# Patient Record
Sex: FEMALE | Race: WHITE | NOT HISPANIC OR LATINO | Employment: OTHER | URBAN - METROPOLITAN AREA
[De-identification: names, ages, dates, MRNs, and addresses within clinical notes are randomized per-mention and may not be internally consistent; named-entity substitution may affect disease eponyms.]

---

## 2017-02-01 ENCOUNTER — HOSPITAL ENCOUNTER (OUTPATIENT)
Dept: MAMMOGRAPHY | Facility: HOSPITAL | Age: 57
Discharge: HOME | End: 2017-02-01
Attending: INTERNAL MEDICINE | Admitting: INTERNAL MEDICINE

## 2017-02-01 DIAGNOSIS — Z12.31 VISIT FOR SCREENING MAMMOGRAM: ICD-10-CM

## 2017-02-01 PROCEDURE — G0202 SCR MAMMO BI INCL CAD: HCPCS

## 2017-02-01 PROCEDURE — 77067 SCR MAMMO BI INCL CAD: CPT | Performed by: RADIOLOGY

## 2017-02-01 PROCEDURE — 77063 BREAST TOMOSYNTHESIS BI: CPT

## 2017-02-01 PROCEDURE — 77063 BREAST TOMOSYNTHESIS BI: CPT | Performed by: RADIOLOGY

## 2017-02-17 ENCOUNTER — HOSPITAL ENCOUNTER (INPATIENT)
Facility: HOSPITAL | Age: 57
LOS: 3 days | Discharge: HOME | End: 2017-02-20
Attending: EMERGENCY MEDICINE | Admitting: FAMILY MEDICINE

## 2017-02-17 ENCOUNTER — APPOINTMENT (OUTPATIENT)
Dept: CT IMAGING | Facility: HOSPITAL | Age: 57
End: 2017-02-17

## 2017-02-17 DIAGNOSIS — Z86.79 HISTORY OF HYPERTENSION: ICD-10-CM

## 2017-02-17 DIAGNOSIS — N17.9 ACUTE RENAL FAILURE, UNSPECIFIED ACUTE RENAL FAILURE TYPE (HCC): Primary | ICD-10-CM

## 2017-02-17 DIAGNOSIS — R18.8 OTHER ASCITES: ICD-10-CM

## 2017-02-17 DIAGNOSIS — R11.2 NON-INTRACTABLE VOMITING WITH NAUSEA, UNSPECIFIED VOMITING TYPE: ICD-10-CM

## 2017-02-17 DIAGNOSIS — E87.20 LACTIC ACIDOSIS: ICD-10-CM

## 2017-02-17 DIAGNOSIS — I95.9 TRANSIENT HYPOTENSION: ICD-10-CM

## 2017-02-17 DIAGNOSIS — N83.8 OVARIAN MASS, LEFT: ICD-10-CM

## 2017-02-17 PROBLEM — K52.9 GASTROENTERITIS, ACUTE: Status: ACTIVE | Noted: 2017-02-17

## 2017-02-17 PROBLEM — E87.6 HYPOKALEMIA: Status: ACTIVE | Noted: 2017-02-17

## 2017-02-17 PROBLEM — D72.829 LEUKOCYTOSIS: Status: ACTIVE | Noted: 2017-02-17

## 2017-02-17 PROBLEM — A41.9 SEPSIS (HCC): Status: ACTIVE | Noted: 2017-02-17

## 2017-02-17 LAB
ALBUMIN SERPL-MCNC: 5 G/DL (ref 3.2–4.8)
ALBUMIN/GLOB SERPL: 1.4 G/DL (ref 1.5–2.5)
ALP SERPL-CCNC: 54 U/L (ref 25–100)
ALT SERPL W P-5'-P-CCNC: 27 U/L (ref 7–40)
ANION GAP SERPL CALCULATED.3IONS-SCNC: 25 MMOL/L (ref 3–11)
AST SERPL-CCNC: 31 U/L (ref 0–33)
B-OH-BUTYR SERPL-SCNC: 0.3 MMOL/L
BASOPHILS # BLD AUTO: 0.02 10*3/MM3 (ref 0–0.2)
BASOPHILS NFR BLD AUTO: 0.1 % (ref 0–1)
BILIRUB SERPL-MCNC: 0.4 MG/DL (ref 0.3–1.2)
BUN BLD-MCNC: 17 MG/DL (ref 9–23)
BUN/CREAT SERPL: 5.5 (ref 7–25)
CALCIUM SPEC-SCNC: 9.2 MG/DL (ref 8.7–10.4)
CHLORIDE SERPL-SCNC: 96 MMOL/L (ref 99–109)
CO2 SERPL-SCNC: 18 MMOL/L (ref 20–31)
CREAT BLD-MCNC: 3.1 MG/DL (ref 0.6–1.3)
D-LACTATE SERPL-SCNC: 5.2 MMOL/L (ref 0.5–2)
DEPRECATED RDW RBC AUTO: 46.7 FL (ref 37–54)
EOSINOPHIL # BLD AUTO: 0 10*3/MM3 (ref 0.1–0.3)
EOSINOPHIL NFR BLD AUTO: 0 % (ref 0–3)
ERYTHROCYTE [DISTWIDTH] IN BLOOD BY AUTOMATED COUNT: 15.7 % (ref 11.3–14.5)
GFR SERPL CREATININE-BSD FRML MDRD: 15 ML/MIN/1.73
GLOBULIN UR ELPH-MCNC: 3.6 GM/DL
GLUCOSE BLD-MCNC: 249 MG/DL (ref 70–100)
HCT VFR BLD AUTO: 46.5 % (ref 34.5–44)
HGB BLD-MCNC: 15.8 G/DL (ref 11.5–15.5)
IMM GRANULOCYTES # BLD: 0.18 10*3/MM3 (ref 0–0.03)
IMM GRANULOCYTES NFR BLD: 1.1 % (ref 0–0.6)
LIPASE SERPL-CCNC: 35 U/L (ref 6–51)
LYMPHOCYTES # BLD AUTO: 1.67 10*3/MM3 (ref 0.6–4.8)
LYMPHOCYTES NFR BLD AUTO: 10.3 % (ref 24–44)
MCH RBC QN AUTO: 27.4 PG (ref 27–31)
MCHC RBC AUTO-ENTMCNC: 34 G/DL (ref 32–36)
MCV RBC AUTO: 80.7 FL (ref 80–99)
MONOCYTES # BLD AUTO: 0.76 10*3/MM3 (ref 0–1)
MONOCYTES NFR BLD AUTO: 4.7 % (ref 0–12)
NEUTROPHILS # BLD AUTO: 13.52 10*3/MM3 (ref 1.5–8.3)
NEUTROPHILS NFR BLD AUTO: 83.8 % (ref 41–71)
PLATELET # BLD AUTO: 613 10*3/MM3 (ref 150–450)
PMV BLD AUTO: 9.3 FL (ref 6–12)
POTASSIUM BLD-SCNC: 2.8 MMOL/L (ref 3.5–5.5)
PROT SERPL-MCNC: 8.6 G/DL (ref 5.7–8.2)
RBC # BLD AUTO: 5.76 10*6/MM3 (ref 3.89–5.14)
SODIUM BLD-SCNC: 139 MMOL/L (ref 132–146)
WBC NRBC COR # BLD: 16.15 10*3/MM3 (ref 3.5–10.8)

## 2017-02-17 PROCEDURE — 74176 CT ABD & PELVIS W/O CONTRAST: CPT

## 2017-02-17 PROCEDURE — 83690 ASSAY OF LIPASE: CPT | Performed by: EMERGENCY MEDICINE

## 2017-02-17 PROCEDURE — 85025 COMPLETE CBC W/AUTO DIFF WBC: CPT | Performed by: EMERGENCY MEDICINE

## 2017-02-17 PROCEDURE — 25010000002 PIPERACILLIN-TAZOBACTAM: Performed by: EMERGENCY MEDICINE

## 2017-02-17 PROCEDURE — 82010 KETONE BODYS QUAN: CPT | Performed by: EMERGENCY MEDICINE

## 2017-02-17 PROCEDURE — 87040 BLOOD CULTURE FOR BACTERIA: CPT | Performed by: NURSE PRACTITIONER

## 2017-02-17 PROCEDURE — 83605 ASSAY OF LACTIC ACID: CPT | Performed by: EMERGENCY MEDICINE

## 2017-02-17 PROCEDURE — 25010000002 METOCLOPRAMIDE PER 10 MG: Performed by: EMERGENCY MEDICINE

## 2017-02-17 PROCEDURE — 99284 EMERGENCY DEPT VISIT MOD MDM: CPT

## 2017-02-17 PROCEDURE — 80053 COMPREHEN METABOLIC PANEL: CPT | Performed by: EMERGENCY MEDICINE

## 2017-02-17 PROCEDURE — 25010000002 PROMETHAZINE PER 50 MG: Performed by: EMERGENCY MEDICINE

## 2017-02-17 RX ORDER — AMLODIPINE BESYLATE 2.5 MG/1
5 TABLET ORAL DAILY
Status: ON HOLD | COMMUNITY
End: 2017-02-18

## 2017-02-17 RX ORDER — CETIRIZINE HYDROCHLORIDE 10 MG/1
10 TABLET ORAL DAILY
COMMUNITY
End: 2019-08-30 | Stop reason: SDUPTHER

## 2017-02-17 RX ORDER — FAMOTIDINE 10 MG/ML
20 INJECTION, SOLUTION INTRAVENOUS ONCE
Status: COMPLETED | OUTPATIENT
Start: 2017-02-17 | End: 2017-02-17

## 2017-02-17 RX ORDER — SODIUM CHLORIDE 9 MG/ML
125 INJECTION, SOLUTION INTRAVENOUS CONTINUOUS
Status: DISCONTINUED | OUTPATIENT
Start: 2017-02-17 | End: 2017-02-18 | Stop reason: SDUPTHER

## 2017-02-17 RX ORDER — POTASSIUM CHLORIDE 750 MG/1
10 TABLET, FILM COATED, EXTENDED RELEASE ORAL DAILY
Status: ON HOLD | COMMUNITY
End: 2017-02-20

## 2017-02-17 RX ORDER — METOCLOPRAMIDE HYDROCHLORIDE 5 MG/ML
10 INJECTION INTRAMUSCULAR; INTRAVENOUS ONCE
Status: COMPLETED | OUTPATIENT
Start: 2017-02-17 | End: 2017-02-17

## 2017-02-17 RX ORDER — SODIUM CHLORIDE 0.9 % (FLUSH) 0.9 %
10 SYRINGE (ML) INJECTION AS NEEDED
Status: DISCONTINUED | OUTPATIENT
Start: 2017-02-17 | End: 2017-02-20 | Stop reason: HOSPADM

## 2017-02-17 RX ORDER — PROMETHAZINE HYDROCHLORIDE 25 MG/ML
6.25 INJECTION, SOLUTION INTRAMUSCULAR; INTRAVENOUS ONCE
Status: COMPLETED | OUTPATIENT
Start: 2017-02-17 | End: 2017-02-17

## 2017-02-17 RX ADMIN — SODIUM CHLORIDE 125 ML/HR: 9 INJECTION, SOLUTION INTRAVENOUS at 20:59

## 2017-02-17 RX ADMIN — FAMOTIDINE 20 MG: 10 INJECTION, SOLUTION INTRAVENOUS at 18:12

## 2017-02-17 RX ADMIN — SODIUM CHLORIDE 1000 ML: 9 INJECTION, SOLUTION INTRAVENOUS at 18:09

## 2017-02-17 RX ADMIN — METOCLOPRAMIDE 10 MG: 5 INJECTION, SOLUTION INTRAMUSCULAR; INTRAVENOUS at 18:12

## 2017-02-17 RX ADMIN — SODIUM CHLORIDE 1000 ML: 9 INJECTION, SOLUTION INTRAVENOUS at 20:55

## 2017-02-17 RX ADMIN — PROMETHAZINE HYDROCHLORIDE 6.25 MG: 25 INJECTION, SOLUTION INTRAMUSCULAR; INTRAVENOUS at 18:10

## 2017-02-17 RX ADMIN — TAZOBACTAM SODIUM AND PIPERACILLIN SODIUM 4.5 G: .5; 4 INJECTION, POWDER, LYOPHILIZED, FOR SOLUTION INTRAVENOUS at 21:51

## 2017-02-17 NOTE — ED PROVIDER NOTES
Subjective   HPI Comments: Jocelyne Lindquist is a 56 y.o.female who presents to the ED with c/o vomiting onset 0300. EMS report the patient has been experiencing continued episodes of vomiting since 0300 this morning. She took Zofran at 1200 with minimal relief. The patient continued to vomit throughout the day, prompting her to call EMS. EMS treated her with Zofran and fluids. In the ED the patient is continuing to vomit. She reports her last BM was two days ago, however she has passed gas. She denies any other complaints at this time.     Patient is a 56 y.o. female presenting with vomiting.   History provided by:  Patient and EMS personnel  Vomiting   The primary symptoms include vomiting. Primary symptoms do not include fever, abdominal pain or diarrhea. Episode onset: 0300. The onset was sudden. The problem has not changed since onset.  The illness does not include chills.       Review of Systems   Constitutional: Negative.  Negative for chills and fever.   HENT: Negative for congestion and rhinorrhea.    Respiratory: Negative.  Negative for shortness of breath.    Cardiovascular: Negative.    Gastrointestinal: Positive for vomiting. Negative for abdominal pain and diarrhea.   Allergic/Immunologic: Negative for immunocompromised state.   Hematological: Does not bruise/bleed easily.   All other systems reviewed and are negative.      Past Medical History   Diagnosis Date   • Hypertension        No Known Allergies    Past Surgical History   Procedure Laterality Date   • Breast biopsy Left        Family History   Problem Relation Age of Onset   • Breast cancer Neg Hx    • Ovarian cancer Neg Hx        Social History     Social History   • Marital status:      Spouse name: N/A   • Number of children: N/A   • Years of education: N/A     Social History Main Topics   • Smoking status: Former Smoker   • Smokeless tobacco: None   • Alcohol use Yes      Comment: pt states sometimes she drinnks daily but hasnt had a  drink in weeks. pt drinks beer when she does drink   • Drug use: No   • Sexual activity: Defer     Other Topics Concern   • None     Social History Narrative   • None         Objective   Physical Exam   Constitutional: She is oriented to person, place, and time. She appears well-developed and well-nourished. No distress.   Patient is currently not in any pain, but appears uncomfortable.   HENT:   Head: Normocephalic and atraumatic.   Eyes: Conjunctivae are normal. No scleral icterus.   Neck: Normal range of motion. Neck supple.   Cardiovascular: Regular rhythm, normal heart sounds and intact distal pulses.    Tachycardic rate and rhythm.   Pulmonary/Chest: Effort normal and breath sounds normal. No respiratory distress.   Abdominal: Soft. Bowel sounds are normal. There is tenderness (Mild diffuse tenderness but no surgical signs no rigidity.).   Musculoskeletal: Normal range of motion. She exhibits no edema.   Neurological: She is alert and oriented to person, place, and time.   Skin: Skin is warm and dry.   Psychiatric: She has a normal mood and affect. Her behavior is normal. Judgment and thought content normal.   Nursing note and vitals reviewed.      Critical Care  Performed by: JUNG HOFFMANN  Authorized by: JUNG HOFFMANN   Total critical care time: 60 minutes  Critical care time was exclusive of separately billable procedures and treating other patients and teaching time.  Critical care was necessary to treat or prevent imminent or life-threatening deterioration of the following conditions: renal failure and metabolic crisis.  Critical care was time spent personally by me on the following activities: discussions with consultants, evaluation of patient's response to treatment, obtaining history from patient or surrogate, ordering and review of laboratory studies, review of old charts, development of treatment plan with patient or surrogate, examination of patient, ordering and performing treatments  and interventions, ordering and review of radiographic studies and re-evaluation of patient's condition.               ED Course  ED Course   Value Comment By Time   Creatinine: (!) 3.10 (Reviewed) Alan Fregoso MD 02/17 1845   WBC: (!) 16.15 (Reviewed) Alan Fregoso MD 02/17 1845   Hemoglobin: (!) 15.8 (Reviewed) Alan Fregoso MD 02/17 1845   Potassium: (!) 2.8 (Reviewed) Alan Fregoso MD 02/17 1845   Chloride: (!) 96 (Reviewed) Alan Fregoso MD 02/17 1845   CO2: (!) 18.0 (Reviewed) Alan Fregoso MD 02/17 1845   Glucose: (!) 249 (Reviewed) Alan Fregoso MD 02/17 1845    The patient has the findings on the CT scan with the acute renal failure and leukocytosis.  The patient is still unable to make any urine.  Thus we will give the patient another 1000 cc bolus.  This point the patient received well over the 30 cc/kg septic bolus in combination with the 1 L provided by EMS and then 2 L plus basal rate here in the emergency department. Alan Fregoso MD 02/17 2039   Lactate, Venous: (!!) 5.2 (Reviewed) Alan Fregoso MD 02/17 2045    Case discussed with Dr. Bobby who agrees with treatment plan for admission. Alan Fregoso MD 02/17 2101    Patient advised on the pelvic lesion as well as our plan for admission.  The patient understands and agrees with the plan. Alan Fregoso MD 02/17 2111     Recent Results (from the past 24 hour(s))   Comprehensive Metabolic Panel    Collection Time: 02/17/17  6:10 PM   Result Value Ref Range    Glucose 249 (H) 70 - 100 mg/dL    BUN 17 9 - 23 mg/dL    Creatinine 3.10 (H) 0.60 - 1.30 mg/dL    Sodium 139 132 - 146 mmol/L    Potassium 2.8 (L) 3.5 - 5.5 mmol/L    Chloride 96 (L) 99 - 109 mmol/L    CO2 18.0 (L) 20.0 - 31.0 mmol/L    Calcium 9.2 8.7 - 10.4 mg/dL    Total Protein 8.6 (H) 5.7 - 8.2 g/dL    Albumin 5.00 (H) 3.20 - 4.80 g/dL    ALT (SGPT) 27 7 - 40 U/L    AST (SGOT) 31 0 - 33 U/L    Alkaline Phosphatase  54 25 - 100 U/L    Total Bilirubin 0.4 0.3 - 1.2 mg/dL    eGFR Non African Amer 15 (L) >60 mL/min/1.73    Globulin 3.6 gm/dL    A/G Ratio 1.4 (L) 1.5 - 2.5 g/dL    BUN/Creatinine Ratio 5.5 (L) 7.0 - 25.0    Anion Gap 25.0 (H) 3.0 - 11.0 mmol/L   Lipase    Collection Time: 02/17/17  6:10 PM   Result Value Ref Range    Lipase 35 6 - 51 U/L   CBC Auto Differential    Collection Time: 02/17/17  6:10 PM   Result Value Ref Range    WBC 16.15 (H) 3.50 - 10.80 10*3/mm3    RBC 5.76 (H) 3.89 - 5.14 10*6/mm3    Hemoglobin 15.8 (H) 11.5 - 15.5 g/dL    Hematocrit 46.5 (H) 34.5 - 44.0 %    MCV 80.7 80.0 - 99.0 fL    MCH 27.4 27.0 - 31.0 pg    MCHC 34.0 32.0 - 36.0 g/dL    RDW 15.7 (H) 11.3 - 14.5 %    RDW-SD 46.7 37.0 - 54.0 fl    MPV 9.3 6.0 - 12.0 fL    Platelets 613 (H) 150 - 450 10*3/mm3    Neutrophil % 83.8 (H) 41.0 - 71.0 %    Lymphocyte % 10.3 (L) 24.0 - 44.0 %    Monocyte % 4.7 0.0 - 12.0 %    Eosinophil % 0.0 0.0 - 3.0 %    Basophil % 0.1 0.0 - 1.0 %    Immature Grans % 1.1 (H) 0.0 - 0.6 %    Neutrophils, Absolute 13.52 (H) 1.50 - 8.30 10*3/mm3    Lymphocytes, Absolute 1.67 0.60 - 4.80 10*3/mm3    Monocytes, Absolute 0.76 0.00 - 1.00 10*3/mm3    Eosinophils, Absolute 0.00 (L) 0.10 - 0.30 10*3/mm3    Basophils, Absolute 0.02 0.00 - 0.20 10*3/mm3    Immature Grans, Absolute 0.18 (H) 0.00 - 0.03 10*3/mm3   Lactic Acid, Plasma    Collection Time: 02/17/17  7:36 PM   Result Value Ref Range    Lactate 5.2 (C) 0.5 - 2.0 mmol/L   Beta Hydroxybutyrate Quantitative    Collection Time: 02/17/17  7:36 PM   Result Value Ref Range    Beta-Hydroxybutyrate Quant 0.300 <=0.500 mmol/L     Note: In addition to lab results from this visit, the labs listed above may include labs taken at another facility or during a different encounter within the last 24 hours. Please correlate lab times with ED admission and discharge times for further clarification of the services performed during this visit.    CT Abdomen Pelvis Without Contrast   Final  "Result   Abnormal      The overall findings are nonspecific but concerning for a moderate to severe    acute gastroenteritis.  Evolving small bowel ischemia cannot be excluded.      4.7 cm cystic structure in the left ovary. A followup ultrasound is recommended    for further evaluation.      Peripheral vascular disease.      Small to moderate amount of slightly complex free fluid of uncertain etiology    but may be related to a gastroenteritis. Subacute hemorrhage or an underlying    neoplastic process cannot be excluded.      Other findings as discussed above.        At 7:20 PM EST on 2/17/2017 the findings were discussed with Phoenix Vogel R.N.,    who reported she would promptly relay the information to Dr. Fregoso.         THIS DOCUMENT HAS BEEN ELECTRONICALLY SIGNED BY SHERRY CONDE MD        Vitals:    02/17/17 1754 02/17/17 1758 02/17/17 2000   BP:  101/64 113/72   Pulse:  (!) 131    Resp:  20    Temp:  97.7 °F (36.5 °C)    SpO2:  96% 92%   Weight: 136 lb (61.7 kg)     Height: 63\" (160 cm)       Medications   sodium chloride 0.9 % flush 10 mL (not administered)   sodium chloride 0.9 % infusion (125 mL/hr Intravenous New Bag 2/17/17 2059)   piperacillin-tazobactam (ZOSYN) 4.5 g/100 mL 0.9% NS IVPB (mbp) (not administered)   sodium chloride 0.9 % bolus 1,000 mL (0 mL Intravenous Stopped 2/17/17 2025)   famotidine (PEPCID) injection 20 mg (20 mg Intravenous Given 2/17/17 1812)   metoclopramide (REGLAN) injection 10 mg (10 mg Intravenous Given 2/17/17 1812)   promethazine (PHENERGAN) injection 6.25 mg (6.25 mg Intravenous Given 2/17/17 1810)   sodium chloride 0.9 % bolus 1,000 mL (1,000 mL Intravenous New Bag 2/17/17 2055)     ECG/EMG Results (last 24 hours)     ** No results found for the last 24 hours. **                        MDM  Number of Diagnoses or Management Options  Acute renal failure, unspecified acute renal failure type:   History of hypertension:   Lactic acidosis:   Non-intractable vomiting with " nausea, unspecified vomiting type:   Other ascites:   Ovarian mass, left:   Transient hypotension:   Diagnosis management comments: ECG/EMG Results (last 24 hours)     ** No results found for the last 24 hours. **             Amount and/or Complexity of Data Reviewed  Clinical lab tests: reviewed  Tests in the radiology section of CPT®: reviewed  Decide to obtain previous medical records or to obtain history from someone other than the patient: yes  Obtain history from someone other than the patient: yes  Discuss the patient with other providers: yes  Independent visualization of images, tracings, or specimens: yes    Critical Care  Total time providing critical care: 30-74 minutes      Final diagnoses:   Acute renal failure, unspecified acute renal failure type   Non-intractable vomiting with nausea, unspecified vomiting type   History of hypertension   Transient hypotension   Lactic acidosis   Ovarian mass, left   Other ascites       Documentation assistance provided by valentin Singh.  Information recorded by the scribe was done at my direction and has been verified and validated by me.     Casandra Singh  02/17/17 1831       Casandra Singh  02/17/17 2006       Casandra Singh  02/17/17 2026       Casandra Singh  02/17/17 2105       Alan Fregoso MD  02/17/17 2120

## 2017-02-18 PROBLEM — E86.9 VOLUME DEPLETION: Status: ACTIVE | Noted: 2017-02-18

## 2017-02-18 PROBLEM — N17.9 ACUTE RENAL FAILURE: Status: ACTIVE | Noted: 2017-02-18

## 2017-02-18 PROBLEM — Z72.0 TOBACCO ABUSE: Status: ACTIVE | Noted: 2017-02-18

## 2017-02-18 PROBLEM — N39.0 UTI (URINARY TRACT INFECTION): Status: ACTIVE | Noted: 2017-02-18

## 2017-02-18 PROBLEM — I95.9 HYPOTENSION: Status: ACTIVE | Noted: 2017-02-18

## 2017-02-18 PROBLEM — R73.9 HYPERGLYCEMIA: Status: ACTIVE | Noted: 2017-02-18

## 2017-02-18 PROBLEM — E83.42 HYPOMAGNESEMIA: Status: ACTIVE | Noted: 2017-02-18

## 2017-02-18 LAB
ALBUMIN SERPL-MCNC: 3.8 G/DL (ref 3.2–4.8)
ALBUMIN/GLOB SERPL: 1.4 G/DL (ref 1.5–2.5)
ALP SERPL-CCNC: 33 U/L (ref 25–100)
ALT SERPL W P-5'-P-CCNC: 20 U/L (ref 7–40)
ANION GAP SERPL CALCULATED.3IONS-SCNC: 15 MMOL/L (ref 3–11)
APTT PPP: 25.9 SECONDS (ref 24–31)
AST SERPL-CCNC: 37 U/L (ref 0–33)
BACTERIA UR QL AUTO: ABNORMAL /HPF
BASOPHILS # BLD AUTO: 0.01 10*3/MM3 (ref 0–0.2)
BASOPHILS NFR BLD AUTO: 0.1 % (ref 0–1)
BILIRUB SERPL-MCNC: 0.2 MG/DL (ref 0.3–1.2)
BILIRUB UR QL STRIP: ABNORMAL
BUN BLD-MCNC: 25 MG/DL (ref 9–23)
BUN/CREAT SERPL: 6.9 (ref 7–25)
CALCIUM SPEC-SCNC: 7.3 MG/DL (ref 8.7–10.4)
CHLORIDE SERPL-SCNC: 104 MMOL/L (ref 99–109)
CLARITY UR: ABNORMAL
CO2 SERPL-SCNC: 20 MMOL/L (ref 20–31)
COLOR UR: ABNORMAL
CREAT BLD-MCNC: 3.6 MG/DL (ref 0.6–1.3)
D-LACTATE SERPL-SCNC: 0.8 MMOL/L (ref 0.5–2)
D-LACTATE SERPL-SCNC: 2.3 MMOL/L (ref 0.5–2)
DEPRECATED RDW RBC AUTO: 45.9 FL (ref 37–54)
EOSINOPHIL # BLD AUTO: 0 10*3/MM3 (ref 0.1–0.3)
EOSINOPHIL NFR BLD AUTO: 0 % (ref 0–3)
ERYTHROCYTE [DISTWIDTH] IN BLOOD BY AUTOMATED COUNT: 15.6 % (ref 11.3–14.5)
GFR SERPL CREATININE-BSD FRML MDRD: 13 ML/MIN/1.73
GLOBULIN UR ELPH-MCNC: 2.7 GM/DL
GLUCOSE BLD-MCNC: 121 MG/DL (ref 70–100)
GLUCOSE BLDC GLUCOMTR-MCNC: 113 MG/DL (ref 70–130)
GLUCOSE BLDC GLUCOMTR-MCNC: 92 MG/DL (ref 70–130)
GLUCOSE UR STRIP-MCNC: ABNORMAL MG/DL
HBA1C MFR BLD: 5.6 % (ref 4.8–5.6)
HCT VFR BLD AUTO: 33.4 % (ref 34.5–44)
HGB BLD-MCNC: 11.4 G/DL (ref 11.5–15.5)
HGB UR QL STRIP.AUTO: ABNORMAL
HYALINE CASTS UR QL AUTO: ABNORMAL /LPF
IMM GRANULOCYTES # BLD: 0.07 10*3/MM3 (ref 0–0.03)
IMM GRANULOCYTES NFR BLD: 0.5 % (ref 0–0.6)
INR PPP: 1.1
KETONES UR QL STRIP: ABNORMAL
LEUKOCYTE ESTERASE UR QL STRIP.AUTO: ABNORMAL
LYMPHOCYTES # BLD AUTO: 1.58 10*3/MM3 (ref 0.6–4.8)
LYMPHOCYTES NFR BLD AUTO: 12.2 % (ref 24–44)
MAGNESIUM SERPL-MCNC: <0.7 MG/DL (ref 1.3–2.7)
MCH RBC QN AUTO: 27.1 PG (ref 27–31)
MCHC RBC AUTO-ENTMCNC: 34.1 G/DL (ref 32–36)
MCV RBC AUTO: 79.5 FL (ref 80–99)
MONOCYTES # BLD AUTO: 1.28 10*3/MM3 (ref 0–1)
MONOCYTES NFR BLD AUTO: 9.9 % (ref 0–12)
NEUTROPHILS # BLD AUTO: 10 10*3/MM3 (ref 1.5–8.3)
NEUTROPHILS NFR BLD AUTO: 77.3 % (ref 41–71)
NITRITE UR QL STRIP: NEGATIVE
PH UR STRIP.AUTO: <=5 [PH] (ref 5–8)
PLATELET # BLD AUTO: 482 10*3/MM3 (ref 150–450)
PMV BLD AUTO: 9.1 FL (ref 6–12)
POTASSIUM BLD-SCNC: 2.9 MMOL/L (ref 3.5–5.5)
PROT SERPL-MCNC: 6.5 G/DL (ref 5.7–8.2)
PROT UR QL STRIP: ABNORMAL
PROTHROMBIN TIME: 12 SECONDS (ref 9.6–11.5)
RBC # BLD AUTO: 4.2 10*6/MM3 (ref 3.89–5.14)
RBC # UR: ABNORMAL /HPF
REF LAB TEST METHOD: ABNORMAL
SODIUM BLD-SCNC: 139 MMOL/L (ref 132–146)
SP GR UR STRIP: 1.03 (ref 1–1.03)
SQUAMOUS #/AREA URNS HPF: ABNORMAL /HPF
UROBILINOGEN UR QL STRIP: ABNORMAL
WBC NRBC COR # BLD: 12.94 10*3/MM3 (ref 3.5–10.8)
WBC UR QL AUTO: ABNORMAL /HPF

## 2017-02-18 PROCEDURE — 25010000002 PIPERACILLIN SOD-TAZOBACTAM PER 1 G

## 2017-02-18 PROCEDURE — 83735 ASSAY OF MAGNESIUM: CPT | Performed by: NURSE PRACTITIONER

## 2017-02-18 PROCEDURE — 99222 1ST HOSP IP/OBS MODERATE 55: CPT | Performed by: FAMILY MEDICINE

## 2017-02-18 PROCEDURE — 83605 ASSAY OF LACTIC ACID: CPT | Performed by: EMERGENCY MEDICINE

## 2017-02-18 PROCEDURE — 85730 THROMBOPLASTIN TIME PARTIAL: CPT | Performed by: NURSE PRACTITIONER

## 2017-02-18 PROCEDURE — 85610 PROTHROMBIN TIME: CPT | Performed by: NURSE PRACTITIONER

## 2017-02-18 PROCEDURE — 82962 GLUCOSE BLOOD TEST: CPT

## 2017-02-18 PROCEDURE — 25010000002 HEPARIN (PORCINE) PER 1000 UNITS: Performed by: FAMILY MEDICINE

## 2017-02-18 PROCEDURE — 83605 ASSAY OF LACTIC ACID: CPT | Performed by: NURSE PRACTITIONER

## 2017-02-18 PROCEDURE — 87086 URINE CULTURE/COLONY COUNT: CPT | Performed by: EMERGENCY MEDICINE

## 2017-02-18 PROCEDURE — 94799 UNLISTED PULMONARY SVC/PX: CPT

## 2017-02-18 PROCEDURE — 81001 URINALYSIS AUTO W/SCOPE: CPT | Performed by: EMERGENCY MEDICINE

## 2017-02-18 PROCEDURE — 99233 SBSQ HOSP IP/OBS HIGH 50: CPT | Performed by: FAMILY MEDICINE

## 2017-02-18 PROCEDURE — 85025 COMPLETE CBC W/AUTO DIFF WBC: CPT | Performed by: NURSE PRACTITIONER

## 2017-02-18 PROCEDURE — 80053 COMPREHEN METABOLIC PANEL: CPT | Performed by: NURSE PRACTITIONER

## 2017-02-18 PROCEDURE — 25010000002 PIPERACILLIN SOD-TAZOBACTAM PER 1 G: Performed by: FAMILY MEDICINE

## 2017-02-18 PROCEDURE — 25010000003 POTASSIUM CHLORIDE 10 MEQ/100ML SOLUTION: Performed by: FAMILY MEDICINE

## 2017-02-18 PROCEDURE — 99252 IP/OBS CONSLTJ NEW/EST SF 35: CPT | Performed by: NURSE PRACTITIONER

## 2017-02-18 PROCEDURE — 83036 HEMOGLOBIN GLYCOSYLATED A1C: CPT | Performed by: NURSE PRACTITIONER

## 2017-02-18 PROCEDURE — 25010000002 MAGNESIUM SULFATE IN D5W 1G/100ML (PREMIX) 10-5 MG/ML-% SOLUTION: Performed by: FAMILY MEDICINE

## 2017-02-18 RX ORDER — SODIUM CHLORIDE 9 MG/ML
100 INJECTION, SOLUTION INTRAVENOUS CONTINUOUS
Status: DISCONTINUED | OUTPATIENT
Start: 2017-02-18 | End: 2017-02-20 | Stop reason: HOSPADM

## 2017-02-18 RX ORDER — POTASSIUM CHLORIDE 1.5 G/1.77G
20 POWDER, FOR SOLUTION ORAL ONCE
Status: COMPLETED | OUTPATIENT
Start: 2017-02-18 | End: 2017-02-18

## 2017-02-18 RX ORDER — ONDANSETRON 4 MG/1
4 TABLET, FILM COATED ORAL EVERY 6 HOURS PRN
Status: DISCONTINUED | OUTPATIENT
Start: 2017-02-18 | End: 2017-02-20 | Stop reason: HOSPADM

## 2017-02-18 RX ORDER — SODIUM CHLORIDE 450 MG/100ML
150 INJECTION, SOLUTION INTRAVENOUS CONTINUOUS
Status: DISCONTINUED | OUTPATIENT
Start: 2017-02-18 | End: 2017-02-18

## 2017-02-18 RX ORDER — AMLODIPINE BESYLATE 5 MG/1
5 TABLET ORAL DAILY
COMMUNITY
End: 2018-06-14 | Stop reason: SDUPTHER

## 2017-02-18 RX ORDER — OMEPRAZOLE 20 MG/1
20 CAPSULE, DELAYED RELEASE ORAL DAILY
COMMUNITY
End: 2018-06-14 | Stop reason: SDUPTHER

## 2017-02-18 RX ORDER — SODIUM CHLORIDE 0.9 % (FLUSH) 0.9 %
1-10 SYRINGE (ML) INJECTION AS NEEDED
Status: DISCONTINUED | OUTPATIENT
Start: 2017-02-18 | End: 2017-02-20 | Stop reason: HOSPADM

## 2017-02-18 RX ORDER — ASPIRIN 81 MG/1
81 TABLET, CHEWABLE ORAL DAILY
COMMUNITY
End: 2019-08-27

## 2017-02-18 RX ORDER — BENAZEPRIL HYDROCHLORIDE 20 MG/1
20 TABLET ORAL DAILY
COMMUNITY
End: 2018-07-25 | Stop reason: SDUPTHER

## 2017-02-18 RX ORDER — SODIUM CHLORIDE 9 MG/ML
125 INJECTION, SOLUTION INTRAVENOUS CONTINUOUS
Status: DISCONTINUED | OUTPATIENT
Start: 2017-02-18 | End: 2017-02-18

## 2017-02-18 RX ORDER — POTASSIUM CHLORIDE 7.45 MG/ML
10 INJECTION INTRAVENOUS
Status: DISCONTINUED | OUTPATIENT
Start: 2017-02-18 | End: 2017-02-20 | Stop reason: HOSPADM

## 2017-02-18 RX ORDER — HEPARIN SODIUM 5000 [USP'U]/ML
5000 INJECTION, SOLUTION INTRAVENOUS; SUBCUTANEOUS EVERY 12 HOURS SCHEDULED
Status: DISCONTINUED | OUTPATIENT
Start: 2017-02-18 | End: 2017-02-20 | Stop reason: HOSPADM

## 2017-02-18 RX ORDER — ONDANSETRON 2 MG/ML
4 INJECTION INTRAMUSCULAR; INTRAVENOUS EVERY 6 HOURS PRN
Status: DISCONTINUED | OUTPATIENT
Start: 2017-02-18 | End: 2017-02-20 | Stop reason: HOSPADM

## 2017-02-18 RX ORDER — TRIAMTERENE AND HYDROCHLOROTHIAZIDE 37.5; 25 MG/1; MG/1
1 CAPSULE ORAL EVERY MORNING
COMMUNITY
End: 2017-02-20 | Stop reason: HOSPADM

## 2017-02-18 RX ORDER — POTASSIUM CHLORIDE 750 MG/1
40 CAPSULE, EXTENDED RELEASE ORAL AS NEEDED
Status: DISCONTINUED | OUTPATIENT
Start: 2017-02-18 | End: 2017-02-20 | Stop reason: HOSPADM

## 2017-02-18 RX ORDER — ALBUTEROL SULFATE 90 UG/1
2 AEROSOL, METERED RESPIRATORY (INHALATION) EVERY 4 HOURS PRN
COMMUNITY
End: 2018-09-05 | Stop reason: SDUPTHER

## 2017-02-18 RX ORDER — POTASSIUM CHLORIDE 1.5 G/1.77G
40 POWDER, FOR SOLUTION ORAL AS NEEDED
Status: DISCONTINUED | OUTPATIENT
Start: 2017-02-18 | End: 2017-02-20 | Stop reason: HOSPADM

## 2017-02-18 RX ADMIN — POTASSIUM CHLORIDE 10 MEQ: 7.46 INJECTION, SOLUTION INTRAVENOUS at 11:54

## 2017-02-18 RX ADMIN — HEPARIN SODIUM 5000 UNITS: 5000 INJECTION, SOLUTION INTRAVENOUS; SUBCUTANEOUS at 12:03

## 2017-02-18 RX ADMIN — TAZOBACTAM SODIUM AND PIPERACILLIN SODIUM 2.25 G: 250; 2 INJECTION, SOLUTION INTRAVENOUS at 18:33

## 2017-02-18 RX ADMIN — POTASSIUM CHLORIDE 10 MEQ: 7.46 INJECTION, SOLUTION INTRAVENOUS at 17:07

## 2017-02-18 RX ADMIN — POTASSIUM CHLORIDE 10 MEQ: 7.46 INJECTION, SOLUTION INTRAVENOUS at 15:59

## 2017-02-18 RX ADMIN — SODIUM CHLORIDE 125 ML/HR: 9 INJECTION, SOLUTION INTRAVENOUS at 09:46

## 2017-02-18 RX ADMIN — SODIUM CHLORIDE 125 ML/HR: 9 INJECTION, SOLUTION INTRAVENOUS at 01:00

## 2017-02-18 RX ADMIN — MAGNESIUM SULFATE HEPTAHYDRATE 1 G: 1 INJECTION, SOLUTION INTRAVENOUS at 21:42

## 2017-02-18 RX ADMIN — POTASSIUM CHLORIDE 10 MEQ: 7.46 INJECTION, SOLUTION INTRAVENOUS at 13:18

## 2017-02-18 RX ADMIN — MAGNESIUM SULFATE HEPTAHYDRATE 1 G: 1 INJECTION, SOLUTION INTRAVENOUS at 23:13

## 2017-02-18 RX ADMIN — POTASSIUM CHLORIDE 20 MEQ: 1.5 POWDER, FOR SOLUTION ORAL at 06:40

## 2017-02-18 RX ADMIN — POTASSIUM CHLORIDE 10 MEQ: 7.46 INJECTION, SOLUTION INTRAVENOUS at 10:32

## 2017-02-18 RX ADMIN — TAZOBACTAM SODIUM AND PIPERACILLIN SODIUM 3.38 G: 375; 3 INJECTION, SOLUTION INTRAVENOUS at 08:49

## 2017-02-18 RX ADMIN — MAGNESIUM SULFATE HEPTAHYDRATE 1 G: 1 INJECTION, SOLUTION INTRAVENOUS at 18:33

## 2017-02-18 RX ADMIN — POTASSIUM CHLORIDE 10 MEQ: 7.46 INJECTION, SOLUTION INTRAVENOUS at 14:38

## 2017-02-18 RX ADMIN — HEPARIN SODIUM 5000 UNITS: 5000 INJECTION, SOLUTION INTRAVENOUS; SUBCUTANEOUS at 20:31

## 2017-02-18 RX ADMIN — MAGNESIUM SULFATE HEPTAHYDRATE 1 G: 1 INJECTION, SOLUTION INTRAVENOUS at 20:32

## 2017-02-18 NOTE — PROGRESS NOTES
"    [facility] Medicine Services  INPATIENT PROGRESS NOTE    Date of Admission: 2/17/2017  Length of Stay: 1  Primary Care Physician: Steve Trujillo MD    Subjective     Chief Complaint:   N/V, dehyration    HPI:  Pt feels markedly better today, still with \"waves\" of nausea but no emesis with sips of water and ice chips this am.  Starting to make urine, has had UOP x3 this am.  Denies abd pain surprisingly.     Review Of Systems:   Review of Systems   Constitutional: Positive for chills and fatigue. Negative for fever.   Cardiovascular: Negative for chest pain, palpitations and leg swelling.   Gastrointestinal: Positive for diarrhea and nausea. Negative for abdominal pain, constipation and vomiting.   Genitourinary: Positive for decreased urine volume. Negative for pelvic pain.   All other systems reviewed and are negative.        Objective      Temp:  [97.6 °F (36.4 °C)-98.4 °F (36.9 °C)] 98 °F (36.7 °C)  Heart Rate:  [] 92  Resp:  [14-20] 16  BP: ()/(64-78) 140/68  Physical Exam  Temp:  [97.6 °F (36.4 °C)-98.4 °F (36.9 °C)] 98 °F (36.7 °C)  Heart Rate:  [] 92  Resp:  [14-20] 16  BP: ()/(64-78) 140/68  Constitutional: no acute distress, awake, alert, MM still dry  Respiratory: Clear to auscultation bilaterally, nonlabored respirations   Cardiovascular: RRR, no murmurs  Gastrointestinal: Positive bowel sounds, soft, nontender, nondistended  Musculoskeletal: No bilateral ankle edema  Psychiatric: oriented x 3, appropriate affect, cooperative  Neurologic: Strength symmetric in all extremities       Results Review:    I have reviewed the labs, radiology results and diagnostic studies.      Results from last 7 days  Lab Units 02/18/17  0722   WBC 10*3/mm3 12.94*   HEMOGLOBIN g/dL 11.4*   PLATELETS 10*3/mm3 482*       Results from last 7 days  Lab Units 02/18/17  0722   SODIUM mmol/L 139   POTASSIUM mmol/L 2.9*   TOTAL CO2 mmol/L 20.0   CREATININE mg/dL 3.60*   GLUCOSE " mg/dL 121*       Culture Data:    Radiology Data:     I have reviewed the medications.    Assessment/Plan     Assessment/Problem List  Principal Problem:    Volume depletion  Active Problems:    MAMADOU (acute kidney injury)    Nausea & vomiting    Gastroenteritis, acute    Ovarian mass    Lactic acidosis    Hypokalemia    Leukocytosis    Sepsis    Hypotension    Tobacco abuse    Hyperglycemia    Hypomagnesemia    UTI (urinary tract infection)           Plan    - symptomatically feels much better today, starting to make UOP  - keep NPO except sips/cgips today and will consider clears tomorrow  - replace lytes aggressively, continue IVF's  - trend creatinine now that starting to make UOP and trending toward rehydration  - follow urine Cx, on Zosyn for now as coverage for gastroenteritis and UTI  - Gyn/Onc to see for ovarian mass although suspect may be benign must at least have it reviewed given presentation with pelvic free fluid (although my suspicion is this was related to her sever gastroenteritis)    DVT prophylaxis:  SC hep  Discharge Planning: I expect patient to be discharged home likely ~Mon/Tues if rehydrated and MAMADOU reliably resolving etc.     Laura Chery MD   02/18/17   11:01 AM    Please note that portions of this note may have been completed with a voice recognition program. Efforts were made to edit the dictations, but occasionally words are mistranscribed.

## 2017-02-18 NOTE — CONSULTS
GYNECOLOGIC ONCOLOGY INPATIENT CONSULTATION    Jocelyne Lindquist  [id number]  1960    Date of Admission: 2/17/2017  Date of Consultation: 02/18/17    Consulting Physician: Dr. Phipps  Patient Care Team:  Steve Trujillo MD as PCP - General  Steve Trujillo MD as PCP - Family Medicine    Admission Diagnoses:  Principal Problem:    Volume depletion  Active Problems:    MAMADOU (acute kidney injury)    Nausea & vomiting    Gastroenteritis, acute    Ovarian mass    Lactic acidosis    Hypokalemia    Leukocytosis    Sepsis    Hypotension    Tobacco abuse    Hyperglycemia    Hypomagnesemia    UTI (urinary tract infection)      History of Present Illness: Patient is a 56 y.o. female admitted to the hospital for acute renal failure, nausea and vomiting, and gastroenteritis. Patient states that she started having abdominal pain about 24 hours ago. States that around 0300 on 2/17/17 she started having episodes of vomiting and states that since that point time her abdominal pain has pretty much resolved, however she is continuously had intractable vomiting. Ultimately the vomiting prompted the patient to come to the hospital. Upon evaluation in the ER she was found to have creatinine of 3.10, potassium of 2.8, and lactic acid of 5.2. CAT scan abdomen and pelvic without contrast was ordered that revealed moderate to severe acute gastroenteritis with concern for small bowel ischemia, with incidental finding of 4.7 cm cystic structure to the left ovary and small amount of free fluid. We were consulted at the request of Dr. Laura Chery with hospitalist service due to findings of ovarian cyst.      The patient is a G0 who went through menopause around the age of 48. She has no history of ovarian cyst, endometriosis, or other GYN complaints. She had an annual exam done with dr. Trujillo in February including pap smear, that per her report was normal. She denies pelvic cramping or tenderness, and denies vaginal bleeding or  spotting.     Cancer History:   No history exists.       Allergies:  Allergies   Allergen Reactions   • Eggs Or Egg-Derived Products      Causes cramps   • Fish-Derived Products      Causes severe stomach cramps       Past Medical History:  Past Medical History   Diagnosis Date   • Hypertension    • PVD (peripheral vascular disease)      OB History   No data available          Past Surgical History:  Past Surgical History   Procedure Laterality Date   • Breast biopsy Left    • Cyst removal       right wrist        Social History:  reports that she has been smoking.  She has a 20.00 pack-year smoking history. She does not have any smokeless tobacco history on file. She reports that she drinks alcohol. She reports that she does not use illicit drugs.    Family History:   Family History   Problem Relation Age of Onset   • Breast cancer Neg Hx    • Ovarian cancer Neg Hx        Medications: Reviewed per the EMR.    heparin (porcine) 5,000 Units Subcutaneous Q12H   piperacillin-tazobactam 3.375 g Intravenous Q12H     magnesium sulfate in D5W 1g/100mL (PREMIX) **OR** magnesium sulfate in D5W 1g/100mL (PREMIX) **OR** magnesium sulfate in D5W 1g/100mL (PREMIX)  •  ondansetron **OR** ondansetron  •  potassium chloride **OR** potassium chloride **OR** potassium chloride  •  sodium chloride  •  Insert peripheral IV **AND** sodium chloride    Review of Systems:  CONSTITUTIONAL:  Weight has been stable, no excessive fatigue.  No fever, chills, night sweats.  PSYCHIATRIC: No history of anxiety, depression, bipolar disorder, or insomnia  EYES: Vision is unchanged. No photophobia.  EARS, NOSE, MOUTH, AND THROAT: Hearing normal. No swallowing difficulties.  No sore throat.  ENDOCRINE: No history of diabetes or thyroid disease.  LYMPHATIC: No enlarged lymph nodes.  RESPIRATORY: No shortness of breath, cough, asthma or wheezing.  No hemoptysis.  CARDIOVASCULAR: No angina, orthopnea, or edema.  No HTN or  hyperlipidemia.  GASTROINTESTINAL: No constipation, diarrhea, or melena.  No reflux.  No nausea or vomiting.  GENITOURINARY: No dysuria, hematuria, urgency, or frequency.  NEUROLOGIC: No numbness, weakness, motor disturbance, syncope, seizure, or headaches.  MUSCULOSKELETAL: No joint pain.  No muscle weakness.  INTEGUMENTARY: No new skin lesions.  GYNECOLOGIC: Per history of present illness.  HEMATOLOGIC: No bleeding problems.  Denies easy bruising.      Vital Signs  Temp:  [97.6 °F (36.4 °C)-98.4 °F (36.9 °C)] 98 °F (36.7 °C)  Heart Rate:  [] 92  Resp:  [14-20] 16  BP: ()/(64-78) 140/68    Physical Exam:  General Appearance:  alert, cooperative, no apparent distress and appears stated age   Neurologic/Psychiatric: A&O x 3, gait steady, appropriate affect   HEENT:  Normocephalic, without obvious abnormality, mucous membranes moist   Neck: Supple, symmetrical, trachea midline, no adenopathy;  No thyromegaly, masses, or tenderness   Back:   Symmetric, no curvature, ROM normal, no CVA tenderness   Lungs:   Clear to auscultation bilaterally; respirations regular, even, and unlabored bilaterally   Heart:  Regular rate and rhythm, no murmurs appreciated   Breasts:  deferred   Abdomen:   Soft, non-tender, non-distended and no organomegaly   Lymph nodes: No cervical, supraclavicular, inguinal or axillary adenopathy noted   Extremities: Normal, atraumatic; no clubbing, cyanosis, or edema    Skin: No rashes, ulcers, or suspicious lesions noted   Pelvic: External Genitalia  without lesions or skin changes  Vagina  is pink, moist, without lesions.   Cervix  no CMT  Uterus  normal size, midposition and nontender  Ovaries  without palpable masses or fullness  Parametria  smooth     ECOG Performance Status: 1 - Symptomatic but completely ambulatory       Results Review:    I reviewed the patient's new clinical results.  I reviewed the patient's new imaging results and agree with the interpretation.  I reviewed the  patient's other test results and agree with the interpretation  Discussed with Dr. Phipps and Dr. Chery    Assessment and Plan: This is a 56 y.o. female with acute gastroenteritis, intractable nausea and vomiting, acute renal failure, and 4.7 cm left ovarian cyst.    1. Ovarian cyst- this appears to be a simple cyst on CAT scan. Will need further evaluation with TVUS, but can be deferred until patient's symptoms/overall condition improve. May order as inpatient in the next few days. Discussed with the patient that this is not likely to be the cause of her previous abdominal discomfort, nausea and vomiting. We discussed the benign nature of this cyst on imaging, with free fluid likely related to acute inflammation of the GI tract from enteritis. Pelvic exam benign.  2. Gastroenteritis- continue supportive care with IV fluids and antiemetics per hospitalist service.  3. ARF/Lactic acidosis- IVF with ongoing evaluation of labs  4. Hypokalemia- repletion per protocol  5. UTI- on Zosyn, final culture pending  6. Tobacco abuse- encouraged cessation  7. PPX- continue TEDs/SCDs/Ambulation as ordered.     I discussed the patients findings and my recommendations with patient and consulting provider.     Thank you for involving us in the care of this patient. Please do not hesitate to contact our on call physician with any questions.     Accompanied Ms Jodi at visit , agree with assessment as documented.  We will look in again tomorrow.     SUE Keita    Note: Speech recognition transcription software was used to dictate portions of this document.  An attempt at proofreading has been made though minor errors in transcription may still be present.  Please do not hesitate to call our office with any questions.

## 2017-02-18 NOTE — H&P
Patient Care Team:  Steve Trujillo MD as PCP - General  Steve Trujillo MD as PCP - Family Medicine    Chief complaint Nausea    Subjective     Patient is a 56 y.o. female presents with nausea.  Patient states that she started having abdominal pain about 24 hours ago.  States that around 0300 on 2/17/17 she started having episodes of vomiting and states that since that point time her abdominal pain has pretty much resolved, however she is continuously had intractable vomiting.  Ultimately the vomiting prompted the patient to come to the hospital.  Patient states that right now her vomiting and nausea does seem to have improved since she came up from the ER.  States that the last time she had episode of vomiting was while she was in the ER.  Patient does state that she did have a bowel movement today and that her bowel movements have been fairly regular.    Review of Systems   All others reviewed and found negative except mentioned in history of present illness    History  Past Medical History   Diagnosis Date   • Hypertension    • PVD (peripheral vascular disease)      Past Surgical History   Procedure Laterality Date   • Breast biopsy Left      Current Facility-Administered Medications   Medication Dose Route Frequency Provider Last Rate Last Dose   • ondansetron (ZOFRAN) tablet 4 mg  4 mg Oral Q6H PRN SUE Boss        Or   • ondansetron (ZOFRAN) injection 4 mg  4 mg Intravenous Q6H PRN SUE Boss       • piperacillin-tazobactam (ZOSYN) 3.375 g in dextrose 50 mL IVPB (premix)  3.375 g Intravenous Q6H SUE Boss       • sodium chloride 0.9 % flush 1-10 mL  1-10 mL Intravenous PRN SUE Boss       • sodium chloride 0.9 % flush 10 mL  10 mL Intravenous PRN Alan Fregoso MD       • sodium chloride 0.9 % infusion  125 mL/hr Intravenous Continuous Alan Fregoso  mL/hr at 02/17/17 2059 125 mL/hr at 02/17/17 2059   • sodium chloride 0.9 % infusion  125  mL/hr Intravenous Continuous Mag SUE Beltran           sodium chloride 125 mL/hr Last Rate: 125 mL/hr (02/17/17 2059)   sodium chloride 125 mL/hr      •  ondansetron **OR** ondansetron  •  sodium chloride  •  Insert peripheral IV **AND** sodium chloride  Allergies   Allergen Reactions   • Eggs Or Egg-Derived Products      Causes cramps   • Fish-Derived Products      Causes severe stomach cramps     Family History   Problem Relation Age of Onset   • Breast cancer Neg Hx    • Ovarian cancer Neg Hx      Social History     Social History   • Marital status:      Spouse name: N/A   • Number of children: N/A   • Years of education: N/A     Occupational History   • Not on file.     Social History Main Topics   • Smoking status: Former Smoker   • Smokeless tobacco: Not on file   • Alcohol use Yes      Comment: pt states sometimes she drinnks daily but hasnt had a drink in weeks. pt drinks beer when she does drink   • Drug use: No   • Sexual activity: Defer     Other Topics Concern   • Not on file     Social History Narrative   • No narrative on file       Objective     Vital Signs  Temp:  [97.6 °F (36.4 °C)-98.4 °F (36.9 °C)] 98.4 °F (36.9 °C)  Heart Rate:  [] 88  Resp:  [16-20] 16  BP: ()/(64-78) 90/67    Physical Exam:      General Appearance:    Alert, cooperative, in no acute distress   Head:    Normocephalic, without obvious abnormality, atraumatic   Eyes:            Lids and lashes normal, conjunctivae and sclerae normal, no   icterus, no pallor, corneas clear, PERRLA   Ears:    Ears appear intact with no abnormalities noted   Throat:   No oral lesions, no thrush, oral mucosa moist   Neck:   No adenopathy, supple, trachea midline, no thyromegaly, no     carotid bruit, no JVD   Back:     No kyphosis present, no scoliosis present, no skin lesions,       erythema or scars, no tenderness to percussion or                   palpation,   range of motion normal   Lungs:     Clear to  auscultation,respirations regular, even and                   unlabored    Heart:    Regular rhythm and normal rate, normal S1 and S2, no            murmur, no gallop, no rub, no click   Breast Exam:    Deferred   Abdomen:     Normal bowel sounds, no masses, no organomegaly, soft        non-tender, non-distended, no guarding, no rebound                 tenderness   Genitalia:    Deferred   Extremities:   Moves all extremities well, no edema, no cyanosis, no              redness   Pulses:   Pulses palpable and equal bilaterally   Skin:   No bleeding, bruising or rash   Lymph nodes:   No palpable adenopathy   Neurologic:   Cranial nerves 2 - 12 grossly intact, sensation intact, DTR        present and equal bilaterally       Results Review:    I reviewed the patient's new clinical results.    Assessment/Plan     Active Problems:    Acute kidney failure    Nausea & vomiting    Gastroenteritis, acute    Ovarian mass    Lactic acidosis    Hypokalemia    Leukocytosis    Sepsis    Hypotension    Acute renal failure    Tobacco abuse      I I did discuss the CT findings with the on call surgeon.  Due to the fact that the patient's abdominal exam is so benign at this point time and the pain has completely resolved we will currently just monitor the patient and give her IV fluid for hydration.  We will go ahead and see about getting GYN oncology involved as well due to the patient's ovarian mass and see what workup they would recommend.  We will continue the patient on Zosyn for the time being as well as continuing giving the patient IV fluids.  We will monitor the patient's lab work by rechecking it in the morning.     I discussed the patients findings and my recommendations with patient.     Jan Bobby,   02/18/17  12:36 AM

## 2017-02-18 NOTE — H&P
[facility]  HISTORY AND PHYSICAL    Primary Care Physician: Steve Trujillo MD    Subjective     Chief Complaint- nausea and vomiting     History of Present Illness   55 yo female presented to the ed w/ c/o nausea and vomiting. Patient reports that 2 days ago she had abdominal cramping that was followed by nausea and vomiting. Since onset, she has continued to have nausea and vomiting and has been unable to tolerate anything by mouth. Her abdominal cramping/pain subsided and she has had no abdominal discomfort in 2 days. She denies chest pain, dyspnea, dysuria, fever/chills, diarrhea, melena. Her last normal bowel movement was yesterday. Patient found to have an elevated WBC, lactic acidosis, MAMADOU and an abnormal CT abd/pel. Admitted to the hospital medicine service for further evaluation.       Review of Systems   Constitutional: Negative for chills, diaphoresis, fatigue, fever and unexpected weight change.   HENT: Negative for congestion, sinus pressure, sneezing and sore throat.    Eyes: Negative for visual disturbance.   Respiratory: Negative for cough, shortness of breath, wheezing and stridor.    Cardiovascular: Negative for chest pain, palpitations and leg swelling.   Gastrointestinal: Positive for nausea and vomiting. Negative for abdominal distention, abdominal pain, blood in stool, constipation and diarrhea.        Intractable nausea and vomiting x 2 days.    Genitourinary: Negative for decreased urine volume, dysuria, flank pain, frequency, hematuria and urgency.   Musculoskeletal: Negative for back pain.   Skin: Negative for rash and wound.   Neurological: Negative for dizziness, syncope and weakness.   Psychiatric/Behavioral: Negative for confusion.   All other systems reviewed and are negative.     Otherwise complete ROS reviewed and negative except as mentioned in the HPI.      Past Medical History:   Past Medical History   Diagnosis Date   • Hypertension  "   • PVD (peripheral vascular disease)        Past Surgical History:  Past Surgical History   Procedure Laterality Date   • Breast biopsy Left    • Cyst removal       right wrist        Family History:   Family History   Problem Relation Age of Onset   • Breast cancer Neg Hx    • Ovarian cancer Neg Hx      Social History:   Social History     Social History   • Marital status:      Spouse name: N/A   • Number of children: N/A   • Years of education: N/A     Occupational History   • Not on file.     Social History Main Topics   • Smoking status: Current Some Day Smoker     Packs/day: 1.00     Years: 20.00   • Smokeless tobacco: Not on file      Comment: Neetuenltramakrishna trying to quit, 2 ciggs in 2 weeks.   • Alcohol use Yes      Comment: 4-6 beers/night in the past. Only 1 beer in the past 3 weeks.   • Drug use: No   • Sexual activity: Defer     Other Topics Concern   • Not on file     Social History Narrative   • No narrative on file       Medications:  Prescriptions Prior to Admission   Medication Sig Dispense Refill Last Dose   • amLODIPine (NORVASC) 2.5 MG tablet Take 2.5 mg by mouth Daily.      • cetirizine (zyrTEC) 10 MG tablet Take 10 mg by mouth Daily.      • potassium chloride (K-DUR) 10 MEQ CR tablet Take 10 mEq by mouth 2 (Two) Times a Day.        Allergies:  Allergies   Allergen Reactions   • Eggs Or Egg-Derived Products      Causes cramps   • Fish-Derived Products      Causes severe stomach cramps       Objective     Vital Signs:   Visit Vitals   • BP 90/67 (BP Location: Left arm, Patient Position: Lying)   • Pulse 88   • Temp 98.4 °F (36.9 °C) (Axillary)   • Resp 16   • Ht 63\" (160 cm)   • Wt 136 lb (61.7 kg)   • SpO2 96%   • BMI 24.09 kg/m2     Physical Exam   Constitutional: She is oriented to person, place, and time. She appears well-developed and well-nourished. No distress.   HENT:   Head: Normocephalic and atraumatic.   Eyes: EOM are normal. Pupils are equal, round, and reactive to light.   Neck: " Normal range of motion. Neck supple.   Cardiovascular: Normal rate, regular rhythm, normal heart sounds and intact distal pulses.  Exam reveals no gallop and no friction rub.    No murmur heard.  Pulmonary/Chest: Effort normal and breath sounds normal. No respiratory distress. She has no wheezes. She has no rales. She exhibits no tenderness.   Abdominal: Soft. Bowel sounds are normal. She exhibits no distension. There is no tenderness.   Musculoskeletal: Normal range of motion. She exhibits no edema.   Neurological: She is alert and oriented to person, place, and time. No cranial nerve deficit.   Skin: Skin is warm and dry. No rash noted. She is not diaphoretic. No erythema. No pallor.   Psychiatric: She has a normal mood and affect. Her behavior is normal. Judgment and thought content normal.   Vitals reviewed.      Results Reviewed:    Results from last 7 days  Lab Units 02/17/17  1810   WBC 10*3/mm3 16.15*   HEMOGLOBIN g/dL 15.8*   PLATELETS 10*3/mm3 613*       Results from last 7 days  Lab Units 02/17/17  1810   SODIUM mmol/L 139   POTASSIUM mmol/L 2.8*   TOTAL CO2 mmol/L 18.0*   CREATININE mg/dL 3.10*   GLUCOSE mg/dL 249*   CALCIUM mg/dL 9.2     CT ABD/PEL without contrast: The overall findings are nonspecific but concerning for a moderate to severe   acute gastroenteritis. Evolving small bowel ischemia cannot be excluded.  4.7 cm cystic structure in the left ovary. A followup ultrasound is recommended   for further evaluation.  Peripheral vascular disease.  Small to moderate amount of slightly complex free fluid of uncertain etiology   but may be related to a gastroenteritis. Subacute hemorrhage or an underlying   neoplastic process cannot be excluded.      I have personally reviewed and interpreted the radiology studies and ECG obtained at time of admission.     Assessment / Plan      Assessment & Plan  Principal Problem:    Volume depletion  Active Problems:    Acute kidney failure    Nausea & vomiting     Gastroenteritis, acute    Ovarian mass    Lactic acidosis    Hypokalemia    Leukocytosis    Sepsis    Hypotension    Acute renal failure    Tobacco abuse    Hyperglycemia    PLAN:  Sepsis   -secondary to leukocytosis, lactic acidosis, MAMADOU, hypotension  -CT abd/pel reviewed  -blood cultures pending  -started on IV Zosyn in ed; will continue  -IVF  -supportive care    Acute renal failure  -baseline unclear  -UA pending  -likely 2/2 volume depletion  -IVF  -repeat CMP in am   -hold nephrotoxic meds    Lactic acidosis  -IVF  -repeat level pending    Acute gastroenteritis  -per CT abd/pel  -IVF  -supportive care    Nausea/vomiting  -PRN antiemetics  -supportive care  -CT abd/pel reviewed; results above    Hypotension  -IVF  -telemetry monitoring    Hypokalemia  -magnesium w/ am labs  -20meq tonight, repeat in am    Ovarian mass  -per CT abd/pel  -general surgery consulted (ED physician spoke w/ Dr. Almeida)  -GYN/ONC consulted    Hyperglycemia  -no hx of diabetes  -hemoglobin a1c pending  -FSBG q 6 hrs     Tobacco abuse  -pt reports that she is currently trying to quit  -has smoked 2 cigarettes in the last 2 weeks  -smoked 1ppd/20+yrs    DVT prophylaxis: teds/scds only for now  Code Status: Full Code     I discussed the patients findings and my recommendations with: the patient and the primary care team.   Mag Beltran, APRN 02/18/17 12:48 AM      Brief Attending Note   I have seen and examined the patient, performing an independent face-to-face diagnostic evaluation at *** in the ***.    The plan of care reviewed and developed with the advanced practice clinician (APC):   *** .  Brief Summary Statement/HPI:   (insert at least 4 elements of HPI for Level 3 billing)      Attending Physical Exam:  (insert LEXEXAMHP meets criteria for Level 3 exam)      Brief Assessment/Plan :      See above for further detailed assessment and plan developed with APC which I have reviewed and/or edited.    I believe this patient requires  (*** insert LEXOBS or LEXINPT, add any supporting concerns here to justify INPATIENT status, for OBS patient no discussion is required)      SUE Boss 02/18/17 12:48 AM

## 2017-02-18 NOTE — PLAN OF CARE
Problem: Patient Care Overview (Adult)  Goal: Plan of Care Review  Outcome: Ongoing (interventions implemented as appropriate)    02/18/17 0559   Coping/Psychosocial Response Interventions   Plan Of Care Reviewed With patient;spouse   Patient Care Overview   Progress improving   Outcome Evaluation   Outcome Summary/Follow up Plan pt able to tolerate sips & chips. ambulatng in room. no pain, N/V/D noted. continuing electrolyte replacement.

## 2017-02-19 ENCOUNTER — APPOINTMENT (OUTPATIENT)
Dept: ULTRASOUND IMAGING | Facility: HOSPITAL | Age: 57
End: 2017-02-19

## 2017-02-19 LAB
ALBUMIN SERPL-MCNC: 3.6 G/DL (ref 3.2–4.8)
ALBUMIN/GLOB SERPL: 1.4 G/DL (ref 1.5–2.5)
ALP SERPL-CCNC: 31 U/L (ref 25–100)
ALT SERPL W P-5'-P-CCNC: 19 U/L (ref 7–40)
ANION GAP SERPL CALCULATED.3IONS-SCNC: 10 MMOL/L (ref 3–11)
AST SERPL-CCNC: 59 U/L (ref 0–33)
BILIRUB SERPL-MCNC: 0.4 MG/DL (ref 0.3–1.2)
BUN BLD-MCNC: 24 MG/DL (ref 9–23)
BUN/CREAT SERPL: 8.9 (ref 7–25)
CA-I SERPL ISE-MCNC: 0.94 MMOL/L (ref 1.12–1.32)
CALCIUM SPEC-SCNC: 6.8 MG/DL (ref 8.7–10.4)
CHLORIDE SERPL-SCNC: 107 MMOL/L (ref 99–109)
CO2 SERPL-SCNC: 18 MMOL/L (ref 20–31)
CREAT BLD-MCNC: 2.7 MG/DL (ref 0.6–1.3)
DEPRECATED RDW RBC AUTO: 47.9 FL (ref 37–54)
ERYTHROCYTE [DISTWIDTH] IN BLOOD BY AUTOMATED COUNT: 16 % (ref 11.3–14.5)
GFR SERPL CREATININE-BSD FRML MDRD: 18 ML/MIN/1.73
GLOBULIN UR ELPH-MCNC: 2.5 GM/DL
GLUCOSE BLD-MCNC: 107 MG/DL (ref 70–100)
GLUCOSE BLDC GLUCOMTR-MCNC: 105 MG/DL (ref 70–130)
GLUCOSE BLDC GLUCOMTR-MCNC: 82 MG/DL (ref 70–130)
HCT VFR BLD AUTO: 29.7 % (ref 34.5–44)
HGB BLD-MCNC: 10 G/DL (ref 11.5–15.5)
MAGNESIUM SERPL-MCNC: 2 MG/DL (ref 1.3–2.7)
MCH RBC QN AUTO: 27.4 PG (ref 27–31)
MCHC RBC AUTO-ENTMCNC: 33.7 G/DL (ref 32–36)
MCV RBC AUTO: 81.4 FL (ref 80–99)
PLATELET # BLD AUTO: 459 10*3/MM3 (ref 150–450)
PMV BLD AUTO: 8.6 FL (ref 6–12)
POTASSIUM BLD-SCNC: 3.7 MMOL/L (ref 3.5–5.5)
PROT SERPL-MCNC: 6.1 G/DL (ref 5.7–8.2)
RBC # BLD AUTO: 3.65 10*6/MM3 (ref 3.89–5.14)
SODIUM BLD-SCNC: 135 MMOL/L (ref 132–146)
WBC NRBC COR # BLD: 10.16 10*3/MM3 (ref 3.5–10.8)

## 2017-02-19 PROCEDURE — 82330 ASSAY OF CALCIUM: CPT | Performed by: FAMILY MEDICINE

## 2017-02-19 PROCEDURE — 93976 VASCULAR STUDY: CPT

## 2017-02-19 PROCEDURE — 25010000002 PIPERACILLIN SOD-TAZOBACTAM PER 1 G

## 2017-02-19 PROCEDURE — 25010000002 MAGNESIUM SULFATE IN D5W 1G/100ML (PREMIX) 10-5 MG/ML-% SOLUTION: Performed by: FAMILY MEDICINE

## 2017-02-19 PROCEDURE — 25010000002 HEPARIN (PORCINE) PER 1000 UNITS: Performed by: FAMILY MEDICINE

## 2017-02-19 PROCEDURE — 85027 COMPLETE CBC AUTOMATED: CPT | Performed by: FAMILY MEDICINE

## 2017-02-19 PROCEDURE — 83735 ASSAY OF MAGNESIUM: CPT | Performed by: FAMILY MEDICINE

## 2017-02-19 PROCEDURE — 76830 TRANSVAGINAL US NON-OB: CPT

## 2017-02-19 PROCEDURE — 82962 GLUCOSE BLOOD TEST: CPT

## 2017-02-19 PROCEDURE — 99232 SBSQ HOSP IP/OBS MODERATE 35: CPT | Performed by: NURSE PRACTITIONER

## 2017-02-19 PROCEDURE — 99233 SBSQ HOSP IP/OBS HIGH 50: CPT | Performed by: FAMILY MEDICINE

## 2017-02-19 PROCEDURE — 80053 COMPREHEN METABOLIC PANEL: CPT | Performed by: FAMILY MEDICINE

## 2017-02-19 RX ADMIN — HEPARIN SODIUM 5000 UNITS: 5000 INJECTION, SOLUTION INTRAVENOUS; SUBCUTANEOUS at 08:23

## 2017-02-19 RX ADMIN — TAZOBACTAM SODIUM AND PIPERACILLIN SODIUM 2.25 G: 250; 2 INJECTION, SOLUTION INTRAVENOUS at 09:30

## 2017-02-19 RX ADMIN — MAGNESIUM SULFATE HEPTAHYDRATE 1 G: 1 INJECTION, SOLUTION INTRAVENOUS at 00:44

## 2017-02-19 RX ADMIN — HEPARIN SODIUM 5000 UNITS: 5000 INJECTION, SOLUTION INTRAVENOUS; SUBCUTANEOUS at 21:13

## 2017-02-19 RX ADMIN — TAZOBACTAM SODIUM AND PIPERACILLIN SODIUM 2.25 G: 250; 2 INJECTION, SOLUTION INTRAVENOUS at 17:22

## 2017-02-19 RX ADMIN — TAZOBACTAM SODIUM AND PIPERACILLIN SODIUM 2.25 G: 250; 2 INJECTION, SOLUTION INTRAVENOUS at 03:13

## 2017-02-19 RX ADMIN — SODIUM CHLORIDE 100 ML/HR: 9 INJECTION, SOLUTION INTRAVENOUS at 21:13

## 2017-02-19 RX ADMIN — SODIUM CHLORIDE 150 ML/HR: 9 INJECTION, SOLUTION INTRAVENOUS at 08:23

## 2017-02-19 RX ADMIN — SODIUM CHLORIDE 150 ML/HR: 9 INJECTION, SOLUTION INTRAVENOUS at 00:43

## 2017-02-19 NOTE — PLAN OF CARE
Problem: Renal Failure/Kidney Injury, Acute (Adult)  Goal: Signs and Symptoms of Listed Potential Problems Will be Absent or Manageable (Renal Failure/Kidney Injury, Acute)  Outcome: Ongoing (interventions implemented as appropriate)    02/19/17 1135   Renal Failure/Kidney Injury, Acute   Problems Assessed (Acute Renal Failure/Kidney Injury) all   Problems Present (Acute Renal Failure/Kidney Injury) electrolyte imbalance

## 2017-02-19 NOTE — PLAN OF CARE
Problem: Patient Care Overview (Adult)  Goal: Plan of Care Review  Outcome: Ongoing (interventions implemented as appropriate)    02/19/17 1137   Coping/Psychosocial Response Interventions   Plan Of Care Reviewed With patient;spouse   Patient Care Overview   Progress improving

## 2017-02-19 NOTE — PROGRESS NOTES
"    [facility]  INPATIENT PROGRESS NOTE    Date of Admission: 2/17/2017  Length of Stay: 2  Primary Care Physician: Steve Trujillo MD    Subjective     Chief Complaint:   N/V, dehyration    HPI:  Patient states that she feels \"great\", she has showered, and ambulated in the halls.  She is tolerating clear liquids.  Denies abdominal pain, nausea or vomiting.  Urine output has picked up greatly and she has urinated several times large voids through the day.    Review Of Systems:   Review of Systems   Constitutional: Positive for chills and fatigue. Negative for fever.   Cardiovascular: Negative for chest pain, palpitations and leg swelling.   Gastrointestinal: Positive for diarrhea and nausea. Negative for abdominal pain, constipation and vomiting.   Genitourinary: Positive for decreased urine volume. Negative for pelvic pain.   All other systems reviewed and are negative.        Objective      Temp:  [98.3 °F (36.8 °C)-98.4 °F (36.9 °C)] 98.3 °F (36.8 °C)  Heart Rate:  [70-87] 77  Resp:  [18] 18  BP: (151)/(81) 151/81  Physical Exam  Temp:  [98.3 °F (36.8 °C)-98.4 °F (36.9 °C)] 98.3 °F (36.8 °C)  Heart Rate:  [70-87] 77  Resp:  [18] 18  BP: (151)/(81) 151/81  Constitutional: no acute distress, awake, alert, MMM today  Respiratory: Clear to auscultation bilaterally, nonlabored respirations   Cardiovascular: RRR, no murmurs  Gastrointestinal: Positive bowel sounds, soft, nontender, nondistended  Musculoskeletal: No bilateral ankle edema  Psychiatric: oriented x 3, appropriate affect, cooperative  Neurologic: Strength symmetric in all extremities       Results Review:    I have reviewed the labs, radiology results and diagnostic studies.      Results from last 7 days  Lab Units 02/19/17  0626   WBC 10*3/mm3 10.16   HEMOGLOBIN g/dL 10.0*   PLATELETS 10*3/mm3 459*       Results from last 7 days  Lab Units 02/19/17  0626   SODIUM mmol/L 135   POTASSIUM mmol/L 3.7   TOTAL CO2 " mmol/L 18.0*   CREATININE mg/dL 2.70*   GLUCOSE mg/dL 107*       Culture Data:  Urine culture no growth day 1  Radiology Data:  Reviewed at time of admission    I have reviewed the medications.    Assessment/Plan     Assessment/Problem List  Principal Problem:    Volume depletion  Active Problems:    MAMADOU (acute kidney injury)    Nausea & vomiting    Gastroenteritis, acute    Ovarian mass    Lactic acidosis    Hypokalemia    Leukocytosis    Sepsis    Hypotension    Tobacco abuse    Hyperglycemia    Hypomagnesemia    UTI (urinary tract infection)           Plan    - symptomatically feels much better,  Making good UOP  - Tolerating clears, will advance to soft bland foods  - replace lytes aggressively, continue IVF's  - trend creatinine, improved today!  As long as trend continues to improve (hopefully to  <2.0) likely can discharge home in the morning with plans for outpatient follow-up in 3-5 days with PCP and creatinine recheck.  - follow urine Cx, on Zosyn for now as coverage for gastroenteritis and UTI but UCx no growth so far and likely will not need antibiotics upon discharge  - Gyn/Onc evaluated ovarian mass and agrees is likely benign cystic structure      DVT prophylaxis:  SC hep  Discharge Planning: I expect patient to be discharged home likely tomorrow if creatinine continues to improve      Laura Chery MD   02/19/17   1:17 PM    Please note that portions of this note may have been completed with a voice recognition program. Efforts were made to edit the dictations, but occasionally words are mistranscribed.

## 2017-02-19 NOTE — PROGRESS NOTES
GYN ONCOLOGY APRN PROGRESS NOTE    Jocelyne Lindquist  [id number]  1960    Subjective   Patient did well overnight. nausea and vomiting has improved with IV antiemetics and IVF. No abdominal pain. No pelvic cramping or vaginal bleeding.        Objective     Vitals:    02/19/17 0825   BP:    Pulse: 75   Resp:    Temp:    SpO2: 98%       Intake/Output last 24 hours:    Intake/Output Summary (Last 24 hours) at 02/19/17 1026  Last data filed at 02/19/17 0930   Gross per 24 hour   Intake   2230 ml   Output    400 ml   Net   1830 ml       Labs/Studies reviewed:  Yes   Lab Results   Component Value Date    WBC 10.16 02/19/2017    HGB 10.0 (L) 02/19/2017    HCT 29.7 (L) 02/19/2017    MCV 81.4 02/19/2017     (H) 02/19/2017     Lab Results   Component Value Date    GLUCOSE 107 (H) 02/19/2017    BUN 24 (H) 02/19/2017    CREATININE 2.70 (H) 02/19/2017    EGFRIFNONA 18 (L) 02/19/2017    BCR 8.9 02/19/2017    CO2 18.0 (L) 02/19/2017    CALCIUM 6.8 (L) 02/19/2017    ALBUMIN 3.60 02/19/2017    LABIL2 1.4 (L) 02/19/2017    AST 59 (H) 02/19/2017    ALT 19 02/19/2017     Radiology studies reviewed:   Yes   Medications reviewed:   Yes     GENERAL: A&O x 3, appropriate affect. alert, cooperative, no apparent distress and appears stated age   CARDIOVASCULAR: Normal rate, regular rhythm.  RESPIRATORY: Clear to auscultation bilaterally, normal respiratory effort  GASTROINTESTINAL:  Soft, non-tender, non-distended, no rebound or guarding.  Positive bowel sounds.   GENITOURINARY: Voiding on her own  EXREMITIES: Symmetric. No edema noted    Assessment/Plan   This is a 56 y.o. female with acute gastroenteritis, intractable nausea and vomiting, acute renal failure, and 4.7 cm left ovarian cyst.    Problems Addressed this Visit        Other    Lactic acidosis      Other Visit Diagnoses     Acute renal failure, unspecified acute renal failure type    -  Primary    Relevant Medications    triamterene-hydrochlorothiazide (DYAZIDE) 37.5-25  MG per capsule    Non-intractable vomiting with nausea, unspecified vomiting type        History of hypertension        Transient hypotension        Ovarian mass, left        Other ascites            1. Ovarian cyst- Will order TVUS with color flow to evaluate further the incidentally found 4.7 cm left ovarian cyst. Follow up on results as outpatient either with Dr. Trujillo or with GYN. No presumed need for surgical intervention at this time as the patient is not having abdominal or pelvic pain, GYN exam is benign, and there are no concerning features thus far on imaging to warrant operative intervention.   2. Gastroenteritis- continue supportive care with IV fluids and antiemetics per hospitalist service.  3. ARF/Lactic acidosis- IVF, improvement in lactic acid. Creatinine improved, but still elevated. Continue to monitor labs.   4. Hypokalemia- repletion per protocol  5. UTI- on Zosyn, culture negative for growth.   6. Tobacco abuse- encouraged cessation  7. PPX- continue TEDs/SCDs/Ambulation as ordered.     We will continue to follow up along during the patient's hospital care. Prognosis and planned discharge will be based on resolution of primary diagnosis (gastroenteritis/ARF), but we are available if needed.     Mima Zapata, APRN  02/19/17  10:26 AM      Note: Speech recognition transcription software was used to dictate portions of this document.  An attempt at proofreading has been made though minor errors in transcription may still be present.  Please do not hesitate to call our office with any questions.

## 2017-02-20 VITALS
WEIGHT: 136 LBS | TEMPERATURE: 98.8 F | HEIGHT: 63 IN | SYSTOLIC BLOOD PRESSURE: 143 MMHG | HEART RATE: 68 BPM | DIASTOLIC BLOOD PRESSURE: 73 MMHG | BODY MASS INDEX: 24.1 KG/M2 | RESPIRATION RATE: 18 BRPM | OXYGEN SATURATION: 100 %

## 2017-02-20 LAB
ANION GAP SERPL CALCULATED.3IONS-SCNC: 7 MMOL/L (ref 3–11)
BACTERIA SPEC AEROBE CULT: NORMAL
BUN BLD-MCNC: 10 MG/DL (ref 9–23)
BUN/CREAT SERPL: 8.3 (ref 7–25)
CALCIUM SPEC-SCNC: 7.3 MG/DL (ref 8.7–10.4)
CHLORIDE SERPL-SCNC: 110 MMOL/L (ref 99–109)
CO2 SERPL-SCNC: 18 MMOL/L (ref 20–31)
CREAT BLD-MCNC: 1.2 MG/DL (ref 0.6–1.3)
GFR SERPL CREATININE-BSD FRML MDRD: 46 ML/MIN/1.73
GLUCOSE BLD-MCNC: 92 MG/DL (ref 70–100)
MAGNESIUM SERPL-MCNC: 1.4 MG/DL (ref 1.3–2.7)
POTASSIUM BLD-SCNC: 2.9 MMOL/L (ref 3.5–5.5)
POTASSIUM BLD-SCNC: 3.7 MMOL/L (ref 3.5–5.5)
SODIUM BLD-SCNC: 135 MMOL/L (ref 132–146)

## 2017-02-20 PROCEDURE — 99239 HOSP IP/OBS DSCHRG MGMT >30: CPT | Performed by: PHYSICIAN ASSISTANT

## 2017-02-20 PROCEDURE — 84132 ASSAY OF SERUM POTASSIUM: CPT | Performed by: PHYSICIAN ASSISTANT

## 2017-02-20 PROCEDURE — 80048 BASIC METABOLIC PNL TOTAL CA: CPT | Performed by: FAMILY MEDICINE

## 2017-02-20 PROCEDURE — 25010000002 HEPARIN (PORCINE) PER 1000 UNITS: Performed by: FAMILY MEDICINE

## 2017-02-20 PROCEDURE — 25010000002 PIPERACILLIN SOD-TAZOBACTAM PER 1 G

## 2017-02-20 PROCEDURE — 25010000002 MAGNESIUM SULFATE PER 500 MG OF MAGNESIUM

## 2017-02-20 PROCEDURE — 83735 ASSAY OF MAGNESIUM: CPT | Performed by: FAMILY MEDICINE

## 2017-02-20 RX ORDER — MAGNESIUM SULFATE HEPTAHYDRATE 40 MG/ML
4 INJECTION, SOLUTION INTRAVENOUS AS NEEDED
Status: DISCONTINUED | OUTPATIENT
Start: 2017-02-21 | End: 2017-02-20

## 2017-02-20 RX ORDER — POTASSIUM CHLORIDE 750 MG/1
20 TABLET, FILM COATED, EXTENDED RELEASE ORAL DAILY
Qty: 30 TABLET | Refills: 0 | Status: ON HOLD | OUTPATIENT
Start: 2017-02-20 | End: 2018-01-08

## 2017-02-20 RX ORDER — ONDANSETRON 4 MG/1
4 TABLET, FILM COATED ORAL EVERY 6 HOURS PRN
Qty: 12 TABLET | Refills: 0 | Status: ON HOLD | OUTPATIENT
Start: 2017-02-20 | End: 2018-01-08

## 2017-02-20 RX ADMIN — Medication 400 MG: at 14:00

## 2017-02-20 RX ADMIN — TAZOBACTAM SODIUM AND PIPERACILLIN SODIUM 2.25 G: 250; 2 INJECTION, SOLUTION INTRAVENOUS at 10:05

## 2017-02-20 RX ADMIN — MAGNESIUM SULFATE HEPTAHYDRATE 6 G: 500 INJECTION, SOLUTION INTRAMUSCULAR; INTRAVENOUS at 11:13

## 2017-02-20 RX ADMIN — TAZOBACTAM SODIUM AND PIPERACILLIN SODIUM 2.25 G: 250; 2 INJECTION, SOLUTION INTRAVENOUS at 02:21

## 2017-02-20 RX ADMIN — POTASSIUM CHLORIDE 40 MEQ: 750 CAPSULE, EXTENDED RELEASE ORAL at 12:41

## 2017-02-20 RX ADMIN — SODIUM CHLORIDE 100 ML/HR: 9 INJECTION, SOLUTION INTRAVENOUS at 07:32

## 2017-02-20 RX ADMIN — POTASSIUM CHLORIDE 40 MEQ: 750 CAPSULE, EXTENDED RELEASE ORAL at 09:11

## 2017-02-20 RX ADMIN — HEPARIN SODIUM 5000 UNITS: 5000 INJECTION, SOLUTION INTRAVENOUS; SUBCUTANEOUS at 09:10

## 2017-02-20 NOTE — DISCHARGE SUMMARY
[facility] Medicine Services  DISCHARGE SUMMARY       Date of Admission: 2/17/2017  Date of Discharge:  2/20/2017  Primary Care Physician: Steve Trujillo MD    Discharge Diagnoses:  Active Hospital Problems (** Indicates Principal Problem)    Diagnosis Date Noted   • **Volume depletion [E86.9] 02/18/2017   • Hypotension [I95.9] 02/18/2017   • Tobacco abuse [Z72.0] 02/18/2017   • Hyperglycemia [R73.9] 02/18/2017   • Hypomagnesemia [E83.42] 02/18/2017   • UTI (urinary tract infection) [N39.0] 02/18/2017   • MAMADOU (acute kidney injury) [N17.9] 02/17/2017   • Nausea & vomiting [R11.2] 02/17/2017   • Gastroenteritis, acute [K52.9] 02/17/2017   • Ovarian mass [N83.9] 02/17/2017   • Lactic acidosis [E87.2] 02/17/2017   • Hypokalemia [E87.6] 02/17/2017   • Leukocytosis [D72.829] 02/17/2017   • Sepsis [A41.9] 02/17/2017      Resolved Hospital Problems    Diagnosis Date Noted Date Resolved   No resolved problems to display.       Presenting Problem/History of Present Illness  Acute renal failure, unspecified acute renal failure type [N17.9]  Acute renal failure, unspecified acute renal failure type [N17.9]     Chief Complaint on Day of Discharge:   Hand discomfort    History of Present Illness on Day of Discharge:   Patient sitting At time of visit, spouse at bedside.  Patient complains of hand discomfort at site of recent IV, became uncomfortable with K+ infusion, has since been removed.  Patient is quite adamant she is going home today despite electrolyte abnormalities.  We'll replace the rest of magnesium and potassium orally, recheck potassium prior to discharge.  Have discussed with patient importance of normalizing electrolytes given risk of low potassium/mag.  She understands and states she is still going home.  Check potassium to make sure has improved following her placement and patient is to follow-up with primary care early this week.  She denies fever, chills, nausea, shortness of  breath, pleuritic pain, chest pain, palpitations, nausea, vomiting, diarrhea.     Hospital Course  Patient is a 56 y.o. female Past medical history hypertension, PVD presented to the ED 2/17/2017 with nausea, intractable vomiting, abdominal pain for 24 hours prior to presentation.  Patient was found to have MAMADOU with PUD and 17, creatinine 3.1, potassium 3.8, she was noted to have lactic acidosis with lactate 5.2, treated for acute gastroenteritis and admitted to the hospitalist service for further evaluation and treatment.    Upon admission the CT of abdomen performed revealing distended loops of small and large concerning for moderate to severe acute gastroenteritis.  Also noting of 4.7 cm cystic structure on the left adnexal region and moderate to large amount of slightly complex fluid in the cul-de-sac and adnexal regions.  Gynecology services were consulted for evaluation.  Transvaginal and abdominal ultrasounds were performed per  Gyn/Onc, Dr. Phipps, with plan to follow up on results as outpatient with Dr. Trujillo or patient's gynecologist.  It was felt that the cyst was a simple cyst, and fluid was likely related to acute inflammation related to gastroenteritis.  Pelvic exam was benign.  Patient has requested: Dr. Trujillo vs Gyn,.    Patient received IV fluids and supportive care lactic acidosis resolved.  She received 2 days of Zosyn to cover for gastroenteritis and UTI, but urine culture has remained negative will not continue antibiotics upon discharge..      Acute kidney injury much improved with creatinine now down to 1.2, requiring potassium replacement for hypokalemia, accompanied by hypo-magnesium  requiring replacement.  Will increase home KCl dose to 20 mEq until follows up with PCP.  The upon discharge patient will need a follow-up BMP in 3-5 days as well as mag level, results to PCP to monitor renal function as well as electrolytes.  Resume antihypertensives including ACE inhibitor, we will  hold Dyazide until follows up with primary care.    Patient is currently afebrile, satting 100% on room air, tolerating po, good fluid intake, continue to encourage by mouth.  She is clinically improved and medically appropriate for discharge home.  Will need to follow-up with primary care within 3-5 days for posthospitalization evaluation and monitor electrolytes and renal function as well as by mouth tolerance. Pt chose to f/u with PCP instead of gyn for results of transvaginal ultrasound regarding left adnexal cyst.  Procedures Performed     none    Consults:   Consults     Date and Time Order Name Status Description    2/18/2017 0049 Inpatient Consult to General Surgery      2/18/2017 0049 Inpatient Consult to Gynecologic Oncology      2/18/2017 0029 Inpatient Consult to General Surgery      2/18/2017 0029 Inpatient Consult to Gynecologic Oncology      2/17/2017 2051 IP Consult to General Surgery          Component      Latest Ref Rng 2/17/2017 2/17/2017 2/17/2017           6:10 PM  7:36 PM 11:54 PM   RBC      3.89 - 5.14 10*6/mm3 5.76 (H)     Hemoglobin      11.5 - 15.5 g/dL 15.8 (H)     Hematocrit      34.5 - 44.0 % 46.5 (H)     MCV      80.0 - 99.0 fL 80.7     MCH      27.0 - 31.0 pg 27.4     MCHC      32.0 - 36.0 g/dL 34.0     RDW      11.3 - 14.5 % 15.7 (H)     RDW-SD      37.0 - 54.0 fl 46.7     MPV      6.0 - 12.0 fL 9.3     Platelets      150 - 450 10*3/mm3 613 (H)     Neutrophil %      41.0 - 71.0 % 83.8 (H)     Lymphocyte %      24.0 - 44.0 % 10.3 (L)     Monocyte %      0.0 - 12.0 % 4.7     Eosinophil %      0.0 - 3.0 % 0.0     Basophil %      0.0 - 1.0 % 0.1     Immature Grans %      0.0 - 0.6 % 1.1 (H)     Neutrophils, Absolute      1.50 - 8.30 10*3/mm3 13.52 (H)     Lymphocytes, Absolute      0.60 - 4.80 10*3/mm3 1.67     Monocytes, Absolute      0.00 - 1.00 10*3/mm3 0.76     Eosinophils, Absolute      0.10 - 0.30 10*3/mm3 0.00 (L)     Basophils, Absolute      0.00 - 0.20 10*3/mm3 0.02     Immature  Grans, Absolute      0.00 - 0.03 10*3/mm3 0.18 (H)     Glucose      70 - 100 mg/dL 249 (H)     BUN      9 - 23 mg/dL 17     Creatinine      0.60 - 1.30 mg/dL 3.10 (H)     Sodium      132 - 146 mmol/L 139     Potassium      3.5 - 5.5 mmol/L 2.8 (L)     Chloride      99 - 109 mmol/L 96 (L)     CO2      20.0 - 31.0 mmol/L 18.0 (L)     Calcium      8.7 - 10.4 mg/dL 9.2     Total Protein      5.7 - 8.2 g/dL 8.6 (H)     Albumin      3.20 - 4.80 g/dL 5.00 (H)     ALT (SGPT)      7 - 40 U/L 27     AST (SGOT)      0 - 33 U/L 31     Alkaline Phosphatase      25 - 100 U/L 54     Total Bilirubin      0.3 - 1.2 mg/dL 0.4     eGFR Non African Amer      >60 mL/min/1.73 15 (L)     Globulin      gm/dL 3.6     A/G Ratio      1.5 - 2.5 g/dL 1.4 (L)     BUN/Creatinine Ratio      7.0 - 25.0 5.5 (L)     Anion Gap      3.0 - 11.0 mmol/L 25.0 (H)     Lipase      6 - 51 U/L 35     Lactate, Venous      0.5 - 2.0 mmol/L  5.2 (HH)    Blood Culture         No growth at 2 days   Hemoglobin A1C      4.80 - 5.60 %      Magnesium      1.3 - 2.7 mg/dL      Ionized Calcium      1.12 - 1.32 mmol/L        Component      Latest Ref Rng 2/17/2017 2/18/2017 2/18/2017 2/19/2017          11:55 PM  2:14 AM  7:22 AM    RBC      3.89 - 5.14 10*6/mm3    3.65 (L)   Hemoglobin      11.5 - 15.5 g/dL    10.0 (L)   Hematocrit      34.5 - 44.0 %    29.7 (L)   MCV      80.0 - 99.0 fL    81.4   MCH      27.0 - 31.0 pg    27.4   MCHC      32.0 - 36.0 g/dL    33.7   RDW      11.3 - 14.5 %    16.0 (H)   RDW-SD      37.0 - 54.0 fl    47.9   MPV      6.0 - 12.0 fL    8.6   Platelets      150 - 450 10*3/mm3    459 (H)   Neutrophil %      41.0 - 71.0 %       Lymphocyte %      24.0 - 44.0 %       Monocyte %      0.0 - 12.0 %       Eosinophil %      0.0 - 3.0 %       Basophil %      0.0 - 1.0 %       Immature Grans %      0.0 - 0.6 %       Neutrophils, Absolute      1.50 - 8.30 10*3/mm3       Lymphocytes, Absolute      0.60 - 4.80 10*3/mm3       Monocytes, Absolute      0.00 -  1.00 10*3/mm3       Eosinophils, Absolute      0.10 - 0.30 10*3/mm3       Basophils, Absolute      0.00 - 0.20 10*3/mm3       Immature Grans, Absolute      0.00 - 0.03 10*3/mm3       Glucose      70 - 100 mg/dL   121 (H) 107 (H)   BUN      9 - 23 mg/dL   25 (H) 24 (H)   Creatinine      0.60 - 1.30 mg/dL   3.60 (H) 2.70 (H)   Sodium      132 - 146 mmol/L   139 135   Potassium      3.5 - 5.5 mmol/L   2.9 (L) 3.7   Chloride      99 - 109 mmol/L   104 107   CO2      20.0 - 31.0 mmol/L   20.0 18.0 (L)   Calcium      8.7 - 10.4 mg/dL   7.3 (L) 6.8 (L)   Total Protein      5.7 - 8.2 g/dL   6.5 6.1   Albumin      3.20 - 4.80 g/dL   3.80 3.60   ALT (SGPT)      7 - 40 U/L   20 19   AST (SGOT)      0 - 33 U/L   37 (H) 59 (H)   Alkaline Phosphatase      25 - 100 U/L   33 31   Total Bilirubin      0.3 - 1.2 mg/dL   0.2 (L) 0.4   eGFR Non African Amer      >60 mL/min/1.73   13 (L) 18 (L)   Globulin      gm/dL   2.7 2.5   A/G Ratio      1.5 - 2.5 g/dL   1.4 (L) 1.4 (L)   BUN/Creatinine Ratio      7.0 - 25.0   6.9 (L) 8.9   Anion Gap      3.0 - 11.0 mmol/L   15.0 (H) 10.0   Lipase      6 - 51 U/L       Lactate, Venous      0.5 - 2.0 mmol/L  2.3 (HH) 0.8    Blood Culture       No growth at 2 days      Hemoglobin A1C      4.80 - 5.60 %   5.60    Magnesium      1.3 - 2.7 mg/dL   <0.7 (LL) 2.0   Ionized Calcium      1.12 - 1.32 mmol/L    0.94 (L)     Component      Latest Ref Rng 2/20/2017             RBC      3.89 - 5.14 10*6/mm3    Hemoglobin      11.5 - 15.5 g/dL    Hematocrit      34.5 - 44.0 %    MCV      80.0 - 99.0 fL    MCH      27.0 - 31.0 pg    MCHC      32.0 - 36.0 g/dL    RDW      11.3 - 14.5 %    RDW-SD      37.0 - 54.0 fl    MPV      6.0 - 12.0 fL    Platelets      150 - 450 10*3/mm3    Neutrophil %      41.0 - 71.0 %    Lymphocyte %      24.0 - 44.0 %    Monocyte %      0.0 - 12.0 %    Eosinophil %      0.0 - 3.0 %    Basophil %      0.0 - 1.0 %    Immature Grans %      0.0 - 0.6 %    Neutrophils, Absolute      1.50 -  8.30 10*3/mm3    Lymphocytes, Absolute      0.60 - 4.80 10*3/mm3    Monocytes, Absolute      0.00 - 1.00 10*3/mm3    Eosinophils, Absolute      0.10 - 0.30 10*3/mm3    Basophils, Absolute      0.00 - 0.20 10*3/mm3    Immature Grans, Absolute      0.00 - 0.03 10*3/mm3    Glucose      70 - 100 mg/dL 92   BUN      9 - 23 mg/dL 10   Creatinine      0.60 - 1.30 mg/dL 1.20   Sodium      132 - 146 mmol/L 135   Potassium      3.5 - 5.5 mmol/L 2.9 (L)   Chloride      99 - 109 mmol/L 110 (H)   CO2      20.0 - 31.0 mmol/L 18.0 (L)   Calcium      8.7 - 10.4 mg/dL 7.3 (L)   Total Protein      5.7 - 8.2 g/dL    Albumin      3.20 - 4.80 g/dL    ALT (SGPT)      7 - 40 U/L    AST (SGOT)      0 - 33 U/L    Alkaline Phosphatase      25 - 100 U/L    Total Bilirubin      0.3 - 1.2 mg/dL    eGFR Non African Amer      >60 mL/min/1.73 46 (L)   Globulin      gm/dL    A/G Ratio      1.5 - 2.5 g/dL    BUN/Creatinine Ratio      7.0 - 25.0 8.3   Anion Gap      3.0 - 11.0 mmol/L 7.0   Lipase      6 - 51 U/L    Lactate, Venous      0.5 - 2.0 mmol/L    Blood Culture          Hemoglobin A1C      4.80 - 5.60 %    Magnesium      1.3 - 2.7 mg/dL 1.4   Ionized Calcium      1.12 - 1.32 mmol/L      Pertinent Test Results:  Imaging Results (most recent)     Procedure Component Value Units Date/Time    CT Abdomen Pelvis Without Contrast [[id number]]  (Abnormal) Collected:  02/17/17 1758     Updated:  02/17/17 1922    Narrative:       EXAM:  CT Abdomen and Pelvis Without Intravenous Contrast.    CLINICAL HISTORY:  56 years old, female; Signs and symptoms; Nausea and vomiting; Additional info:   N/v intractable    TECHNIQUE:  Axial computed tomography images of the abdomen and pelvis without intravenous   contrast.  This CT exam was performed using one or more of the following dose   reduction techniques:  automated exposure control, adjustment of the mA and/or   kV according to patient size, and/or use of iterative reconstruction technique.  Coronal  reformatted images were created and reviewed.    EXAM DATE/TIME:  2/17/2017 5:58 PM    COMPARISON:  No relevant prior studies available.    FINDINGS:    Lower thorax:  Mild dependent atelectasis. Focus of anterior pericardial fluid   measuring up to 12 mm in depth.    ABDOMEN:    Liver:  Slightly complex perihepatic fluid. No definite hepatic mass.    Gallbladder and bile ducts:  No radiodense gallstones or biliary ductal   dilatation.    Pancreas:  Grossly normal.  No ductal dilation.    Spleen:  Calcified granulomas in the spleen. Small amount of slightly complex   perisplenic fluid.    Adrenals:  Mild diffuse enlargement of both adrenal glands. No mass.    Kidneys and ureters:  No definite renal mass, stone, or hydronephrosis.        PELVIS:  Appendix:  Normal caliber of the appendix. It appendicitis cannot be excluded   due to the free fluid.    Bladder:  Nondistended urinary bladder not well characterized.    Reproductive:  The uterus and right ovary are not well characterized but appear   grossly normal in size.  4.7 cm cystic structure in the left adnexal region,   likely ovarian. Moderate to large amount of slightly complex fluid in the   cul-de-sac and adnexal regions.    ABDOMEN and PELVIS:    Stomach and bowel:  Pronounced wall thickening of the upper to mid stomach   which may at least in part reflect lack of distention. No significant   surrounding inflammation to confirm an acute gastritis. Distended loops of   small bowel, distal bowel wall thickening, mesenteric congestion, and free   fluid in. No significant small or large bowel dilatation to confirm acute   obstruction. Scattered colonic diverticula. Early diverticulitis cannot be   excluded due to the free fluid.     Fluid/air: Small to moderate amount of slightly complex appearing free fluid.   No definite free air.    Bones/joints:  Dextrocurvature the spine, mild bony demineralization, and   spinal degenerative changes.No evidence of an acute  "fracture or dislocation.      Subcutaneous tissues:  Grossly intact.    Vasculature:  Nonaneurysmal abdominal aorta. Scattered calcified aortic and   aortic branch plaque.    Lymph nodes:  No significant lymph node enlargement.      Impression:         The overall findings are nonspecific but concerning for a moderate to severe   acute gastroenteritis.  Evolving small bowel ischemia cannot be excluded.    4.7 cm cystic structure in the left ovary. A followup ultrasound is recommended   for further evaluation.    Peripheral vascular disease.    Small to moderate amount of slightly complex free fluid of uncertain etiology   but may be related to a gastroenteritis. Subacute hemorrhage or an underlying   neoplastic process cannot be excluded.    Other findings as discussed above.      At 7:20 PM EST on 2/17/2017 the findings were discussed with Phoenix Vogel R.N.,   who reported she would promptly relay the information to Dr. Fregoso.      THIS DOCUMENT HAS BEEN ELECTRONICALLY SIGNED BY SHERRY CONDE MD     Non-ob Transvaginal [[id number]]      Updated:  02/19/17 1510    US Testicular or Ovarian Vascular Limited [[id number]] Resulted:  02/19/17 1510     Updated:  02/19/17 1510        Condition on Discharge:  stable    Physical Exam on Discharge:  Visit Vitals   • /73 (BP Location: Left arm, Patient Position: Sitting)   • Pulse 69   • Temp 98.1 °F (36.7 °C) (Oral)   • Resp 20   • Ht 63\" (160 cm)   • Wt 136 lb (61.7 kg)   • SpO2 100%   • BMI 24.09 kg/m2     Physical Exam  Temp:  [97.9 °F (36.6 °C)-98.1 °F (36.7 °C)] 98.1 °F (36.7 °C)  Heart Rate:  [68-83] 69  Resp:  [16-20] 20  BP: (123-143)/(71-73) 143/73  Constitutional: no acute distress, awake, alert  Eyes: PERRLA, sclerae anicteric, no conjunctival injection  Neck: supple, trachea midline  Respiratory: Clear to auscultation bilaterally, nonlabored respirations   Cardiovascular: RRR, no murmurs, rubs, or gallops, palpable pedal pulses " bilaterally  Gastrointestinal: Positive bowel sounds, soft, nontender, nondistended  Musculoskeletal: No bilateral ankle edema, no clubbing or cyanosis to bilateral lower extremities  Psychiatric: oriented x 3, anxious and frustrated but cooperative  Neurologic:follows commands, speech is clear  Discharge Disposition  Home or Self Care    Discharge Medications   Jocelyne Lindquist   Home Medication Instructions BRIANNE:[id number]    Printed on:02/20/17 6008   Medication Information                      albuterol (PROVENTIL HFA;VENTOLIN HFA) 108 (90 BASE) MCG/ACT inhaler  Inhale 2 puffs Every 4 (Four) Hours As Needed for wheezing.             amLODIPine (NORVASC) 5 MG tablet  Take 5 mg by mouth Daily.             aspirin 81 MG chewable tablet  Chew 81 mg Daily.             benazepril (LOTENSIN) 20 MG tablet  Take 20 mg by mouth Daily.             cetirizine (zyrTEC) 10 MG tablet  Take 10 mg by mouth Daily.             omeprazole (priLOSEC) 20 MG capsule  Take 20 mg by mouth Daily.             ondansetron (ZOFRAN) 4 MG tablet  Take 1 tablet by mouth Every 6 (Six) Hours As Needed for nausea or vomiting.             potassium chloride (K-DUR) 10 MEQ CR tablet  Take 2 tablets by mouth Daily.                 Discharge Diet:   Diet Instructions     Advance Diet As Tolerated                     Discharge Care Plan / Instructions:    Activity at Discharge:   Activity Instructions     Activity as Tolerated                     Follow-up Appointments  No future appointments.  Referrals and Follow-ups to Schedule     Follow-Up    As directed    Patient needs to have scheduled appointment with PCP to be seen 3-4 days of discharge for recent MAMADOU with significant electrolyte abnormalities.  BMP, magnesium will be checked and results sent to PCP in 3 days.  Check blood pressure, determine if okay to resume Dyazide.  Transvag and abdominal ultrasound of the ovaries were obtained, Dr. Trujillo to follow-up with results.                 Test  Results Pending at Discharge   Order Current Status    Blood Culture Preliminary result    Blood Culture Preliminary result           Casie M Mayne, PA 02/20/17 1:46 PM    Time: Discharge 45 min    Please note that portions of this note may have been completed with a voice recognition program. Efforts were made to edit the dictations, but occasionally words are mistranscribed.

## 2017-02-20 NOTE — PROGRESS NOTES
Discharge Planning Assessment  [facility]     Patient Name: Jocelyne Linqduist  MRN: [id number]  Today's Date: 2/20/2017    Admit Date: 2/17/2017          Discharge Needs Assessment       02/20/17 0926    Living Environment    Lives With spouse    Living Arrangements house   There are 2 stairs to enter the home and an upstairs.     Provides Primary Care For no one    Quality Of Family Relationships supportive;helpful;involved    Able to Return to Prior Living Arrangements yes    Discharge Needs Assessment    Concerns To Be Addressed no discharge needs identified;denies needs/concerns at this time    Readmission Within The Last 30 Days no previous admission in last 30 days    Anticipated Changes Related to Illness none    Equipment Currently Used at Home none    Equipment Needed After Discharge none    Transportation Available car;family or friend will provide    Discharge Disposition home or self-care    Discharge Contact Information if Applicable Spouse- Thierno Lindquist- [phone]            Discharge Plan       02/20/17 0927    Case Management/Social Work Plan    Plan Home    Patient/Family In Agreement With Plan yes    Additional Comments Spoke with patient at bedside.  Pt resides with spouse in [facility] [place].  Pt is independent with ADL's and uses no HH agency or DME currently.  No discharge needs identified at this time.  CM will continue to follow.         Discharge Placement     No information found        Expected Discharge Date and Time     Expected Discharge Date Expected Discharge Time    Feb 20, 2017               Demographic Summary       02/20/17 0924    Referral Information    Admission Type inpatient    Arrived From admitted as an inpatient    Referral Source admission list    Reason For Consult discharge planning    Contact Information    Permission Granted to Share Information With family/designee    Primary Care Physician Information    Name Steve Trujillo            Functional Status       02/20/17 0912     Functional Status Current    Current Functional Level Comment See Nursing Assessment    Change in Functional Status Since Onset of Current Illness/Injury no    Functional Status Prior    Ambulation 0-->independent    Transferring 0-->independent    Toileting 0-->independent    Bathing 0-->independent    Dressing 0-->independent    Eating 0-->independent    Communication 0-->understands/communicates without difficulty    Swallowing 0-->swallows foods/liquids without difficulty    IADL    Medications independent    Meal Preparation independent    Housekeeping independent    Laundry independent    Shopping independent    Oral Care independent    Activity Tolerance    Usual Activity Tolerance good    Current Activity Tolerance good    Employment/Financial    Employment/Finance Comments Pt confirms she has [place] [employer] with prescription coverage.             Psychosocial     None            Abuse/Neglect     None            Legal     None            Substance Abuse     None            Patient Forms     None          Shahida Navarrete RN

## 2017-02-20 NOTE — PLAN OF CARE
Problem: Patient Care Overview (Adult)  Goal: Plan of Care Review  Outcome: Ongoing (interventions implemented as appropriate)    02/20/17 0202   Coping/Psychosocial Response Interventions   Plan Of Care Reviewed With patient   Patient Care Overview   Progress improving   Outcome Evaluation   Outcome Summary/Follow up Plan doing great and had some pudding for snack and is ambulating on her own       Goal: Adult Individualization and Mutuality  Outcome: Ongoing (interventions implemented as appropriate)  Goal: Discharge Needs Assessment  Outcome: Ongoing (interventions implemented as appropriate)    Problem: Renal Failure/Kidney Injury, Acute (Adult)  Goal: Signs and Symptoms of Listed Potential Problems Will be Absent or Manageable (Renal Failure/Kidney Injury, Acute)  Outcome: Ongoing (interventions implemented as appropriate)

## 2017-02-23 LAB
BACTERIA SPEC AEROBE CULT: NORMAL
BACTERIA SPEC AEROBE CULT: NORMAL

## 2017-12-13 ENCOUNTER — LAB REQUISITION (OUTPATIENT)
Dept: LAB | Facility: HOSPITAL | Age: 57
End: 2017-12-13

## 2017-12-13 DIAGNOSIS — D23.10 OTHER BENIGN NEOPLASM OF SKIN OF RIGHT EYELID, INCLUDING CANTHUS: ICD-10-CM

## 2017-12-13 LAB — POTASSIUM BLDA-SCNC: 3.69 MMOL/L (ref 3.5–5.3)

## 2017-12-13 PROCEDURE — 88305 TISSUE EXAM BY PATHOLOGIST: CPT | Performed by: OPHTHALMOLOGY

## 2017-12-13 PROCEDURE — 84132 ASSAY OF SERUM POTASSIUM: CPT | Performed by: OPHTHALMOLOGY

## 2017-12-15 LAB
CYTO UR: NORMAL
LAB AP CASE REPORT: NORMAL
LAB AP CLINICAL INFORMATION: NORMAL
Lab: NORMAL
PATH REPORT.FINAL DX SPEC: NORMAL
PATH REPORT.GROSS SPEC: NORMAL

## 2017-12-28 ENCOUNTER — LAB REQUISITION (OUTPATIENT)
Dept: LAB | Facility: HOSPITAL | Age: 57
End: 2017-12-28

## 2017-12-28 DIAGNOSIS — Z00.00 ROUTINE GENERAL MEDICAL EXAMINATION AT A HEALTH CARE FACILITY: ICD-10-CM

## 2017-12-28 LAB
FLUAV SUBTYP SPEC NAA+PROBE: NOT DETECTED
FLUBV RNA ISLT QL NAA+PROBE: DETECTED

## 2017-12-28 PROCEDURE — 87502 INFLUENZA DNA AMP PROBE: CPT | Performed by: NURSE PRACTITIONER

## 2018-01-07 ENCOUNTER — APPOINTMENT (OUTPATIENT)
Dept: GENERAL RADIOLOGY | Facility: HOSPITAL | Age: 58
End: 2018-01-07

## 2018-01-07 ENCOUNTER — HOSPITAL ENCOUNTER (INPATIENT)
Facility: HOSPITAL | Age: 58
LOS: 3 days | Discharge: HOME | End: 2018-01-10
Attending: EMERGENCY MEDICINE | Admitting: INTERNAL MEDICINE

## 2018-01-07 DIAGNOSIS — I95.9 HYPOTENSION, UNSPECIFIED HYPOTENSION TYPE: Primary | ICD-10-CM

## 2018-01-07 DIAGNOSIS — N17.9 ACUTE RENAL FAILURE, UNSPECIFIED ACUTE RENAL FAILURE TYPE (HCC): ICD-10-CM

## 2018-01-07 PROBLEM — R19.7 DIARRHEA: Status: ACTIVE | Noted: 2018-01-07

## 2018-01-07 PROBLEM — N83.202 CYST OF LEFT OVARY: Status: ACTIVE | Noted: 2017-02-17

## 2018-01-07 PROBLEM — N83.201 CYST OF RIGHT OVARY: Status: ACTIVE | Noted: 2017-02-17

## 2018-01-07 LAB
ALBUMIN SERPL-MCNC: 3.6 G/DL (ref 3.2–4.8)
ALBUMIN/GLOB SERPL: 1.2 G/DL (ref 1.5–2.5)
ALP SERPL-CCNC: 54 U/L (ref 25–100)
ALT SERPL W P-5'-P-CCNC: 25 U/L (ref 7–40)
ANION GAP SERPL CALCULATED.3IONS-SCNC: 14 MMOL/L (ref 3–11)
APTT PPP: 28.5 SECONDS (ref 24–31)
AST SERPL-CCNC: 26 U/L (ref 0–33)
BASOPHILS # BLD AUTO: 0.06 10*3/MM3 (ref 0–0.2)
BASOPHILS NFR BLD AUTO: 0.5 % (ref 0–1)
BILIRUB SERPL-MCNC: 0.2 MG/DL (ref 0.3–1.2)
BUN BLD-MCNC: 27 MG/DL (ref 9–23)
BUN/CREAT SERPL: 5 (ref 7–25)
C DIFF TOX GENS STL QL NAA+PROBE: NOT DETECTED
CA-I SERPL ISE-MCNC: 1 MMOL/L (ref 1.12–1.32)
CALCIUM SPEC-SCNC: 7.1 MG/DL (ref 8.7–10.4)
CHLORIDE SERPL-SCNC: 100 MMOL/L (ref 99–109)
CO2 SERPL-SCNC: 17 MMOL/L (ref 20–31)
CREAT BLD-MCNC: 5.4 MG/DL (ref 0.6–1.3)
CRP SERPL-MCNC: 1.73 MG/DL (ref 0–1)
D-LACTATE SERPL-SCNC: 1.9 MMOL/L (ref 0.5–2)
DEPRECATED RDW RBC AUTO: 43.3 FL (ref 37–54)
EOSINOPHIL # BLD AUTO: 0.03 10*3/MM3 (ref 0–0.3)
EOSINOPHIL NFR BLD AUTO: 0.2 % (ref 0–3)
ERYTHROCYTE [DISTWIDTH] IN BLOOD BY AUTOMATED COUNT: 15.3 % (ref 11.3–14.5)
GFR SERPL CREATININE-BSD FRML MDRD: 8 ML/MIN/1.73
GLOBULIN UR ELPH-MCNC: 3 GM/DL
GLUCOSE BLD-MCNC: 106 MG/DL (ref 70–100)
HCT VFR BLD AUTO: 33.3 % (ref 34.5–44)
HGB BLD-MCNC: 11 G/DL (ref 11.5–15.5)
HOLD SPECIMEN: NORMAL
HOLD SPECIMEN: NORMAL
IMM GRANULOCYTES # BLD: 0.68 10*3/MM3 (ref 0–0.03)
IMM GRANULOCYTES NFR BLD: 5.1 % (ref 0–0.6)
INR PPP: 1.18
LYMPHOCYTES # BLD AUTO: 2.32 10*3/MM3 (ref 0.6–4.8)
LYMPHOCYTES NFR BLD AUTO: 17.5 % (ref 24–44)
MAGNESIUM SERPL-MCNC: <0.7 MG/DL (ref 1.3–2.7)
MCH RBC QN AUTO: 25.3 PG (ref 27–31)
MCHC RBC AUTO-ENTMCNC: 33 G/DL (ref 32–36)
MCV RBC AUTO: 76.6 FL (ref 80–99)
MONOCYTES # BLD AUTO: 0.99 10*3/MM3 (ref 0–1)
MONOCYTES NFR BLD AUTO: 7.4 % (ref 0–12)
NEUTROPHILS # BLD AUTO: 9.21 10*3/MM3 (ref 1.5–8.3)
NEUTROPHILS NFR BLD AUTO: 69.3 % (ref 41–71)
PHOSPHATE SERPL-MCNC: 5.1 MG/DL (ref 2.4–5.1)
PLAT MORPH BLD: NORMAL
PLATELET # BLD AUTO: 669 10*3/MM3 (ref 150–450)
PMV BLD AUTO: 8.6 FL (ref 6–12)
POTASSIUM BLD-SCNC: 3.2 MMOL/L (ref 3.5–5.5)
PROT SERPL-MCNC: 6.6 G/DL (ref 5.7–8.2)
PROTHROMBIN TIME: 12.9 SECONDS (ref 9.6–11.5)
RBC # BLD AUTO: 4.35 10*6/MM3 (ref 3.89–5.14)
RBC MORPH BLD: NORMAL
SODIUM BLD-SCNC: 131 MMOL/L (ref 132–146)
TROPONIN I SERPL-MCNC: 0 NG/ML (ref 0–0.07)
WBC MORPH BLD: NORMAL
WBC NRBC COR # BLD: 13.29 10*3/MM3 (ref 3.5–10.8)
WHOLE BLOOD HOLD SPECIMEN: NORMAL
WHOLE BLOOD HOLD SPECIMEN: NORMAL

## 2018-01-07 PROCEDURE — 87040 BLOOD CULTURE FOR BACTERIA: CPT | Performed by: EMERGENCY MEDICINE

## 2018-01-07 PROCEDURE — 25010000002 PIPERACILLIN-TAZOBACTAM: Performed by: EMERGENCY MEDICINE

## 2018-01-07 PROCEDURE — 86140 C-REACTIVE PROTEIN: CPT | Performed by: EMERGENCY MEDICINE

## 2018-01-07 PROCEDURE — 99223 1ST HOSP IP/OBS HIGH 75: CPT | Performed by: INTERNAL MEDICINE

## 2018-01-07 PROCEDURE — 87493 C DIFF AMPLIFIED PROBE: CPT | Performed by: INTERNAL MEDICINE

## 2018-01-07 PROCEDURE — 93005 ELECTROCARDIOGRAM TRACING: CPT | Performed by: EMERGENCY MEDICINE

## 2018-01-07 PROCEDURE — 71045 X-RAY EXAM CHEST 1 VIEW: CPT

## 2018-01-07 PROCEDURE — 84100 ASSAY OF PHOSPHORUS: CPT | Performed by: EMERGENCY MEDICINE

## 2018-01-07 PROCEDURE — 85610 PROTHROMBIN TIME: CPT | Performed by: EMERGENCY MEDICINE

## 2018-01-07 PROCEDURE — 80053 COMPREHEN METABOLIC PANEL: CPT | Performed by: EMERGENCY MEDICINE

## 2018-01-07 PROCEDURE — 99285 EMERGENCY DEPT VISIT HI MDM: CPT

## 2018-01-07 PROCEDURE — 51702 INSERT TEMP BLADDER CATH: CPT

## 2018-01-07 PROCEDURE — 83605 ASSAY OF LACTIC ACID: CPT | Performed by: EMERGENCY MEDICINE

## 2018-01-07 PROCEDURE — 84484 ASSAY OF TROPONIN QUANT: CPT

## 2018-01-07 PROCEDURE — 85730 THROMBOPLASTIN TIME PARTIAL: CPT | Performed by: EMERGENCY MEDICINE

## 2018-01-07 PROCEDURE — 25010000002 MAGNESIUM SULFATE 2 GM/50ML SOLUTION: Performed by: INTERNAL MEDICINE

## 2018-01-07 PROCEDURE — 83735 ASSAY OF MAGNESIUM: CPT | Performed by: EMERGENCY MEDICINE

## 2018-01-07 PROCEDURE — 82330 ASSAY OF CALCIUM: CPT | Performed by: EMERGENCY MEDICINE

## 2018-01-07 PROCEDURE — 85007 BL SMEAR W/DIFF WBC COUNT: CPT | Performed by: EMERGENCY MEDICINE

## 2018-01-07 PROCEDURE — 25010000002 HEPARIN (PORCINE) PER 1000 UNITS: Performed by: INTERNAL MEDICINE

## 2018-01-07 PROCEDURE — 25010000002 VANCOMYCIN: Performed by: EMERGENCY MEDICINE

## 2018-01-07 PROCEDURE — 85025 COMPLETE CBC W/AUTO DIFF WBC: CPT | Performed by: EMERGENCY MEDICINE

## 2018-01-07 RX ORDER — MAGNESIUM SULFATE HEPTAHYDRATE 40 MG/ML
2 INJECTION, SOLUTION INTRAVENOUS AS NEEDED
Status: DISCONTINUED | OUTPATIENT
Start: 2018-01-07 | End: 2018-01-10 | Stop reason: HOSPADM

## 2018-01-07 RX ORDER — PANTOPRAZOLE SODIUM 40 MG/1
40 TABLET, DELAYED RELEASE ORAL EVERY MORNING
Status: DISCONTINUED | OUTPATIENT
Start: 2018-01-07 | End: 2018-01-10 | Stop reason: HOSPADM

## 2018-01-07 RX ORDER — MAGNESIUM SULFATE HEPTAHYDRATE 40 MG/ML
2 INJECTION, SOLUTION INTRAVENOUS AS NEEDED
Status: DISCONTINUED | OUTPATIENT
Start: 2018-01-07 | End: 2018-01-07

## 2018-01-07 RX ORDER — MAGNESIUM SULFATE HEPTAHYDRATE 40 MG/ML
4 INJECTION, SOLUTION INTRAVENOUS AS NEEDED
Status: DISCONTINUED | OUTPATIENT
Start: 2018-01-07 | End: 2018-01-07

## 2018-01-07 RX ORDER — POTASSIUM CHLORIDE 7.45 MG/ML
10 INJECTION INTRAVENOUS
Status: DISCONTINUED | OUTPATIENT
Start: 2018-01-07 | End: 2018-01-10 | Stop reason: HOSPADM

## 2018-01-07 RX ORDER — SODIUM CHLORIDE 9 MG/ML
150 INJECTION, SOLUTION INTRAVENOUS CONTINUOUS
Status: DISCONTINUED | OUTPATIENT
Start: 2018-01-07 | End: 2018-01-09

## 2018-01-07 RX ORDER — MAGNESIUM SULFATE 1 G/100ML
1 INJECTION INTRAVENOUS AS NEEDED
Status: DISCONTINUED | OUTPATIENT
Start: 2018-01-07 | End: 2018-01-07

## 2018-01-07 RX ORDER — POTASSIUM CHLORIDE 1.5 G/1.77G
40 POWDER, FOR SOLUTION ORAL AS NEEDED
Status: DISCONTINUED | OUTPATIENT
Start: 2018-01-07 | End: 2018-01-07

## 2018-01-07 RX ORDER — SODIUM CHLORIDE 0.9 % (FLUSH) 0.9 %
10 SYRINGE (ML) INJECTION AS NEEDED
Status: DISCONTINUED | OUTPATIENT
Start: 2018-01-07 | End: 2018-01-10 | Stop reason: HOSPADM

## 2018-01-07 RX ORDER — POTASSIUM CHLORIDE 750 MG/1
40 CAPSULE, EXTENDED RELEASE ORAL AS NEEDED
Status: DISCONTINUED | OUTPATIENT
Start: 2018-01-07 | End: 2018-01-10 | Stop reason: HOSPADM

## 2018-01-07 RX ORDER — ALBUTEROL SULFATE 90 UG/1
2 AEROSOL, METERED RESPIRATORY (INHALATION) EVERY 4 HOURS PRN
Status: DISCONTINUED | OUTPATIENT
Start: 2018-01-07 | End: 2018-01-10 | Stop reason: HOSPADM

## 2018-01-07 RX ORDER — MAGNESIUM SULFATE HEPTAHYDRATE 40 MG/ML
4 INJECTION, SOLUTION INTRAVENOUS ONCE
Status: COMPLETED | OUTPATIENT
Start: 2018-01-07 | End: 2018-01-08

## 2018-01-07 RX ORDER — MAGNESIUM SULFATE HEPTAHYDRATE 40 MG/ML
2 INJECTION, SOLUTION INTRAVENOUS ONCE
Status: COMPLETED | OUTPATIENT
Start: 2018-01-07 | End: 2018-01-07

## 2018-01-07 RX ORDER — HEPARIN SODIUM 5000 [USP'U]/ML
5000 INJECTION, SOLUTION INTRAVENOUS; SUBCUTANEOUS EVERY 8 HOURS SCHEDULED
Status: DISCONTINUED | OUTPATIENT
Start: 2018-01-07 | End: 2018-01-10 | Stop reason: HOSPADM

## 2018-01-07 RX ORDER — SODIUM CHLORIDE 0.9 % (FLUSH) 0.9 %
1-10 SYRINGE (ML) INJECTION AS NEEDED
Status: DISCONTINUED | OUTPATIENT
Start: 2018-01-07 | End: 2018-01-10 | Stop reason: HOSPADM

## 2018-01-07 RX ORDER — MAGNESIUM SULFATE 1 G/100ML
3 INJECTION INTRAVENOUS AS NEEDED
Status: DISCONTINUED | OUTPATIENT
Start: 2018-01-07 | End: 2018-01-10 | Stop reason: HOSPADM

## 2018-01-07 RX ADMIN — SODIUM CHLORIDE 1000 ML: 9 INJECTION, SOLUTION INTRAVENOUS at 17:53

## 2018-01-07 RX ADMIN — VANCOMYCIN HYDROCHLORIDE 1250 MG: 10 INJECTION, POWDER, LYOPHILIZED, FOR SOLUTION INTRAVENOUS at 16:24

## 2018-01-07 RX ADMIN — MAGNESIUM SULFATE HEPTAHYDRATE 2 G: 40 INJECTION, SOLUTION INTRAVENOUS at 21:17

## 2018-01-07 RX ADMIN — HEPARIN SODIUM 5000 UNITS: 5000 INJECTION, SOLUTION INTRAVENOUS; SUBCUTANEOUS at 22:37

## 2018-01-07 RX ADMIN — SODIUM CHLORIDE 150 ML/HR: 9 INJECTION, SOLUTION INTRAVENOUS at 18:51

## 2018-01-07 RX ADMIN — PANTOPRAZOLE SODIUM 40 MG: 40 TABLET, DELAYED RELEASE ORAL at 18:50

## 2018-01-07 RX ADMIN — POTASSIUM CHLORIDE 40 MEQ: 750 CAPSULE, EXTENDED RELEASE ORAL at 22:34

## 2018-01-07 RX ADMIN — TAZOBACTAM SODIUM AND PIPERACILLIN SODIUM 4.5 G: .5; 4 INJECTION, POWDER, LYOPHILIZED, FOR SOLUTION INTRAVENOUS at 16:27

## 2018-01-07 RX ADMIN — SODIUM CHLORIDE 1851 ML: 9 INJECTION, SOLUTION INTRAVENOUS at 15:41

## 2018-01-07 RX ADMIN — MAGNESIUM SULFATE IN WATER 4 G: 40 INJECTION, SOLUTION INTRAVENOUS at 22:27

## 2018-01-07 NOTE — ED PROVIDER NOTES
Subjective   HPI Comments: Ms. Jocelyne Lindquist is a 57 year old female who presents to the ED with c/o illness. She states she tested positive for flu b on 12/25/17. She reports typical flu symptoms at that time; fever, nausea, and fatigue. She was prescribed Tamiflu and finished that course and then started on Doxycycline which she states she had an adverse reaction to. She reports her symptoms now are dizzy, lightheaded, diarrhea, cough, weakness, fatigue and decreased appetite. Her last fever was 5 days ago, the highest being 100.1. There are no other known complaints at this time.      Patient is a 57 y.o. female presenting with general illness.   History provided by:  Patient and spouse  Illness   Location:  Generalized  Quality:  Cough, fatigue, dizzy  Severity:  Moderate  Onset quality:  Gradual  Timing:  Constant  Progression:  Unchanged  Chronicity:  New  Context:  Flu B positive 12/25  Relieved by:  Nothing  Worsened by:  Nothing  Associated symptoms: cough, diarrhea, fatigue, fever, nausea and vomiting        Review of Systems   Constitutional: Positive for fatigue and fever.   Respiratory: Positive for cough.    Gastrointestinal: Positive for diarrhea, nausea and vomiting.   All other systems reviewed and are negative.      Past Medical History:   Diagnosis Date   • Hypertension    • PVD (peripheral vascular disease)        Allergies   Allergen Reactions   • Eggs Or Egg-Derived Products      Causes cramps   • Fish-Derived Products      Causes severe stomach cramps       Past Surgical History:   Procedure Laterality Date   • BREAST BIOPSY Left    • CYST REMOVAL      right wrist        Family History   Problem Relation Age of Onset   • Breast cancer Neg Hx    • Ovarian cancer Neg Hx        Social History     Social History   • Marital status:      Spouse name: N/A   • Number of children: N/A   • Years of education: N/A     Social History Main Topics   • Smoking status: Current Some Day Smoker      Packs/day: 1.00     Years: 20.00   • Smokeless tobacco: None      Comment: Sameera trying to quit, 2 ciggs in 2 weeks.   • Alcohol use Yes      Comment: 4-6 beers/night in the past. Only 1 beer in the past 3 weeks.   • Drug use: No   • Sexual activity: Defer     Other Topics Concern   • None     Social History Narrative         Objective   Physical Exam   Constitutional: She is oriented to person, place, and time. She appears well-developed and well-nourished. No distress.   Patient is lethargic. She is slow to answer questions and answers some inaccurately with  correcting her.   HENT:   Head: Normocephalic and atraumatic.   Nose: Nose normal.   Mouth/Throat: Mucous membranes are dry.   Eyes: Conjunctivae are normal. No scleral icterus.   Neck: Normal range of motion.   Cardiovascular: Normal rate, regular rhythm and normal heart sounds.  Exam reveals decreased pulses.    No murmur heard.  Peripheral pulses are weak including radial.   Pulmonary/Chest: Effort normal. No respiratory distress. She has decreased breath sounds in the left upper field, the left middle field and the left lower field.   Mildly diminished breath sounds on the left.   Abdominal: Soft. There is no tenderness.   Musculoskeletal: Normal range of motion. She exhibits no edema.   Neurological: She is alert and oriented to person, place, and time.   Skin: Skin is warm and dry. There is pallor.   Psychiatric: She has a normal mood and affect. Her behavior is normal.   Nursing note and vitals reviewed.      Critical Care  Performed by: DONNA GARAY  Authorized by: DONNA GARAY     Critical care provider statement:     Critical care time (minutes):  30    Critical care time was exclusive of:  Separately billable procedures and treating other patients    Critical care was necessary to treat or prevent imminent or life-threatening deterioration of the following conditions:  Shock and renal failure    Critical care was time spent  personally by me on the following activities:  Development of treatment plan with patient or surrogate, discussions with consultants, examination of patient, evaluation of patient's response to treatment, obtaining history from patient or surrogate, review of old charts, re-evaluation of patient's condition, ordering and review of radiographic studies, ordering and review of laboratory studies, ordering and performing treatments and interventions and pulse oximetry               ED Course  ED Course   Comment By Time   Chest X-ray interpreted by myself and confirmed by radiology negative. Horace Almazan MD 01/07 173   Dr. Olivo, intensivist, pagekelly. Yuan Carrasco 01/07 1747   Last creatinine was 1.2.  Today's creatinine is 5.4. Horace Almazan MD 01/07 1747   Dr. Almazan has discussed the case in detail with Dr. Olivo, intensivist, who will admit the patient. Yuan Carrasco 01/07 1756     Recent Results (from the past 24 hour(s))   Comprehensive Metabolic Panel    Collection Time: 01/07/18  4:54 PM   Result Value Ref Range    Glucose 106 (H) 70 - 100 mg/dL    BUN 27 (H) 9 - 23 mg/dL    Creatinine 5.40 (H) 0.60 - 1.30 mg/dL    Sodium 131 (L) 132 - 146 mmol/L    Potassium 3.2 (L) 3.5 - 5.5 mmol/L    Chloride 100 99 - 109 mmol/L    CO2 17.0 (L) 20.0 - 31.0 mmol/L    Calcium 7.1 (L) 8.7 - 10.4 mg/dL    Total Protein 6.6 5.7 - 8.2 g/dL    Albumin 3.60 3.20 - 4.80 g/dL    ALT (SGPT) 25 7 - 40 U/L    AST (SGOT) 26 0 - 33 U/L    Alkaline Phosphatase 54 25 - 100 U/L    Total Bilirubin 0.2 (L) 0.3 - 1.2 mg/dL    eGFR Non African Amer 8 (L) >60 mL/min/1.73    Globulin 3.0 gm/dL    A/G Ratio 1.2 (L) 1.5 - 2.5 g/dL    BUN/Creatinine Ratio 5.0 (L) 7.0 - 25.0    Anion Gap 14.0 (H) 3.0 - 11.0 mmol/L   CBC Auto Differential    Collection Time: 01/07/18  4:54 PM   Result Value Ref Range    WBC 13.29 (H) 3.50 - 10.80 10*3/mm3    RBC 4.35 3.89 - 5.14 10*6/mm3    Hemoglobin 11.0 (L) 11.5 - 15.5 g/dL    Hematocrit 33.3 (L)  34.5 - 44.0 %    MCV 76.6 (L) 80.0 - 99.0 fL    MCH 25.3 (L) 27.0 - 31.0 pg    MCHC 33.0 32.0 - 36.0 g/dL    RDW 15.3 (H) 11.3 - 14.5 %    RDW-SD 43.3 37.0 - 54.0 fl    MPV 8.6 6.0 - 12.0 fL    Platelets 669 (H) 150 - 450 10*3/mm3    Neutrophil % 69.3 41.0 - 71.0 %    Lymphocyte % 17.5 (L) 24.0 - 44.0 %    Monocyte % 7.4 0.0 - 12.0 %    Eosinophil % 0.2 0.0 - 3.0 %    Basophil % 0.5 0.0 - 1.0 %    Immature Grans % 5.1 (H) 0.0 - 0.6 %    Neutrophils, Absolute 9.21 (H) 1.50 - 8.30 10*3/mm3    Lymphocytes, Absolute 2.32 0.60 - 4.80 10*3/mm3    Monocytes, Absolute 0.99 0.00 - 1.00 10*3/mm3    Eosinophils, Absolute 0.03 0.00 - 0.30 10*3/mm3    Basophils, Absolute 0.06 0.00 - 0.20 10*3/mm3    Immature Grans, Absolute 0.68 (H) 0.00 - 0.03 10*3/mm3   Protime-INR    Collection Time: 01/07/18  4:54 PM   Result Value Ref Range    Protime 12.9 (H) 9.6 - 11.5 Seconds    INR 1.18    aPTT    Collection Time: 01/07/18  4:54 PM   Result Value Ref Range    PTT 28.5 24.0 - 31.0 seconds   Magnesium    Collection Time: 01/07/18  4:54 PM   Result Value Ref Range    Magnesium <0.7 (C) 1.3 - 2.7 mg/dL   Phosphorus    Collection Time: 01/07/18  4:54 PM   Result Value Ref Range    Phosphorus 5.1 2.4 - 5.1 mg/dL   Calcium, Ionized    Collection Time: 01/07/18  4:54 PM   Result Value Ref Range    Ionized Calcium 1.00 (L) 1.12 - 1.32 mmol/L   Lactic Acid, Plasma    Collection Time: 01/07/18  4:54 PM   Result Value Ref Range    Lactate 1.9 0.5 - 2.0 mmol/L   C-reactive Protein    Collection Time: 01/07/18  4:54 PM   Result Value Ref Range    C-Reactive Protein 1.73 (H) 0.00 - 1.00 mg/dL   Scan Slide    Collection Time: 01/07/18  4:54 PM   Result Value Ref Range    RBC Morphology Normal Normal    WBC Morphology Normal Normal    Platelet Morphology Normal Normal   POC Troponin, Rapid    Collection Time: 01/07/18  4:56 PM   Result Value Ref Range    Troponin I 0.00 0.00 - 0.07 ng/mL     Note: In addition to lab results from this visit, the labs  listed above may include labs taken at another facility or during a different encounter within the last 24 hours. Please correlate lab times with ED admission and discharge times for further clarification of the services performed during this visit.    XR Chest 1 View    (Results Pending)     Vitals:    01/07/18 1622 01/07/18 1623 01/07/18 1642 01/07/18 1730   BP: 94/50  101/57 94/52   BP Location:       Patient Position:       Pulse:  94 101 105   Resp:       Temp:       TempSrc:       SpO2:  100% 97% 99%   Weight:       Height:         Medications   sodium chloride 0.9 % flush 10 mL (not administered)   sodium chloride 0.9 % bolus 1,851 mL (0 mL/kg × 61.7 kg Intravenous Stopped 1/7/18 1743)   sodium chloride 0.9 % bolus 1,000 mL (1,000 mL Intravenous New Bag 1/7/18 1753)   piperacillin-tazobactam (ZOSYN) 4.5 g/100 mL 0.9% NS IVPB (mbp) (0 g Intravenous Stopped 1/7/18 1707)   vancomycin 1250 mg/250 mL 0.9% NS IVPB ([facility]) (1,250 mg Intravenous New Bag 1/7/18 1624)     ECG/EMG Results (last 24 hours)     Procedure Component Value Units Date/Time    ECG 12 Lead [[id number]] Collected:  01/07/18 1558     Updated:  01/07/18 1755    Narrative:       Test Reason : Weak/Dizzy/AMS protocol  Blood Pressure : **/** mmHG  Vent. Rate : 102 BPM     Atrial Rate : 102 BPM     P-R Int : 150 ms          QRS Dur : 078 ms      QT Int : 374 ms       P-R-T Axes : 070 069 084 degrees     QTc Int : 487 ms    Sinus tachycardia  Otherwise normal ECG  No previous ECGs available  Confirmed by DONNA GARAY MD ([id number]) on 1/7/2018 5:55:17 PM    Referred By:  TANISHA           Confirmed By:DONNA GARAY MD                        MDM  Number of Diagnoses or Management Options  Acute renal failure, unspecified acute renal failure type:   Hypotension, unspecified hypotension type:      Amount and/or Complexity of Data Reviewed  Clinical lab tests: reviewed  Tests in the radiology section of CPT®: reviewed  Tests in the medicine section of CPT®:  reviewed  Decide to obtain previous medical records or to obtain history from someone other than the patient: yes    Critical Care  Total time providing critical care: 30-74 minutes      Final diagnoses:   Hypotension, unspecified hypotension type   Acute renal failure, unspecified acute renal failure type       Documentation assistance provided by valentin Carrasco.  Information recorded by the scribe was done at my direction and has been verified and validated by me.     Yuan Carrasco  01/07/18 1608       Yuan BURLESON Walker  01/07/18 1702       Yuan Carrasco  01/07/18 1755       Yuan Carrasco  01/07/18 1757       Horace Almazan MD  01/08/18 0059

## 2018-01-07 NOTE — H&P
Chief complaint:  Dizziness and weakness    Subjective     Patient is a 57 y.o. female who presented to the emergency department this afternoon with complaints of dizziness and weakness.      Her current seems to have begun on [place] day.  She did had a significant cough at that time as well as these other, nausea, and fatigue.  She was positive for flu B and was given Tamiflu.  She had persistent symptoms of cough and weakness and was given doxycycline though she thinks this caused her to have diarrhea.  Start taking this 3 days ago but quit since then.  She has had some mild nausea but says she's been keeping liquids down.  She's had no appetite.  Her last fever was 5 days ago.  Highest temperature that she measured was 100.1.    She presented here and was found to be hypotensive as well as having a markedly elevated creatinine consistent with volume depletion.  I was asked to admit her to the ICU because of her persistent hypotension despite volume resuscitation though currently blood pressure is 110/60.     She had an episode of gastroenteritis with volume depletion and acute kidney injury last February.  She was admitted here during that episode.      History  Past Medical History:   Diagnosis Date   • Asthma    • Hypertension    • PVD (peripheral vascular disease)      Past Surgical History:   Procedure Laterality Date   • BREAST BIOPSY Left    • CYST REMOVAL      right wrist      Family History   Problem Relation Age of Onset   • Cancer Mother    • Heart failure Father    • Breast cancer Neg Hx    • Ovarian cancer Neg Hx      Social History   Substance Use Topics   • Smoking status: Former Smoker     Packs/day: 1.00     Years: 20.00   • Smokeless tobacco: None      Comment: Quit [place] Day   • Alcohol use Yes      Comment: 4-6 beers/night in the past.        (Not in a hospital admission)  Allergies:  Eggs or egg-derived products and Fish-derived products    Review of Systems   Pertinent items are  "noted in HPI, all other systems reviewed and negative      Objective     Vital Signs  Temp:  [97.6 °F (36.4 °C)] 97.6 °F (36.4 °C)  Heart Rate:  [] 96  Resp:  [18] 18  BP: ()/(27-57) 95/54    Physical Exam:    Objective:  General Appearance:  In no acute distress.    Vital signs: (most recent): Blood pressure 95/54, pulse 96, temperature 97.6 °F (36.4 °C), temperature source Oral, resp. rate 18, height 160 cm (63\"), weight 61.7 kg (136 lb), SpO2 98 %.    HEENT: Normal HEENT exam.    Lungs:  Normal respiratory rate and normal effort.  She is not in respiratory distress.  Breath sounds clear to auscultation.  No wheezes, rales or rhonchi.    Heart: Normal rate.  Regular rhythm.  S1 normal and S2 normal.  No murmur, gallop or friction rub.   Chest: Symmetric chest wall expansion.   Abdomen: Abdomen is soft and non-distended.  Bowel sounds are normal.   There is no abdominal tenderness.   There is no mass. There is no splenomegaly. There is no hepatomegaly.   Extremities: There is no deformity or dependent edema.    Neurological: Patient is alert and oriented to person, place and time.    Pupils:  Pupils are equal, round, and reactive to light.    Skin:  Warm and dry.              Results Review:    I reviewed the patient's new clinical results.  I reviewed the patient's new imaging results and agree with the interpretation.  I reviewed the patient's other test results and agree with the interpretation    Assessment/Plan     Assessment:    Hospital Problem List     * (Principal)Sepsis    MAMADOU (acute kidney injury)    Hypotension    Volume depletion    Nausea & vomiting    Gastroenteritis, acute    Hypokalemia    Hypomagnesemia    Diarrhea          57-year-old female with volume depletion and hypotension.  She does not appear to be toxic or clinically septic and has no localizing symptoms.  She has had some mild nausea and some diarrhea, though she does not describe it as severe.  Impressively, her creatinine " is greater than 5.  She seemed to be responding to fluid resuscitation.  She had a recent episode of influenza B and received Tamiflu but the majority of those symptoms seemed to have resolved.      Plan:    1. Complete IV fluid resuscitation   2.  empiric antibiotics with vancomycin and Zosyn  3. Pan culture  4. ICU admission for close observation at least overnight  5. Follow-up renal labs  6. Replace electrolytes    I discussed the patients findings and my recommendations with patient and nursing staff.     Art Chester MD  Pulmonary and Critical Care Medicine  01/07/18  6:45 PM

## 2018-01-07 NOTE — ED NOTES
Assisted patient to bedside toilet. Patient unable to provide urine specimen at this time.     Aj Mendieta  01/07/18 0819

## 2018-01-08 LAB
ALBUMIN SERPL-MCNC: 3.6 G/DL (ref 3.2–4.8)
ALBUMIN/GLOB SERPL: 1.5 G/DL (ref 1.5–2.5)
ALP SERPL-CCNC: 44 U/L (ref 25–100)
ALT SERPL W P-5'-P-CCNC: 21 U/L (ref 7–40)
ANION GAP SERPL CALCULATED.3IONS-SCNC: 13 MMOL/L (ref 3–11)
AST SERPL-CCNC: 24 U/L (ref 0–33)
BACTERIA UR QL AUTO: ABNORMAL /HPF
BASOPHILS # BLD MANUAL: 0 10*3/MM3 (ref 0–0.2)
BASOPHILS NFR BLD AUTO: 0 % (ref 0–1)
BILIRUB SERPL-MCNC: 0.3 MG/DL (ref 0.3–1.2)
BILIRUB UR QL STRIP: NEGATIVE
BUN BLD-MCNC: 25 MG/DL (ref 9–23)
BUN/CREAT SERPL: 6.4 (ref 7–25)
CALCIUM SPEC-SCNC: 6.6 MG/DL (ref 8.7–10.4)
CHLORIDE SERPL-SCNC: 107 MMOL/L (ref 99–109)
CLARITY UR: ABNORMAL
CO2 SERPL-SCNC: 14 MMOL/L (ref 20–31)
COD CRY URNS QL: ABNORMAL /HPF
COLOR UR: YELLOW
CREAT BLD-MCNC: 3.9 MG/DL (ref 0.6–1.3)
DEPRECATED RDW RBC AUTO: 44.3 FL (ref 37–54)
EOSINOPHIL # BLD MANUAL: 0 10*3/MM3 (ref 0.1–0.3)
EOSINOPHIL NFR BLD MANUAL: 0 % (ref 0–3)
ERYTHROCYTE [DISTWIDTH] IN BLOOD BY AUTOMATED COUNT: 15.5 % (ref 11.3–14.5)
GFR SERPL CREATININE-BSD FRML MDRD: 12 ML/MIN/1.73
GLOBULIN UR ELPH-MCNC: 2.4 GM/DL
GLUCOSE BLD-MCNC: 90 MG/DL (ref 70–100)
GLUCOSE UR STRIP-MCNC: NEGATIVE MG/DL
HCT VFR BLD AUTO: 26.6 % (ref 34.5–44)
HGB BLD-MCNC: 8.6 G/DL (ref 11.5–15.5)
HGB UR QL STRIP.AUTO: ABNORMAL
HYALINE CASTS UR QL AUTO: ABNORMAL /LPF
KETONES UR QL STRIP: NEGATIVE
LEUKOCYTE ESTERASE UR QL STRIP.AUTO: NEGATIVE
LYMPHOCYTES # BLD MANUAL: 2.15 10*3/MM3 (ref 0.6–4.8)
LYMPHOCYTES NFR BLD MANUAL: 21 % (ref 24–44)
LYMPHOCYTES NFR BLD MANUAL: 7 % (ref 0–12)
MAGNESIUM SERPL-MCNC: 2.6 MG/DL (ref 1.3–2.7)
MCH RBC QN AUTO: 25 PG (ref 27–31)
MCHC RBC AUTO-ENTMCNC: 32.3 G/DL (ref 32–36)
MCV RBC AUTO: 77.3 FL (ref 80–99)
MONOCYTES # BLD AUTO: 0.72 10*3/MM3 (ref 0–1)
NEUTROPHILS # BLD AUTO: 7.37 10*3/MM3 (ref 1.5–8.3)
NEUTROPHILS NFR BLD MANUAL: 70 % (ref 41–71)
NEUTS BAND NFR BLD MANUAL: 2 % (ref 0–5)
NITRITE UR QL STRIP: NEGATIVE
PH UR STRIP.AUTO: <=5 [PH] (ref 5–8)
PHOSPHATE SERPL-MCNC: 3.4 MG/DL (ref 2.4–5.1)
PLAT MORPH BLD: NORMAL
PLATELET # BLD AUTO: 673 10*3/MM3 (ref 150–450)
PMV BLD AUTO: 9.1 FL (ref 6–12)
POTASSIUM BLD-SCNC: 3.2 MMOL/L (ref 3.5–5.5)
POTASSIUM BLD-SCNC: 3.9 MMOL/L (ref 3.5–5.5)
PROCALCITONIN SERPL-MCNC: 0.07 NG/ML
PROT SERPL-MCNC: 6 G/DL (ref 5.7–8.2)
PROT UR QL STRIP: ABNORMAL
RBC # BLD AUTO: 3.44 10*6/MM3 (ref 3.89–5.14)
RBC # UR: ABNORMAL /HPF
RBC MORPH BLD: NORMAL
REF LAB TEST METHOD: ABNORMAL
SODIUM BLD-SCNC: 134 MMOL/L (ref 132–146)
SP GR UR STRIP: 1.02 (ref 1–1.03)
SQUAMOUS #/AREA URNS HPF: ABNORMAL /HPF
TSH SERPL DL<=0.05 MIU/L-ACNC: 0.73 MIU/ML (ref 0.35–5.35)
UROBILINOGEN UR QL STRIP: ABNORMAL
VANCOMYCIN SERPL-MCNC: 18.4 MCG/ML (ref 5–40)
WBC MORPH BLD: NORMAL
WBC NRBC COR # BLD: 10.23 10*3/MM3 (ref 3.5–10.8)
WBC UR QL AUTO: ABNORMAL /HPF

## 2018-01-08 PROCEDURE — 87045 FECES CULTURE AEROBIC BACT: CPT | Performed by: NURSE PRACTITIONER

## 2018-01-08 PROCEDURE — 25010000002 HEPARIN (PORCINE) PER 1000 UNITS: Performed by: INTERNAL MEDICINE

## 2018-01-08 PROCEDURE — 25010000002 PIPERACILLIN-TAZOBACTAM: Performed by: INTERNAL MEDICINE

## 2018-01-08 PROCEDURE — 94640 AIRWAY INHALATION TREATMENT: CPT

## 2018-01-08 PROCEDURE — 87086 URINE CULTURE/COLONY COUNT: CPT | Performed by: EMERGENCY MEDICINE

## 2018-01-08 PROCEDURE — 94799 UNLISTED PULMONARY SVC/PX: CPT

## 2018-01-08 PROCEDURE — 87046 STOOL CULTR AEROBIC BACT EA: CPT | Performed by: NURSE PRACTITIONER

## 2018-01-08 PROCEDURE — 94760 N-INVAS EAR/PLS OXIMETRY 1: CPT

## 2018-01-08 PROCEDURE — 80202 ASSAY OF VANCOMYCIN: CPT

## 2018-01-08 PROCEDURE — 25010000002 ALBUMIN HUMAN 5% PER 50 ML: Performed by: NURSE PRACTITIONER

## 2018-01-08 PROCEDURE — 85027 COMPLETE CBC AUTOMATED: CPT | Performed by: INTERNAL MEDICINE

## 2018-01-08 PROCEDURE — 84145 PROCALCITONIN (PCT): CPT | Performed by: INTERNAL MEDICINE

## 2018-01-08 PROCEDURE — 99233 SBSQ HOSP IP/OBS HIGH 50: CPT | Performed by: INTERNAL MEDICINE

## 2018-01-08 PROCEDURE — 80053 COMPREHEN METABOLIC PANEL: CPT | Performed by: INTERNAL MEDICINE

## 2018-01-08 PROCEDURE — P9041 ALBUMIN (HUMAN),5%, 50ML: HCPCS | Performed by: NURSE PRACTITIONER

## 2018-01-08 PROCEDURE — 84443 ASSAY THYROID STIM HORMONE: CPT | Performed by: INTERNAL MEDICINE

## 2018-01-08 PROCEDURE — 83735 ASSAY OF MAGNESIUM: CPT | Performed by: INTERNAL MEDICINE

## 2018-01-08 PROCEDURE — 84100 ASSAY OF PHOSPHORUS: CPT | Performed by: INTERNAL MEDICINE

## 2018-01-08 PROCEDURE — 84132 ASSAY OF SERUM POTASSIUM: CPT | Performed by: INTERNAL MEDICINE

## 2018-01-08 PROCEDURE — 85007 BL SMEAR W/DIFF WBC COUNT: CPT | Performed by: INTERNAL MEDICINE

## 2018-01-08 PROCEDURE — 81001 URINALYSIS AUTO W/SCOPE: CPT | Performed by: EMERGENCY MEDICINE

## 2018-01-08 RX ORDER — POTASSIUM CHLORIDE 750 MG/1
10 TABLET, EXTENDED RELEASE ORAL 2 TIMES DAILY
COMMUNITY
End: 2018-06-14 | Stop reason: SDUPTHER

## 2018-01-08 RX ORDER — IPRATROPIUM BROMIDE AND ALBUTEROL SULFATE 2.5; .5 MG/3ML; MG/3ML
3 SOLUTION RESPIRATORY (INHALATION) EVERY 6 HOURS PRN
Status: DISCONTINUED | OUTPATIENT
Start: 2018-01-08 | End: 2018-01-10 | Stop reason: HOSPADM

## 2018-01-08 RX ORDER — ALBUMIN, HUMAN INJ 5% 5 %
500 SOLUTION INTRAVENOUS ONCE
Status: COMPLETED | OUTPATIENT
Start: 2018-01-08 | End: 2018-01-08

## 2018-01-08 RX ORDER — TRIAMTERENE AND HYDROCHLOROTHIAZIDE 37.5; 25 MG/1; MG/1
1 CAPSULE ORAL DAILY
COMMUNITY
End: 2018-06-12 | Stop reason: SDUPTHER

## 2018-01-08 RX ADMIN — POTASSIUM CHLORIDE 40 MEQ: 750 CAPSULE, EXTENDED RELEASE ORAL at 06:35

## 2018-01-08 RX ADMIN — SODIUM CHLORIDE 150 ML/HR: 9 INJECTION, SOLUTION INTRAVENOUS at 15:34

## 2018-01-08 RX ADMIN — SODIUM CHLORIDE 150 ML/HR: 9 INJECTION, SOLUTION INTRAVENOUS at 22:24

## 2018-01-08 RX ADMIN — PANTOPRAZOLE SODIUM 40 MG: 40 TABLET, DELAYED RELEASE ORAL at 06:08

## 2018-01-08 RX ADMIN — SODIUM CHLORIDE 150 ML/HR: 9 INJECTION, SOLUTION INTRAVENOUS at 01:34

## 2018-01-08 RX ADMIN — HEPARIN SODIUM 5000 UNITS: 5000 INJECTION, SOLUTION INTRAVENOUS; SUBCUTANEOUS at 22:24

## 2018-01-08 RX ADMIN — HEPARIN SODIUM 5000 UNITS: 5000 INJECTION, SOLUTION INTRAVENOUS; SUBCUTANEOUS at 06:08

## 2018-01-08 RX ADMIN — ALBUMIN HUMAN 500 ML: 0.05 INJECTION, SOLUTION INTRAVENOUS at 01:15

## 2018-01-08 RX ADMIN — IPRATROPIUM BROMIDE AND ALBUTEROL SULFATE 3 ML: .5; 3 SOLUTION RESPIRATORY (INHALATION) at 13:49

## 2018-01-08 RX ADMIN — TAZOBACTAM SODIUM AND PIPERACILLIN SODIUM 4.5 G: .5; 4 INJECTION, POWDER, LYOPHILIZED, FOR SOLUTION INTRAVENOUS at 06:07

## 2018-01-08 RX ADMIN — SODIUM CHLORIDE 150 ML/HR: 9 INJECTION, SOLUTION INTRAVENOUS at 08:28

## 2018-01-08 RX ADMIN — TAZOBACTAM SODIUM AND PIPERACILLIN SODIUM 4.5 G: .5; 4 INJECTION, POWDER, LYOPHILIZED, FOR SOLUTION INTRAVENOUS at 17:42

## 2018-01-08 RX ADMIN — HEPARIN SODIUM 5000 UNITS: 5000 INJECTION, SOLUTION INTRAVENOUS; SUBCUTANEOUS at 14:09

## 2018-01-08 NOTE — PLAN OF CARE
Problem: Patient Care Overview (Adult)  Goal: Plan of Care Review  Outcome: Ongoing (interventions implemented as appropriate)   01/07/18 2230 01/08/18 0551   Patient Care Overview   Progress --  improving   Outcome Evaluation   Outcome Summary/Follow up Plan --  Pt. admitted from ED at 2214 with sepsis and MAMADOU. HR, RR stable; afebrile. Intermittent O2 desaturations into the 80's when sleeping; 2L/NC applied. BP 80's-100's/40's-50's with MAP 55-70's; IVF continues at 150 mL/hr and 500 mL of 5% Albumin given x1 with improvement of BP afterward. Magnesium and potassium replaced. Congested cough with wheezing/tightness noted in all lung lobes. Incentive spirometer initiated. Nuñez catheter present on admission from ED; urine cloudy initially, but has cleared. UOP marginal, but has improved throughout the night. BM x2; stool culture sent to lab due to stool being liquid and lime green in color. Awaiting morning lab results.   Coping/Psychosocial Response Interventions   Plan Of Care Reviewed With patient --      Goal: Adult Individualization and Mutuality  Outcome: Ongoing (interventions implemented as appropriate)    Goal: Discharge Needs Assessment  Outcome: Ongoing (interventions implemented as appropriate)      Problem: Sepsis (Adult)  Goal: Signs and Symptoms of Listed Potential Problems Will be Absent or Manageable (Sepsis)  Outcome: Ongoing (interventions implemented as appropriate)      Problem: Renal Failure/Kidney Injury, Acute (Adult)  Goal: Signs and Symptoms of Listed Potential Problems Will be Absent or Manageable (Renal Failure/Kidney Injury, Acute)  Outcome: Ongoing (interventions implemented as appropriate)      Problem: Pressure Ulcer Risk (Ángel Scale) (Adult,Obstetrics,Pediatric)  Goal: Identify Related Risk Factors and Signs and Symptoms  Outcome: Outcome(s) achieved Date Met: 01/08/18    Goal: Skin Integrity  Outcome: Ongoing (interventions implemented as appropriate)

## 2018-01-08 NOTE — PROGRESS NOTES
"Adult Nutrition  Assessment/PES    Patient Name:  Jocelyne Lindquist  YOB: 1960  MRN: [id number]  Admit Date:  1/7/2018    Assessment Date:  1/8/2018    Comments:  RD completed nutrition assessment r/t N/V/D PTA noted during admission assessment to ICU. RD to continue to follow.             Reason for Assessment       01/08/18 1005    Reason for Assessment    Reason For Assessment/Visit admission assessment;other (comment)   N/V/D PTA noted    Time Spent (min) 30    Diagnosis Diagnosis    Cardiac Hypotension    Fluid Status Volume depletion    Gastrointestinal Other (comment)   N/V/gastroenteritis-PTA    Infectious Disease Other (comment);Sepsis   Flu B 12/25-tamiflu given    Renal MAMADOU                Nutrition/Diet History       01/08/18 1015    Nutrition/Diet History    Reported/Observed By RN;MD    Other Verbal order via Intensivist to advance diet. Pt states she has had poor PO intake since getting the Flu on 12/25. She states ABX given for her recent illness gave her diarrhea and also affected sense of smell and appetite. Pt states appetite is much better and PO intake has increased.             Anthropometrics       01/08/18 1015    Anthropometrics (Special Considerations)    Height Used for Calculations 1.6 m (5' 3\")    Weight Used for Calculations 61.2 kg (135 lb)   bedscale 1/7 RD Calculated BMI (kg/m2) 23.98    Usual Body Weight (UBW)    Usual Body Weight 108 kg (239 lb)    Weight Loss 1.814 kg (4 lb)   ?fluid status r/t vlume depletion    % Weight Loss  3 %    Weight Loss Time Frame 2 weeks    Body Mass Index (BMI)    BMI Grade 19.1 - 24.9 - normal            Labs/Tests/Procedures/Meds       01/08/18 1016    Labs/Tests/Procedures/Meds    Labs/Tests Review Reviewed;BUN;Creat    Medication Review Reviewed, pertinent   GTT:NaCl@150mL/hr Other meds: ABX     Results from last 7 days  Lab Units 01/08/18  0409   SODIUM mmol/L 134   POTASSIUM mmol/L 3.2*   CHLORIDE mmol/L 107   CO2 mmol/L 14.0* "   BUN mg/dL 25*   CREATININE mg/dL 3.90*   CALCIUM mg/dL 6.6*   BILIRUBIN mg/dL 0.3   ALK PHOS U/L 44   ALT (SGPT) U/L 21   AST (SGOT) U/L 24   GLUCOSE mg/dL 90       Intake & Output (last day)       01/07 0701 - 01/08 0700 01/08 0701 - 01/09 0700    I.V. (mL/kg) 2802 (45.6) 516.6 (8.4)    IV Piggyback 833     Total Intake(mL/kg) 3635 (59.2) 516.6 (8.4)    Urine (mL/kg/hr) 400 925 (2.7)    Stool 150 0 (0)    Total Output 550 925    Net +3085 -408.4          Unmeasured Stool Occurrence 1 x 1 x                   Nutrition Prescription Ordered       01/08/18 1016    Nutrition Prescription PO    Current PO Diet Clear Liquid            Evaluation of Received Nutrient/Fluid Intake       01/08/18 1016    PO Evaluation    Number of Days PO Intake Evaluated Insufficient Data            Problem/Interventions:        Problem 1       01/08/18 1238    Nutrition Diagnoses Problem 1    Problem 1 Unintended Weight Loss    Etiology (related to) --   fluid status, GI status PTA-symptoms improved    Signs/Symptoms (evidenced by) Unintended Weight Change    Unintended Weight Change Loss    Number of Pounds Lost 4lb 3%    Weight loss time period 2 weeks                    Intervention Goal       01/08/18 1239    Intervention Goal    General Nutrition support treatment    PO Establish PO    Weight No significant weight loss            Nutrition Intervention       01/08/18 1240    Nutrition Intervention    RD/Tech Action Care plan reviewd;Follow Tx progress;Menu provided;Encourage intake            Nutrition Prescription       01/08/18 1017    Nutrition Prescription PO    PO Prescription Begin/change diet    Begin/Change Diet to Regular    New PO Prescription Ordered? Yes            Education/Evaluation       01/08/18 1240    Monitor/Evaluation    Monitor Per protocol;I&O;PO intake;Weight        Electronically signed by:  Moriah Conrad, ALINE, LD  01/08/18 12:40 PM

## 2018-01-08 NOTE — PROGRESS NOTES
"Intensive Care Follow-up     Hospital:  LOS: 1 day   Ms. Jocelyne Lindquist, 57 y.o. female is followed for:   Sepsis        Subjective   Interval History:  The chart is been reviewed. The patient has remained hemodynamically stable overnight. She continues to receive IV fluids. She states that she is tolerating her clear liquids well and is requesting an advance of her diet. I will culture data has been reviewed.    The patient's relevant past medical, surgical and social history were reviewed and updated in Epic as appropriate.        Objective     Infusions:    sodium chloride 150 mL/hr Last Rate: 150 mL/hr (01/08/18 0828)     Medications:    heparin (porcine) 5,000 Units Subcutaneous Q8H   pantoprazole 40 mg Oral QAM   piperacillin-tazobactam 4.5 g Intravenous Q12H       Vital Sign Min/Max for last 24 hours  Temp  Min: 97.3 °F (36.3 °C)  Max: 98 °F (36.7 °C)   BP  Min: 53/27  Max: 117/90   Pulse  Min: 85  Max: 121   Resp  Min: 18  Max: 20   SpO2  Min: 86 %  Max: 100 %   Flow (L/min)  Min: 2  Max: 2       Input/Output for last 24 hour shift  01/07 0701 - 01/08 0700  In: 3635 [I.V.:2802]  Out: 550 [Urine:400]      Objective:  General Appearance:  Comfortable, well-appearing and in no acute distress.    Vital signs: (most recent): Blood pressure 101/54, pulse 93, temperature 97.7 °F (36.5 °C), temperature source Axillary, resp. rate 20, height 160 cm (63\"), weight 61.4 kg (135 lb 5.8 oz), SpO2 99 %.  No fever.    Output: Producing urine.    HEENT: Normal HEENT exam.    Lungs:  Normal respiratory rate and normal effort.  She is not in respiratory distress.  No stridor.  There are wheezes.  No rales, rhonchi or decreased breath sounds.    Heart: Normal rate.  Regular rhythm.  S1 normal and S2 normal.  No murmur or friction rub.   Abdomen: Abdomen is soft and non-distended.  Bowel sounds are normal.   There is no abdominal tenderness.   There is no mass.   Extremities: Normal range of motion.  There is no deformity or " dependent edema.    Neurological: Patient is alert and oriented to person, place and time.  Normal strength.    Pupils:  Pupils are equal, round, and reactive to light.  Pupils are equal.   Skin:  Warm.  No rash or cyanosis.               Results from last 7 days  Lab Units 01/08/18  0409 01/07/18  1654   WBC 10*3/mm3 10.23 13.29*   HEMOGLOBIN g/dL 8.6* 11.0*   PLATELETS 10*3/mm3 673* 669*       Results from last 7 days  Lab Units 01/08/18  0409 01/07/18  1654   SODIUM mmol/L 134 131*   POTASSIUM mmol/L 3.2* 3.2*   CO2 mmol/L 14.0* 17.0*   BUN mg/dL 25* 27*   CREATININE mg/dL 3.90* 5.40*   MAGNESIUM mg/dL 2.6 <0.7*   PHOSPHORUS mg/dL 3.4 5.1   GLUCOSE mg/dL 90 106*     Estimated Creatinine Clearance: 15.1 mL/min (by C-G formula based on Cr of 3.9).          I reviewed the patient's results and images.     Assessment/Plan   Impression      Principal Problem:    Sepsis  Active Problems:    MAMADOU (acute kidney injury)    Nausea & vomiting    Gastroenteritis, acute    Hypokalemia    Hypotension    Volume depletion    Hypomagnesemia    Diarrhea       Plan        Stop vancomycin. We will continue Zosyn.  Continue normal saline at 150 mL per hour.  Discontinue Nuñez catheter.  Replace all electrolytes.  Wean oxygen as tolerated.  Follow-up labs and orders have been placed.  She will remain in the intensive care unit today.     Plan of care and goals reviewed with mulitdisciplinary team at daily rounds.   I discussed the patient's findings and my recommendations with patient and nursing staff         Chencho Humphrey MD, [facility]  Pulmonary and Critical Care Medicine  01/08/18 1:43 PM

## 2018-01-08 NOTE — PROGRESS NOTES
Discharge Planning Assessment  [facility]     Patient Name: Jocelyne Lindquist  MRN: [id number]  Today's Date: 1/8/2018    Admit Date: 1/7/2018          Discharge Needs Assessment       01/08/18 1245    Living Environment    Lives With spouse    Living Arrangements house    Home Accessibility no concerns    Stair Railings at Home none    Type of Financial/Environmental Concern none    Transportation Available family or friend will provide    Living Environment    Provides Primary Care For no one    Quality Of Family Relationships supportive    Able to Return to Prior Living Arrangements yes    Discharge Needs Assessment    Concerns To Be Addressed no discharge needs identified    Readmission Within The Last 30 Days no previous admission in last 30 days    Anticipated Changes Related to Illness none    Equipment Currently Used at Home none    Equipment Needed After Discharge none            Discharge Plan       01/08/18 1246    Case Management/Social Work Plan    Plan Home    Patient/Family In Agreement With Plan yes    Additional Comments Spoke with patient and  at bedside.  Patient resides in [facility] and lives with her .  Prior to admission patient was independent with ADL's and mobility.  Patient does not have any DME and has never used home health.  No needs identified at this time.  Patient plans to go home upon discharge.  CM will continue to follow.        Discharge Placement     No information found        Expected Discharge Date and Time     Expected Discharge Date Expected Discharge Time    Jan 12, 2018               Demographic Summary       01/08/18 1245    Referral Information    Admission Type inpatient    Arrived From home or self-care    Referral Source admission list    Reason For Consult discharge planning    Contact Information    Permission Granted to Share Information With ;facility ;family/designee;power of  for finances    Primary Care  Physician Information    Name [facility]            Functional Status     None            Psychosocial     None            Abuse/Neglect     None            Legal     None            Substance Abuse     None            Patient Forms     None          Muna Sheldon RN

## 2018-01-08 NOTE — PLAN OF CARE
Problem: Patient Care Overview (Adult)  Goal: Plan of Care Review  Outcome: Ongoing (interventions implemented as appropriate)   01/08/18 1528   Patient Care Overview   Progress improving   Outcome Evaluation   Outcome Summary/Follow up Plan Pt's BP has remained stable, MAP >65, UOP increasing, Mag and K replacments showed improvment. F/C discontinued. Diet changed to Regular. Pt states she feels much better. PRN nebs ordered for SOA/wheezing are helping pt. Continues to have a few liquid bright green stools, no solid stools.    Coping/Psychosocial Response Interventions   Plan Of Care Reviewed With patient     Goal: Adult Individualization and Mutuality  Outcome: Ongoing (interventions implemented as appropriate)    Goal: Discharge Needs Assessment  Outcome: Ongoing (interventions implemented as appropriate)      Problem: Sepsis (Adult)  Goal: Signs and Symptoms of Listed Potential Problems Will be Absent or Manageable (Sepsis)  Outcome: Ongoing (interventions implemented as appropriate)      Problem: Renal Failure/Kidney Injury, Acute (Adult)  Goal: Signs and Symptoms of Listed Potential Problems Will be Absent or Manageable (Renal Failure/Kidney Injury, Acute)  Outcome: Ongoing (interventions implemented as appropriate)      Problem: Pressure Ulcer Risk (Ángel Scale) (Adult,Obstetrics,Pediatric)  Goal: Skin Integrity  Outcome: Ongoing (interventions implemented as appropriate)

## 2018-01-08 NOTE — PROGRESS NOTES
"Pharmacy Consult-Vancomycin Dosing  Jocelyne Lindquist is a  57 y.o. female receiving vancomycin therapy.     Indication: Sepsis  Consulting Provider: Intensivist  ID Consult: No    Goal Trough: 15-20 mcg/mL    Current Antimicrobial Therapy  Anti-Infectives       Ordered     Dose/Rate Route Frequency Start Stop      01/07/18 1839  piperacillin-tazobactam (ZOSYN) 4.5 g/100 mL 0.9% NS IVPB (mbp)     Comments:  CrCl = 11. Changed from 4.5 gm q8h due to renal function   Ordering Provider:  Art Chester MD    4.5 g  over 4 Hours Intravenous Every 12 Hours 01/08/18 0600 01/15/18 0559    01/07/18 1902  vancomycin (dosing per levels)     Ordering Provider:  Heather Johns RP     Does not apply Daily 01/07/18 1904 01/14/18 0859    01/07/18 1839  Pharmacy to dose vancomycin     Ordering Provider:  Art Chester MD     Does not apply Continuous PRN 01/07/18 1839 01/14/18 1838    01/07/18 1538  piperacillin-tazobactam (ZOSYN) 4.5 g/100 mL 0.9% NS IVPB (mbp)     Ordering Provider:  Horace Almazan MD    4.5 g  over 0.5 Hours Intravenous Once 01/07/18 1540 01/07/18 1707    01/07/18 1538  vancomycin 1250 mg/250 mL 0.9% NS IVPB ([facility])     Ordering Provider:  Horace Almazan MD    20 mg/kg × 61.7 kg  over 90 Minutes Intravenous Once 01/07/18 1540 01/07/18 1754          Allergies  Allergies as of 01/07/2018 - Horace as Reviewed 01/07/2018   Allergen Reaction Noted   • Eggs or egg-derived products  02/17/2017   • Fish-derived products  02/17/2017     Labs    Results from last 7 days   Lab Units 01/07/18  1654   BUN mg/dL 27*   CREATININE mg/dL 5.40*     Results from last 7 days   Lab Units 01/07/18  1654   WBC 10*3/mm3 13.29*     Evaluation of Dosing     Ht - 160 cm (63\")  Wt - 61.7 kg (136 lb)    Estimated Creatinine Clearance: 10.9 mL/min (by C-G formula based on Cr of 5.4).    Intake & Output (last 3 days)         01/05 0701 - 01/06 0700 01/06 0701 - 01/07 0700 01/07 0701 - 01/08 0700      IV Piggyback   250    " Total Intake(mL/kg)   250 (4.1)    Net     +250                 Microbiology and Radiology  Microbiology Results (last 10 days)       ** No results found for the last 240 hours. **          Evaluation of Level    Vanc AM random 1/8.    Assessment/Plan:    Patient received a loading dose of vancomycin 1250 mg IV x 1 (20 mg/kg).   Due to patient's renal function (SCr = 5.4, CrCl = 10.9 ml/min), will order a vancomycin AM random 1/8 to assess need for further dosing.   Monitor renal function, cultures and sensitivities, and clinical status, and adjust regimen as necessary.  Pharmacy will continue to follow.    Heather Johns, PharmD  1/7/2018  7:13 PM

## 2018-01-09 LAB
ANION GAP SERPL CALCULATED.3IONS-SCNC: 8 MMOL/L (ref 3–11)
BASOPHILS # BLD AUTO: 0.03 10*3/MM3 (ref 0–0.2)
BASOPHILS NFR BLD AUTO: 0.4 % (ref 0–1)
BUN BLD-MCNC: 13 MG/DL (ref 9–23)
BUN/CREAT SERPL: 11.8 (ref 7–25)
CALCIUM SPEC-SCNC: 6.6 MG/DL (ref 8.7–10.4)
CHLORIDE SERPL-SCNC: 114 MMOL/L (ref 99–109)
CO2 SERPL-SCNC: 15 MMOL/L (ref 20–31)
CREAT BLD-MCNC: 1.1 MG/DL (ref 0.6–1.3)
DEPRECATED RDW RBC AUTO: 44.4 FL (ref 37–54)
EOSINOPHIL # BLD AUTO: 0.08 10*3/MM3 (ref 0–0.3)
EOSINOPHIL NFR BLD AUTO: 1 % (ref 0–3)
ERYTHROCYTE [DISTWIDTH] IN BLOOD BY AUTOMATED COUNT: 15.7 % (ref 11.3–14.5)
GFR SERPL CREATININE-BSD FRML MDRD: 51 ML/MIN/1.73
GLUCOSE BLD-MCNC: 95 MG/DL (ref 70–100)
HCT VFR BLD AUTO: 25.8 % (ref 34.5–44)
HGB BLD-MCNC: 8.3 G/DL (ref 11.5–15.5)
IMM GRANULOCYTES # BLD: 0.09 10*3/MM3 (ref 0–0.03)
IMM GRANULOCYTES NFR BLD: 1.2 % (ref 0–0.6)
LYMPHOCYTES # BLD AUTO: 1.91 10*3/MM3 (ref 0.6–4.8)
LYMPHOCYTES NFR BLD AUTO: 25 % (ref 24–44)
MAGNESIUM SERPL-MCNC: 1 MG/DL (ref 1.3–2.7)
MCH RBC QN AUTO: 24.8 PG (ref 27–31)
MCHC RBC AUTO-ENTMCNC: 32.2 G/DL (ref 32–36)
MCV RBC AUTO: 77 FL (ref 80–99)
MONOCYTES # BLD AUTO: 0.51 10*3/MM3 (ref 0–1)
MONOCYTES NFR BLD AUTO: 6.7 % (ref 0–12)
NEUTROPHILS # BLD AUTO: 5.01 10*3/MM3 (ref 1.5–8.3)
NEUTROPHILS NFR BLD AUTO: 65.7 % (ref 41–71)
PLATELET # BLD AUTO: 634 10*3/MM3 (ref 150–450)
PMV BLD AUTO: 8.9 FL (ref 6–12)
POTASSIUM BLD-SCNC: 3.1 MMOL/L (ref 3.5–5.5)
POTASSIUM BLD-SCNC: 3.9 MMOL/L (ref 3.5–5.5)
RBC # BLD AUTO: 3.35 10*6/MM3 (ref 3.89–5.14)
SODIUM BLD-SCNC: 137 MMOL/L (ref 132–146)
WBC NRBC COR # BLD: 7.63 10*3/MM3 (ref 3.5–10.8)

## 2018-01-09 PROCEDURE — 99233 SBSQ HOSP IP/OBS HIGH 50: CPT | Performed by: INTERNAL MEDICINE

## 2018-01-09 PROCEDURE — 83735 ASSAY OF MAGNESIUM: CPT | Performed by: INTERNAL MEDICINE

## 2018-01-09 PROCEDURE — 25010000002 PIPERACILLIN-TAZOBACTAM: Performed by: INTERNAL MEDICINE

## 2018-01-09 PROCEDURE — 84132 ASSAY OF SERUM POTASSIUM: CPT | Performed by: INTERNAL MEDICINE

## 2018-01-09 PROCEDURE — 25010000002 HEPARIN (PORCINE) PER 1000 UNITS: Performed by: INTERNAL MEDICINE

## 2018-01-09 PROCEDURE — 25010000002 MAGNESIUM SULFATE 2 GM/50ML SOLUTION: Performed by: INTERNAL MEDICINE

## 2018-01-09 PROCEDURE — 85025 COMPLETE CBC W/AUTO DIFF WBC: CPT | Performed by: INTERNAL MEDICINE

## 2018-01-09 PROCEDURE — 80048 BASIC METABOLIC PNL TOTAL CA: CPT | Performed by: INTERNAL MEDICINE

## 2018-01-09 RX ORDER — ASPIRIN 81 MG/1
81 TABLET, CHEWABLE ORAL DAILY
Status: DISCONTINUED | OUTPATIENT
Start: 2018-01-09 | End: 2018-01-10 | Stop reason: HOSPADM

## 2018-01-09 RX ADMIN — MAGNESIUM SULFATE HEPTAHYDRATE 2 G: 40 INJECTION, SOLUTION INTRAVENOUS at 10:35

## 2018-01-09 RX ADMIN — POTASSIUM CHLORIDE 40 MEQ: 750 CAPSULE, EXTENDED RELEASE ORAL at 12:18

## 2018-01-09 RX ADMIN — HEPARIN SODIUM 5000 UNITS: 5000 INJECTION, SOLUTION INTRAVENOUS; SUBCUTANEOUS at 22:10

## 2018-01-09 RX ADMIN — ASPIRIN 81 MG 81 MG: 81 TABLET ORAL at 10:33

## 2018-01-09 RX ADMIN — MAGNESIUM SULFATE HEPTAHYDRATE 2 G: 40 INJECTION, SOLUTION INTRAVENOUS at 07:27

## 2018-01-09 RX ADMIN — TAZOBACTAM SODIUM AND PIPERACILLIN SODIUM 4.5 G: .5; 4 INJECTION, POWDER, LYOPHILIZED, FOR SOLUTION INTRAVENOUS at 06:00

## 2018-01-09 RX ADMIN — HEPARIN SODIUM 5000 UNITS: 5000 INJECTION, SOLUTION INTRAVENOUS; SUBCUTANEOUS at 06:00

## 2018-01-09 RX ADMIN — SODIUM CHLORIDE 150 ML/HR: 9 INJECTION, SOLUTION INTRAVENOUS at 05:13

## 2018-01-09 RX ADMIN — PANTOPRAZOLE SODIUM 40 MG: 40 TABLET, DELAYED RELEASE ORAL at 06:00

## 2018-01-09 RX ADMIN — POTASSIUM CHLORIDE 40 MEQ: 750 CAPSULE, EXTENDED RELEASE ORAL at 07:27

## 2018-01-09 RX ADMIN — HEPARIN SODIUM 5000 UNITS: 5000 INJECTION, SOLUTION INTRAVENOUS; SUBCUTANEOUS at 15:23

## 2018-01-09 NOTE — PLAN OF CARE
Problem: Patient Care Overview (Adult)  Goal: Plan of Care Review  Outcome: Ongoing (interventions implemented as appropriate)   01/08/18 2000 01/09/18 0616   Patient Care Overview   Progress --  improving   Outcome Evaluation   Outcome Summary/Follow up Plan --  Vital signs stable; afebrile. UOP adequate. No c/o pain. Left DP/PT pulses weak and only dopplerable; both feet feel warm, but are blotchy and discolored when patient is in the chair. Awaiting morning lab results. Potential transfer to telemetry or D/C home today.   Coping/Psychosocial Response Interventions   Plan Of Care Reviewed With patient;spouse --      Goal: Adult Individualization and Mutuality  Outcome: Ongoing (interventions implemented as appropriate)    Goal: Discharge Needs Assessment  Outcome: Ongoing (interventions implemented as appropriate)      Problem: Sepsis (Adult)  Goal: Signs and Symptoms of Listed Potential Problems Will be Absent or Manageable (Sepsis)  Outcome: Ongoing (interventions implemented as appropriate)      Problem: Renal Failure/Kidney Injury, Acute (Adult)  Goal: Signs and Symptoms of Listed Potential Problems Will be Absent or Manageable (Renal Failure/Kidney Injury, Acute)  Outcome: Ongoing (interventions implemented as appropriate)

## 2018-01-09 NOTE — PROGRESS NOTES
"Intensive Care Follow-up     Hospital:  LOS: 2 days   Ms. Jocelyne Lindquist, 57 y.o. female is followed for:   Sepsis        Subjective   Interval History:  The chart is been reviewed. The patient has done well overnight. Her blood pressure has been stable and she states her breathing is much better.    The patient's relevant past medical, surgical and social history were reviewed and updated in Epic as appropriate.        Objective     Infusions:     Medications:    aspirin 81 mg Oral Daily   heparin (porcine) 5,000 Units Subcutaneous Q8H   pantoprazole 40 mg Oral QAM       Vital Sign Min/Max for last 24 hours  Temp  Min: 97.9 °F (36.6 °C)  Max: 98.3 °F (36.8 °C)   BP  Min: 90/50  Max: 124/63   Pulse  Min: 80  Max: 98   Resp  Min: 18  Max: 22   SpO2  Min: 95 %  Max: 100 %   No Data Recorded       Input/Output for last 24 hour shift  01/08 0701 - 01/09 0700  In: 5267.5 [P.O.:1310; I.V.:3757.5]  Out: 2950 [Urine:2950]      Objective:  General Appearance:  Comfortable, well-appearing, in no acute distress and not in pain.    Vital signs: (most recent): Blood pressure 123/61, pulse 94, temperature 98.2 °F (36.8 °C), temperature source Oral, resp. rate 18, height 160 cm (63\"), weight 64.1 kg (141 lb 5 oz), SpO2 100 %.  No fever.    Output: Producing urine.    HEENT: Normal HEENT exam.    Lungs:  Normal respiratory rate and normal effort.  She is not in respiratory distress.  No stridor.  No wheezes, rales, rhonchi or decreased breath sounds.    Heart: Normal rate.  Regular rhythm.  S1 normal and S2 normal.  No murmur or friction rub.   Abdomen: Abdomen is soft and non-distended.  Bowel sounds are normal.   There is no abdominal tenderness.   There is no mass.   Extremities: Normal range of motion.  There is no deformity or dependent edema.    Neurological: Patient is alert and oriented to person, place and time.  Normal strength.    Pupils:  Pupils are equal, round, and reactive to light.  Pupils are equal.   Skin:  Warm.  " No rash or cyanosis.               Results from last 7 days  Lab Units 01/09/18  0421 01/08/18  0409 01/07/18  1654   WBC 10*3/mm3 7.63 10.23 13.29*   HEMOGLOBIN g/dL 8.3* 8.6* 11.0*   PLATELETS 10*3/mm3 634* 673* 669*       Results from last 7 days  Lab Units 01/09/18  0421 01/08/18  1338 01/08/18  0409 01/07/18  1654   SODIUM mmol/L 137  --  134 131*   POTASSIUM mmol/L 3.1* 3.9 3.2* 3.2*   CO2 mmol/L 15.0*  --  14.0* 17.0*   BUN mg/dL 13  --  25* 27*   CREATININE mg/dL 1.10  --  3.90* 5.40*   MAGNESIUM mg/dL 1.0*  --  2.6 <0.7*   PHOSPHORUS mg/dL  --   --  3.4 5.1   GLUCOSE mg/dL 95  --  90 106*     Estimated Creatinine Clearance: 49.6 mL/min (by C-G formula based on Cr of 1.1).            I reviewed the patient's results and images.     Assessment/Plan   Impression      Principal Problem:    Sepsis  Active Problems:    MAMADOU (acute kidney injury)    Nausea & vomiting    Gastroenteritis, acute    Hypokalemia    Hypotension    Volume depletion    Hypomagnesemia    Diarrhea       Plan        The patient's infectious workup has been negative thus far. She has tolerated withdrawal of antibiotics.  Her renal function has improved.  I do note that she is anemic with microcytic characteristics. I will check an iron panel tomorrow to facilitate workup of this.  I would like to monitor her overnight with repeat labs tomorrow morning and if she continues to improve we will plan to discharge her at that time.  Replace all electrolytes.  Follow-up labs and orders are placed.    Plan of care and goals reviewed with mulitdisciplinary team at daily rounds.   I discussed the patient's findings and my recommendations with patient, family and nursing staff         Chencho Humphrey MD, [facility]  Pulmonary and Critical Care Medicine  01/09/18 2:10 PM

## 2018-01-09 NOTE — PLAN OF CARE
Problem: Patient Care Overview (Adult)  Goal: Plan of Care Review  Outcome: Ongoing (interventions implemented as appropriate)   01/09/18 1723   Patient Care Overview   Progress improving   Outcome Evaluation   Outcome Summary/Follow up Plan BUN/Cr 13/1.10, IVF's discontinued. K and Mag replaced. IV Zosyn d/c'd. Pt given shower privileges. MD notifed about weak DP/PT and home dose Aspirin 81 restarted. However was able to palpate pulse more frequently today than need for doppler.    Coping/Psychosocial Response Interventions   Plan Of Care Reviewed With patient       Problem: Sepsis (Adult)  Goal: Signs and Symptoms of Listed Potential Problems Will be Absent or Manageable (Sepsis)  Outcome: Ongoing (interventions implemented as appropriate)      Problem: Renal Failure/Kidney Injury, Acute (Adult)  Goal: Signs and Symptoms of Listed Potential Problems Will be Absent or Manageable (Renal Failure/Kidney Injury, Acute)  Outcome: Ongoing (interventions implemented as appropriate)

## 2018-01-09 NOTE — PROGRESS NOTES
Adult Nutrition  Assessment/PES    Patient Name:  Jocelyne Lindquist  YOB: 1960  MRN: [id number]  Admit Date:  1/7/2018    Assessment Date:  1/9/2018            Reason for Assessment       01/09/18 0920    Reason for Assessment    Reason For Assessment/Visit follow up protocol;multidisciplinary rounds    Time Spent (min) 20    Diagnosis --   per notes this adm   Principal Problem:    Sepsis  Active Problems:    MAMADOU (acute kidney injury)    Nausea & vomiting    Gastroenteritis, acute    Hypokalemia    Hypotension    Volume depletion    Hypomagnesemia    Diarrhea               Anthropometrics       01/09/18 0922    Anthropometrics (Special Considerations)    Weight Used for Calculations 64 kg (141 lb)   per bedscale on (1/9)            Labs/Tests/Procedures/Meds       01/09/18 0921    Labs/Tests/Procedures/Meds    Labs/Tests Review Reviewed;K+;Mg++   replacing    Medication Review Reviewed, pertinent   plan to d/c IV fluids     Results from last 7 days  Lab Units 01/09/18  0421  01/08/18  0409   SODIUM mmol/L 137  --  134   POTASSIUM mmol/L 3.1*  < > 3.2*   CHLORIDE mmol/L 114*  --  107   CO2 mmol/L 15.0*  --  14.0*   BUN mg/dL 13  --  25*   CREATININE mg/dL 1.10  --  3.90*   CALCIUM mg/dL 6.6*  --  6.6*   BILIRUBIN mg/dL  --   --  0.3   ALK PHOS U/L  --   --  44   ALT (SGPT) U/L  --   --  21   AST (SGOT) U/L  --   --  24   GLUCOSE mg/dL 95  --  90   < > = values in this interval not displayed.             Nutrition Prescription Ordered       01/09/18 0921    Nutrition Prescription PO    Current PO Diet Regular            Evaluation of Received Nutrient/Fluid Intake       01/09/18 0921    PO Evaluation    Number of Meals 3    % PO Intake 67            Problem/Interventions:        Problem 1       01/09/18 0921    Nutrition Diagnoses Problem 1    Problem 1 Unintended Weight Loss    Etiology (related to) --   fluid status, GI status PTA-symptoms improved    Signs/Symptoms (evidenced by) Unintended Weight  Change    Unintended Weight Change Loss    Number of Pounds Lost 4lb 3%    Weight loss time period 2 weeks                    Intervention Goal       01/09/18 0921    Intervention Goal    General Nutrition support treatment    PO Maintain intake    Weight No significant weight loss            Nutrition Intervention       01/09/18 0922    Nutrition Intervention    RD/Tech Action Care plan reviewd;Follow Tx progress              Education/Evaluation       01/09/18 0922    Monitor/Evaluation    Monitor Per protocol;PO intake;Weight;Symptoms;Pertinent labs        Electronically signed by:  India Rodriguez MS RD/JULIET [facility]  01/09/18 9:23 AM

## 2018-01-10 VITALS
TEMPERATURE: 97.8 F | BODY MASS INDEX: 25.04 KG/M2 | HEIGHT: 63 IN | RESPIRATION RATE: 18 BRPM | WEIGHT: 141.31 LBS | HEART RATE: 92 BPM | OXYGEN SATURATION: 100 % | DIASTOLIC BLOOD PRESSURE: 64 MMHG | SYSTOLIC BLOOD PRESSURE: 130 MMHG

## 2018-01-10 LAB
ANION GAP SERPL CALCULATED.3IONS-SCNC: 10 MMOL/L (ref 3–11)
BACTERIA SPEC AEROBE CULT: ABNORMAL
BACTERIA SPEC AEROBE CULT: NORMAL
BASOPHILS # BLD AUTO: 0.03 10*3/MM3 (ref 0–0.2)
BASOPHILS NFR BLD AUTO: 0.4 % (ref 0–1)
BUN BLD-MCNC: 6 MG/DL (ref 9–23)
BUN/CREAT SERPL: 10 (ref 7–25)
CALCIUM SPEC-SCNC: 7 MG/DL (ref 8.7–10.4)
CHLORIDE SERPL-SCNC: 109 MMOL/L (ref 99–109)
CO2 SERPL-SCNC: 18 MMOL/L (ref 20–31)
CREAT BLD-MCNC: 0.6 MG/DL (ref 0.6–1.3)
DEPRECATED RDW RBC AUTO: 45.4 FL (ref 37–54)
EOSINOPHIL # BLD AUTO: 0 10*3/MM3 (ref 0–0.3)
EOSINOPHIL NFR BLD AUTO: 0 % (ref 0–3)
ERYTHROCYTE [DISTWIDTH] IN BLOOD BY AUTOMATED COUNT: 16.1 % (ref 11.3–14.5)
GFR SERPL CREATININE-BSD FRML MDRD: 102 ML/MIN/1.73
GLUCOSE BLD-MCNC: 92 MG/DL (ref 70–100)
HCT VFR BLD AUTO: 27.8 % (ref 34.5–44)
HGB BLD-MCNC: 8.9 G/DL (ref 11.5–15.5)
IMM GRANULOCYTES # BLD: 0.05 10*3/MM3 (ref 0–0.03)
IMM GRANULOCYTES NFR BLD: 0.6 % (ref 0–0.6)
IRON 24H UR-MRATE: 44 MCG/DL (ref 50–175)
IRON SATN MFR SERPL: 15 % (ref 15–50)
LYMPHOCYTES # BLD AUTO: 2.18 10*3/MM3 (ref 0.6–4.8)
LYMPHOCYTES NFR BLD AUTO: 28 % (ref 24–44)
MAGNESIUM SERPL-MCNC: 1.2 MG/DL (ref 1.3–2.7)
MCH RBC QN AUTO: 24.9 PG (ref 27–31)
MCHC RBC AUTO-ENTMCNC: 32 G/DL (ref 32–36)
MCV RBC AUTO: 77.7 FL (ref 80–99)
MONOCYTES # BLD AUTO: 0.72 10*3/MM3 (ref 0–1)
MONOCYTES NFR BLD AUTO: 9.2 % (ref 0–12)
NEUTROPHILS # BLD AUTO: 4.81 10*3/MM3 (ref 1.5–8.3)
NEUTROPHILS NFR BLD AUTO: 61.8 % (ref 41–71)
PLATELET # BLD AUTO: 691 10*3/MM3 (ref 150–450)
PMV BLD AUTO: 8.8 FL (ref 6–12)
POTASSIUM BLD-SCNC: 4.4 MMOL/L (ref 3.5–5.5)
RBC # BLD AUTO: 3.58 10*6/MM3 (ref 3.89–5.14)
SODIUM BLD-SCNC: 137 MMOL/L (ref 132–146)
TIBC SERPL-MCNC: 284 MCG/DL (ref 250–450)
WBC NRBC COR # BLD: 7.79 10*3/MM3 (ref 3.5–10.8)

## 2018-01-10 PROCEDURE — 83550 IRON BINDING TEST: CPT | Performed by: INTERNAL MEDICINE

## 2018-01-10 PROCEDURE — 85025 COMPLETE CBC W/AUTO DIFF WBC: CPT | Performed by: INTERNAL MEDICINE

## 2018-01-10 PROCEDURE — 83735 ASSAY OF MAGNESIUM: CPT | Performed by: INTERNAL MEDICINE

## 2018-01-10 PROCEDURE — 25010000002 HEPARIN (PORCINE) PER 1000 UNITS: Performed by: INTERNAL MEDICINE

## 2018-01-10 PROCEDURE — 99239 HOSP IP/OBS DSCHRG MGMT >30: CPT | Performed by: INTERNAL MEDICINE

## 2018-01-10 PROCEDURE — 80048 BASIC METABOLIC PNL TOTAL CA: CPT | Performed by: INTERNAL MEDICINE

## 2018-01-10 PROCEDURE — 83540 ASSAY OF IRON: CPT | Performed by: INTERNAL MEDICINE

## 2018-01-10 RX ADMIN — ASPIRIN 81 MG 81 MG: 81 TABLET ORAL at 08:31

## 2018-01-10 RX ADMIN — HEPARIN SODIUM 5000 UNITS: 5000 INJECTION, SOLUTION INTRAVENOUS; SUBCUTANEOUS at 06:04

## 2018-01-10 RX ADMIN — PANTOPRAZOLE SODIUM 40 MG: 40 TABLET, DELAYED RELEASE ORAL at 06:04

## 2018-01-10 NOTE — PROGRESS NOTES
Multidisciplinary Rounds    Time: 20min  Patient Name: Jocelyne Lindquist  Date of Encounter: 01/10/18 9:54 AM  MRN: [id number]  Admission date: 1/7/2018      Reason for visit: MDR. RD to continue to follow per protocol.     Additional information obtained during MDR: Plans to d/c home today.     Current diet: Diet Regular      Intervention:  Follow treatment plan  Care plan reviewed    Follow up:   Per protocol      Moriah Conrad RDN, JULIET  9:54 AM

## 2018-01-10 NOTE — PAYOR COMM NOTE
"Shahida Navarrete RN Utilization Review [phone]  Fax# [phone]  Ref# [id number]    Jocelyne Lindquist (57 y.o. Female)     Date of Birth Social Security Number Address Home Phone MRN    1960  [id number]   [facility] [address] [phone] [id number]    Zoroastrian Marital Status          None        Admission Date Admission Type Admitting Provider Attending Provider Department, Room/Bed    1/7/18 Emergency Art Chester MD Tzouanakis, Alexander E, MD [facility] 2A ICU, N208/1    Discharge Date Discharge Disposition Discharge Destination                      Attending Provider: Art Chester MD     Allergies:  Eggs Or Egg-derived Products, Fish-derived Products    Isolation:  None   Infection:  None   Code Status:  FULL    Ht:  160 cm (63\")   Wt:  64.1 kg (141 lb 5 oz)    Admission Cmt:  None   Principal Problem:  Sepsis [A41.9]                 Active Insurance as of 1/7/2018     Primary Coverage     Payor Plan Insurance Group Employer/Plan Group    ANTHEM BLUE CROSS ANTHEM BLUE CROSS BLUE [facility] PPO [id number]     Payor Plan Address Payor Plan Phone Number Effective From Effective To    [phone] 2/1/2017     [place] [address]       Subscriber Name Subscriber Birth Date Member ID       CHOCO LINDQUIST 6/6/1962 [id number]                 Emergency Contacts      (Rel.) Home Phone Work Phone Mobile Phone    Thierno Lindquist (Spouse) [phone] -- [phone]            Vital Signs (last 24 hours)       01/09 0700  -  01/10 0659 01/10 0700  -  01/10 0915   Most Recent    Temp (°F) 97.3 -  98.2       97.9 (36.6)    Heart Rate 68 -  98      80     80    Resp 16 -  20      18     18    BP 95/59 -  140/80      136/67     136/67    SpO2 (%) 95 -  100      96     96          Hospital Medications (active)       Dose Frequency Start End    albuterol (PROVENTIL HFA;VENTOLIN HFA) inhaler 2 puff 2 puff Every 4 Hours PRN 1/7/2018     Sig - Route: " "Inhale 2 puffs Every 4 (Four) Hours As Needed for Wheezing. - Inhalation    aspirin chewable tablet 81 mg 81 mg Daily 1/9/2018     Sig - Route: Chew 1 tablet Daily. - Oral    heparin (porcine) 5000 UNIT/ML injection 5,000 Units 5,000 Units Every 8 Hours Scheduled 1/7/2018     Sig - Route: Inject 1 mL under the skin Every 8 (Eight) Hours. - Subcutaneous    ipratropium-albuterol (DUO-NEB) nebulizer solution 3 mL 3 mL Every 6 Hours PRN 1/8/2018     Sig - Route: Take 3 mL by nebulization Every 6 (Six) Hours As Needed for Shortness of Air. - Nebulization    magnesium sulfate 2g/50 mL (PREMIX) infusion 2 g As Needed 1/7/2018 1/21/2018    Sig - Route: Infuse 50 mL into a venous catheter As Needed (For Magnesium 1.6-1.9 mg/dL). - Intravenous    magnesium sulfate in D5W 1g/100mL (PREMIX) 3 g As Needed 1/7/2018 1/21/2018    Sig - Route: Infuse 300 mL into a venous catheter As Needed (Magnesium 1.1-1.5). - Intravenous    pantoprazole (PROTONIX) EC tablet 40 mg 40 mg Every Morning 1/7/2018     Sig - Route: Take 1 tablet by mouth Every Morning. - Oral    potassium chloride (MICRO-K) CR capsule 40 mEq 40 mEq As Needed 1/7/2018     Sig - Route: Take 4 capsules by mouth As Needed (potassium replacement.  see admin instructions). - Oral    Linked Group 1:  \"Or\" Linked Group Details        potassium chloride 10 mEq in 100 mL IVPB 10 mEq Every 1 Hour PRN 1/7/2018     Sig - Route: Infuse 100 mL into a venous catheter Every 1 (One) Hour As Needed (potassium protocol PERIPHERAL - see admin instructions). - Intravenous    Linked Group 1:  \"Or\" Linked Group Details        sodium chloride 0.9 % flush 1-10 mL 1-10 mL As Needed 1/7/2018     Sig - Route: Infuse 1-10 mL into a venous catheter As Needed for Line Care. - Intravenous    sodium chloride 0.9 % flush 10 mL 10 mL As Needed 1/7/2018     Sig - Route: Infuse 10 mL into a venous catheter As Needed for Line Care. - Intravenous    Cosign for Ordering: Accepted by Horace Almazan MD on " 1/9/2018  1:50 AM    piperacillin-tazobactam (ZOSYN) 4.5 g/100 mL 0.9% NS IVPB (mbp) (Discontinued) 4.5 g Every 12 Hours 1/8/2018 1/9/2018    Sig - Route: Infuse 100 mL into a venous catheter Every 12 (Twelve) Hours. - Intravenous    Notes to Pharmacy: CrCl = 11. Changed from 4.5 gm q8h due to renal function    sodium chloride 0.9 % infusion (Discontinued) 150 mL/hr Continuous 1/7/2018 1/9/2018    Sig - Route: Infuse 150 mL/hr into a venous catheter Continuous. - Intravenous          Operative/Procedure Notes (all)     No notes of this type exist for this encounter.        Chencho Humphrey MD Physician Signed Pulmonology Progress Notes Date of Service: 1/8/2018  1:43 PM      Expand All Collapse All    []Hide copied text  []Hover for attribution information  Intensive Care Follow-up      Hospital:  LOS: 1 day   Ms. Jocelyne Lindquist, 57 y.o. female is followed for:   Sepsis         Subjective      Subjective   Interval History:  The chart is been reviewed. The patient has remained hemodynamically stable overnight. She continues to receive IV fluids. She states that she is tolerating her clear liquids well and is requesting an advance of her diet. I will culture data has been reviewed.     The patient's relevant past medical, surgical and social history were reviewed and updated in Epic as appropriate.         Objective      Objective      Infusions:     sodium chloride 150 mL/hr Last Rate: 150 mL/hr (01/08/18 0828)      Medications:     heparin (porcine) 5,000 Units Subcutaneous Q8H   pantoprazole 40 mg Oral QAM   piperacillin-tazobactam 4.5 g Intravenous Q12H         Vital Sign Min/Max for last 24 hours  Temp  Min: 97.3 °F (36.3 °C)  Max: 98 °F (36.7 °C)   BP  Min: 53/27  Max: 117/90   Pulse  Min: 85  Max: 121   Resp  Min: 18  Max: 20   SpO2  Min: 86 %  Max: 100 %   Flow (L/min)  Min: 2  Max: 2        Input/Output for last 24 hour shift  01/07 0701 - 01/08 0700  In: 3635 [I.V.:2802]  Out: 550 [Urine:400]  "  Objective:  General Appearance:  Comfortable, well-appearing and in no acute distress.    Vital signs: (most recent): Blood pressure 101/54, pulse 93, temperature 97.7 °F (36.5 °C), temperature source Axillary, resp. rate 20, height 160 cm (63\"), weight 61.4 kg (135 lb 5.8 oz), SpO2 99 %.  No fever.    Output: Producing urine.    HEENT: Normal HEENT exam.    Lungs:  Normal respiratory rate and normal effort.  She is not in respiratory distress.  No stridor.  There are wheezes.  No rales, rhonchi or decreased breath sounds.    Heart: Normal rate.  Regular rhythm.  S1 normal and S2 normal.  No murmur or friction rub.   Abdomen: Abdomen is soft and non-distended.  Bowel sounds are normal.   There is no abdominal tenderness.   There is no mass.   Extremities: Normal range of motion.  There is no deformity or dependent edema.    Neurological: Patient is alert and oriented to person, place and time.  Normal strength.    Pupils:  Pupils are equal, round, and reactive to light.  Pupils are equal.   Skin:  Warm.  No rash or cyanosis.                     Results from last 7 days  Lab Units 01/08/18  0409 01/07/18  1654   WBC 10*3/mm3 10.23 13.29*   HEMOGLOBIN g/dL 8.6* 11.0*   PLATELETS 10*3/mm3 673* 669*         Results from last 7 days  Lab Units 01/08/18  0409 01/07/18  1654   SODIUM mmol/L 134 131*   POTASSIUM mmol/L 3.2* 3.2*   CO2 mmol/L 14.0* 17.0*   BUN mg/dL 25* 27*   CREATININE mg/dL 3.90* 5.40*   MAGNESIUM mg/dL 2.6 <0.7*   PHOSPHORUS mg/dL 3.4 5.1   GLUCOSE mg/dL 90 106*      Estimated Creatinine Clearance: 15.1 mL/min (by C-G formula based on Cr of 3.9).            I reviewed the patient's results and images.         Assessment/Plan      Impression       Principal Problem:    Sepsis  Active Problems:    MAMADOU (acute kidney injury)    Nausea & vomiting    Gastroenteritis, acute    Hypokalemia    Hypotension    Volume depletion    Hypomagnesemia    Diarrhea         Plan         Stop vancomycin. We will continue " "Zosyn.  Continue normal saline at 150 mL per hour.  Discontinue Nuñez catheter.  Replace all electrolytes.  Wean oxygen as tolerated.  Follow-up labs and orders have been placed.  She will remain in the intensive care unit today.      Plan of care and goals reviewed with mulitdisciplinary team at daily rounds.   I discussed the patient's findings and my recommendations with patient and nursing staff            Chencho Humphrey MD, [facility]  Pulmonary and Critical Care Medicine  01/08/18 1:43 PM                 Physician Progress Notes (last 24 hours) (Notes from 1/9/2018  9:16 AM through 1/10/2018  9:16 AM)      Chencho Humphrey MD at 1/9/2018  2:10 PM  Version 1 of 1         Intensive Care Follow-up     Hospital:  LOS: 2 days   Ms. Jocelyne Lindquist, 57 y.o. female is followed for:   Sepsis     Subjective  Subjective   Interval History:  The chart is been reviewed. The patient has done well overnight. Her blood pressure has been stable and she states her breathing is much better.    The patient's relevant past medical, surgical and social history were reviewed and updated in Epic as appropriate.     Objective  Objective     Infusions:     Medications:    aspirin 81 mg Oral Daily   heparin (porcine) 5,000 Units Subcutaneous Q8H   pantoprazole 40 mg Oral QAM       Vital Sign Min/Max for last 24 hours  Temp  Min: 97.9 °F (36.6 °C)  Max: 98.3 °F (36.8 °C)   BP  Min: 90/50  Max: 124/63   Pulse  Min: 80  Max: 98   Resp  Min: 18  Max: 22   SpO2  Min: 95 %  Max: 100 %   No Data Recorded       Input/Output for last 24 hour shift  01/08 0701 - 01/09 0700  In: 5267.5 [P.O.:1310; I.V.:3757.5]  Out: 2950 [Urine:2950]      Objective:  General Appearance:  Comfortable, well-appearing, in no acute distress and not in pain.    Vital signs: (most recent): Blood pressure 123/61, pulse 94, temperature 98.2 °F (36.8 °C), temperature source Oral, resp. rate 18, height 160 cm (63\"), weight 64.1 kg (141 lb 5 oz), SpO2 100 %.  No fever.  "   Output: Producing urine.    HEENT: Normal HEENT exam.    Lungs:  Normal respiratory rate and normal effort.  She is not in respiratory distress.  No stridor.  No wheezes, rales, rhonchi or decreased breath sounds.    Heart: Normal rate.  Regular rhythm.  S1 normal and S2 normal.  No murmur or friction rub.   Abdomen: Abdomen is soft and non-distended.  Bowel sounds are normal.   There is no abdominal tenderness.   There is no mass.   Extremities: Normal range of motion.  There is no deformity or dependent edema.    Neurological: Patient is alert and oriented to person, place and time.  Normal strength.    Pupils:  Pupils are equal, round, and reactive to light.  Pupils are equal.   Skin:  Warm.  No rash or cyanosis.               Results from last 7 days  Lab Units 01/09/18  0421 01/08/18  0409 01/07/18  1654   WBC 10*3/mm3 7.63 10.23 13.29*   HEMOGLOBIN g/dL 8.3* 8.6* 11.0*   PLATELETS 10*3/mm3 634* 673* 669*       Results from last 7 days  Lab Units 01/09/18  0421 01/08/18  1338 01/08/18  0409 01/07/18  1654   SODIUM mmol/L 137  --  134 131*   POTASSIUM mmol/L 3.1* 3.9 3.2* 3.2*   CO2 mmol/L 15.0*  --  14.0* 17.0*   BUN mg/dL 13  --  25* 27*   CREATININE mg/dL 1.10  --  3.90* 5.40*   MAGNESIUM mg/dL 1.0*  --  2.6 <0.7*   PHOSPHORUS mg/dL  --   --  3.4 5.1   GLUCOSE mg/dL 95  --  90 106*     Estimated Creatinine Clearance: 49.6 mL/min (by C-G formula based on Cr of 1.1).            I reviewed the patient's results and images.     Assessment/Plan   Impression      Principal Problem:    Sepsis  Active Problems:    MAMADOU (acute kidney injury)    Nausea & vomiting    Gastroenteritis, acute    Hypokalemia    Hypotension    Volume depletion    Hypomagnesemia    Diarrhea       Plan        The patient's infectious workup has been negative thus far. She has tolerated withdrawal of antibiotics.  Her renal function has improved.  I do note that she is anemic with microcytic characteristics. I will check an iron panel tomorrow  to facilitate workup of this.  I would like to monitor her overnight with repeat labs tomorrow morning and if she continues to improve we will plan to discharge her at that time.  Replace all electrolytes.  Follow-up labs and orders are placed.    Plan of care and goals reviewed with mulitdisciplinary team at daily rounds.   I discussed the patient's findings and my recommendations with patient, family and nursing staff         Chencho Humphrey MD, [facility]  Pulmonary and Critical Care Medicine  01/09/18 2:10 PM        Electronically signed by Chencho Humphrey MD at 1/9/2018  2:14 PM

## 2018-01-10 NOTE — PROGRESS NOTES
Continued Stay Note  [facility]     Patient Name: Jocelyne Lindquist  MRN: [id number]  Today's Date: 1/10/2018    Admit Date: 1/7/2018          Discharge Plan       01/10/18 1046    Case Management/Social Work Plan    Plan Home    Additional Comments Patient discharged home.  No needs identified.              Discharge Codes     None        Expected Discharge Date and Time     Expected Discharge Date Expected Discharge Time    Henry 10, 2018             Muna Sheldon RN

## 2018-01-10 NOTE — DISCHARGE SUMMARY
Date of Admission: 1/7/2018  Date of Discharge:  1/10/2018    Discharge Diagnosis:   Problem List Items Addressed This Visit        Cardiovascular and Mediastinum    Hypotension - Primary; this likely was secondary to dehydration only with no evidence of systemic sepsis.       Other Visit Diagnoses     Acute renal failure, unspecified acute renal failure type        Relevant Medications    triamterene-hydrochlorothiazide (DYAZIDE) 37.5-25 MG per capsule            Hospital Course:  Patient is a 57 y.o. female who presented to the emergency department this afternoon with complaints of dizziness and weakness.       Her current seems to have begun on [place] day.  She did had a significant cough at that time as well as these other, nausea, and fatigue.  She was positive for flu B and was given Tamiflu.  She had persistent symptoms of cough and weakness and was given doxycycline though she thinks this caused her to have diarrhea.  Start taking this 3 days ago but quit since then.  She has had some mild nausea but says she's been keeping liquids down.  She's had no appetite.  Her last fever was 5 days ago.  Highest temperature that she measured was 100.1.     She presented here and was found to be hypotensive as well as having a markedly elevated creatinine consistent with volume depletion.  I was asked to admit her to the ICU because of her persistent hypotension despite volume resuscitation though currently blood pressure is 110/60.      She had an episode of gastroenteritis with volume depletion and acute kidney injury last February.  She was admitted here during that episode.    The patient received volume resuscitation during her stay in the intensive care unit and her renal function improved remarkably down to normal on the day of discharge. She was able tolerate by mouth intake without difficulty. She initially did receive broad antibiotics including Zosyn and vancomycin though these were stopped after  approximately 24 hours. All cultures were reviewed and are negative. It was noted that the patient had a low hemoglobin which apparently is new for her. I have advised her to follow-up with her primary care physician about this within the next week or so.    The day of discharge, it was determined that she had maximized the benefit of her inpatient hospitalization. The plan will be to discharge her to home with follow-up with her primary care.        Pertinent Test Results:     Results from last 7 days  Lab Units 01/10/18  0410   WBC 10*3/mm3 7.79   HEMOGLOBIN g/dL 8.9*   HEMATOCRIT % 27.8*   PLATELETS 10*3/mm3 691*       Results from last 7 days  Lab Units 01/10/18  0410 01/09/18  1603 01/09/18  0421  01/08/18  0409 01/07/18  1654   SODIUM mmol/L 137  --  137  --  134 131*   POTASSIUM mmol/L 4.4 3.9 3.1*  < > 3.2* 3.2*   CHLORIDE mmol/L 109  --  114*  --  107 100   CO2 mmol/L 18.0*  --  15.0*  --  14.0* 17.0*   BUN mg/dL 6*  --  13  --  25* 27*   CREATININE mg/dL 0.60  --  1.10  --  3.90* 5.40*   GLUCOSE mg/dL 92  --  95  --  90 106*   CALCIUM mg/dL 7.0*  --  6.6*  --  6.6* 7.1*   PHOSPHORUS mg/dL  --   --   --   --  3.4 5.1   < > = values in this interval not displayed.        CULTURES were reviewed and are negative.      Condition on Discharge:  Good    Vital Signs  Temp:  [97.3 °F (36.3 °C)-98 °F (36.7 °C)] 97.8 °F (36.6 °C)  Heart Rate:  [68-97] 92  Resp:  [16-20] 18  BP: (109-140)/(61-83) 130/64    Discharge Disposition:  Home or Self Care    Discharge Medications:   Jocelyne Lindquist   Home Medication Instructions BRIANNE:[id number]    Printed on:01/10/18 1202   Medication Information                      albuterol (PROVENTIL HFA;VENTOLIN HFA) 108 (90 BASE) MCG/ACT inhaler  Inhale 2 puffs Every 4 (Four) Hours As Needed for wheezing.             amLODIPine (NORVASC) 5 MG tablet  Take 5 mg by mouth Daily.             aspirin 81 MG chewable tablet  Chew 81 mg Daily.             benazepril (LOTENSIN) 20 MG  tablet  Take 20 mg by mouth Daily.             cetirizine (zyrTEC) 10 MG tablet  Take 10 mg by mouth Daily.             omeprazole (priLOSEC) 20 MG capsule  Take 20 mg by mouth Daily.             potassium chloride (K-DUR,KLOR-CON) 10 MEQ CR tablet  Take 10 mEq by mouth 2 (Two) Times a Day.             triamterene-hydrochlorothiazide (DYAZIDE) 37.5-25 MG per capsule  Take 1 capsule by mouth Daily.                 Discharge Diet:   Diet Instructions     Resume as tolerated.  We recommend a heart healthy diet low in saturated fats and salt.                Activity at Discharge:   Activity Instructions     As tolerated.               Follow-up Appointments   Mrs. Lindquist has been advised to obtain a follow-up appointment with her primary care physician by early next week for generalized checkup and recheck of her lab work including a CBC to follow her anemia as well as a basic metabolic panel.     Chencho Humphrey MD  01/10/18  12:02 PM    Cc: Steve Trujillo MD    Time: Discharge 34 min

## 2018-01-10 NOTE — PLAN OF CARE
Problem: Patient Care Overview (Adult)  Goal: Plan of Care Review  Outcome: Ongoing (interventions implemented as appropriate)   01/09/18 2000 01/10/18 0454   Patient Care Overview   Progress --  improving   Outcome Evaluation   Outcome Summary/Follow up Plan --  Vital sigsn stable; afebrile. Lung sounds are clear and undiminished; pt. on room air. IVFs D/C'd yesterday and pt. consuming adequate PO intake. Able to palpate left pedal pulses throughout the night. Awaiting morning lab results for potential discharge home today.   Coping/Psychosocial Response Interventions   Plan Of Care Reviewed With patient;spouse --      Goal: Adult Individualization and Mutuality  Outcome: Ongoing (interventions implemented as appropriate)

## 2018-01-12 LAB
BACTERIA SPEC AEROBE CULT: NORMAL
BACTERIA SPEC AEROBE CULT: NORMAL

## 2018-01-24 ENCOUNTER — TRANSCRIBE ORDERS (OUTPATIENT)
Dept: ADMINISTRATIVE | Facility: HOSPITAL | Age: 58
End: 2018-01-24

## 2018-01-24 DIAGNOSIS — Z12.31 VISIT FOR SCREENING MAMMOGRAM: Primary | ICD-10-CM

## 2018-02-14 ENCOUNTER — HOSPITAL ENCOUNTER (OUTPATIENT)
Dept: MAMMOGRAPHY | Facility: HOSPITAL | Age: 58
Discharge: HOME | End: 2018-02-14
Attending: INTERNAL MEDICINE | Admitting: INTERNAL MEDICINE

## 2018-02-14 DIAGNOSIS — Z12.31 VISIT FOR SCREENING MAMMOGRAM: ICD-10-CM

## 2018-02-14 PROCEDURE — 77067 SCR MAMMO BI INCL CAD: CPT

## 2018-02-14 PROCEDURE — 77063 BREAST TOMOSYNTHESIS BI: CPT | Performed by: RADIOLOGY

## 2018-02-14 PROCEDURE — 77067 SCR MAMMO BI INCL CAD: CPT | Performed by: RADIOLOGY

## 2018-02-14 PROCEDURE — 77063 BREAST TOMOSYNTHESIS BI: CPT

## 2018-03-02 ENCOUNTER — LAB REQUISITION (OUTPATIENT)
Dept: LAB | Facility: HOSPITAL | Age: 58
End: 2018-03-02

## 2018-03-02 DIAGNOSIS — C69.01: ICD-10-CM

## 2018-03-02 LAB — POTASSIUM BLDA-SCNC: 5.24 MMOL/L (ref 3.5–5.3)

## 2018-03-02 PROCEDURE — 84132 ASSAY OF SERUM POTASSIUM: CPT | Performed by: OPHTHALMOLOGY

## 2018-03-20 LAB
LAB AP CASE REPORT: NORMAL
Lab: NORMAL
PATH REPORT.FINAL DX SPEC: NORMAL

## 2018-03-21 ENCOUNTER — INFUSION (OUTPATIENT)
Dept: ONCOLOGY | Facility: HOSPITAL | Age: 58
End: 2018-03-21

## 2018-03-21 VITALS
TEMPERATURE: 97.8 F | HEART RATE: 84 BPM | RESPIRATION RATE: 18 BRPM | WEIGHT: 148 LBS | DIASTOLIC BLOOD PRESSURE: 60 MMHG | HEIGHT: 63 IN | BODY MASS INDEX: 26.22 KG/M2 | SYSTOLIC BLOOD PRESSURE: 123 MMHG

## 2018-03-21 DIAGNOSIS — D50.9 IRON DEFICIENCY ANEMIA, UNSPECIFIED IRON DEFICIENCY ANEMIA TYPE: Primary | ICD-10-CM

## 2018-03-21 PROCEDURE — 25010000002 FERUMOXYTOL 510 MG/17ML SOLUTION 510 MG VIAL: Performed by: INTERNAL MEDICINE

## 2018-03-21 PROCEDURE — 96374 THER/PROPH/DIAG INJ IV PUSH: CPT

## 2018-03-21 RX ADMIN — FERUMOXYTOL 510 MG: 510 INJECTION INTRAVENOUS at 13:42

## 2018-03-28 ENCOUNTER — INFUSION (OUTPATIENT)
Dept: ONCOLOGY | Facility: HOSPITAL | Age: 58
End: 2018-03-28

## 2018-03-28 VITALS
TEMPERATURE: 98.4 F | WEIGHT: 146 LBS | HEART RATE: 88 BPM | RESPIRATION RATE: 16 BRPM | BODY MASS INDEX: 25.87 KG/M2 | HEIGHT: 63 IN | DIASTOLIC BLOOD PRESSURE: 56 MMHG | SYSTOLIC BLOOD PRESSURE: 133 MMHG

## 2018-03-28 DIAGNOSIS — D50.9 IRON DEFICIENCY ANEMIA, UNSPECIFIED IRON DEFICIENCY ANEMIA TYPE: Primary | ICD-10-CM

## 2018-03-28 PROCEDURE — 96374 THER/PROPH/DIAG INJ IV PUSH: CPT

## 2018-03-28 PROCEDURE — 25010000002 FERUMOXYTOL 510 MG/17ML SOLUTION 510 MG VIAL: Performed by: INTERNAL MEDICINE

## 2018-03-28 RX ADMIN — FERUMOXYTOL 510 MG: 510 INJECTION INTRAVENOUS at 13:46

## 2018-04-25 ENCOUNTER — LAB REQUISITION (OUTPATIENT)
Dept: LAB | Facility: HOSPITAL | Age: 58
End: 2018-04-25

## 2018-04-25 DIAGNOSIS — C44.121 SQUAMOUS CELL CARCINOMA OF SKIN OF RIGHT EYELID, INCLUDING CANTHUS: ICD-10-CM

## 2018-04-25 PROCEDURE — 88305 TISSUE EXAM BY PATHOLOGIST: CPT | Performed by: OPHTHALMOLOGY

## 2018-04-25 PROCEDURE — 88331 PATH CONSLTJ SURG 1 BLK 1SPC: CPT | Performed by: OPHTHALMOLOGY

## 2018-04-26 LAB
CYTO UR: NORMAL
LAB AP CASE REPORT: NORMAL
LAB AP CLINICAL INFORMATION: NORMAL
LAB AP DIAGNOSIS COMMENT: NORMAL
Lab: NORMAL
Lab: NORMAL
PATH REPORT.FINAL DX SPEC: NORMAL
PATH REPORT.GROSS SPEC: NORMAL

## 2018-06-04 ENCOUNTER — OFFICE VISIT (OUTPATIENT)
Dept: FAMILY MEDICINE CLINIC | Facility: CLINIC | Age: 58
End: 2018-06-04

## 2018-06-04 VITALS
SYSTOLIC BLOOD PRESSURE: 134 MMHG | OXYGEN SATURATION: 99 % | BODY MASS INDEX: 25.52 KG/M2 | HEIGHT: 63 IN | HEART RATE: 68 BPM | DIASTOLIC BLOOD PRESSURE: 84 MMHG | WEIGHT: 144 LBS

## 2018-06-04 DIAGNOSIS — R21 RASH AND NONSPECIFIC SKIN ERUPTION: Primary | ICD-10-CM

## 2018-06-04 PROCEDURE — 99213 OFFICE O/P EST LOW 20 MIN: CPT | Performed by: PHYSICIAN ASSISTANT

## 2018-06-04 RX ORDER — PREDNISONE 20 MG/1
TABLET ORAL
Qty: 23 TABLET | Refills: 0 | Status: SHIPPED | OUTPATIENT
Start: 2018-06-04 | End: 2019-05-23

## 2018-06-04 RX ORDER — CEPHALEXIN 500 MG/1
500 CAPSULE ORAL 3 TIMES DAILY
Qty: 30 CAPSULE | Refills: 0 | Status: SHIPPED | OUTPATIENT
Start: 2018-06-04 | End: 2018-06-14

## 2018-06-04 NOTE — PROGRESS NOTES
Chief Complaint   Patient presents with   • Rash     right leg, ongoing since last tuesday, itching        Patient is a pleasant 57-year-old white female who presents today for new onset right lower extremity rash.  Patient reports that she noticed the rash 6 days ago.  She denies any pain associated with.  The rash is pruritic which she notices mostly at night.  She denies any recent bug bites, change in medications or skin products.  She has not been using anything topically to alleviate the symptoms.  She is taking Zyrtec and/or Benadryl daily.      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     Social History Main Topics   • Smoking status: Former Smoker     Packs/day: 1.00     Years: 20.00   • Smokeless tobacco: Never Used      Comment: 12/25/17   • Alcohol use No      Comment: not since 12/25/17   • Drug use: No   • Sexual activity: Defer     Other Topics Concern   • Not on file     Social History Narrative   • No narrative on file       Allergies   Allergen Reactions   • Eggs Or Egg-Derived Products      Causes cramps   • Fish-Derived Products      Causes severe stomach cramps       ROS    Review of Systems   Constitutional: Negative for chills and fever.   Eyes: Positive  for redness and visual disturbance.   Cardiovascular: Positive for leg swelling.   Skin: Positive for rash and skin lesions. Negative for bruise.        Pruritic rash on right lower extremity with small wound.         Vitals:    06/04/18 1314   BP: 134/84   Pulse: 68   SpO2: 99%         Current Outpatient Prescriptions:   •  albuterol (PROVENTIL HFA;VENTOLIN HFA) 108 (90 BASE) MCG/ACT inhaler, Inhale 2 puffs Every 4 (Four) Hours As Needed for wheezing., Disp: , Rfl:   •  amLODIPine (NORVASC) 5 MG tablet, Take 5 mg by mouth Daily., Disp: , Rfl:   •  aspirin 81 MG chewable tablet, Chew 81 mg Daily., Disp: , Rfl:   •  benazepril (LOTENSIN) 20 MG tablet, Take 20 mg by mouth Daily., Disp: , Rfl:   •  cetirizine (zyrTEC) 10 MG tablet, Take 10 mg by mouth Daily., Disp: , Rfl:   •  omeprazole (priLOSEC) 20 MG capsule, Take 20 mg by mouth Daily., Disp: , Rfl:   •  potassium chloride (K-DUR,KLOR-CON) 10 MEQ CR tablet, Take 10 mEq by mouth 2 (Two) Times a Day., Disp: , Rfl:   •  triamterene-hydrochlorothiazide (DYAZIDE) 37.5-25 MG per capsule, Take 1 capsule by mouth Daily., Disp: , Rfl:   •  cephalexin (KEFLEX) 500 MG capsule, Take 1 capsule by mouth 3 (Three) Times a Day for 10 days., Disp: 30 capsule, Rfl: 0  •  HYDROcodone-acetaminophen (NORCO) 5-325 MG per tablet, Take 1 tablet by mouth Every 4 (Four) Hours As Needed for pain, Disp: 10 tablet, Rfl: 0  •  ondansetron ODT (ZOFRAN-ODT) 8 MG disintegrating tablet, Take 1 tablet by mouth Every 8 (Eight) Hours As Needed for nausea, Disp: 10 tablet, Rfl: 0  •  predniSONE (DELTASONE) 20 MG tablet, Take 3 tabs (60mg) x3 days, 2 tabs (40mg) x4 days, 1 tab (20mg) x4 days, 0.5 tab (10mg) x4 days, Disp: 23 tablet, Rfl: 0  •  triamcinolone (KENALOG) 0.1 % ointment, Apply  topically 3 (Three) Times a Day., Disp: 60 g, Rfl: 1    PE    Physical Exam   Constitutional: She is oriented to person, place, and time. She appears well-developed and well-nourished.   HENT:   Head: Normocephalic.    Eyes: Conjunctivae are normal.   Neck: Normal range of motion.   Cardiovascular: Normal rate, regular rhythm and normal heart sounds.  Exam reveals no gallop and no friction rub.    No murmur heard.  Pulmonary/Chest: Effort normal and breath sounds normal. No respiratory distress. She has no wheezes. She has no rales.   Musculoskeletal: Normal range of motion. She exhibits no edema or tenderness.   Neurological: She is alert and oriented to person, place, and time.   Skin: Skin is dry. Rash noted. There is erythema.   RLE has 3 erythematous macular patches of dry skin on shin.  No vesicles or papules.  Skin is dry and warm.      Small I cm laceration on lower aspect of right shin without any purulent drainage or signs of infection.    Severe spider veins in bilateral lower extremities around feet, ankles and shins.   Psychiatric: She has a normal mood and affect. Judgment normal.   Vitals reviewed.       A/P    Jocelyne was seen today for rash.    Diagnoses and all orders for this visit:    Rash and nonspecific skin eruption  -erythematous macular patched rash on shin of RLE x6 days  -will treat for possible infection given heat, degree of erythema  -most likely due to allergic dermatitis, will give topical cream and prednisone  -call if symptoms worsen or don't improve  -     predniSONE (DELTASONE) 20 MG tablet; Take 3 tabs (60mg) x3 days, 2 tabs (40mg) x4 days, 1 tab (20mg) x4 days, 0.5 tab (10mg) x4 days  -     triamcinolone (KENALOG) 0.1 % ointment; Apply  topically 3 (Three) Times a Day.  -     cephalexin (KEFLEX) 500 MG capsule; Take 1 capsule by mouth 3 (Three) Times a Day for 10 days.         Plan of care reviewed with patient at the conclusion of today's visit. Education was provided regarding diagnosis, management and any prescribed or recommended OTC medications.  Patient verbalizes understanding of and agreement with management plan.    Return in about 4 weeks (around 7/2/2018) for Annual physical.      Uzma Duffy PA-C

## 2018-06-12 ENCOUNTER — TELEPHONE (OUTPATIENT)
Dept: FAMILY MEDICINE CLINIC | Facility: CLINIC | Age: 58
End: 2018-06-12

## 2018-06-12 RX ORDER — TRIAMTERENE AND HYDROCHLOROTHIAZIDE 37.5; 25 MG/1; MG/1
1 CAPSULE ORAL DAILY
Qty: 90 CAPSULE | Refills: 1 | Status: SHIPPED | OUTPATIENT
Start: 2018-06-12 | End: 2018-11-16 | Stop reason: SDUPTHER

## 2018-06-12 NOTE — TELEPHONE ENCOUNTER
PT CALLING BECAUSE SHE IS OUT OF MED. PT NEEDING TRIAMTERENE-HYDROCHLOROTHIAZIDE 37.5-25 MG PER CAP. 1 CAP PO QD. PT USES [facility] ON [address].

## 2018-06-13 ENCOUNTER — LAB REQUISITION (OUTPATIENT)
Dept: LAB | Facility: HOSPITAL | Age: 58
End: 2018-06-13

## 2018-06-13 DIAGNOSIS — H02.831 DERMATOCHALASIS OF RIGHT UPPER EYELID: ICD-10-CM

## 2018-06-13 LAB — POTASSIUM BLDA-SCNC: 3.65 MMOL/L (ref 3.5–5.3)

## 2018-06-13 PROCEDURE — 84132 ASSAY OF SERUM POTASSIUM: CPT | Performed by: OPHTHALMOLOGY

## 2018-06-14 ENCOUNTER — TELEPHONE (OUTPATIENT)
Dept: FAMILY MEDICINE CLINIC | Facility: CLINIC | Age: 58
End: 2018-06-14

## 2018-06-14 RX ORDER — OMEPRAZOLE 20 MG/1
20 CAPSULE, DELAYED RELEASE ORAL DAILY
Qty: 90 CAPSULE | Refills: 3 | Status: SHIPPED | OUTPATIENT
Start: 2018-06-14 | End: 2019-07-04 | Stop reason: SDUPTHER

## 2018-06-14 RX ORDER — POTASSIUM CHLORIDE 750 MG/1
10 TABLET, EXTENDED RELEASE ORAL DAILY
Qty: 90 TABLET | Refills: 1 | Status: SHIPPED | OUTPATIENT
Start: 2018-06-14 | End: 2018-12-17 | Stop reason: SDUPTHER

## 2018-06-14 RX ORDER — AMLODIPINE BESYLATE 5 MG/1
5 TABLET ORAL DAILY
Qty: 90 TABLET | Refills: 1 | Status: SHIPPED | OUTPATIENT
Start: 2018-06-14 | End: 2018-12-17 | Stop reason: SDUPTHER

## 2018-06-14 NOTE — TELEPHONE ENCOUNTER
PT NEEDS KLOR-CON 10 MEQ 1PO BID, AMLODIPINE 5MG 1PO QD AND  OMEPRAZOLE 20MG 1PO QD 30 DAY SUPPLY AT [employer] [facility]

## 2018-07-25 ENCOUNTER — TELEPHONE (OUTPATIENT)
Dept: FAMILY MEDICINE CLINIC | Facility: CLINIC | Age: 58
End: 2018-07-25

## 2018-07-25 RX ORDER — BENAZEPRIL HYDROCHLORIDE 20 MG/1
20 TABLET ORAL DAILY
Qty: 90 TABLET | Refills: 1 | Status: SHIPPED | OUTPATIENT
Start: 2018-07-25 | End: 2018-12-17 | Stop reason: SDUPTHER

## 2018-07-25 NOTE — TELEPHONE ENCOUNTER
PER PT WOULD LIKE A REFILL OF:    BENAZEPRIL 20 MG 1 A DAY 30 DAY SUPPLY    [facility]

## 2018-09-05 RX ORDER — ALBUTEROL SULFATE 90 UG/1
2 AEROSOL, METERED RESPIRATORY (INHALATION) EVERY 4 HOURS PRN
Qty: 8 G | Refills: 2 | Status: SHIPPED | OUTPATIENT
Start: 2018-09-05 | End: 2018-12-19 | Stop reason: SDUPTHER

## 2018-11-01 ENCOUNTER — TELEPHONE (OUTPATIENT)
Dept: FAMILY MEDICINE CLINIC | Facility: CLINIC | Age: 58
End: 2018-11-01

## 2018-11-01 ENCOUNTER — OFFICE VISIT (OUTPATIENT)
Dept: FAMILY MEDICINE CLINIC | Facility: CLINIC | Age: 58
End: 2018-11-01

## 2018-11-01 VITALS
WEIGHT: 146.8 LBS | HEIGHT: 63 IN | SYSTOLIC BLOOD PRESSURE: 124 MMHG | BODY MASS INDEX: 26.01 KG/M2 | OXYGEN SATURATION: 96 % | HEART RATE: 97 BPM | DIASTOLIC BLOOD PRESSURE: 70 MMHG

## 2018-11-01 DIAGNOSIS — Z23 NEED FOR IMMUNIZATION AGAINST INFLUENZA: ICD-10-CM

## 2018-11-01 DIAGNOSIS — J20.9 ACUTE BRONCHITIS, UNSPECIFIED ORGANISM: Primary | ICD-10-CM

## 2018-11-01 DIAGNOSIS — B00.9 HERPES: ICD-10-CM

## 2018-11-01 DIAGNOSIS — B00.9 HERPES: Primary | ICD-10-CM

## 2018-11-01 DIAGNOSIS — J30.2 SEASONAL ALLERGIES: ICD-10-CM

## 2018-11-01 PROCEDURE — 99214 OFFICE O/P EST MOD 30 MIN: CPT | Performed by: PHYSICIAN ASSISTANT

## 2018-11-01 PROCEDURE — 90471 IMMUNIZATION ADMIN: CPT | Performed by: PHYSICIAN ASSISTANT

## 2018-11-01 PROCEDURE — 90686 IIV4 VACC NO PRSV 0.5 ML IM: CPT | Performed by: PHYSICIAN ASSISTANT

## 2018-11-01 RX ORDER — METHYLPREDNISOLONE 4 MG/1
TABLET ORAL
Qty: 1 EACH | Refills: 0 | Status: SHIPPED | OUTPATIENT
Start: 2018-11-01 | End: 2019-05-23

## 2018-11-01 RX ORDER — ACYCLOVIR 400 MG/1
400 TABLET ORAL 3 TIMES DAILY
Qty: 30 TABLET | Refills: 1 | Status: SHIPPED | OUTPATIENT
Start: 2018-11-01 | End: 2018-11-01

## 2018-11-01 RX ORDER — ACYCLOVIR 400 MG/1
400 TABLET ORAL 3 TIMES DAILY
Qty: 30 TABLET | Refills: 1 | Status: SHIPPED | OUTPATIENT
Start: 2018-11-01 | End: 2020-03-12

## 2018-11-01 RX ORDER — AMOXICILLIN 875 MG/1
875 TABLET, COATED ORAL EVERY 12 HOURS SCHEDULED
Qty: 20 TABLET | Refills: 0 | Status: SHIPPED | OUTPATIENT
Start: 2018-11-01 | End: 2018-11-11

## 2018-11-01 NOTE — PROGRESS NOTES
Chief Complaint   Patient presents with   • Cough     Cough, congestion. Cough is productive. Symptoms have been going on for a couple of days.       Patient is a pleasant 57 y/o white female who presents with new onset productive cough, congestion, sneezing and sinus pressure that started a few days ago and is not improving.  Patient reports that she usually gets sick around this time of year with similar symptoms, has had to go to ER when she didn't treat it early enough.  History of smoking, quit last year.  Has asthma, but denies COPD diagnosis.  Denies fever, chills, shortness of breath, ear or throat pain today.  Didn't tolerate doxycycline previously, no antibiotic allergies.          Jocelyne Lindquist is a 58 y.o. female who presents for Cough (Cough, congestion. Cough is productive. 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     Social History Main Topics   • Smoking status: Former Smoker     Packs/day: 1.00     Years: 20.00   • Smokeless tobacco: Never Used      Comment: 12/25/17   • Alcohol use No      Comment: not since 12/25/17   • Drug use: No   • Sexual activity: Defer     Other Topics Concern   •  "Not on file     Social History Narrative   • No narrative on file       Allergies   Allergen Reactions   • Doxycycline Diarrhea   • Eggs Or Egg-Derived Products      Causes cramps   • Fish-Derived Products      Causes severe stomach cramps       ROS    Review of Systems   Constitutional: Positive for fatigue. Negative for chills and fever.   HENT: Positive for congestion, rhinorrhea and sinus pressure. Negative for ear pain and sore throat.    Respiratory: Positive for cough. Negative for shortness of breath and wheezing.        Vitals:    11/01/18 1334   BP: 124/70   BP Location: Right arm   Patient Position: Sitting   Cuff Size: Adult   Pulse: 97   SpO2: 96%   Weight: 66.6 kg (146 lb 12.8 oz)   Height: 160 cm (63\")         Current Outpatient Prescriptions:   •  albuterol (PROVENTIL HFA;VENTOLIN HFA) 108 (90 Base) MCG/ACT inhaler, Inhale 2 puffs Every 4 (Four) Hours As Needed for Wheezing., Disp: 8 g, Rfl: 2  •  amLODIPine (NORVASC) 5 MG tablet, Take 1 tablet by mouth Daily., Disp: 90 tablet, Rfl: 1  •  aspirin 81 MG chewable tablet, Chew 81 mg Daily., Disp: , Rfl:   •  benazepril (LOTENSIN) 20 MG tablet, Take 1 tablet by mouth Daily., Disp: 90 tablet, Rfl: 1  •  cetirizine (zyrTEC) 10 MG tablet, Take 10 mg by mouth Daily., Disp: , Rfl:   •  HYDROcodone-acetaminophen (NORCO) 5-325 MG per tablet, Take 1 tablet by mouth Every 4 (Four) Hours As Needed for pain., Disp: 10 tablet, Rfl: 0  •  omeprazole (priLOSEC) 20 MG capsule, Take 1 capsule by mouth Daily., Disp: 90 capsule, Rfl: 3  •  ondansetron ODT (ZOFRAN-ODT) 8 MG disintegrating tablet, Take 1 tablet by mouth Every 8 (Eight) Hours As Needed for nausea, Disp: 10 tablet, Rfl: 0  •  potassium chloride (K-DUR,KLOR-CON) 10 MEQ CR tablet, Take 1 tablet by mouth Daily., Disp: 90 tablet, Rfl: 1  •  predniSONE (DELTASONE) 20 MG tablet, Take 3 tabs (60mg) x3 days, 2 tabs (40mg) x4 days, 1 tab (20mg) x4 days, 0.5 tab (10mg) x4 days, Disp: 23 tablet, Rfl: 0  •  triamcinolone " (KENALOG) 0.1 % ointment, Apply  topically 3 (Three) Times a Day., Disp: 60 g, Rfl: 1  •  triamterene-hydrochlorothiazide (DYAZIDE) 37.5-25 MG per capsule, Take 1 capsule by mouth Daily., Disp: 90 capsule, Rfl: 1  •  acyclovir (ZOVIRAX) 400 MG tablet, Take 1 tablet by mouth 3 (Three) Times a Day. Take no more than 5 doses a day., Disp: 30 tablet, Rfl: 1  •  amoxicillin (AMOXIL) 875 MG tablet, Take 1 tablet by mouth Every 12 (Twelve) Hours for 10 days., Disp: 20 tablet, Rfl: 0  •  MethylPREDNISolone (MEDROL) 4 MG tablet, Take as directed on package instructions. Dispense: 21 Count dose ashu with 0 refills., Disp: 1 each, Rfl: 0    PE    Physical Exam   Constitutional: She is oriented to person, place, and time. Vital signs are normal. She appears well-developed and well-nourished. No distress.   HENT:   Head: Normocephalic and atraumatic.   Right Ear: Hearing, tympanic membrane, external ear and ear canal normal.   Left Ear: Hearing, tympanic membrane, external ear and ear canal normal.   Nose: Mucosal edema and rhinorrhea present. Right sinus exhibits maxillary sinus tenderness. Right sinus exhibits no frontal sinus tenderness. Left sinus exhibits maxillary sinus tenderness. Left sinus exhibits no frontal sinus tenderness.   Mouth/Throat: Uvula is midline and mucous membranes are normal. Posterior oropharyngeal erythema present.   Eyes: Conjunctivae are normal.   Neck: Normal range of motion.   Cardiovascular: Normal rate, regular rhythm and normal heart sounds.  Exam reveals no gallop and no friction rub.    No murmur heard.  Pulmonary/Chest: Effort normal and breath sounds normal. No respiratory distress. She has no decreased breath sounds. She has no rales.   Dry cough   Lymphadenopathy:     She has no cervical adenopathy.   Neurological: She is alert and oriented to person, place, and time.   Skin: She is not diaphoretic.   Psychiatric: She has a normal mood and affect. Judgment normal.   Vitals reviewed.        A/P    Jocelyne was seen today for cough.    Diagnoses and all orders for this visit:    Acute bronchitis, unspecified organism  -reports productive cough worse at night and in morning for the last few days that is worsening  -reports yearly bronchitis around this time of year, similar symptoms each year  -given upcoming weekend, will prescribe amoxicillin and medrol dosepak  -call if symptoms do not improve, would need chest x-ray at that time  -most likely related to post-nasal drip   -     MethylPREDNISolone (MEDROL) 4 MG tablet; Take as directed on package instructions. Dispense: 21 Count dose ashu with 0 refills.  -     amoxicillin (AMOXIL) 875 MG tablet; Take 1 tablet by mouth Every 12 (Twelve) Hours for 10 days.    Seasonal allergies  -recommend continuing claritin daily, adding nasal saline lavage    Herpes  -reports episodic oral herpes outbreak on lips  -will give acyclovir to keep onhand  -     acyclovir (ZOVIRAX) 400 MG tablet; Take 1 tablet by mouth 3 (Three) Times a Day. Take no more than 5 doses a day.     Need for immunization against influenza  -     Fluarix/Fluzone/Afluria/FluLaval ([id number])    Plan of care reviewed with patient at the conclusion of today's visit. Education was provided regarding diagnosis, management and any prescribed or recommended OTC medications.  Patient verbalizes understanding of and agreement with management plan.    Return in about 4 weeks (around 11/29/2018) for Annual physical.     Uzma Duffy PA-C

## 2018-11-01 NOTE — PATIENT INSTRUCTIONS
"· Attain adequate rest and increase clear fluid intake.   · Use warm salt water gargles, lozenges, honey as a natural antitussive, and/or Delsym syrup as needed for cough.   · Use Mucinex 600 mg twice daily and nasal saline irrigation with \"ocean\" or \"simply saline\" brand sterile nasal spray twice daily.   · Use warm compresses over sinuses for comfort.   · Alternate use of Tylenol 500 mg and Ibuprofen 400 mg every 4 hours as needed for headache, body aches, and/or fever reduction  · Use Loratadine (Claritin), Cetrizine (Zyrtec), or Fexofenadine (Allegra) once daily over the counter, Flonase 1 spray each nostril daily, and auto-inflate ears using modified valsalva maneuver approximately 20 times daily for ear congestion.   · Recommend probiotic, plain yogurt or kefir while taking an antibiotic to help mitigate GI symptoms.    Call or Return to office if symptoms worsen or persist.     "

## 2018-11-16 ENCOUNTER — TELEPHONE (OUTPATIENT)
Dept: FAMILY MEDICINE CLINIC | Facility: CLINIC | Age: 58
End: 2018-11-16

## 2018-11-16 DIAGNOSIS — E87.1 HYPONATREMIA: Primary | ICD-10-CM

## 2018-11-16 RX ORDER — TRIAMTERENE AND HYDROCHLOROTHIAZIDE 37.5; 25 MG/1; MG/1
CAPSULE ORAL
Qty: 90 CAPSULE | Refills: 1 | Status: SHIPPED | OUTPATIENT
Start: 2018-11-16 | End: 2019-05-09 | Stop reason: SDUPTHER

## 2018-11-16 NOTE — TELEPHONE ENCOUNTER
PHARM NEEDS TO KNOW IF THE PATIENT HAD A RECENT BLOOD DRAW TO MAKE SURE HER POTASIUM LEVEL IS OK. THE MED THAT WAS JUST CALLED IN INTERACTS WITH ANOTHER MED SHE IS TAKING.

## 2018-11-16 NOTE — TELEPHONE ENCOUNTER
Patient needs to return for blood work to check potassium and kidney function before we can refill her medication.  Does she have enough to get through the weekend?  Could call in a week supply if necessary.  Also, have we received patient's paperwork - when was her last annual physical?

## 2018-11-16 NOTE — TELEPHONE ENCOUNTER
Refill request was for Triamterene-HCTZ 37.5-25 mg tablets.      Last potassium level was 3.65 on 6/13/18    Please advise.

## 2018-11-28 NOTE — TELEPHONE ENCOUNTER
Called pt, informed pt of the message below. Stated that she would come in and have blood drawn, requesting that we add an iron level to her blood work as well. Told her that Uzma would not be in the office until tomorrow. Stated that was fine to just give her a call back tomorrow and let her know when that order is placed.

## 2018-11-29 DIAGNOSIS — N17.9 AKI (ACUTE KIDNEY INJURY) (HCC): Primary | ICD-10-CM

## 2018-11-29 NOTE — TELEPHONE ENCOUNTER
Both iron and BMP added.  Please have patient come in for these.  Will resubmit BMP to ensure it is completed.

## 2018-11-30 ENCOUNTER — TELEPHONE (OUTPATIENT)
Dept: FAMILY MEDICINE CLINIC | Facility: CLINIC | Age: 58
End: 2018-11-30

## 2018-12-01 DIAGNOSIS — J20.9 ACUTE BRONCHITIS, UNSPECIFIED ORGANISM: ICD-10-CM

## 2018-12-03 RX ORDER — AMOXICILLIN 875 MG/1
TABLET, COATED ORAL
Qty: 20 TABLET | Refills: 0 | OUTPATIENT
Start: 2018-12-03

## 2018-12-06 ENCOUNTER — LAB (OUTPATIENT)
Dept: LAB | Facility: HOSPITAL | Age: 58
End: 2018-12-06

## 2018-12-06 DIAGNOSIS — N17.9 AKI (ACUTE KIDNEY INJURY) (HCC): ICD-10-CM

## 2018-12-06 DIAGNOSIS — E87.1 HYPONATREMIA: ICD-10-CM

## 2018-12-06 LAB
ANION GAP SERPL CALCULATED.3IONS-SCNC: 8 MMOL/L (ref 3–11)
BUN BLD-MCNC: 7 MG/DL (ref 9–23)
BUN/CREAT SERPL: 10.8 (ref 7–25)
CALCIUM SPEC-SCNC: 9.7 MG/DL (ref 8.7–10.4)
CHLORIDE SERPL-SCNC: 105 MMOL/L (ref 99–109)
CO2 SERPL-SCNC: 29 MMOL/L (ref 20–31)
CREAT BLD-MCNC: 0.65 MG/DL (ref 0.6–1.3)
GFR SERPL CREATININE-BSD FRML MDRD: 93 ML/MIN/1.73
GLUCOSE BLD-MCNC: 96 MG/DL (ref 70–100)
IRON 24H UR-MRATE: 98 MCG/DL (ref 50–175)
POTASSIUM BLD-SCNC: 4 MMOL/L (ref 3.5–5.5)
SODIUM BLD-SCNC: 142 MMOL/L (ref 132–146)

## 2018-12-06 PROCEDURE — 80048 BASIC METABOLIC PNL TOTAL CA: CPT | Performed by: PHYSICIAN ASSISTANT

## 2018-12-06 PROCEDURE — 83540 ASSAY OF IRON: CPT | Performed by: PHYSICIAN ASSISTANT

## 2018-12-06 PROCEDURE — 36415 COLL VENOUS BLD VENIPUNCTURE: CPT

## 2018-12-06 NOTE — TELEPHONE ENCOUNTER
PER PT CALLED, STATED SHE HAD HER LAB WORK DONE AND WAS WONDERING WHEN THE AMOXICILLIN WOULD BE CALLED IN AND IS REQUESTING 1 EVERY 4 HOURS. PLEASE CALL PT BACK TO ADVISE.

## 2018-12-07 NOTE — TELEPHONE ENCOUNTER
Spoke with pt and let her know per Uzma that she will need to be seen in order to receive any antibiotics. Patient stated that she is highly displeased because that is not what she was previously told. Explained to her that she was last seen on 11/01 for that Rx and that she will need to be evaluated again. Patient was not happy but understanding was verbalized. Told her to call back if she changed her mind and wanted to schedule an appt.

## 2018-12-17 RX ORDER — POTASSIUM CHLORIDE 750 MG/1
TABLET, EXTENDED RELEASE ORAL
Qty: 90 TABLET | Refills: 1 | Status: SHIPPED | OUTPATIENT
Start: 2018-12-17 | End: 2019-06-07 | Stop reason: SDUPTHER

## 2018-12-17 RX ORDER — BENAZEPRIL HYDROCHLORIDE 20 MG/1
20 TABLET ORAL DAILY
Qty: 90 TABLET | Refills: 1 | Status: SHIPPED | OUTPATIENT
Start: 2018-12-17 | End: 2019-07-04 | Stop reason: SDUPTHER

## 2018-12-17 RX ORDER — AMLODIPINE BESYLATE 5 MG/1
5 TABLET ORAL DAILY
Qty: 90 TABLET | Refills: 1 | Status: SHIPPED | OUTPATIENT
Start: 2018-12-17 | End: 2020-01-02

## 2018-12-19 ENCOUNTER — TELEPHONE (OUTPATIENT)
Dept: FAMILY MEDICINE CLINIC | Facility: CLINIC | Age: 58
End: 2018-12-19

## 2019-01-09 ENCOUNTER — TRANSCRIBE ORDERS (OUTPATIENT)
Dept: ADMINISTRATIVE | Facility: HOSPITAL | Age: 59
End: 2019-01-09

## 2019-01-09 DIAGNOSIS — Z12.31 VISIT FOR SCREENING MAMMOGRAM: Primary | ICD-10-CM

## 2019-02-22 ENCOUNTER — HOSPITAL ENCOUNTER (OUTPATIENT)
Dept: MAMMOGRAPHY | Facility: HOSPITAL | Age: 59
Discharge: HOME | End: 2019-02-22
Admitting: PHYSICIAN ASSISTANT

## 2019-02-22 DIAGNOSIS — Z12.31 VISIT FOR SCREENING MAMMOGRAM: ICD-10-CM

## 2019-02-22 PROCEDURE — 77067 SCR MAMMO BI INCL CAD: CPT

## 2019-02-22 PROCEDURE — 77067 SCR MAMMO BI INCL CAD: CPT | Performed by: RADIOLOGY

## 2019-02-22 PROCEDURE — 77063 BREAST TOMOSYNTHESIS BI: CPT

## 2019-02-22 PROCEDURE — 77063 BREAST TOMOSYNTHESIS BI: CPT | Performed by: RADIOLOGY

## 2019-03-08 ENCOUNTER — TELEPHONE (OUTPATIENT)
Dept: FAMILY MEDICINE CLINIC | Facility: CLINIC | Age: 59
End: 2019-03-08

## 2019-03-08 DIAGNOSIS — M54.2 NECK PAIN: Primary | ICD-10-CM

## 2019-03-08 RX ORDER — CYCLOBENZAPRINE HCL 5 MG
5 TABLET ORAL 3 TIMES DAILY PRN
Qty: 30 TABLET | Refills: 0 | Status: SHIPPED | OUTPATIENT
Start: 2019-03-08 | End: 2020-03-12

## 2019-03-08 NOTE — TELEPHONE ENCOUNTER
PT CALLED AGAIN. CRICK IN HER NECK IS REALLY BAD.    WONDERING IF A MUSCLE RELAXER CAN BE CALLED IN FOR HER.

## 2019-03-08 NOTE — TELEPHONE ENCOUNTER
Called pt and gave message below. Pt verbalized understanding and had no further questions or concerns at this time.

## 2019-03-08 NOTE — TELEPHONE ENCOUNTER
Sent in flexeril muscle relaxant to [facility].  This medication can be sedating - please warn patient about this.  Recommend light stretching of neck, heating pad, ibuprofen or tylenol and rest.  Needs appointment next week if symptoms do not improve.  Let her know we are open tomorrow as well and I am working.

## 2019-03-16 ENCOUNTER — TELEPHONE (OUTPATIENT)
Dept: FAMILY MEDICINE CLINIC | Facility: CLINIC | Age: 59
End: 2019-03-16

## 2019-04-25 ENCOUNTER — TELEPHONE (OUTPATIENT)
Dept: FAMILY MEDICINE CLINIC | Facility: CLINIC | Age: 59
End: 2019-04-25

## 2019-04-25 NOTE — TELEPHONE ENCOUNTER
Can you confirm that we have received her and her husbands medical records?  sees David. Please call at [phone]

## 2019-05-09 RX ORDER — TRIAMTERENE AND HYDROCHLOROTHIAZIDE 37.5; 25 MG/1; MG/1
CAPSULE ORAL
Qty: 90 CAPSULE | Refills: 0 | Status: SHIPPED | OUTPATIENT
Start: 2019-05-09 | End: 2019-08-07 | Stop reason: SDUPTHER

## 2019-05-23 ENCOUNTER — OFFICE VISIT (OUTPATIENT)
Dept: FAMILY MEDICINE CLINIC | Facility: CLINIC | Age: 59
End: 2019-05-23

## 2019-05-23 VITALS
HEIGHT: 63 IN | WEIGHT: 150.2 LBS | SYSTOLIC BLOOD PRESSURE: 136 MMHG | OXYGEN SATURATION: 98 % | HEART RATE: 77 BPM | DIASTOLIC BLOOD PRESSURE: 74 MMHG | BODY MASS INDEX: 26.61 KG/M2

## 2019-05-23 DIAGNOSIS — E87.6 HYPOKALEMIA: ICD-10-CM

## 2019-05-23 DIAGNOSIS — I10 ESSENTIAL HYPERTENSION: ICD-10-CM

## 2019-05-23 DIAGNOSIS — D50.9 IRON DEFICIENCY ANEMIA, UNSPECIFIED IRON DEFICIENCY ANEMIA TYPE: ICD-10-CM

## 2019-05-23 DIAGNOSIS — S69.92XA INJURY OF LEFT WRIST, INITIAL ENCOUNTER: Primary | ICD-10-CM

## 2019-05-23 DIAGNOSIS — Z12.83 SKIN CANCER SCREENING: ICD-10-CM

## 2019-05-23 DIAGNOSIS — B07.8 OTHER VIRAL WARTS: ICD-10-CM

## 2019-05-23 DIAGNOSIS — Z13.220 LIPID SCREENING: ICD-10-CM

## 2019-05-23 PROBLEM — Z72.0 TOBACCO ABUSE: Status: RESOLVED | Noted: 2017-02-18 | Resolved: 2019-05-23

## 2019-05-23 PROBLEM — I95.9 HYPOTENSION: Status: RESOLVED | Noted: 2017-02-18 | Resolved: 2019-05-23

## 2019-05-23 PROCEDURE — 99214 OFFICE O/P EST MOD 30 MIN: CPT | Performed by: PHYSICIAN ASSISTANT

## 2019-05-23 PROCEDURE — 17110 DESTRUCTION B9 LES UP TO 14: CPT | Performed by: PHYSICIAN ASSISTANT

## 2019-05-23 NOTE — PROGRESS NOTES
Chief Complaint   Patient presents with   • Fall     recent fall, has bump on left arm.   • Anemia     low iron, needing blood work done       HPI      Jocelyne Lindquist is a 58 y.o. female who presents for Fall (recent fall, has bump on left arm.) and Anemia (low iron, needing blood work done)    Patient is a pleasant 57 y/o white female who presents to the office with several concerns.  She bumped her left arm and has swelling at the wrist.  Denies any pain or loss of motion.  Injury occurred last week and swelling has improved some.  She has a wart on her left hand that has worsened and she would like this frozen/removed.  She is not established with dermatology.  She has history of significant sun exposure in youth and has freckles/nevi throughout.  She has a history of iron deficiency and would like her labs evaluated.  She denies worsening shortness of breath or fatigue.  She is not currently taking any iron tablets.  Last iron value was normal, 5 months ago.    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, right     outer corner of right eye   • Tobacco abuse 2/18/2017    Quit 12/25/17       Past Surgical History:   Procedure Laterality Date   • BREAST BIOPSY Left 06/18/2010    stereo bx   • CYST REMOVAL Right     wrist   • SQUAMOUS CELL CARCINOMA EXCISION Right 12/13/2017    outer corner of right eye       Family History   Problem Relation Age of Onset   • Cancer Mother         esophageal and liver   • Heart failure Father    • Breast cancer Neg Hx    • Ovarian cancer Neg Hx        Social History     Socioeconomic History   • Marital status:      Spouse name: Not on file   • Number of children: Not on file   • Years of education: Not on file   • Highest education level: Not on file   Tobacco Use   • Smoking status:  "Former Smoker     Packs/day: 1.00     Years: 20.00     Pack years: 20.00   • Smokeless tobacco: Never Used   • Tobacco comment: 12/25/17   Substance and Sexual Activity   • Alcohol use: No     Comment: not since 12/25/17   • Drug use: No   • Sexual activity: Defer       Allergies   Allergen Reactions   • Doxycycline Diarrhea   • Eggs Or Egg-Derived Products      Causes cramps   • Fish-Derived Products      Causes severe stomach cramps       ROS    Review of Systems   Constitutional: Negative for chills, diaphoresis, fatigue and fever.   HENT: Positive for postnasal drip and rhinorrhea. Negative for congestion.    Respiratory: Negative for cough, shortness of breath and wheezing.    Cardiovascular: Negative for chest pain, palpitations and leg swelling.   Musculoskeletal: Positive for joint swelling. Negative for arthralgias, gait problem and myalgias.   Skin: Positive for skin lesions. Negative for rash.   Neurological: Negative for dizziness and headache.       Vitals:    05/23/19 1304   BP: 136/74   BP Location: Right arm   Patient Position: Sitting   Cuff Size: Adult   Pulse: 77   SpO2: 98%   Weight: 68.1 kg (150 lb 3.2 oz)   Height: 160 cm (63\")       Current Outpatient Medications on File Prior to Visit   Medication Sig Dispense Refill   • acyclovir (ZOVIRAX) 400 MG tablet Take 1 tablet by mouth 3 (Three) Times a Day. Do not take for more than 5 days. 30 tablet 1   • amLODIPine (NORVASC) 5 MG tablet Take 1 tablet by mouth Daily. 90 tablet 1   • aspirin 81 MG chewable tablet Chew 81 mg Daily.     • benazepril (LOTENSIN) 20 MG tablet Take 1 tablet by mouth Daily. 90 tablet 1   • cetirizine (zyrTEC) 10 MG tablet Take 10 mg by mouth Daily.     • cyclobenzaprine (FLEXERIL) 5 MG tablet Take 1 tablet by mouth 3 (Three) Times a Day As Needed for Muscle Spasms. 30 tablet 0   • KLOR-CON 10 MEQ CR tablet TAKE 1 TABLET BY MOUTH ONCE DAILY 90 tablet 1   • omeprazole (priLOSEC) 20 MG capsule Take 1 capsule by mouth Daily. 90 " capsule 3   • ondansetron ODT (ZOFRAN-ODT) 8 MG disintegrating tablet Take 1 tablet by mouth Every 8 (Eight) Hours As Needed for nausea 10 tablet 0   • PROAIR  (90 Base) MCG/ACT inhaler Inhale 2 puffs Every 4 (Four) Hours As Needed for Wheezing. 1 inhaler 2   • triamcinolone (KENALOG) 0.1 % ointment Apply  topically 3 (Three) Times a Day. 60 g 1   • triamterene-hydrochlorothiazide (DYAZIDE) 37.5-25 MG per capsule TAKE 1 CAPSULE BY MOUTH ONCE DAILY 90 capsule 0   • [DISCONTINUED] HYDROcodone-acetaminophen (NORCO) 5-325 MG per tablet Take 1 tablet by mouth Every 4 (Four) Hours As Needed for pain. 10 tablet 0   • [DISCONTINUED] MethylPREDNISolone (MEDROL) 4 MG tablet Take as directed on package instructions. Dispense: 21 Count dose ashu with 0 refills. 1 each 0   • [DISCONTINUED] predniSONE (DELTASONE) 20 MG tablet Take 3 tabs (60mg) x3 days, 2 tabs (40mg) x4 days, 1 tab (20mg) x4 days, 0.5 tab (10mg) x4 days 23 tablet 0     No current facility-administered medications on file prior to visit.        Results for orders placed or performed in visit on 12/06/18   Iron   Result Value Ref Range    Iron 98 50 - 175 mcg/dL   Basic metabolic panel   Result Value Ref Range    Glucose 96 70 - 100 mg/dL    BUN 7 (L) 9 - 23 mg/dL    Creatinine 0.65 0.60 - 1.30 mg/dL    Sodium 142 132 - 146 mmol/L    Potassium 4.0 3.5 - 5.5 mmol/L    Chloride 105 99 - 109 mmol/L    CO2 29.0 20.0 - 31.0 mmol/L    Calcium 9.7 8.7 - 10.4 mg/dL    eGFR Non African Amer 93 >60 mL/min/1.73    BUN/Creatinine Ratio 10.8 7.0 - 25.0    Anion Gap 8.0 3.0 - 11.0 mmol/L       PE    Physical Exam   Constitutional: She appears well-developed and well-nourished. She is active and cooperative. No distress.   HENT:   Head: Normocephalic and atraumatic.   Eyes: EOM are normal.   Neck: Normal range of motion.   Cardiovascular: Normal rate, regular rhythm and normal heart sounds.   Pulmonary/Chest: Effort normal and breath sounds normal.   Musculoskeletal: Normal  range of motion. She exhibits no edema.   Neurological: She is alert.   Skin: Skin is warm. She is not diaphoretic. No erythema.        Psychiatric: She has a normal mood and affect. Her speech is normal and behavior is normal. Judgment and thought content normal. She is not actively hallucinating. She is attentive.      Cryotherapy, Skin Lesion  Date/Time: 5/23/2019 2:26 PM  Performed by: Uzma Duffy PA-C  Authorized by: Uzma Duffy PA-C   Local anesthesia used: no    Anesthesia:  Local anesthesia used: no    Sedation:  Patient sedated: no    Patient tolerance: Patient tolerated the procedure well with no immediate complications  Comments: Left dorsal aspect of hand near digit #4          A/P    Jocelyne was seen today for fall and anemia.    Diagnoses and all orders for this visit:    Injury of left wrist, initial encounter  -bumped her left wrist a week ago and has swelling  -denies pain or diminished range of motion, swelling has improved slightly since onset  -recommend giving it more time to see if swelling will resolve since it is asymptomatic  -if swelling persists or she has a change in symptoms, would want a left wrist x-ray    Other viral warts  -wart on her left hand on dorsal aspect near digit #4  -     Cryotherapy, Skin Lesion    Skin cancer screening  -     Ambulatory Referral to Dermatology    Iron deficiency anemia, unspecified iron deficiency anemia type  -history of iron deficiency, recent iron 5 months ago was normal  -not taking any iron supplements  -denies worsening shortness of breath or fatigue  -will repeat iron panel  -     Iron and TIBC    Hypokalemia  -     Comprehensive Metabolic Panel    Essential hypertension  -blood pressure is stable and well-controlled  -     Comprehensive Metabolic Panel         Plan of care reviewed with patient at the conclusion of today's visit. Education was provided regarding diagnosis, management and any prescribed or recommended OTC  medications.  Patient verbalizes understanding of and agreement with management plan.    Return in about 3 months (around 8/23/2019) for Annual physical.     Uzma Duffy PA-C

## 2019-05-23 NOTE — PATIENT INSTRUCTIONS
-recommend shingrix and hepatitis A    [facility] Ovarian Cancer Screening Program    Eligibility:    All women over the age of 50 (including those who have no symptoms and no personal history of ovarian cancer) are eligible for a free ovarian cancer screening.   Women over the age of 25 who have a family history of ovarian cancer are also eligible for a free screening.    Any woman in one of these two groups should contact us at [phone] to schedule an appointment.  Free service available to women.    About Ovarian Cancer (from [facility] website):    The chances of having breast cancer are about one in nine over a woman’s lifetime. The chances of having ovarian cancer are about seven to eight times lower than that. But despite the lower risk, ovarian cancer kills more women than all other gynecologic malignancies combined.     Typically ovarian cancer is not accompanied by symptoms until the disease is advanced. Early-stage ovarian cancer is often curable, while advanced-stage ovarian cancer carries a poor prognosis for survival.     Most ovarian cancers occur in women who are over the age of 50 and do not have any symptoms or risk factors.    Our research protocol experience involving more than 47,000 women who have received over 310,000 free ultrasound screens indicates that transvaginal ultrasound is the most effective way to detect early stage ovarian cancers and save lives. This research protocol remains open to [place] women to continue establishing the soundness of this approach.

## 2019-05-24 ENCOUNTER — LAB (OUTPATIENT)
Dept: LAB | Facility: HOSPITAL | Age: 59
End: 2019-05-24

## 2019-05-24 DIAGNOSIS — Z13.220 LIPID SCREENING: ICD-10-CM

## 2019-05-24 DIAGNOSIS — D50.9 IRON DEFICIENCY ANEMIA, UNSPECIFIED IRON DEFICIENCY ANEMIA TYPE: ICD-10-CM

## 2019-05-24 DIAGNOSIS — E87.6 HYPOKALEMIA: ICD-10-CM

## 2019-05-24 LAB
ALBUMIN SERPL-MCNC: 4.6 G/DL (ref 3.5–5.2)
ALBUMIN/GLOB SERPL: 1.4 G/DL
ALP SERPL-CCNC: 63 U/L (ref 39–117)
ALT SERPL W P-5'-P-CCNC: 18 U/L (ref 1–33)
ANION GAP SERPL CALCULATED.3IONS-SCNC: 16.7 MMOL/L
AST SERPL-CCNC: 27 U/L (ref 1–32)
BILIRUB SERPL-MCNC: 0.6 MG/DL (ref 0.2–1.2)
BUN BLD-MCNC: 7 MG/DL (ref 8–23)
BUN/CREAT SERPL: 10.6 (ref 7–25)
CALCIUM SPEC-SCNC: 10.4 MG/DL (ref 8.6–10.5)
CHLORIDE SERPL-SCNC: 94 MMOL/L (ref 98–107)
CHOLEST SERPL-MCNC: 229 MG/DL (ref 0–200)
CO2 SERPL-SCNC: 25.3 MMOL/L (ref 22–29)
CREAT BLD-MCNC: 0.66 MG/DL (ref 0.57–1)
GFR SERPL CREATININE-BSD FRML MDRD: 91 ML/MIN/1.73
GLOBULIN UR ELPH-MCNC: 3.4 GM/DL
GLUCOSE BLD-MCNC: 107 MG/DL (ref 65–99)
HDLC SERPL-MCNC: 106 MG/DL (ref 40–60)
IRON 24H UR-MRATE: 144 MCG/DL (ref 37–145)
IRON SATN MFR SERPL: 33 % (ref 20–50)
LDLC SERPL CALC-MCNC: 84 MG/DL (ref 0–100)
LDLC/HDLC SERPL: 0.79 {RATIO}
POTASSIUM BLD-SCNC: 4.1 MMOL/L (ref 3.5–5.2)
PROT SERPL-MCNC: 8 G/DL (ref 6–8.5)
SODIUM BLD-SCNC: 136 MMOL/L (ref 136–145)
TIBC SERPL-MCNC: 431 MCG/DL (ref 298–536)
TRANSFERRIN SERPL-MCNC: 289 MG/DL (ref 200–360)
TRIGL SERPL-MCNC: 194 MG/DL (ref 0–150)
VLDLC SERPL-MCNC: 38.8 MG/DL (ref 5–40)

## 2019-05-24 PROCEDURE — 80053 COMPREHEN METABOLIC PANEL: CPT | Performed by: PHYSICIAN ASSISTANT

## 2019-05-24 PROCEDURE — 80061 LIPID PANEL: CPT | Performed by: PHYSICIAN ASSISTANT

## 2019-05-24 PROCEDURE — 83540 ASSAY OF IRON: CPT | Performed by: PHYSICIAN ASSISTANT

## 2019-05-24 PROCEDURE — 84466 ASSAY OF TRANSFERRIN: CPT | Performed by: PHYSICIAN ASSISTANT

## 2019-06-07 RX ORDER — POTASSIUM CHLORIDE 750 MG/1
TABLET, EXTENDED RELEASE ORAL
Qty: 90 TABLET | Refills: 1 | Status: SHIPPED | OUTPATIENT
Start: 2019-06-07 | End: 2019-12-23

## 2019-07-05 RX ORDER — AMLODIPINE BESYLATE 5 MG/1
TABLET ORAL
Qty: 90 TABLET | Refills: 1 | Status: SHIPPED | OUTPATIENT
Start: 2019-07-05 | End: 2019-08-27 | Stop reason: SDUPTHER

## 2019-07-05 RX ORDER — OMEPRAZOLE 20 MG/1
CAPSULE, DELAYED RELEASE ORAL
Qty: 90 CAPSULE | Refills: 1 | Status: SHIPPED | OUTPATIENT
Start: 2019-07-05 | End: 2020-01-20

## 2019-07-05 RX ORDER — BENAZEPRIL HYDROCHLORIDE 20 MG/1
TABLET ORAL
Qty: 90 TABLET | Refills: 1 | Status: SHIPPED | OUTPATIENT
Start: 2019-07-05 | End: 2020-01-02

## 2019-07-08 ENCOUNTER — TELEPHONE (OUTPATIENT)
Dept: FAMILY MEDICINE CLINIC | Facility: CLINIC | Age: 59
End: 2019-07-08

## 2019-07-08 NOTE — TELEPHONE ENCOUNTER
Med Refill:    PROAIR  (90 Base) MCG/ACT inhaler    [address] - [phone] [facility] [phone]   [phone]

## 2019-08-07 RX ORDER — TRIAMTERENE AND HYDROCHLOROTHIAZIDE 37.5; 25 MG/1; MG/1
CAPSULE ORAL
Qty: 90 CAPSULE | Refills: 1 | Status: SHIPPED | OUTPATIENT
Start: 2019-08-07 | End: 2020-01-02

## 2019-08-19 ENCOUNTER — TELEPHONE (OUTPATIENT)
Dept: FAMILY MEDICINE CLINIC | Facility: CLINIC | Age: 59
End: 2019-08-19

## 2019-08-19 NOTE — TELEPHONE ENCOUNTER
Called and spoke with patient, she did not know the date she was originally called for these records and did not know who she spoke with. I am currently unable to find these records

## 2019-08-27 ENCOUNTER — OFFICE VISIT (OUTPATIENT)
Dept: FAMILY MEDICINE CLINIC | Facility: CLINIC | Age: 59
End: 2019-08-27

## 2019-08-27 ENCOUNTER — APPOINTMENT (OUTPATIENT)
Dept: LAB | Facility: HOSPITAL | Age: 59
End: 2019-08-27

## 2019-08-27 VITALS
HEIGHT: 63 IN | OXYGEN SATURATION: 98 % | RESPIRATION RATE: 16 BRPM | DIASTOLIC BLOOD PRESSURE: 82 MMHG | HEART RATE: 100 BPM | WEIGHT: 145 LBS | SYSTOLIC BLOOD PRESSURE: 150 MMHG | BODY MASS INDEX: 25.69 KG/M2

## 2019-08-27 DIAGNOSIS — R73.9 HYPERGLYCEMIA: ICD-10-CM

## 2019-08-27 DIAGNOSIS — E55.9 VITAMIN D DEFICIENCY: ICD-10-CM

## 2019-08-27 DIAGNOSIS — I10 ESSENTIAL HYPERTENSION: ICD-10-CM

## 2019-08-27 DIAGNOSIS — E78.2 MIXED HYPERLIPIDEMIA: ICD-10-CM

## 2019-08-27 DIAGNOSIS — F51.8 ABNORMAL DREAMS: ICD-10-CM

## 2019-08-27 DIAGNOSIS — D50.9 IRON DEFICIENCY ANEMIA, UNSPECIFIED IRON DEFICIENCY ANEMIA TYPE: ICD-10-CM

## 2019-08-27 DIAGNOSIS — Z00.00 PHYSICAL EXAM, ANNUAL: Primary | ICD-10-CM

## 2019-08-27 DIAGNOSIS — N17.9 AKI (ACUTE KIDNEY INJURY) (HCC): ICD-10-CM

## 2019-08-27 DIAGNOSIS — Z13.29 SCREENING FOR THYROID DISORDER: ICD-10-CM

## 2019-08-27 LAB
ALBUMIN SERPL-MCNC: 5 G/DL (ref 3.5–5.2)
ALBUMIN/GLOB SERPL: 1.7 G/DL
ALP SERPL-CCNC: 56 U/L (ref 39–117)
ALT SERPL W P-5'-P-CCNC: 18 U/L (ref 1–33)
ANION GAP SERPL CALCULATED.3IONS-SCNC: 15.9 MMOL/L (ref 5–15)
AST SERPL-CCNC: 27 U/L (ref 1–32)
BASOPHILS # BLD AUTO: 0.07 10*3/MM3 (ref 0–0.2)
BASOPHILS NFR BLD AUTO: 0.9 % (ref 0–1.5)
BILIRUB SERPL-MCNC: 0.4 MG/DL (ref 0.2–1.2)
BUN BLD-MCNC: 8 MG/DL (ref 8–23)
BUN/CREAT SERPL: 14 (ref 7–25)
CALCIUM SPEC-SCNC: 10 MG/DL (ref 8.6–10.5)
CHLORIDE SERPL-SCNC: 97 MMOL/L (ref 98–107)
CHOLEST SERPL-MCNC: 239 MG/DL (ref 0–200)
CO2 SERPL-SCNC: 25.1 MMOL/L (ref 22–29)
CREAT BLD-MCNC: 0.57 MG/DL (ref 0.57–1)
DEPRECATED RDW RBC AUTO: 46.6 FL (ref 37–54)
EOSINOPHIL # BLD AUTO: 0.41 10*3/MM3 (ref 0–0.4)
EOSINOPHIL NFR BLD AUTO: 5.4 % (ref 0.3–6.2)
ERYTHROCYTE [DISTWIDTH] IN BLOOD BY AUTOMATED COUNT: 13.4 % (ref 12.3–15.4)
GFR SERPL CREATININE-BSD FRML MDRD: 108 ML/MIN/1.73
GLOBULIN UR ELPH-MCNC: 2.9 GM/DL
GLUCOSE BLD-MCNC: 103 MG/DL (ref 65–99)
HBA1C MFR BLD: 5.71 % (ref 4.8–5.6)
HCT VFR BLD AUTO: 44.5 % (ref 34–46.6)
HDLC SERPL-MCNC: 93 MG/DL (ref 40–60)
HGB BLD-MCNC: 14.4 G/DL (ref 12–15.9)
IMM GRANULOCYTES # BLD AUTO: 0.03 10*3/MM3 (ref 0–0.05)
IMM GRANULOCYTES NFR BLD AUTO: 0.4 % (ref 0–0.5)
LDLC SERPL CALC-MCNC: ABNORMAL MG/DL
LDLC/HDLC SERPL: ABNORMAL {RATIO}
LYMPHOCYTES # BLD AUTO: 2.42 10*3/MM3 (ref 0.7–3.1)
LYMPHOCYTES NFR BLD AUTO: 31.7 % (ref 19.6–45.3)
MCH RBC QN AUTO: 30.6 PG (ref 26.6–33)
MCHC RBC AUTO-ENTMCNC: 32.4 G/DL (ref 31.5–35.7)
MCV RBC AUTO: 94.7 FL (ref 79–97)
MONOCYTES # BLD AUTO: 0.66 10*3/MM3 (ref 0.1–0.9)
MONOCYTES NFR BLD AUTO: 8.7 % (ref 5–12)
NEUTROPHILS # BLD AUTO: 4.04 10*3/MM3 (ref 1.7–7)
NEUTROPHILS NFR BLD AUTO: 52.9 % (ref 42.7–76)
NRBC BLD AUTO-RTO: 0.1 /100 WBC (ref 0–0.2)
PLATELET # BLD AUTO: 348 10*3/MM3 (ref 140–450)
PMV BLD AUTO: 9.4 FL (ref 6–12)
POTASSIUM BLD-SCNC: 4.4 MMOL/L (ref 3.5–5.2)
PROT SERPL-MCNC: 7.9 G/DL (ref 6–8.5)
RBC # BLD AUTO: 4.7 10*6/MM3 (ref 3.77–5.28)
SODIUM BLD-SCNC: 138 MMOL/L (ref 136–145)
TRIGL SERPL-MCNC: 459 MG/DL (ref 0–150)
VLDLC SERPL-MCNC: ABNORMAL MG/DL
WBC NRBC COR # BLD: 7.63 10*3/MM3 (ref 3.4–10.8)

## 2019-08-27 PROCEDURE — 85025 COMPLETE CBC W/AUTO DIFF WBC: CPT | Performed by: PHYSICIAN ASSISTANT

## 2019-08-27 PROCEDURE — 80061 LIPID PANEL: CPT | Performed by: PHYSICIAN ASSISTANT

## 2019-08-27 PROCEDURE — 83036 HEMOGLOBIN GLYCOSYLATED A1C: CPT | Performed by: PHYSICIAN ASSISTANT

## 2019-08-27 PROCEDURE — 99396 PREV VISIT EST AGE 40-64: CPT | Performed by: PHYSICIAN ASSISTANT

## 2019-08-27 PROCEDURE — 80053 COMPREHEN METABOLIC PANEL: CPT | Performed by: PHYSICIAN ASSISTANT

## 2019-08-27 PROCEDURE — 83721 ASSAY OF BLOOD LIPOPROTEIN: CPT | Performed by: PHYSICIAN ASSISTANT

## 2019-08-27 PROCEDURE — 82306 VITAMIN D 25 HYDROXY: CPT | Performed by: PHYSICIAN ASSISTANT

## 2019-08-27 PROCEDURE — 84443 ASSAY THYROID STIM HORMONE: CPT | Performed by: PHYSICIAN ASSISTANT

## 2019-08-27 RX ORDER — TRAZODONE HYDROCHLORIDE 50 MG/1
50 TABLET ORAL NIGHTLY
Qty: 30 TABLET | Refills: 1 | Status: SHIPPED | OUTPATIENT
Start: 2019-08-27 | End: 2020-03-12

## 2019-08-27 NOTE — PATIENT INSTRUCTIONS
"-may want to try Xyzal 5 mg daily for allergies (instead of zyrtec)  -okay to stop Aspirin    General Health Maintenance:  -Yearly dermatology exam  -Yearly eye exam if you are diabetic, otherwise every 2 years for patients without diabetes  -Dental exams/cleanings at least twice a year  -Staying current on your required colonoscopy, starts at age 50 unless there are other indications prior  -Regular pap smears (at least every 1-3 years until age 65)  -Yearly pelvic and breast exams  -Yearly mammograms starting at age 40    Vaccines:  -Talk to pharmacist about shingrix shot  -Obtain flu shot when available  -Health department is recommending Hepatitis A vaccine    The largest impact you can have on your own health is getting the proper nutrition, exercise and maintaining an overall healthy body weight.  Proper nutrition involves eating lots of vegetables, fruit, minimal lean meat and avoiding processed foods and limiting refined sugars, carbohydrates and starches (\"the white stuff\").  Drinking at least 8 cups of water daily.  Walking at least 30 minutes a day and if possible, increasing this to a more cardiovascular aerobic exercise.  Maintaining a healthy body weight.      If you smoke or use tobacco products, quitting is extremely important and I am happy to discuss options with you regarding how to do this and what's available to assist you.    If you consume alcohol, limit your alcohol intake to 2 drinks a day for men and 1 drink a day for woman.    It is also important to make sure to keep regular appointments and have all general health maintenance items completed in a timely manner.      Thank you for entrusting me with your care.  It is a pleasure and honor to participate in your health.    "

## 2019-08-27 NOTE — PROGRESS NOTES
Patient Care Team:  Uzma Duffy PA-C as PCP - General (Physician Assistant)     Chief complaint: Patient is in today for a physical     Jocelynejaci Lindquist is a 58 y.o. female who presents for her yearly physical exam.     Patient is a pleasant 58-year-old white female who presents for annual physical exam.  Patient's past medical history is significant for hypertension, hyperlipidemia, hyperglycemia, acute kidney injury, iron deficiency anemia and abnormal dreams.  Overall patient is doing well and has no complaints today.  She is compliant on all of her medications.  She is up-to-date on mammogram and colonoscopy.  She is currently taking aspirin but has no history of stroke or heart disease so she will discontinue.  Her blood pressure is elevated.  She monitors occasionally at home and it is within normal limits.  She denies headaches or dizziness.  She is due for an ophthalmologist appointment.  Goes to dentist regularly.  Recently seen a dermatologist.  Denies any concerns for skin lesions.  Overall patient is doing well.  Her main complaint is abnormal dreams.  She reports that she has no difficulty falling or staying asleep but that while sleeping she has abnormal dreams throughout the night and upon awaking often does not feel well rested.  She has taken temazepam historically and this helped.  She has not tried any other medications and does not currently take any melatonin.         Review of Systems   Constitutional: Negative for chills, diaphoresis, fatigue and fever.   HENT: Negative for congestion, ear pain, hearing loss, postnasal drip, rhinorrhea and sore throat.    Eyes: Negative for blurred vision and pain.   Respiratory: Negative for cough, shortness of breath and wheezing.    Cardiovascular: Negative for chest pain and leg swelling.   Gastrointestinal: Negative for abdominal pain, blood in stool, constipation, diarrhea, nausea, vomiting and indigestion.   Endocrine: Negative for  polyuria.   Genitourinary: Negative for breast lump, dysuria, flank pain, hematuria, vaginal discharge and vaginal pain.   Musculoskeletal: Negative for arthralgias, gait problem and myalgias.   Skin: Negative for rash and skin lesions.   Neurological: Negative for dizziness and headache.   Psychiatric/Behavioral: Positive for sleep disturbance. Negative for self-injury, suicidal ideas, depressed mood and stress. The patient is not nervous/anxious.         History  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, right     outer corner of right eye   • Tobacco abuse 2/18/2017    Quit 12/25/17      Past Surgical History:   Procedure Laterality Date   • BREAST BIOPSY Left 06/18/2010    stereo bx   • CYST REMOVAL Right     wrist   • SQUAMOUS CELL CARCINOMA EXCISION Right 12/13/2017    outer corner of right eye      Allergies   Allergen Reactions   • Doxycycline Diarrhea   • Eggs Or Egg-Derived Products      Causes cramps   • Fish-Derived Products      Causes severe stomach cramps      Family History   Problem Relation Age of Onset   • Cancer Mother         esophageal and liver   • Heart failure Father    • Breast cancer Neg Hx    • Ovarian cancer Neg Hx       Social History     Socioeconomic History   • Marital status:      Spouse name: Not on file   • Number of children: Not on file   • Years of education: Not on file   • Highest education level: Not on file   Tobacco Use   • Smoking status: Former Smoker     Packs/day: 1.00     Years: 20.00     Pack years: 20.00   • Smokeless tobacco: Never Used   • Tobacco comment: 12/25/17   Substance and Sexual Activity   • Alcohol use: No     Comment: not since 12/25/17   • Drug use: No   • Sexual activity: Defer      Current Outpatient Medications on File Prior to Visit   Medication Sig  Dispense Refill   • acyclovir (ZOVIRAX) 400 MG tablet Take 1 tablet by mouth 3 (Three) Times a Day. Do not take for more than 5 days. 30 tablet 1   • amLODIPine (NORVASC) 5 MG tablet Take 1 tablet by mouth Daily. 90 tablet 1   • benazepril (LOTENSIN) 20 MG tablet TAKE 1 TABLET BY MOUTH ONCE DAILY 90 tablet 1   • cetirizine (zyrTEC) 10 MG tablet Take 10 mg by mouth Daily.     • cyclobenzaprine (FLEXERIL) 5 MG tablet Take 1 tablet by mouth 3 (Three) Times a Day As Needed for Muscle Spasms. 30 tablet 0   • omeprazole (priLOSEC) 20 MG capsule TAKE 1 CAPSULE BY MOUTH ONCE DAILY 90 capsule 1   • ondansetron ODT (ZOFRAN-ODT) 8 MG disintegrating tablet Take 1 tablet by mouth Every 8 (Eight) Hours As Needed for nausea 10 tablet 0   • potassium chloride (K-DUR,KLOR-CON) 10 MEQ CR tablet TAKE 1 TABLET BY MOUTH ONCE DAILY 90 tablet 1   • PROAIR  (90 Base) MCG/ACT inhaler Inhale 2 puffs Every 4 (Four) Hours As Needed for Wheezing. 1 inhaler 2   • triamcinolone (KENALOG) 0.1 % ointment Apply  topically 3 (Three) Times a Day. 60 g 1   • triamterene-hydrochlorothiazide (DYAZIDE) 37.5-25 MG per capsule TAKE 1 CAPSULE BY MOUTH ONCE DAILY 90 capsule 1   • [DISCONTINUED] aspirin 81 MG chewable tablet Chew 81 mg Daily.     • [DISCONTINUED] amLODIPine (NORVASC) 5 MG tablet TAKE 1 TABLET BY MOUTH ONCE DAILY 90 tablet 1     No current facility-administered medications on file prior to visit.        Results for orders placed or performed in visit on 05/24/19   Iron and TIBC   Result Value Ref Range    Iron 144 37 - 145 mcg/dL    Iron Saturation 33 20 - 50 %    Transferrin 289 200 - 360 mg/dL    TIBC 431 298 - 536 mcg/dL   Comprehensive metabolic panel   Result Value Ref Range    Glucose 107 (H) 65 - 99 mg/dL    BUN 7 (L) 8 - 23 mg/dL    Creatinine 0.66 0.57 - 1.00 mg/dL    Sodium 136 136 - 145 mmol/L    Potassium 4.1 3.5 - 5.2 mmol/L    Chloride 94 (L) 98 - 107 mmol/L    CO2 25.3 22.0 - 29.0 mmol/L    Calcium 10.4 8.6 - 10.5 mg/dL     Total Protein 8.0 6.0 - 8.5 g/dL    Albumin 4.60 3.50 - 5.20 g/dL    ALT (SGPT) 18 1 - 33 U/L    AST (SGOT) 27 1 - 32 U/L    Alkaline Phosphatase 63 39 - 117 U/L    Total Bilirubin 0.6 0.2 - 1.2 mg/dL    eGFR Non African Amer 91 >60 mL/min/1.73    Globulin 3.4 gm/dL    A/G Ratio 1.4 g/dL    BUN/Creatinine Ratio 10.6 7.0 - 25.0    Anion Gap 16.7 mmol/L   Lipid panel   Result Value Ref Range    Total Cholesterol 229 (H) 0 - 200 mg/dL    Triglycerides 194 (H) 0 - 150 mg/dL    HDL Cholesterol 106 (H) 40 - 60 mg/dL    LDL Cholesterol  84 0 - 100 mg/dL    VLDL Cholesterol 38.8 5 - 40 mg/dL    LDL/HDL Ratio 0.79        Health Maintenance   Topic Date Due   • ZOSTER VACCINE (1 of 2) 09/15/2008   • PAP SMEAR  2018   • LIPID PANEL  2020   • ANNUAL PHYSICAL  2020   • MAMMOGRAM  2021   • DXA SCAN  2022   • TDAP/TD VACCINES (2 - Td) 2023   • COLONOSCOPY  2024   • HEPATITIS C SCREENING  Completed       Immunizations  Td/Tdap(Booster Q 10 yrs):  Completed  Flu (Yearly):  N/A  Pneumovax (1 yr after Prevnar):  Completed  [id number] (1 yr after Pneumo):  N/A  Hep B:  N/A  Shringrix:  Discussed Today}   Immunization History   Administered Date(s) Administered   • FLUARIX/FLUZONE/AFLURIA/FLULAVAL QUAD 2018   • Pneumococcal Polysaccharide (PPSV23) 2016   • Tdap 2013         Diabetes:  No   Eye Exam: N/A   Foot Exam:  N/A  Obesity Counseling:  N/A  Hemoglobin A1C   Date Value Ref Range Status   2017 5.60 4.80 - 5.60 % Final       Patient's Body mass index is 25.69 kg/m². BMI is within normal parameters. No follow-up required..      Colorectal Screening:  Complete  Pap:  Complete  Mammogram:  Complete  PSA(Over age 50):  N/A  US Aorta (For male smokers, age 65):  N/A  CT for Smoker (Age 55-75, 30pk yr):  N/A  Bone Density/DEXA:  N/A  Hep C ( 9483-2686):  Complete      Results for orders placed or performed in visit on 19   Iron and TIBC   Result Value Ref Range     "Iron 144 37 - 145 mcg/dL    Iron Saturation 33 20 - 50 %    Transferrin 289 200 - 360 mg/dL    TIBC 431 298 - 536 mcg/dL   Comprehensive metabolic panel   Result Value Ref Range    Glucose 107 (H) 65 - 99 mg/dL    BUN 7 (L) 8 - 23 mg/dL    Creatinine 0.66 0.57 - 1.00 mg/dL    Sodium 136 136 - 145 mmol/L    Potassium 4.1 3.5 - 5.2 mmol/L    Chloride 94 (L) 98 - 107 mmol/L    CO2 25.3 22.0 - 29.0 mmol/L    Calcium 10.4 8.6 - 10.5 mg/dL    Total Protein 8.0 6.0 - 8.5 g/dL    Albumin 4.60 3.50 - 5.20 g/dL    ALT (SGPT) 18 1 - 33 U/L    AST (SGOT) 27 1 - 32 U/L    Alkaline Phosphatase 63 39 - 117 U/L    Total Bilirubin 0.6 0.2 - 1.2 mg/dL    eGFR Non African Amer 91 >60 mL/min/1.73    Globulin 3.4 gm/dL    A/G Ratio 1.4 g/dL    BUN/Creatinine Ratio 10.6 7.0 - 25.0    Anion Gap 16.7 mmol/L   Lipid panel   Result Value Ref Range    Total Cholesterol 229 (H) 0 - 200 mg/dL    Triglycerides 194 (H) 0 - 150 mg/dL    HDL Cholesterol 106 (H) 40 - 60 mg/dL    LDL Cholesterol  84 0 - 100 mg/dL    VLDL Cholesterol 38.8 5 - 40 mg/dL    LDL/HDL Ratio 0.79             Vitals:    08/27/19 1308   BP: 150/82   Pulse: 100   Resp: 16   SpO2: 98%   Weight: 65.8 kg (145 lb)   Height: 160 cm (63\")         Physical Exam   Constitutional: She is oriented to person, place, and time. She appears well-developed and well-nourished. She is active and cooperative. She does not appear ill. No distress. She is not overweight.  HENT:   Head: Normocephalic and atraumatic.   Right Ear: Hearing, tympanic membrane, external ear and ear canal normal.   Left Ear: Hearing, tympanic membrane, external ear and ear canal normal.   Nose: Nose normal. Right sinus exhibits no maxillary sinus tenderness and no frontal sinus tenderness. Left sinus exhibits no maxillary sinus tenderness and no frontal sinus tenderness.   Mouth/Throat: Uvula is midline, oropharynx is clear and moist and mucous membranes are normal.   Eyes: Conjunctivae, EOM and lids are normal.   Neck: " Trachea normal, normal range of motion and phonation normal. No thyroid mass and no thyromegaly present.   Cardiovascular: Normal rate, regular rhythm and normal heart sounds.   Pulmonary/Chest: Effort normal and breath sounds normal.   Abdominal: Soft. Normal appearance and bowel sounds are normal. She exhibits no distension. There is no tenderness. There is no rigidity, no guarding and no CVA tenderness.   Musculoskeletal: Normal range of motion. She exhibits no edema.   Lymphadenopathy:     She has no cervical adenopathy.        Right cervical: No superficial cervical adenopathy present.       Left cervical: No superficial cervical adenopathy present.   Neurological: She is alert and oriented to person, place, and time. She has normal strength and normal reflexes. Coordination and gait normal.   CN grossly intact   Skin: Skin is warm and intact. No rash noted. She is not diaphoretic. No cyanosis or erythema. Nails show no clubbing.   Psychiatric: She has a normal mood and affect. Her speech is normal and behavior is normal. Judgment and thought content normal. She is not actively hallucinating. Cognition and memory are normal. She is attentive.   Vitals reviewed.          Counseling provided on diet and nutrition, exercise, supplements, mental health concerns and vaccinations.    Jocelyne was seen today for annual exam.    Diagnoses and all orders for this visit:    Physical exam, annual  -PE is unremarkable  -up-to-date on ophthalmologist, dentist and dermatologist  -recently had mammogram and colonoscopy  -had normal pap smear 1-2 years ago, no symptoms; will repeat pap smear in 2020  -will order labs    Essential hypertension  -mild elevation today  -monitors at home, denies headache/dizziness  -repeat CMP  -compliant on benazepril 20 mg QD, dyazide and amlodipine 5 mg QD  -     Comprehensive Metabolic Panel    MAMADOU (acute kidney injury) (CMS/HCC)  -repeat CMP today    Iron deficiency anemia, unspecified iron  deficiency anemia type  -     CBC Auto Differential    Hyperglycemia  -     Hemoglobin A1c    Mixed hyperlipidemia  -     Comprehensive Metabolic Panel  -     Lipid Panel    Vitamin D deficiency  -     Vitamin D 25 Hydroxy    Screening for thyroid disorder  -     TSH    Abnormal dreams  -trial of trazodone to help patient with this  -previously took temazepam and this helped  -     traZODone (DESYREL) 50 MG tablet; Take 1 tablet by mouth Every Night.       Uzma Duffy PA-C   8/27/2019   2:12 PM

## 2019-08-28 LAB
25(OH)D3 SERPL-MCNC: 33.9 NG/ML (ref 30–100)
ARTICHOKE IGE QN: 112 MG/DL (ref 0–100)
TSH SERPL DL<=0.05 MIU/L-ACNC: 2.17 MIU/ML (ref 0.27–4.2)

## 2019-08-29 ENCOUNTER — TELEPHONE (OUTPATIENT)
Dept: FAMILY MEDICINE CLINIC | Facility: CLINIC | Age: 59
End: 2019-08-29

## 2019-08-30 DIAGNOSIS — E78.2 MIXED HYPERLIPIDEMIA: Primary | ICD-10-CM

## 2019-08-30 RX ORDER — LEVOCETIRIZINE DIHYDROCHLORIDE 5 MG/1
5 TABLET, FILM COATED ORAL EVERY EVENING
Qty: 30 TABLET | Refills: 5 | Status: SHIPPED | OUTPATIENT
Start: 2019-08-30 | End: 2020-03-12

## 2019-08-30 RX ORDER — PRAVASTATIN SODIUM 40 MG
40 TABLET ORAL NIGHTLY
Qty: 30 TABLET | Refills: 5 | Status: SHIPPED | OUTPATIENT
Start: 2019-08-30 | End: 2020-12-31

## 2019-08-30 RX ORDER — CETIRIZINE HYDROCHLORIDE 10 MG/1
10 TABLET ORAL DAILY
Qty: 30 TABLET | Refills: 5 | Status: SHIPPED | OUTPATIENT
Start: 2019-08-30 | End: 2019-08-30

## 2019-08-30 NOTE — TELEPHONE ENCOUNTER
Pt called back. She said that they prescription that is at [facility] is for Zyrtec. She does not need Zyrtec.    She stated that on her 8/27/19 office visit Uzma told her to go to the pharmacy and buy xyzal over the counter. The pharmacist told her that they do not make a generic for xyzal that she would need a prescription.    She is requesting a prescription for  levocetirizine dihydrochloride (XYZAL).  She has not had this prescription before so she does not know how many mg it should be.    Her pharmacy is the [facility].    She can be reached at [phone]

## 2019-09-12 ENCOUNTER — TELEPHONE (OUTPATIENT)
Dept: FAMILY MEDICINE CLINIC | Facility: CLINIC | Age: 59
End: 2019-09-12

## 2019-09-12 DIAGNOSIS — E78.2 MIXED HYPERLIPIDEMIA: Primary | ICD-10-CM

## 2019-09-12 NOTE — TELEPHONE ENCOUNTER
PER PT CALLED, IS REQUESTING THE LABS SHE HAD DONE  ON 8/27 TO BE REORDERED BECAUSE SHE WAS NOT COMPLETLEY FASTING AND PT WOULD LIKE TO RE-DO THEM. PLEASE CALL PT BACK TO [facility].

## 2019-09-13 NOTE — TELEPHONE ENCOUNTER
Patient was notified of the lipid panel being ordered. She was advised to come in at her earliest convenience

## 2019-09-13 NOTE — TELEPHONE ENCOUNTER
Cholesterol panel is the only lab that would be affected by not fasting.  This order has been placed.  Please notify patient.

## 2019-09-20 ENCOUNTER — TELEPHONE (OUTPATIENT)
Dept: FAMILY MEDICINE CLINIC | Facility: CLINIC | Age: 59
End: 2019-09-20

## 2019-09-20 NOTE — TELEPHONE ENCOUNTER
8/27/19 LABS WERE NOT CODED AS PREVENTATIVE SHE WAS IN FOR A PHYSICAL. PLEASE CHANGE SO INS WILL COVER.     PT REQUESTING A CALL BACK WHEN COMPLETED.

## 2019-09-23 NOTE — TELEPHONE ENCOUNTER
I don't know how to recode these without canceling and reordering but patient has already had labs completed.  She did have a physical exam and they can be coded under this.

## 2019-09-24 NOTE — TELEPHONE ENCOUNTER
I have printed these and had CM sign.  I will bring down for you.  Please call the patient to let them know.  thanks

## 2019-09-25 ENCOUNTER — LAB (OUTPATIENT)
Dept: LAB | Facility: HOSPITAL | Age: 59
End: 2019-09-25

## 2019-09-25 DIAGNOSIS — E78.2 MIXED HYPERLIPIDEMIA: ICD-10-CM

## 2019-09-25 LAB
CHOLEST SERPL-MCNC: 218 MG/DL (ref 0–200)
HDLC SERPL-MCNC: 80 MG/DL (ref 40–60)
LDLC SERPL CALC-MCNC: 96 MG/DL (ref 0–100)
LDLC/HDLC SERPL: 1.2 {RATIO}
TRIGL SERPL-MCNC: 210 MG/DL (ref 0–150)
VLDLC SERPL-MCNC: 42 MG/DL (ref 5–40)

## 2019-09-25 PROCEDURE — 80061 LIPID PANEL: CPT

## 2019-10-17 ENCOUNTER — TELEPHONE (OUTPATIENT)
Dept: FAMILY MEDICINE CLINIC | Facility: CLINIC | Age: 59
End: 2019-10-17

## 2019-12-23 RX ORDER — POTASSIUM CHLORIDE 750 MG/1
TABLET, EXTENDED RELEASE ORAL
Qty: 90 TABLET | Refills: 0 | Status: SHIPPED | OUTPATIENT
Start: 2019-12-23 | End: 2020-03-12 | Stop reason: SDUPTHER

## 2019-12-27 ENCOUNTER — TELEPHONE (OUTPATIENT)
Dept: FAMILY MEDICINE CLINIC | Facility: CLINIC | Age: 59
End: 2019-12-27

## 2019-12-27 NOTE — TELEPHONE ENCOUNTER
Spoke with patient.   is doing okay.  He will go to [facility] this weekend if symptoms do not improve or worsen.

## 2019-12-27 NOTE — TELEPHONE ENCOUNTER
Pt called requesting a call back From TRACEE Duffy about a Prescription. While verifying info by protocol Pt suggested that there were too many questions being asked and that all that needed to be known was that she needed a call back tried to verify nature of call but was unsuccessful. Please advise at [facility] at [phone]

## 2019-12-27 NOTE — TELEPHONE ENCOUNTER
Patient stopped by the office. This may be related to her . She presented upstairs while her  was getting labs drawn on the first floor requesting an abx for her . I explained to her that he would need to be seen before abx could be sent in but she stated he was just seen for a physical on Saturday last week. I further informed her that because this was for a physical and the patient was experiencing URI sx that protocol is for him to be seen in the office before abx were given. The patient was very irate and did not agree with this. She had asked if Uzma EASLEY was in and could see her  and proceeded downstairs to schedule an appt if one was available.     Just a little more information. Patient only requested to speak with Uzma.

## 2020-01-02 RX ORDER — AMLODIPINE BESYLATE 5 MG/1
TABLET ORAL
Qty: 90 TABLET | Refills: 0 | Status: SHIPPED | OUTPATIENT
Start: 2020-01-02 | End: 2020-03-12 | Stop reason: SDUPTHER

## 2020-01-02 RX ORDER — TRIAMTERENE AND HYDROCHLOROTHIAZIDE 37.5; 25 MG/1; MG/1
CAPSULE ORAL
Qty: 90 CAPSULE | Refills: 0 | Status: SHIPPED | OUTPATIENT
Start: 2020-01-02 | End: 2020-03-12 | Stop reason: SDUPTHER

## 2020-01-02 RX ORDER — BENAZEPRIL HYDROCHLORIDE 20 MG/1
TABLET ORAL
Qty: 90 TABLET | Refills: 0 | Status: SHIPPED | OUTPATIENT
Start: 2020-01-02 | End: 2020-03-12 | Stop reason: SDUPTHER

## 2020-01-20 ENCOUNTER — TELEPHONE (OUTPATIENT)
Dept: FAMILY MEDICINE CLINIC | Facility: CLINIC | Age: 60
End: 2020-01-20

## 2020-01-20 RX ORDER — OMEPRAZOLE 20 MG/1
20 CAPSULE, DELAYED RELEASE ORAL DAILY
Qty: 90 CAPSULE | Refills: 1 | Status: CANCELLED | OUTPATIENT
Start: 2020-01-20

## 2020-01-20 RX ORDER — OMEPRAZOLE 20 MG/1
CAPSULE, DELAYED RELEASE ORAL
Qty: 90 CAPSULE | Refills: 0 | Status: SHIPPED | OUTPATIENT
Start: 2020-01-20 | End: 2020-03-12 | Stop reason: SDUPTHER

## 2020-01-20 NOTE — TELEPHONE ENCOUNTER
These medications have already been sent in. Patient will have to schedule an appt for future refills.

## 2020-01-20 NOTE — TELEPHONE ENCOUNTER
PT CALLED IN REQUESTING A REFILL ON HER omeprazole (priLOSEC) 20 MG capsule AND PROAIR  (90 Base) MCG/ACT inhaler PT IS ALSO REQUESTING MORE THAN A MONTHLY REFILL    PT CB NUMBER: [phone]  [place]: HETAL RAJAN  FAX# [phone]

## 2020-01-27 ENCOUNTER — TRANSCRIBE ORDERS (OUTPATIENT)
Dept: ADMINISTRATIVE | Facility: HOSPITAL | Age: 60
End: 2020-01-27

## 2020-01-27 DIAGNOSIS — Z12.31 VISIT FOR SCREENING MAMMOGRAM: Primary | ICD-10-CM

## 2020-01-31 ENCOUNTER — TELEPHONE (OUTPATIENT)
Dept: FAMILY MEDICINE CLINIC | Facility: CLINIC | Age: 60
End: 2020-01-31

## 2020-01-31 NOTE — TELEPHONE ENCOUNTER
Pt says she has bump on her forearm and is wanting to know if she needs a referral to se Dr. Angulo.  Pt request call back to discuss further.

## 2020-02-21 ENCOUNTER — TELEPHONE (OUTPATIENT)
Dept: FAMILY MEDICINE CLINIC | Facility: CLINIC | Age: 60
End: 2020-02-21

## 2020-02-21 NOTE — TELEPHONE ENCOUNTER
Patient wanted to know if it was time for her to have labs, and also why he medication is only being called in one month at a time.

## 2020-03-12 ENCOUNTER — OFFICE VISIT (OUTPATIENT)
Dept: FAMILY MEDICINE CLINIC | Facility: CLINIC | Age: 60
End: 2020-03-12

## 2020-03-12 VITALS
OXYGEN SATURATION: 97 % | WEIGHT: 143.6 LBS | SYSTOLIC BLOOD PRESSURE: 124 MMHG | HEART RATE: 88 BPM | DIASTOLIC BLOOD PRESSURE: 80 MMHG | BODY MASS INDEX: 25.45 KG/M2 | HEIGHT: 63 IN

## 2020-03-12 DIAGNOSIS — K21.9 GASTROESOPHAGEAL REFLUX DISEASE WITHOUT ESOPHAGITIS: ICD-10-CM

## 2020-03-12 DIAGNOSIS — M67.432 GANGLION CYST OF WRIST, LEFT: Primary | ICD-10-CM

## 2020-03-12 DIAGNOSIS — I10 ESSENTIAL HYPERTENSION: ICD-10-CM

## 2020-03-12 DIAGNOSIS — E78.2 MIXED HYPERLIPIDEMIA: ICD-10-CM

## 2020-03-12 DIAGNOSIS — J45.20 MILD INTERMITTENT ASTHMA WITHOUT COMPLICATION: ICD-10-CM

## 2020-03-12 PROCEDURE — 99214 OFFICE O/P EST MOD 30 MIN: CPT | Performed by: PHYSICIAN ASSISTANT

## 2020-03-12 RX ORDER — OMEPRAZOLE 20 MG/1
20 CAPSULE, DELAYED RELEASE ORAL DAILY
Qty: 90 CAPSULE | Refills: 1 | Status: SHIPPED | OUTPATIENT
Start: 2020-03-12 | End: 2020-09-21

## 2020-03-12 RX ORDER — AMLODIPINE BESYLATE 5 MG/1
5 TABLET ORAL DAILY
Qty: 90 TABLET | Refills: 1 | Status: SHIPPED | OUTPATIENT
Start: 2020-03-12 | End: 2020-09-21

## 2020-03-12 RX ORDER — TRIAMTERENE AND HYDROCHLOROTHIAZIDE 37.5; 25 MG/1; MG/1
1 CAPSULE ORAL DAILY
Qty: 90 CAPSULE | Refills: 1 | Status: SHIPPED | OUTPATIENT
Start: 2020-03-12 | End: 2020-09-21

## 2020-03-12 RX ORDER — POTASSIUM CHLORIDE 750 MG/1
10 TABLET, EXTENDED RELEASE ORAL DAILY
Qty: 90 TABLET | Refills: 1 | Status: SHIPPED | OUTPATIENT
Start: 2020-03-12 | End: 2020-09-21

## 2020-03-12 RX ORDER — BENAZEPRIL HYDROCHLORIDE 20 MG/1
20 TABLET ORAL DAILY
Qty: 90 TABLET | Refills: 1 | Status: SHIPPED | OUTPATIENT
Start: 2020-03-12 | End: 2020-09-21

## 2020-03-12 NOTE — PROGRESS NOTES
Chief Complaint   Patient presents with   • Hypertension       HPI     Jocelyne Lindquist is a pleasant 59 y.o. female who is here for routine follow-up of hypertension, hyperlipidemia, asthma, GERD and ganglion cyst of left wrist.  Patient's blood pressure is stable and well-controlled.  Needs refills on all medications.  Asthma is well-controlled with minimal episodes of shortness of breath, does need inhaler refill.  She is taking omeprazole 20 mg daily and this is working well for her reflux.  She reports worsening lump on her left wrist.  Previous history of ganglion cyst on wrist that had to be removed surgically.  Not established with orthopedic surgeon, needs refill.  Only mild pain at this time but seems similar to symptoms she had previously with the other ganglion cyst.    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, right     outer corner of right eye   • Tobacco abuse 2/18/2017    Quit 12/25/17       Past Surgical History:   Procedure Laterality Date   • BREAST BIOPSY Left 06/18/2010    stereo bx   • CYST REMOVAL Right     wrist   • SQUAMOUS CELL CARCINOMA EXCISION Right 12/13/2017    outer corner of right eye       Family History   Problem Relation Age of Onset   • Cancer Mother         esophageal and liver   • Heart failure Father    • Breast cancer Neg Hx    • Ovarian cancer Neg Hx        Social History     Socioeconomic History   • Marital status:      Spouse name: Not on file   • Number of children: Not on file   • Years of education: Not on file   • Highest education level: Not on file   Tobacco Use   • Smoking status: Former Smoker     Packs/day: 1.00     Years: 20.00     Pack years: 20.00   • Smokeless tobacco: Never Used   • Tobacco comment: 12/25/17   Substance and Sexual Activity   • Alcohol use: No      "Comment: not since 12/25/17   • Drug use: No   • Sexual activity: Defer       Allergies   Allergen Reactions   • Doxycycline Diarrhea   • Eggs Or Egg-Derived Products      Causes cramps   • Fish-Derived Products      Causes severe stomach cramps       ROS  Review of Systems   Constitutional: Negative for chills, diaphoresis, fatigue and fever.   HENT: Negative for congestion, postnasal drip and rhinorrhea.    Respiratory: Negative for cough, shortness of breath and wheezing.    Cardiovascular: Negative for chest pain and leg swelling.   Musculoskeletal: Positive for joint swelling.   Neurological: Negative for dizziness and headache.   Psychiatric/Behavioral: Negative for self-injury, sleep disturbance, suicidal ideas, depressed mood and stress. The patient is not nervous/anxious.        Vitals:    03/12/20 1432   BP: 124/80   BP Location: Left arm   Patient Position: Sitting   Cuff Size: Adult   Pulse: 88   SpO2: 97%   Weight: 65.1 kg (143 lb 9.6 oz)   Height: 160 cm (63\")     Body mass index is 25.44 kg/m².    Current Outpatient Medications on File Prior to Visit   Medication Sig Dispense Refill   • pravastatin (PRAVACHOL) 40 MG tablet Take 1 tablet by mouth Every Night. 30 tablet 5   • triamcinolone (KENALOG) 0.1 % ointment Apply  topically 3 (Three) Times a Day. 60 g 1     No current facility-administered medications on file prior to visit.        Results for orders placed or performed in visit on 09/25/19   Lipid Panel   Result Value Ref Range    Total Cholesterol 218 (H) 0 - 200 mg/dL    Triglycerides 210 (H) 0 - 150 mg/dL    HDL Cholesterol 80 (H) 40 - 60 mg/dL    LDL Cholesterol  96 0 - 100 mg/dL    VLDL Cholesterol 42 (H) 5 - 40 mg/dL    LDL/HDL Ratio 1.20        PE    Physical Exam   Constitutional: She appears well-developed and well-nourished. She is active and cooperative. No distress.   HENT:   Head: Normocephalic and atraumatic.   Eyes: EOM are normal.   Neck: Normal range of motion.   Cardiovascular: " Normal rate, regular rhythm and normal heart sounds.   Pulmonary/Chest: Effort normal and breath sounds normal.   Musculoskeletal: Normal range of motion. She exhibits no edema.   Neurological: She is alert.   Skin: Skin is warm. She is not diaphoretic. No erythema.   Psychiatric: She has a normal mood and affect. Her speech is normal and behavior is normal. Judgment and thought content normal. She is not actively hallucinating. She is attentive.   Vitals reviewed.      A/P    Jocelyne was seen today for hypertension.    Diagnoses and all orders for this visit:    Ganglion cyst of wrist, left  -     Ambulatory Referral to Orthopedic Surgery  History of ganglion cyst removed before.  New lump with similar symptoms that appeared in a different location.  Mild pain.  Getting worse.  No signs of infection.  Will refer to ortho for evaluation.    Essential hypertension  -     Comprehensive Metabolic Panel; Future  -     amLODIPine (NORVASC) 5 MG tablet; Take 1 tablet by mouth Daily.  -     benazepril (LOTENSIN) 20 MG tablet; Take 1 tablet by mouth Daily.  -     potassium chloride (K-DUR,KLOR-CON) 10 MEQ CR tablet; Take 1 tablet by mouth Daily.  -     triamterene-hydrochlorothiazide (DYAZIDE) 37.5-25 MG per capsule; Take 1 capsule by mouth Daily.  Stable, well-controlled.  Compliant on medications.    Mixed hyperlipidemia  -     Lipid Panel; Future  Compliant on statin.    Mild intermittent asthma without complication  -     PROAIR  (90 Base) MCG/ACT inhaler; Inhale 2 puffs Every 6 (Six) Hours As Needed for Wheezing.  No significant issues, only has to use albuterol very infrequently.    Gastroesophageal reflux disease without esophagitis  -     omeprazole (priLOSEC) 20 MG capsule; Take 1 capsule by mouth Daily.  No issues with heartburn as long as patient takes her PPI.       Plan of care reviewed with patient at the conclusion of today's visit. Education was provided regarding diagnosis, management and any  prescribed or recommended OTC medications.  Patient verbalizes understanding of and agreement with management plan.    Return in about 6 months (around 9/12/2020) for Annual physical.     Uzma Duffy PA-C

## 2020-03-15 DIAGNOSIS — B00.9 HERPES: ICD-10-CM

## 2020-03-16 RX ORDER — ACYCLOVIR 400 MG/1
TABLET ORAL
Qty: 30 TABLET | Refills: 0 | Status: SHIPPED | OUTPATIENT
Start: 2020-03-16 | End: 2020-08-06 | Stop reason: SDUPTHER

## 2020-03-18 ENCOUNTER — LAB (OUTPATIENT)
Dept: LAB | Facility: HOSPITAL | Age: 60
End: 2020-03-18

## 2020-03-18 DIAGNOSIS — I10 ESSENTIAL HYPERTENSION: ICD-10-CM

## 2020-03-18 DIAGNOSIS — E78.2 MIXED HYPERLIPIDEMIA: ICD-10-CM

## 2020-03-18 LAB
ALBUMIN SERPL-MCNC: 4.8 G/DL (ref 3.5–5.2)
ALBUMIN/GLOB SERPL: 1.8 G/DL
ALP SERPL-CCNC: 59 U/L (ref 39–117)
ALT SERPL W P-5'-P-CCNC: 16 U/L (ref 1–33)
ANION GAP SERPL CALCULATED.3IONS-SCNC: 13.3 MMOL/L (ref 5–15)
AST SERPL-CCNC: 23 U/L (ref 1–32)
BILIRUB SERPL-MCNC: 0.4 MG/DL (ref 0.2–1.2)
BUN BLD-MCNC: 7 MG/DL (ref 8–23)
BUN/CREAT SERPL: 9.5 (ref 7–25)
CALCIUM SPEC-SCNC: 9.3 MG/DL (ref 8.6–10.5)
CHLORIDE SERPL-SCNC: 97 MMOL/L (ref 98–107)
CHOLEST SERPL-MCNC: 194 MG/DL (ref 0–200)
CO2 SERPL-SCNC: 24.7 MMOL/L (ref 22–29)
CREAT BLD-MCNC: 0.74 MG/DL (ref 0.57–1)
GFR SERPL CREATININE-BSD FRML MDRD: 80 ML/MIN/1.73
GLOBULIN UR ELPH-MCNC: 2.6 GM/DL
GLUCOSE BLD-MCNC: 114 MG/DL (ref 65–99)
HDLC SERPL-MCNC: 92 MG/DL (ref 40–60)
LDLC SERPL CALC-MCNC: 71 MG/DL (ref 0–100)
LDLC/HDLC SERPL: 0.77 {RATIO}
POTASSIUM BLD-SCNC: 3.9 MMOL/L (ref 3.5–5.2)
PROT SERPL-MCNC: 7.4 G/DL (ref 6–8.5)
SODIUM BLD-SCNC: 135 MMOL/L (ref 136–145)
TRIGL SERPL-MCNC: 155 MG/DL (ref 0–150)
VLDLC SERPL-MCNC: 31 MG/DL (ref 5–40)

## 2020-03-18 PROCEDURE — 80061 LIPID PANEL: CPT

## 2020-03-18 PROCEDURE — 80053 COMPREHEN METABOLIC PANEL: CPT

## 2020-04-08 ENCOUNTER — APPOINTMENT (OUTPATIENT)
Dept: MAMMOGRAPHY | Facility: HOSPITAL | Age: 60
End: 2020-04-08

## 2020-05-15 ENCOUNTER — TELEPHONE (OUTPATIENT)
Dept: FAMILY MEDICINE CLINIC | Facility: CLINIC | Age: 60
End: 2020-05-15

## 2020-05-15 DIAGNOSIS — J45.20 MILD INTERMITTENT ASTHMA WITHOUT COMPLICATION: ICD-10-CM

## 2020-05-15 NOTE — TELEPHONE ENCOUNTER
Pt is requesting a refill   PROAIR  (90 Base) MCG/ACT inhaler  30 day supply  She stated It needs to say inhale 2 puff every 6 hours as needed     Pt call back [phone]    [facility]

## 2020-05-19 ENCOUNTER — TELEPHONE (OUTPATIENT)
Dept: FAMILY MEDICINE CLINIC | Facility: CLINIC | Age: 60
End: 2020-05-19

## 2020-05-19 ENCOUNTER — TELEMEDICINE (OUTPATIENT)
Dept: FAMILY MEDICINE CLINIC | Facility: CLINIC | Age: 60
End: 2020-05-19

## 2020-05-19 DIAGNOSIS — M54.2 NECK PAIN: Primary | ICD-10-CM

## 2020-05-19 PROCEDURE — 99213 OFFICE O/P EST LOW 20 MIN: CPT | Performed by: PHYSICIAN ASSISTANT

## 2020-05-19 RX ORDER — METHYLPREDNISOLONE 4 MG/1
TABLET ORAL
Qty: 1 EACH | Refills: 0 | Status: SHIPPED | OUTPATIENT
Start: 2020-05-19 | End: 2020-12-31

## 2020-05-19 RX ORDER — CYCLOBENZAPRINE HCL 10 MG
10 TABLET ORAL 3 TIMES DAILY PRN
Qty: 30 TABLET | Refills: 0 | Status: SHIPPED | OUTPATIENT
Start: 2020-05-19 | End: 2020-12-28 | Stop reason: SDUPTHER

## 2020-05-19 NOTE — TELEPHONE ENCOUNTER
-Patient called and statd that she picked up a bag of mulch out of a truck and now she cannot move her neck.  Patient stated that this happened 05/17/20 and she slept on a heating pad, taken tylenol but her neck has continued to decline.  Patient is asking for a muscle relaxer    Pharmacy: [facility] [address]  PH: [phone]  FX: [phone]    Pt callback: [phone]    Please advise.

## 2020-05-19 NOTE — TELEPHONE ENCOUNTER
Attempted to contact, no answer LVM that she needs to scheduled an appt.     Hub can relay message

## 2020-05-19 NOTE — PROGRESS NOTES
Chief Complaint   Patient presents with   • Neck Pain       HPI      Jocelyne Lindquist is a 59 y.o. female who presents for Neck Pain.  Patient reports lifting 10 bags of mulch on Sunday and waking up the next day with significant neck pain and stiffness.  Denies any radicular pain into upper extremities, paresthesia or weakness.  Has been taking ibuprofen and tylenol without much relief.  No history of diabetes.  Reports that her blood pressure is currently stable and well-controlled on medications.      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, right     outer corner of right eye   • Tobacco abuse 2/18/2017    Quit 12/25/17       Past Surgical History:   Procedure Laterality Date   • BREAST BIOPSY Left 06/18/2010    stereo bx   • CYST REMOVAL Right     wrist   • SQUAMOUS CELL CARCINOMA EXCISION Right 12/13/2017    outer corner of right eye       Family History   Problem Relation Age of Onset   • Cancer Mother         esophageal and liver   • Heart failure Father    • Breast cancer Neg Hx    • Ovarian cancer Neg Hx        Social History     Socioeconomic History   • Marital status:      Spouse name: Not on file   • Number of children: Not on file   • Years of education: Not on file   • Highest education level: Not on file   Tobacco Use   • Smoking status: Former Smoker     Packs/day: 1.00     Years: 20.00     Pack years: 20.00   • Smokeless tobacco: Never Used   • Tobacco comment: 12/25/17   Substance and Sexual Activity   • Alcohol use: No     Comment: not since 12/25/17   • Drug use: No   • Sexual activity: Defer       Allergies   Allergen Reactions   • Doxycycline Diarrhea   • Eggs Or Egg-Derived Products      Causes cramps   • Fish-Derived Products      Causes severe stomach cramps       ROS    Review of Systems    Constitutional: Negative for chills and fever.   Musculoskeletal: Positive for arthralgias, neck pain and neck stiffness.   Neurological: Negative for weakness and numbness.       There were no vitals filed for this visit.  There is no height or weight on file to calculate BMI.    Current Outpatient Medications on File Prior to Visit   Medication Sig Dispense Refill   • acyclovir (ZOVIRAX) 400 MG tablet TAKE 1 TABLET BY MOUTH THREE TIMES DAILY **DO  NOT  TAKE  FOR  MORE  THAN  5  DAYS** 30 tablet 0   • amLODIPine (NORVASC) 5 MG tablet Take 1 tablet by mouth Daily. 90 tablet 1   • benazepril (LOTENSIN) 20 MG tablet Take 1 tablet by mouth Daily. 90 tablet 1   • omeprazole (priLOSEC) 20 MG capsule Take 1 capsule by mouth Daily. 90 capsule 1   • potassium chloride (K-DUR,KLOR-CON) 10 MEQ CR tablet Take 1 tablet by mouth Daily. 90 tablet 1   • pravastatin (PRAVACHOL) 40 MG tablet Take 1 tablet by mouth Every Night. 30 tablet 5   • PROAIR  (90 Base) MCG/ACT inhaler Inhale 2 puffs Every 6 (Six) Hours As Needed for Wheezing. 18 g 5   • triamcinolone (KENALOG) 0.1 % ointment Apply  topically 3 (Three) Times a Day. 60 g 1   • triamterene-hydrochlorothiazide (DYAZIDE) 37.5-25 MG per capsule Take 1 capsule by mouth Daily. 90 capsule 1     No current facility-administered medications on file prior to visit.        Results for orders placed or performed in visit on 03/18/20   Lipid Panel   Result Value Ref Range    Total Cholesterol 194 0 - 200 mg/dL    Triglycerides 155 (H) 0 - 150 mg/dL    HDL Cholesterol 92 (H) 40 - 60 mg/dL    LDL Cholesterol  71 0 - 100 mg/dL    VLDL Cholesterol 31 5 - 40 mg/dL    LDL/HDL Ratio 0.77    Comprehensive Metabolic Panel   Result Value Ref Range    Glucose 114 (H) 65 - 99 mg/dL    BUN 7 (L) 8 - 23 mg/dL    Creatinine 0.74 0.57 - 1.00 mg/dL    Sodium 135 (L) 136 - 145 mmol/L    Potassium 3.9 3.5 - 5.2 mmol/L    Chloride 97 (L) 98 - 107 mmol/L    CO2 24.7 22.0 - 29.0 mmol/L    Calcium 9.3  8.6 - 10.5 mg/dL    Total Protein 7.4 6.0 - 8.5 g/dL    Albumin 4.80 3.50 - 5.20 g/dL    ALT (SGPT) 16 1 - 33 U/L    AST (SGOT) 23 1 - 32 U/L    Alkaline Phosphatase 59 39 - 117 U/L    Total Bilirubin 0.4 0.2 - 1.2 mg/dL    eGFR Non African Amer 80 >60 mL/min/1.73    Globulin 2.6 gm/dL    A/G Ratio 1.8 g/dL    BUN/Creatinine Ratio 9.5 7.0 - 25.0    Anion Gap 13.3 5.0 - 15.0 mmol/L       PE    Physical Exam   Constitutional: She appears well-developed and well-nourished. She is active and cooperative.  Non-toxic appearance. She does not appear ill. No distress. She is not overweight.  HENT:   Head: Normocephalic and atraumatic.   Eyes: EOM are normal.   Neck:   Minimal range of motion.  No swelling or discoloration or rash appreciated.   Pulmonary/Chest: No respiratory distress.   Musculoskeletal: Normal range of motion.   Neurological: She is alert.   Skin: She is not diaphoretic.   Psychiatric: She has a normal mood and affect. Her speech is normal and behavior is normal. Judgment and thought content normal. She is not actively hallucinating. She is attentive.        A/P    Jocelyne was seen today for neck pain.    Diagnoses and all orders for this visit:    Neck pain  -     methylPREDNISolone (Medrol) 4 MG tablet; Take as directed on package instructions. Dispense: 21 Count dose ashu with 0 refills.  -     cyclobenzaprine (FLEXERIL) 10 MG tablet; Take 1 tablet by mouth 3 (Three) Times a Day As Needed for Muscle Spasms.  Secondary to overuse/lifting 2 days ago.  No radiculopathy/weakness or paresthesia into upper extremities.  Recommend light stretching, heating pad, tylenol prn pain.  Will prescribe steroid pack (advised to watch blood pressure with steroids) and flexeril prn.  If symptoms do not improve or worsen, would recommend PT and xray.     You have chosen to receive care through a telehealth visit.  Completed on [facility].  Do you consent to use a video/audio connection for your medical care today?  Yes      Plan of care reviewed with patient at the conclusion of today's visit. Education was provided regarding diagnosis, management and any prescribed or recommended OTC medications.  Patient verbalizes understanding of and agreement with management plan.    No follow-ups on file.     Uzma Duffy PA-C

## 2020-05-19 NOTE — PATIENT INSTRUCTIONS
Neck Exercises  Ask your health care provider which exercises are safe for you. Do exercises exactly as told by your health care provider and adjust them as directed. It is normal to feel mild stretching, pulling, tightness, or discomfort as you do these exercises. Stop right away if you feel sudden pain or your pain gets worse. Do not begin these exercises until told by your health care provider.  Neck exercises can be important for many reasons. They can improve strength and maintain flexibility in your neck, which will help your upper back and prevent neck pain.  Stretching exercises  Rotation neck stretching    1. Sit in a chair or stand up.  2. Place your feet flat on the floor, shoulder width apart.  3. Slowly turn your head (rotate) to the right until a slight stretch is felt. Turn it all the way to the right so you can look over your right shoulder. Do not tilt or tip your head.  4. Hold this position for 10-30 seconds.  5. Slowly turn your head (rotate) to the left until a slight stretch is felt. Turn it all the way to the left so you can look over your left shoulder. Do not tilt or tip your head.  6. Hold this position for 10-30 seconds.  Repeat __________ times. Complete this exercise __________ times a day.  Neck retraction  1. Sit in a sturdy chair or stand up.  2. Look straight ahead. Do not bend your neck.  3. Use your fingers to push your chin backward (retraction). Do not bend your neck for this movement. Continue to face straight ahead. If you are doing the exercise properly, you will feel a slight sensation in your throat and a stretch at the back of your neck.  4. Hold the stretch for 1-2 seconds.  Repeat __________ times. Complete this exercise __________ times a day.  Strengthening exercises  Neck press  1. Lie on your back on a firm bed or on the floor with a pillow under your head.  2. Use your neck muscles to push your head down on the pillow and straighten your spine.  3. Hold the position  as well as you can. Keep your head facing up (in a neutral position) and your chin tucked.  4. Slowly count to 5 while holding this position.  Repeat __________ times. Complete this exercise __________ times a day.  Isometrics  These are exercises in which you strengthen the muscles in your neck while keeping your neck still (isometrics).  1. Sit in a supportive chair and place your hand on your forehead.  2. Keep your head and face facing straight ahead. Do not flex or extend your neck while doing isometrics.  3. Push forward with your head and neck while pushing back with your hand. Hold for 10 seconds.  4. Do the sequence again, this time putting your hand against the back of your head. Use your head and neck to push backward against the hand pressure.  5. Finally, do the same exercise on either side of your head, pushing sideways against the pressure of your hand.  Repeat __________ times. Complete this exercise __________ times a day.  Prone head lifts  1. Lie face-down (prone position), resting on your elbows so that your chest and upper back are raised.  2. Start with your head facing downward, near your chest. Position your chin either on or near your chest.  3. Slowly lift your head upward. Lift until you are looking straight ahead. Then continue lifting your head as far back as you can comfortably stretch.  4. Hold your head up for 5 seconds. Then slowly lower it to your starting position.  Repeat __________ times. Complete this exercise __________ times a day.  Supine head lifts  1. Lie on your back (supine position), bending your knees to point to the ceiling and keeping your feet flat on the floor.  2. Lift your head slowly off the floor, raising your chin toward your chest.  3. Hold for 5 seconds.  Repeat __________ times. Complete this exercise __________ times a day.  Scapular retraction  1. Stand with your arms at your sides. Look straight ahead.  2. Slowly pull both shoulders (scapulae) backward  and downward (retraction) until you feel a stretch between your shoulder blades in your upper back.  3. Hold for 10-30 seconds.  4. Relax and repeat.  Repeat __________ times. Complete this exercise __________ times a day.  Contact a health care provider if:  · Your neck pain or discomfort gets much worse when you do an exercise.  · Your neck pain or discomfort does not improve within 2 hours after you exercise.  If you have any of these problems, stop exercising right away. Do not do the exercises again unless your health care provider says that you can.  Get help right away if:  · You develop sudden, severe neck pain.  If this happens, stop exercising right away. Do not do the exercises again unless your health care provider says that you can.  This information is not intended to replace advice given to you by your health care provider. Make sure you discuss any questions you have with your health care provider.  Document Released: 11/28/2016 Document Revised: 10/16/2019 Document Reviewed: 10/16/2019  Elsevier Interactive Patient Education © 2020 [employer]

## 2020-06-18 ENCOUNTER — HOSPITAL ENCOUNTER (OUTPATIENT)
Dept: MAMMOGRAPHY | Facility: HOSPITAL | Age: 60
Discharge: HOME | End: 2020-06-18
Admitting: PHYSICIAN ASSISTANT

## 2020-06-18 DIAGNOSIS — Z12.31 VISIT FOR SCREENING MAMMOGRAM: ICD-10-CM

## 2020-06-18 PROCEDURE — 77067 SCR MAMMO BI INCL CAD: CPT | Performed by: RADIOLOGY

## 2020-06-18 PROCEDURE — 77063 BREAST TOMOSYNTHESIS BI: CPT

## 2020-06-18 PROCEDURE — 77067 SCR MAMMO BI INCL CAD: CPT

## 2020-06-18 PROCEDURE — 77063 BREAST TOMOSYNTHESIS BI: CPT | Performed by: RADIOLOGY

## 2020-07-14 ENCOUNTER — TELEPHONE (OUTPATIENT)
Dept: FAMILY MEDICINE CLINIC | Facility: CLINIC | Age: 60
End: 2020-07-14

## 2020-07-14 DIAGNOSIS — J45.20 MILD INTERMITTENT ASTHMA WITHOUT COMPLICATION: ICD-10-CM

## 2020-07-14 NOTE — TELEPHONE ENCOUNTER
PT STATES THAT SHE WANTS GENERIC ALBUTEROL PLEASE RESEND TO PHARMACY     PT LIKES THE GENERIC BETTER    PT STATES TO PLEASE GET IT RIGHT THIS TIME.      PHARMACY CONFIRMED  PLEASE [facility]  [phone]

## 2020-08-06 ENCOUNTER — TELEPHONE (OUTPATIENT)
Dept: FAMILY MEDICINE CLINIC | Facility: CLINIC | Age: 60
End: 2020-08-06

## 2020-08-06 DIAGNOSIS — B00.9 HERPES: ICD-10-CM

## 2020-08-06 RX ORDER — ACYCLOVIR 400 MG/1
400 TABLET ORAL 3 TIMES DAILY
Qty: 30 TABLET | Refills: 0 | Status: SHIPPED | OUTPATIENT
Start: 2020-08-06 | End: 2021-01-04 | Stop reason: HOSPADM

## 2020-08-06 NOTE — TELEPHONE ENCOUNTER
Patient states that she needs Acyclovir called in for a fever blister to [facility], [address].  He can be reached at [phone]

## 2020-09-20 DIAGNOSIS — I10 ESSENTIAL HYPERTENSION: ICD-10-CM

## 2020-09-20 DIAGNOSIS — K21.9 GASTROESOPHAGEAL REFLUX DISEASE WITHOUT ESOPHAGITIS: ICD-10-CM

## 2020-09-21 RX ORDER — BENAZEPRIL HYDROCHLORIDE 20 MG/1
TABLET ORAL
Qty: 90 TABLET | Refills: 0 | Status: SHIPPED | OUTPATIENT
Start: 2020-09-21 | End: 2020-12-28

## 2020-09-21 RX ORDER — POTASSIUM CHLORIDE 750 MG/1
TABLET, EXTENDED RELEASE ORAL
Qty: 90 TABLET | Refills: 0 | Status: SHIPPED | OUTPATIENT
Start: 2020-09-21 | End: 2020-12-28

## 2020-09-21 RX ORDER — TRIAMTERENE AND HYDROCHLOROTHIAZIDE 37.5; 25 MG/1; MG/1
CAPSULE ORAL
Qty: 90 CAPSULE | Refills: 0 | Status: SHIPPED | OUTPATIENT
Start: 2020-09-21 | End: 2020-12-28

## 2020-09-21 RX ORDER — AMLODIPINE BESYLATE 5 MG/1
TABLET ORAL
Qty: 90 TABLET | Refills: 0 | Status: SHIPPED | OUTPATIENT
Start: 2020-09-21 | End: 2020-12-28

## 2020-09-21 RX ORDER — OMEPRAZOLE 20 MG/1
CAPSULE, DELAYED RELEASE ORAL
Qty: 90 CAPSULE | Refills: 0 | Status: SHIPPED | OUTPATIENT
Start: 2020-09-21 | End: 2020-12-28

## 2020-09-29 ENCOUNTER — TELEMEDICINE (OUTPATIENT)
Dept: FAMILY MEDICINE CLINIC | Facility: CLINIC | Age: 60
End: 2020-09-29

## 2020-09-29 ENCOUNTER — DOCUMENTATION (OUTPATIENT)
Dept: FAMILY MEDICINE CLINIC | Facility: CLINIC | Age: 60
End: 2020-09-29

## 2020-09-29 DIAGNOSIS — J06.9 UPPER RESPIRATORY TRACT INFECTION, UNSPECIFIED TYPE: Primary | ICD-10-CM

## 2020-09-29 PROCEDURE — 99213 OFFICE O/P EST LOW 20 MIN: CPT | Performed by: PHYSICIAN ASSISTANT

## 2020-09-29 RX ORDER — AMOXICILLIN 500 MG/1
500 CAPSULE ORAL 3 TIMES DAILY
Qty: 30 CAPSULE | Refills: 0 | Status: SHIPPED | OUTPATIENT
Start: 2020-09-29 | End: 2020-10-09

## 2020-09-29 RX ORDER — AZITHROMYCIN 250 MG/1
TABLET, FILM COATED ORAL
Qty: 6 TABLET | Refills: 0 | Status: SHIPPED | OUTPATIENT
Start: 2020-09-29 | End: 2020-12-31

## 2020-09-29 NOTE — PROGRESS NOTES
Was contacres by patient seen earlier in [facility] for sinusitis. Patient requested antibiotic changed to amoxil. Sent rx to [facility] on [address]

## 2020-09-29 NOTE — PROGRESS NOTES
Chief Complaint   Patient presents with   • Mouth Lesions   • Cough       HPI      Jocelyne Lindquist is a 60 y.o. female who presents for Mouth Lesions and Cough for over a week.  Had acyclovir for mouth sore and completed 5 day course with no relief.  Cough is getting worse with productive sputum.  No facial pain, sore throat, loss of taste/smell, fever/chills or shortness of breath.  Usually does well with azithromycin.      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, right     outer corner of right eye   • Tobacco abuse 2/18/2017    Quit 12/25/17       Past Surgical History:   Procedure Laterality Date   • BREAST BIOPSY Left 06/18/2010    stereo bx   • CYST REMOVAL Right     wrist   • SQUAMOUS CELL CARCINOMA EXCISION Right 12/13/2017    outer corner of right eye       Family History   Problem Relation Age of Onset   • Cancer Mother         esophageal and liver   • Heart failure Father    • Breast cancer Neg Hx    • Ovarian cancer Neg Hx        Social History     Socioeconomic History   • Marital status:      Spouse name: Not on file   • Number of children: Not on file   • Years of education: Not on file   • Highest education level: Not on file   Tobacco Use   • Smoking status: Former Smoker     Packs/day: 1.00     Years: 20.00     Pack years: 20.00   • Smokeless tobacco: Never Used   • Tobacco comment: 12/25/17   Substance and Sexual Activity   • Alcohol use: No     Comment: not since 12/25/17   • Drug use: No   • Sexual activity: Defer       Allergies   Allergen Reactions   • Doxycycline Diarrhea   • Eggs Or Egg-Derived Products      Causes cramps   • Fish-Derived Products      Causes severe stomach cramps       ROS    Review of Systems   Constitutional: Positive for fatigue. Negative for chills and fever.   HENT:  Positive for congestion, mouth sores and rhinorrhea. Negative for ear pain, sinus pressure and sore throat.    Respiratory: Positive for cough. Negative for shortness of breath and wheezing.        There were no vitals filed for this visit.  There is no height or weight on file to calculate BMI.    Current Outpatient Medications on File Prior to Visit   Medication Sig Dispense Refill   • acyclovir (ZOVIRAX) 400 MG tablet Take 1 tablet by mouth 3 (Three) Times a Day. Take no more than 5 doses a day. 30 tablet 0   • albuterol (PROAIR RESPICLICK) 108 (90 Base) MCG/ACT inhaler Inhale 1 puff Every 4 (Four) Hours As Needed for Wheezing. 18 g 5   • amLODIPine (NORVASC) 5 MG tablet Take 1 tablet by mouth once daily 90 tablet 0   • benazepril (LOTENSIN) 20 MG tablet Take 1 tablet by mouth once daily 90 tablet 0   • cyclobenzaprine (FLEXERIL) 10 MG tablet Take 1 tablet by mouth 3 (Three) Times a Day As Needed for Muscle Spasms. 30 tablet 0   • methylPREDNISolone (Medrol) 4 MG tablet Take as directed on package instructions. Dispense: 21 Count dose ashu with 0 refills. 1 each 0   • omeprazole (priLOSEC) 20 MG capsule Take 1 capsule by mouth once daily 90 capsule 0   • potassium chloride (K-DUR,KLOR-CON) 10 MEQ CR tablet Take 1 tablet by mouth once daily 90 tablet 0   • pravastatin (PRAVACHOL) 40 MG tablet Take 1 tablet by mouth Every Night. 30 tablet 5   • PROAIR  (90 Base) MCG/ACT inhaler Inhale 2 puffs Every 6 (Six) Hours As Needed for Wheezing. 18 g 5   • triamcinolone (KENALOG) 0.1 % ointment Apply  topically 3 (Three) Times a Day. 60 g 1   • triamterene-hydrochlorothiazide (DYAZIDE) 37.5-25 MG per capsule Take 1 capsule by mouth once daily 90 capsule 0     No current facility-administered medications on file prior to visit.        Results for orders placed or performed in visit on 03/18/20   Lipid Panel    Specimen: Blood   Result Value Ref Range    Total Cholesterol 194 0 - 200 mg/dL    Triglycerides 155 (H) 0 -  150 mg/dL    HDL Cholesterol 92 (H) 40 - 60 mg/dL    LDL Cholesterol  71 0 - 100 mg/dL    VLDL Cholesterol 31 5 - 40 mg/dL    LDL/HDL Ratio 0.77    Comprehensive Metabolic Panel    Specimen: Blood   Result Value Ref Range    Glucose 114 (H) 65 - 99 mg/dL    BUN 7 (L) 8 - 23 mg/dL    Creatinine 0.74 0.57 - 1.00 mg/dL    Sodium 135 (L) 136 - 145 mmol/L    Potassium 3.9 3.5 - 5.2 mmol/L    Chloride 97 (L) 98 - 107 mmol/L    CO2 24.7 22.0 - 29.0 mmol/L    Calcium 9.3 8.6 - 10.5 mg/dL    Total Protein 7.4 6.0 - 8.5 g/dL    Albumin 4.80 3.50 - 5.20 g/dL    ALT (SGPT) 16 1 - 33 U/L    AST (SGOT) 23 1 - 32 U/L    Alkaline Phosphatase 59 39 - 117 U/L    Total Bilirubin 0.4 0.2 - 1.2 mg/dL    eGFR Non African Amer 80 >60 mL/min/1.73    Globulin 2.6 gm/dL    A/G Ratio 1.8 g/dL    BUN/Creatinine Ratio 9.5 7.0 - 25.0    Anion Gap 13.3 5.0 - 15.0 mmol/L       PE    Physical Exam  Vitals signs reviewed.   Constitutional:       General: She is not in acute distress.     Appearance: Normal appearance. She is well-developed. She is not ill-appearing or diaphoretic.   HENT:      Head: Normocephalic and atraumatic.      Mouth/Throat:     Eyes:      Extraocular Movements: Extraocular movements intact.      Conjunctiva/sclera: Conjunctivae normal.   Neck:      Musculoskeletal: Normal range of motion.   Pulmonary:      Effort: No respiratory distress.   Musculoskeletal: Normal range of motion.   Neurological:      General: No focal deficit present.      Mental Status: She is alert.   Psychiatric:         Attention and Perception: She is attentive.         Mood and Affect: Mood normal.         Speech: Speech normal.         Behavior: Behavior normal. Behavior is cooperative.         Thought Content: Thought content normal.         Judgment: Judgment normal.          A/P    Diagnoses and all orders for this visit:    Upper respiratory tract infection, unspecified type  -     azithromycin (Zithromax Z-Sincere) 250 MG tablet; Take 2 tablets the  first day, then 1 tablet daily for 4 days.  Reports productive cough, nasal drainage and mouth sores that have been ongoing.  She started acyclovir and reports no improvement.  Has done well in the past in resolution of symptoms with azithromycin, will send in.  Call if symptoms do not improve.       You have chosen to receive care through a telehealth visit.  Do you consent to use a video/audio connection for your medical care today? Yes    Plan of care reviewed with patient at the conclusion of today's visit. Education was provided regarding diagnosis, management and any prescribed or recommended OTC medications.  Patient verbalizes understanding of and agreement with management plan.    No follow-ups on file.     Uzma Duffy PA-C

## 2020-10-27 ENCOUNTER — TELEPHONE (OUTPATIENT)
Dept: FAMILY MEDICINE CLINIC | Facility: CLINIC | Age: 60
End: 2020-10-27

## 2020-10-27 DIAGNOSIS — B00.9 HERPES: Primary | ICD-10-CM

## 2020-10-27 RX ORDER — ACYCLOVIR 800 MG/1
TABLET ORAL
Qty: 15 TABLET | Refills: 0 | Status: SHIPPED | OUTPATIENT
Start: 2020-10-27 | End: 2020-12-28 | Stop reason: SDUPTHER

## 2020-10-27 NOTE — TELEPHONE ENCOUNTER
PT CALLED TO REQUEST A HIGH DOSE FOR acyclovir (ZOVIRAX) 400 MG tablet.  PT STATED THAT SHE DOESN'T HAVE A FEVER AND IT IS JUST FOR HER LIPS.    PT CONTACT [phone]     PHARMACY VERIFIED [address] [phone]  - [phone] FX

## 2020-12-26 DIAGNOSIS — K21.9 GASTROESOPHAGEAL REFLUX DISEASE WITHOUT ESOPHAGITIS: ICD-10-CM

## 2020-12-26 DIAGNOSIS — I10 ESSENTIAL HYPERTENSION: ICD-10-CM

## 2020-12-26 DIAGNOSIS — B00.9 HERPES: ICD-10-CM

## 2020-12-28 ENCOUNTER — TELEPHONE (OUTPATIENT)
Dept: FAMILY MEDICINE CLINIC | Facility: CLINIC | Age: 60
End: 2020-12-28

## 2020-12-28 DIAGNOSIS — M54.2 NECK PAIN: ICD-10-CM

## 2020-12-28 RX ORDER — CYCLOBENZAPRINE HCL 10 MG
10 TABLET ORAL 3 TIMES DAILY PRN
Qty: 30 TABLET | Refills: 0 | Status: SHIPPED | OUTPATIENT
Start: 2020-12-28 | End: 2022-04-22

## 2020-12-28 RX ORDER — POTASSIUM CHLORIDE 750 MG/1
TABLET, EXTENDED RELEASE ORAL
Qty: 90 TABLET | Refills: 0 | Status: SHIPPED | OUTPATIENT
Start: 2020-12-28 | End: 2021-01-07

## 2020-12-28 RX ORDER — AMLODIPINE BESYLATE 5 MG/1
TABLET ORAL
Qty: 90 TABLET | Refills: 0 | Status: SHIPPED | OUTPATIENT
Start: 2020-12-28 | End: 2021-01-07

## 2020-12-28 RX ORDER — TRIAMTERENE AND HYDROCHLOROTHIAZIDE 37.5; 25 MG/1; MG/1
CAPSULE ORAL
Qty: 90 CAPSULE | Refills: 0 | Status: SHIPPED | OUTPATIENT
Start: 2020-12-28 | End: 2021-01-07

## 2020-12-28 RX ORDER — BENAZEPRIL HYDROCHLORIDE 20 MG/1
TABLET ORAL
Qty: 90 TABLET | Refills: 0 | Status: SHIPPED | OUTPATIENT
Start: 2020-12-28 | End: 2021-01-07

## 2020-12-28 RX ORDER — ACYCLOVIR 800 MG/1
TABLET ORAL
Qty: 15 TABLET | Refills: 0 | Status: SHIPPED | OUTPATIENT
Start: 2020-12-28 | End: 2021-01-04 | Stop reason: HOSPADM

## 2020-12-28 RX ORDER — OMEPRAZOLE 20 MG/1
CAPSULE, DELAYED RELEASE ORAL
Qty: 90 CAPSULE | Refills: 0 | Status: SHIPPED | OUTPATIENT
Start: 2020-12-28 | End: 2021-03-25 | Stop reason: SDUPTHER

## 2020-12-28 NOTE — TELEPHONE ENCOUNTER
Caller: AUTUMN    Relationship: SELF    Best call back number: [phone]    Medication needed:   Requested Prescriptions     Pending Prescriptions Disp Refills   • potassium chloride (K-DUR,KLOR-CON) 10 MEQ CR tablet [Pharmacy Med Name: Potassium Chloride Mary ER 10 MEQ Oral Tablet Extended Release] 90 tablet 0     Sig: Take 1 tablet by mouth once daily   • amLODIPine (NORVASC) 5 MG tablet [Pharmacy Med Name: amLODIPine Besylate 5 MG Oral Tablet] 90 tablet 0     Sig: Take 1 tablet by mouth once daily   • omeprazole (priLOSEC) 20 MG capsule [Pharmacy Med Name: Omeprazole 20 MG Oral Capsule Delayed Release] 90 capsule 0     Sig: Take 1 capsule by mouth once daily   • triamterene-hydrochlorothiazide (DYAZIDE) 37.5-25 MG per capsule [Pharmacy Med Name: Triamterene-HCTZ 37.5-25 MG Oral Capsule] 90 capsule 0     Sig: Take 1 capsule by mouth once daily   • benazepril (LOTENSIN) 20 MG tablet [Pharmacy Med Name: Benazepril HCl 20 MG Oral Tablet] 90 tablet 0     Sig: Take 1 tablet by mouth once daily   • acyclovir (ZOVIRAX) 800 MG tablet 15 tablet 0     Sig: Take 1 tab 3 times a day for 2-5 days.  Stop high dose when lesions resolve.       When do you need the refill by: 12/28/20    What details did the patient provide when requesting the medication: PATIENT ASKED FOR MORE THAN ONE MONTH OF REFILLS.    Does the patient have less than a 3 day supply:  [x] Yes  [] No    What is the patient's preferred pharmacy: [address] [phone] [facility] [phone]

## 2020-12-28 NOTE — TELEPHONE ENCOUNTER
Please call patient.  Sent in a refill of cyclobenzaprine (flexeril).  If pain persists, she needs appointment to be evaluated to determine next course of action.

## 2020-12-28 NOTE — TELEPHONE ENCOUNTER
Pt called stating she is experiencing neck pain and is requesting something be called in for her    Please advise at: [phone]     [address] [phone] [facility] [phone] FX

## 2020-12-31 ENCOUNTER — OFFICE VISIT (OUTPATIENT)
Dept: FAMILY MEDICINE CLINIC | Facility: CLINIC | Age: 60
End: 2020-12-31

## 2020-12-31 ENCOUNTER — LAB (OUTPATIENT)
Dept: LAB | Facility: HOSPITAL | Age: 60
End: 2020-12-31

## 2020-12-31 ENCOUNTER — TELEPHONE (OUTPATIENT)
Dept: FAMILY MEDICINE CLINIC | Facility: CLINIC | Age: 60
End: 2020-12-31

## 2020-12-31 VITALS
BODY MASS INDEX: 25.34 KG/M2 | HEART RATE: 76 BPM | WEIGHT: 143 LBS | SYSTOLIC BLOOD PRESSURE: 122 MMHG | HEIGHT: 63 IN | DIASTOLIC BLOOD PRESSURE: 84 MMHG | OXYGEN SATURATION: 92 %

## 2020-12-31 DIAGNOSIS — J06.9 UPPER RESPIRATORY TRACT INFECTION, UNSPECIFIED TYPE: Primary | ICD-10-CM

## 2020-12-31 DIAGNOSIS — J06.9 ACUTE RESPIRATORY DISEASE: Primary | ICD-10-CM

## 2020-12-31 LAB — SARS-COV-2 RNA RESP QL NAA+PROBE: NOT DETECTED

## 2020-12-31 PROCEDURE — 99213 OFFICE O/P EST LOW 20 MIN: CPT | Performed by: NURSE PRACTITIONER

## 2020-12-31 PROCEDURE — U0004 COV-19 TEST NON-CDC HGH THRU: HCPCS

## 2020-12-31 RX ORDER — AMOXICILLIN AND CLAVULANATE POTASSIUM 875; 125 MG/1; MG/1
1 TABLET, FILM COATED ORAL 2 TIMES DAILY
Qty: 20 TABLET | Refills: 0 | Status: SHIPPED | OUTPATIENT
Start: 2020-12-31 | End: 2021-01-04 | Stop reason: HOSPADM

## 2020-12-31 NOTE — TELEPHONE ENCOUNTER
PATIENT CALLED TO CHECK AND MAKE SURE THAT OLIVER LLOYD WAS GOING TO SEND HER PRESCRIPTIONS IN WITH MORE REFILLS.    PATIENT'S CONTACT [phone]

## 2020-12-31 NOTE — PROGRESS NOTES
"Lauro Lindquist is a 60 y.o. female. URI (pt states that symptoms mostly started yesterday), Nasal Congestion, and Cough (mild cough)      History of Present Illness   2 days ago she started having symptoms.  Yesterday it worsened.  She is having nasal congestion with yellow/green discharge.  She has a cough that is productive of yellow/green mucus.  She is having some pain over her sinuses.  She feels like she is going to get a h/a, but then it goes away.  She denies ear pain, throat pain, chills, fever, shortness of breath.  She feels like she is starting to have some body aches over her lungs.  She denies sick contacts.  She has taken Zyrtec and Aspirin.  She doesn't really feel like it helped.      She has been using a massager to help get rid of the crick in her neck.  She sent a message to her PCP on 12/28 and received a script for Cyclobenzaprine, which has been somewhat helpful.      The following portions of the patient's history were reviewed and updated as appropriate: allergies, current medications, past medical history and problem list.    Review of Systems   Constitutional: Negative for chills and fever.   HENT: Positive for congestion and rhinorrhea. Negative for ear pain and sore throat.    Respiratory: Positive for cough. Negative for shortness of breath.    Musculoskeletal: Positive for myalgias and neck pain.       Objective   Vitals:    12/31/20 0922   BP: 122/84   BP Location: Left arm   Patient Position: Sitting   Cuff Size: Adult   Pulse: 76   SpO2: 92%   Weight: 64.9 kg (143 lb)   Height: 160 cm (63\")      Physical Exam  Vitals signs reviewed.   Constitutional:       Appearance: Normal appearance.   HENT:      Head: Normocephalic and atraumatic.   Cardiovascular:      Rate and Rhythm: Normal rate and regular rhythm.      Heart sounds: Normal heart sounds.   Pulmonary:      Comments: Pt appears to get SOB when speaking, but is able to complete sentences.  BLL " decreased.  Musculoskeletal:      Comments: Decreased ROM neck d/t pain.  Pain with palpation of left trapezius muscle.   Neurological:      General: No focal deficit present.      Mental Status: She is alert and oriented to person, place, and time.   Psychiatric:         Mood and Affect: Mood normal.         Behavior: Behavior normal.         Thought Content: Thought content normal.         Judgment: Judgment normal.       Assessment/Plan   Diagnoses and all orders for this visit:    1. Upper respiratory tract infection, unspecified type (Primary) -d/t pt's symptoms and decreased breath sounds, will treat with Augmentin 1 tab PO BID x 10 days.  Discussed SE.  Agrees to use.  Encouraged her to try OTC Fluticasone 2 sprays to each nostril daily.  Can use Albuterol inhaler PRN for SOB/cough/wheezing.  Ensure adequate fluid intake.  Perform good hand hygiene.  Will swab for COVID today.  Encouraged pt to isolate for 10 days until symptoms are improved.  Will notify her of COVID results when they are received.  If she develops high fever, confusion, worsening SOB; go to ED for further evaluation.    -     amoxicillin-clavulanate (Augmentin) 875-125 MG per tablet; Take 1 tablet by mouth 2 (Two) Times a Day for 10 days.  Dispense: 20 tablet; Refill: 0  -     Cancel: COVID-19,[facility],NP/OP Swab in Transport Media or ESwab 72 HR TAT - Swab, Oropharynx; Future    2. Neck Pain -appears to be muscular.  Continue Cyclobenzaprine TID PRN.  Apply heat to affected area TID for 20 minutes at a time.  If worsens or does not improve, RTC.         Plan of care discussed with pt and pt is agreeable.    Return if symptoms worsen or fail to improve.    Kristan Winter, APRN

## 2021-01-04 ENCOUNTER — HOSPITAL ENCOUNTER (INPATIENT)
Facility: HOSPITAL | Age: 61
LOS: 1 days | Discharge: HOME | End: 2021-01-04
Attending: EMERGENCY MEDICINE | Admitting: INTERNAL MEDICINE

## 2021-01-04 ENCOUNTER — APPOINTMENT (OUTPATIENT)
Dept: GENERAL RADIOLOGY | Facility: HOSPITAL | Age: 61
End: 2021-01-04

## 2021-01-04 ENCOUNTER — READMISSION MANAGEMENT (OUTPATIENT)
Dept: CALL CENTER | Facility: HOSPITAL | Age: 61
End: 2021-01-04

## 2021-01-04 VITALS
HEIGHT: 62 IN | SYSTOLIC BLOOD PRESSURE: 129 MMHG | RESPIRATION RATE: 16 BRPM | TEMPERATURE: 96.9 F | WEIGHT: 143 LBS | OXYGEN SATURATION: 98 % | HEART RATE: 97 BPM | BODY MASS INDEX: 26.31 KG/M2 | DIASTOLIC BLOOD PRESSURE: 69 MMHG

## 2021-01-04 DIAGNOSIS — T78.3XXA ANGIOEDEMA, INITIAL ENCOUNTER: Primary | ICD-10-CM

## 2021-01-04 PROBLEM — E87.1 HYPONATREMIA: Status: ACTIVE | Noted: 2021-01-04

## 2021-01-04 PROBLEM — K14.8 TONGUE EDEMA: Status: ACTIVE | Noted: 2021-01-04

## 2021-01-04 PROBLEM — J01.10 ACUTE FRONTAL SINUSITIS: Status: ACTIVE | Noted: 2021-01-04

## 2021-01-04 PROBLEM — R11.2 NAUSEA & VOMITING: Status: RESOLVED | Noted: 2017-02-17 | Resolved: 2021-01-04

## 2021-01-04 PROBLEM — R19.7 DIARRHEA: Status: RESOLVED | Noted: 2018-01-07 | Resolved: 2021-01-04

## 2021-01-04 PROBLEM — K52.9 GASTROENTERITIS, ACUTE: Status: RESOLVED | Noted: 2017-02-17 | Resolved: 2021-01-04

## 2021-01-04 PROBLEM — A41.9 SEPSIS (HCC): Status: RESOLVED | Noted: 2017-02-17 | Resolved: 2021-01-04

## 2021-01-04 PROBLEM — D75.839 THROMBOCYTOSIS: Status: ACTIVE | Noted: 2021-01-04

## 2021-01-04 PROBLEM — E83.42 HYPOMAGNESEMIA: Status: RESOLVED | Noted: 2017-02-18 | Resolved: 2021-01-04

## 2021-01-04 PROBLEM — E86.9 VOLUME DEPLETION: Status: RESOLVED | Noted: 2017-02-18 | Resolved: 2021-01-04

## 2021-01-04 PROBLEM — N39.0 UTI (URINARY TRACT INFECTION): Status: RESOLVED | Noted: 2017-02-18 | Resolved: 2021-01-04

## 2021-01-04 PROBLEM — T78.40XA ALLERGIC REACTION CAUSED BY A DRUG: Status: ACTIVE | Noted: 2021-01-04

## 2021-01-04 PROBLEM — N17.9 AKI (ACUTE KIDNEY INJURY): Status: RESOLVED | Noted: 2017-02-17 | Resolved: 2021-01-04

## 2021-01-04 PROBLEM — E87.20 LACTIC ACIDOSIS: Status: RESOLVED | Noted: 2017-02-17 | Resolved: 2021-01-04

## 2021-01-04 PROBLEM — D72.829 LEUKOCYTOSIS: Status: RESOLVED | Noted: 2017-02-17 | Resolved: 2021-01-04

## 2021-01-04 PROBLEM — R73.9 HYPERGLYCEMIA: Status: RESOLVED | Noted: 2017-02-18 | Resolved: 2021-01-04

## 2021-01-04 PROBLEM — E87.6 HYPOKALEMIA: Status: RESOLVED | Noted: 2017-02-17 | Resolved: 2021-01-04

## 2021-01-04 LAB
ALBUMIN SERPL-MCNC: 3.8 G/DL (ref 3.5–5.2)
ALBUMIN/GLOB SERPL: 1.3 G/DL
ALP SERPL-CCNC: 81 U/L (ref 39–117)
ALT SERPL W P-5'-P-CCNC: 24 U/L (ref 1–33)
ANION GAP SERPL CALCULATED.3IONS-SCNC: 17 MMOL/L (ref 5–15)
AST SERPL-CCNC: 31 U/L (ref 1–32)
BASOPHILS # BLD AUTO: 0.07 10*3/MM3 (ref 0–0.2)
BASOPHILS NFR BLD AUTO: 0.6 % (ref 0–1.5)
BILIRUB SERPL-MCNC: 0.4 MG/DL (ref 0–1.2)
BUN SERPL-MCNC: 21 MG/DL (ref 8–23)
BUN/CREAT SERPL: 24.4 (ref 7–25)
CALCIUM SPEC-SCNC: 8.1 MG/DL (ref 8.6–10.5)
CHLORIDE SERPL-SCNC: 86 MMOL/L (ref 98–107)
CO2 SERPL-SCNC: 23 MMOL/L (ref 22–29)
CREAT SERPL-MCNC: 0.86 MG/DL (ref 0.57–1)
DEPRECATED RDW RBC AUTO: 44.3 FL (ref 37–54)
EOSINOPHIL # BLD AUTO: 0.1 10*3/MM3 (ref 0–0.4)
EOSINOPHIL NFR BLD AUTO: 0.9 % (ref 0.3–6.2)
ERYTHROCYTE [DISTWIDTH] IN BLOOD BY AUTOMATED COUNT: 13.2 % (ref 12.3–15.4)
FLUAV RNA RESP QL NAA+PROBE: NOT DETECTED
FLUBV RNA RESP QL NAA+PROBE: NOT DETECTED
GFR SERPL CREATININE-BSD FRML MDRD: 67 ML/MIN/1.73
GLOBULIN UR ELPH-MCNC: 3 GM/DL
GLUCOSE SERPL-MCNC: 136 MG/DL (ref 65–99)
HCT VFR BLD AUTO: 37 % (ref 34–46.6)
HGB BLD-MCNC: 12.9 G/DL (ref 12–15.9)
HOLD SPECIMEN: NORMAL
HOLD SPECIMEN: NORMAL
IMM GRANULOCYTES # BLD AUTO: 0.09 10*3/MM3 (ref 0–0.05)
IMM GRANULOCYTES NFR BLD AUTO: 0.8 % (ref 0–0.5)
LYMPHOCYTES # BLD AUTO: 3.77 10*3/MM3 (ref 0.7–3.1)
LYMPHOCYTES NFR BLD AUTO: 32.5 % (ref 19.6–45.3)
MCH RBC QN AUTO: 31.8 PG (ref 26.6–33)
MCHC RBC AUTO-ENTMCNC: 34.9 G/DL (ref 31.5–35.7)
MCV RBC AUTO: 91.1 FL (ref 79–97)
MONOCYTES # BLD AUTO: 1.3 10*3/MM3 (ref 0.1–0.9)
MONOCYTES NFR BLD AUTO: 11.2 % (ref 5–12)
NEUTROPHILS NFR BLD AUTO: 54 % (ref 42.7–76)
NEUTROPHILS NFR BLD AUTO: 6.28 10*3/MM3 (ref 1.7–7)
NRBC BLD AUTO-RTO: 0 /100 WBC (ref 0–0.2)
NT-PROBNP SERPL-MCNC: 6.9 PG/ML (ref 0–900)
PLATELET # BLD AUTO: 592 10*3/MM3 (ref 140–450)
PMV BLD AUTO: 9.1 FL (ref 6–12)
POTASSIUM SERPL-SCNC: 3.6 MMOL/L (ref 3.5–5.2)
PROT SERPL-MCNC: 6.8 G/DL (ref 6–8.5)
QT INTERVAL: 312 MS
QTC INTERVAL: 437 MS
RBC # BLD AUTO: 4.06 10*6/MM3 (ref 3.77–5.28)
SARS-COV-2 RNA RESP QL NAA+PROBE: NOT DETECTED
SODIUM SERPL-SCNC: 126 MMOL/L (ref 136–145)
TROPONIN T SERPL-MCNC: <0.01 NG/ML (ref 0–0.03)
WBC # BLD AUTO: 11.61 10*3/MM3 (ref 3.4–10.8)
WHOLE BLOOD HOLD SPECIMEN: NORMAL
WHOLE BLOOD HOLD SPECIMEN: NORMAL

## 2021-01-04 PROCEDURE — 99284 EMERGENCY DEPT VISIT MOD MDM: CPT

## 2021-01-04 PROCEDURE — 84484 ASSAY OF TROPONIN QUANT: CPT | Performed by: EMERGENCY MEDICINE

## 2021-01-04 PROCEDURE — 25010000002 METHYLPREDNISOLONE PER 125 MG: Performed by: EMERGENCY MEDICINE

## 2021-01-04 PROCEDURE — 83880 ASSAY OF NATRIURETIC PEPTIDE: CPT | Performed by: EMERGENCY MEDICINE

## 2021-01-04 PROCEDURE — 25010000002 EPINEPHRINE (ANAPHYLAXIS) 1 MG/ML SOLUTION: Performed by: EMERGENCY MEDICINE

## 2021-01-04 PROCEDURE — 80053 COMPREHEN METABOLIC PANEL: CPT | Performed by: EMERGENCY MEDICINE

## 2021-01-04 PROCEDURE — 93005 ELECTROCARDIOGRAM TRACING: CPT | Performed by: EMERGENCY MEDICINE

## 2021-01-04 PROCEDURE — 85025 COMPLETE CBC W/AUTO DIFF WBC: CPT | Performed by: EMERGENCY MEDICINE

## 2021-01-04 PROCEDURE — 99238 HOSP IP/OBS DSCHRG MGMT 30/<: CPT | Performed by: NURSE PRACTITIONER

## 2021-01-04 PROCEDURE — 99223 1ST HOSP IP/OBS HIGH 75: CPT | Performed by: INTERNAL MEDICINE

## 2021-01-04 PROCEDURE — 71045 X-RAY EXAM CHEST 1 VIEW: CPT

## 2021-01-04 PROCEDURE — 25010000002 DIPHENHYDRAMINE PER 50 MG: Performed by: EMERGENCY MEDICINE

## 2021-01-04 PROCEDURE — 87636 SARSCOV2 & INF A&B AMP PRB: CPT | Performed by: EMERGENCY MEDICINE

## 2021-01-04 PROCEDURE — 70360 X-RAY EXAM OF NECK: CPT

## 2021-01-04 RX ORDER — EPINEPHRINE 1 MG/ML
0.3 INJECTION, SOLUTION INTRAMUSCULAR; SUBCUTANEOUS ONCE
Status: COMPLETED | OUTPATIENT
Start: 2021-01-04 | End: 2021-01-04

## 2021-01-04 RX ORDER — SODIUM CHLORIDE, SODIUM LACTATE, POTASSIUM CHLORIDE, CALCIUM CHLORIDE 600; 310; 30; 20 MG/100ML; MG/100ML; MG/100ML; MG/100ML
100 INJECTION, SOLUTION INTRAVENOUS CONTINUOUS
Status: DISCONTINUED | OUTPATIENT
Start: 2021-01-04 | End: 2021-01-04 | Stop reason: HOSPADM

## 2021-01-04 RX ORDER — METHYLPREDNISOLONE SODIUM SUCCINATE 125 MG/2ML
60 INJECTION, POWDER, LYOPHILIZED, FOR SOLUTION INTRAMUSCULAR; INTRAVENOUS EVERY 8 HOURS
Status: DISCONTINUED | OUTPATIENT
Start: 2021-01-04 | End: 2021-01-04 | Stop reason: HOSPADM

## 2021-01-04 RX ORDER — METHYLPREDNISOLONE SODIUM SUCCINATE 125 MG/2ML
125 INJECTION, POWDER, LYOPHILIZED, FOR SOLUTION INTRAMUSCULAR; INTRAVENOUS ONCE
Status: COMPLETED | OUTPATIENT
Start: 2021-01-04 | End: 2021-01-04

## 2021-01-04 RX ORDER — DIPHENHYDRAMINE HYDROCHLORIDE 50 MG/ML
25 INJECTION INTRAMUSCULAR; INTRAVENOUS ONCE
Status: COMPLETED | OUTPATIENT
Start: 2021-01-04 | End: 2021-01-04

## 2021-01-04 RX ORDER — PANTOPRAZOLE SODIUM 40 MG/1
40 TABLET, DELAYED RELEASE ORAL EVERY MORNING
Status: CANCELLED | OUTPATIENT
Start: 2021-01-04

## 2021-01-04 RX ORDER — SODIUM CHLORIDE 0.9 % (FLUSH) 0.9 %
10 SYRINGE (ML) INJECTION AS NEEDED
Status: DISCONTINUED | OUTPATIENT
Start: 2021-01-04 | End: 2021-01-04 | Stop reason: HOSPADM

## 2021-01-04 RX ORDER — LISINOPRIL 10 MG/1
10 TABLET ORAL
Status: CANCELLED | OUTPATIENT
Start: 2021-01-05

## 2021-01-04 RX ORDER — SODIUM CHLORIDE 0.9 % (FLUSH) 0.9 %
10 SYRINGE (ML) INJECTION EVERY 12 HOURS SCHEDULED
Status: DISCONTINUED | OUTPATIENT
Start: 2021-01-04 | End: 2021-01-04 | Stop reason: HOSPADM

## 2021-01-04 RX ORDER — CYCLOBENZAPRINE HCL 10 MG
10 TABLET ORAL 3 TIMES DAILY PRN
Status: CANCELLED | OUTPATIENT
Start: 2021-01-04

## 2021-01-04 RX ORDER — FAMOTIDINE 10 MG/ML
20 INJECTION, SOLUTION INTRAVENOUS ONCE
Status: COMPLETED | OUTPATIENT
Start: 2021-01-04 | End: 2021-01-04

## 2021-01-04 RX ORDER — TRIAMTERENE AND HYDROCHLOROTHIAZIDE 37.5; 25 MG/1; MG/1
1 TABLET ORAL DAILY
Status: CANCELLED | OUTPATIENT
Start: 2021-01-05

## 2021-01-04 RX ORDER — AMLODIPINE BESYLATE 5 MG/1
5 TABLET ORAL DAILY
Status: CANCELLED | OUTPATIENT
Start: 2021-01-05

## 2021-01-04 RX ORDER — PREDNISONE 20 MG/1
40 TABLET ORAL DAILY
Qty: 5 TABLET | Refills: 0 | Status: SHIPPED | OUTPATIENT
Start: 2021-01-04 | End: 2021-01-07

## 2021-01-04 RX ADMIN — EPINEPHRINE 0.3 MG: 1 INJECTION INTRAMUSCULAR; INTRAVENOUS; SUBCUTANEOUS at 05:58

## 2021-01-04 RX ADMIN — METHYLPREDNISOLONE SODIUM SUCCINATE 125 MG: 125 INJECTION, POWDER, FOR SOLUTION INTRAMUSCULAR; INTRAVENOUS at 05:58

## 2021-01-04 RX ADMIN — FAMOTIDINE 20 MG: 10 INJECTION, SOLUTION INTRAVENOUS at 05:58

## 2021-01-04 RX ADMIN — DIPHENHYDRAMINE HYDROCHLORIDE 25 MG: 50 INJECTION INTRAMUSCULAR; INTRAVENOUS at 05:58

## 2021-01-04 NOTE — H&P
Intensive Care Admission Note     Chief Complaint:  Allergic reaction caused by a drug    History of Present Illness     Jocelyne Lindquist is a 60 year old female former smoker with PMH significant for HTN, Asthma, GERD who presented to the ED with 12 hours of swelling of the tongue.  She states that she was started on Augmentin 800 mg 4 days ago.  For a sinusitis at which point time she states that starting around 2 AM she started feeling of tongue swelling she woke up took Benadryl, she woke up again a few hours later and realized that her tongue continue to swell and she could not talk or swallow at that point.  Immediately to the ER.  Upon arrival to the ER she was given steroids, Benadryl, and Pepcid with significant relief.  She denies any other drug changes and has been on her lisinopril for many years at this point.  She states that she had never taken Augmentin before but taken amoxicillin multiple times during her life.  States that the sinusitis appears to be improved at this point.  She denies any chest pain, nausea, vomiting, or abdominal pain at this point.    Problem List, Surgical History, Family, Social History, and ROS     Patient Active Problem List    Diagnosis   • *Allergic reaction caused by a drug [T78.40XA]   • Hyponatremia [E87.1]   • Thrombocytosis (CMS/HCC) [D47.3]   • Swollen tongue [K14.8]   • Acute frontal sinusitis [J01.10]   • Essential hypertension [I10]   • Mixed hyperlipidemia [E78.2]   • Abnormal dreams [F51.8]   • Iron deficiency anemia [D50.9]   • Cyst of left ovary [N83.202]     Past Surgical History:   Procedure Laterality Date   • BREAST BIOPSY Left 06/18/2010    stereo bx   • CYST REMOVAL Right     wrist   • SQUAMOUS CELL CARCINOMA EXCISION Right 12/13/2017    outer corner of right eye       Allergies   Allergen Reactions   • Amoxicillin Swelling   • Doxycycline Diarrhea   • Eggs Or Egg-Derived Products      Causes cramps   • Fish-Derived Products      Causes severe stomach  cramps     No current facility-administered medications on file prior to encounter.      Current Outpatient Medications on File Prior to Encounter   Medication Sig   • acyclovir (ZOVIRAX) 400 MG tablet Take 1 tablet by mouth 3 (Three) Times a Day. Take no more than 5 doses a day.   • acyclovir (ZOVIRAX) 800 MG tablet Take 1 tab 3 times a day for 2-5 days.  Stop high dose when lesions resolve.   • amLODIPine (NORVASC) 5 MG tablet Take 1 tablet by mouth once daily   • amoxicillin-clavulanate (Augmentin) 875-125 MG per tablet Take 1 tablet by mouth 2 (Two) Times a Day for 10 days.   • benazepril (LOTENSIN) 20 MG tablet Take 1 tablet by mouth once daily   • cyclobenzaprine (FLEXERIL) 10 MG tablet Take 1 tablet by mouth 3 (Three) Times a Day As Needed for Muscle Spasms.   • omeprazole (priLOSEC) 20 MG capsule Take 1 capsule by mouth once daily   • potassium chloride (K-DUR,KLOR-CON) 10 MEQ CR tablet Take 1 tablet by mouth once daily   • PROAIR  (90 Base) MCG/ACT inhaler Inhale 2 puffs Every 6 (Six) Hours As Needed for Wheezing.   • triamcinolone (KENALOG) 0.1 % ointment Apply  topically 3 (Three) Times a Day.   • triamterene-hydrochlorothiazide (DYAZIDE) 37.5-25 MG per capsule Take 1 capsule by mouth once daily     MEDICATION LIST AND ALLERGIES REVIEWED.    Family History   Problem Relation Age of Onset   • Cancer Mother         esophageal and liver   • Heart failure Father    • Breast cancer Neg Hx    • Ovarian cancer Neg Hx      Social History     Tobacco Use   • Smoking status: Former Smoker     Packs/day: 1.00     Years: 20.00     Pack years: 20.00   • Smokeless tobacco: Never Used   • Tobacco comment: 12/25/17   Substance Use Topics   • Alcohol use: No     Comment: not since 12/25/17   • Drug use: No     Social History     Social History Narrative   • Not on file     FAMILY AND SOCIAL HISTORY REVIEWED.    Review of Systems   Constitutional: Negative for activity change, appetite change, chills and fever.  "  HENT: Positive for facial swelling and trouble swallowing. Negative for congestion, sore throat and voice change.    Eyes: Negative for photophobia and visual disturbance.   Respiratory: Negative for cough, shortness of breath and wheezing.    Cardiovascular: Negative for chest pain, palpitations and leg swelling.   Gastrointestinal: Negative for abdominal distention and abdominal pain.   Genitourinary: Negative for difficulty urinating and flank pain.   Musculoskeletal: Negative for myalgias and neck stiffness.   Skin: Negative for color change and rash.   Neurological: Negative for dizziness, seizures and headaches.   Hematological: Negative for adenopathy.   Psychiatric/Behavioral: Negative for agitation, hallucinations and sleep disturbance.     ALL OTHER SYSTEMS REVIEWED AND ARE NEGATIVE.    Physical Exam and Clinical Information   /72 (BP Location: Left arm, Patient Position: Sitting)   Pulse 104   Temp 96.9 °F (36.1 °C) (Infrared)   Resp 16   Ht 157.5 cm (62\")   Wt 64.9 kg (143 lb)   SpO2 93%   BMI 26.16 kg/m²   Physical Exam  Vitals signs and nursing note reviewed.   Constitutional:       General: She is not in acute distress.     Appearance: She is well-developed and normal weight. She is not ill-appearing or toxic-appearing.   HENT:      Head: Normocephalic and atraumatic.      Right Ear: External ear normal.      Left Ear: External ear normal.      Nose: Nose normal.      Mouth/Throat:      Mouth: Mucous membranes are moist.      Pharynx: Oropharynx is clear. No oropharyngeal exudate or posterior oropharyngeal erythema.      Comments: Swelling of the tongue  Eyes:      Conjunctiva/sclera: Conjunctivae normal.      Pupils: Pupils are equal, round, and reactive to light.   Neck:      Musculoskeletal: Normal range of motion and neck supple. No neck rigidity.   Cardiovascular:      Rate and Rhythm: Normal rate and regular rhythm.      Pulses: Normal pulses.      Heart sounds: Normal heart " sounds. No murmur. No friction rub. No gallop.    Pulmonary:      Effort: Pulmonary effort is normal. No respiratory distress.      Breath sounds: Normal breath sounds. No wheezing, rhonchi or rales.   Abdominal:      General: Bowel sounds are normal. There is no distension.      Palpations: Abdomen is soft.      Tenderness: There is no abdominal tenderness. There is no rebound.   Musculoskeletal: Normal range of motion.      Right lower leg: No edema.      Left lower leg: No edema.   Skin:     General: Skin is warm and dry.      Capillary Refill: Capillary refill takes less than 2 seconds.   Neurological:      General: No focal deficit present.      Mental Status: She is alert and oriented to person, place, and time.   Psychiatric:         Mood and Affect: Mood normal.         Behavior: Behavior normal.         Thought Content: Thought content normal.         Judgment: Judgment normal.         Results from last 7 days   Lab Units 01/04/21  0612   WBC 10*3/mm3 11.61*   HEMOGLOBIN g/dL 12.9   PLATELETS 10*3/mm3 592*     Results from last 7 days   Lab Units 01/04/21  0612   SODIUM mmol/L 126*   POTASSIUM mmol/L 3.6   CO2 mmol/L 23.0   BUN mg/dL 21   CREATININE mg/dL 0.86   GLUCOSE mg/dL 136*     Estimated Creatinine Clearance: 60 mL/min (by C-G formula based on SCr of 0.86 mg/dL).          Lab Results   Component Value Date    LACTATE 1.9 01/07/2018        Images: Xr Neck Soft Tissue    Result Date: 1/4/2021  The styloid process is somewhat elongated on both sides. There is cervical degenerative disc disease and facet arthropathy as described above. There is calcified plaque at the left carotid bifurcation. Signer Name: Quinten Roman MD  Signed: 1/4/2021 6:45 AM  Workstation Name: [email or web address]  [facility]    Xr Chest 1 View    Result Date: 1/4/2021  No acute cardiopulmonary findings. Signer Name: Quinten Roman MD  Signed: 1/4/2021 6:45 AM  Workstation Name: [email or web address]  [facility]   [place]        I reviewed the patient's results/ images and I agree with the reports.     Impression     Allergic reaction caused by a drug    Essential hypertension    Mixed hyperlipidemia    Hyponatremia    Thrombocytosis (CMS/HCC)    Swollen tongue    Acute frontal sinusitis      Plan/Recommendations     60-year-old female with a past medical history sniffing for hypertension and hyperlipidemia.  Who presented to [facility] ICU on 1/4/2020 secondary to an allergic reaction leading to swelling of the tongue at high risk for airway compromise.  Suspect this is secondary to the amoxicillin as his only new medication that she has had, but I am not sure that this is a penicillin allergy is much as a reaction to possible fixative could have been inside of the medication given the fact that she had taken penicillin on multiple occasions in the past.  She was given Benadryl, epi, steroids, and famotidine in the ER with significant improvement of her swelling.    · Admit the patient to the ICU for close monitoring due to high risk airway.  · Continue Solu-Medrol 60 mg 3 times daily  · Hold Augmentin  ·  cc/h  · Monitor sodium and thrombocytosis likely secondary to allergic reaction, repeat labs in the morning  · Continue antihypertensive medications  · Patient okay for ice chips and swabs for now, if swelling continues to decrease over the course the afternoon than I will start her on a full diet by dinnertime.  · Hold further antibiotics as sinusitis appears to improved at this point.  · SCDs for DVT prophylaxis  · If patient does well this afternoon, likely be discharged in the morning.      High level of risk due to:  illness with threat to life or bodily function.    SUE Sorto, ACNP-BC  Pulmonary & Critical Care Medicine     STALIN Lorenzana DO  Pulmonary and Critical Care Medicine  01/04/21 08:45 EST     CC: Uzma Duffy PA-C

## 2021-01-04 NOTE — PROGRESS NOTES
Discharge Planning Assessment  [facility]     Patient Name: Jocelyne Lindquist  MRN: [id number]  Today's Date: 1/4/2021    Admit Date: 1/4/2021    Discharge Needs Assessment     Row Name 01/04/21 0920       Living Environment    Lives With  spouse    Name(s) of Who Lives With Patient  Thierno    Current Living Arrangements  home/apartment/condo    Potentially Unsafe Housing Conditions  unable to assess    Primary Care Provided by  self    Provides Primary Care For  no one    Family Caregiver if Needed  spouse    Family Caregiver Names  Thierno    Quality of Family Relationships  helpful;involved;supportive    Able to Return to Prior Arrangements  yes       Resource/Environmental Concerns    Resource/Environmental Concerns  none    Transportation Concerns  car, none       Transition Planning    Patient/Family Anticipates Transition to  home with family    Patient/Family Anticipated Services at Transition  none       Discharge Needs Assessment    Readmission Within the Last 30 Days  no previous admission in last 30 days    Equipment Currently Used at Home  none    Concerns to be Addressed  denies needs/concerns at this time    Anticipated Changes Related to Illness  none    Equipment Needed After Discharge  none    Provided Post Acute Provider List?  N/A    Provided Post Acute Provider Quality & Resource List?  N/A        Discharge Plan     Row Name 01/04/21 0922       Plan    Plan  initial    Plan Comments  Pt lives in [facility] with her . She is independent with care. No DME, oxygen, outpt services PCP is TRACEE Meade    Final Discharge Disposition Code  30 - still a patient        Continued Care and Services - Admitted Since 1/4/2021    Coordination has not been started for this encounter.         Demographic Summary    No documentation.       Functional Status     Row Name 01/04/21 0920       Functional Status    Usual Activity Tolerance  good       Functional Status, IADL    Medications   independent    Meal Preparation  independent    Housekeeping  independent    Laundry  independent    Shopping  independent       Mental Status    General Appearance WDL  WDL       Mental Status Summary    Recent Changes in Mental Status/Cognitive Functioning  no changes       Employment/    Employment Status  retired        Psychosocial    No documentation.       Abuse/Neglect    No documentation.       Legal    No documentation.       Substance Abuse    No documentation.       Patient Forms    No documentation.           Yenni De Dios RN

## 2021-01-04 NOTE — PLAN OF CARE
"  Problem: Adult Inpatient Plan of Care  Goal: Plan of Care Review  Outcome: Ongoing, Progressing  Flowsheets (Taken 1/4/2021 1333)  Progress: no change  Plan of Care Reviewed With: patient  Outcome Summary: Patient refusing care, wants to leave AMA upon arrival to unit. Patient stated that she told the ED that she did not want to stay and they stated that, \"they'll decide upstairs if you can leave.\" Patient educated about the risks of going home and APRN notified. Patient does not want to be assessed, but no swelling noted. Pt okay with me checking her oxygen saturations before she leaves, the pt is 98% on RA. Patient now being discharged home on prednisone. Discharge teaching completed.   Goal Outcome Evaluation:  Plan of Care Reviewed With: patient  Progress: no change  Outcome Summary: Patient refusing care, wants to leave AMA upon arrival to unit. Patient stated that she told the ED that she did not want to stay and they stated that, \"they'll decide upstairs if you can leave.\" Patient educated about the risks of going home and APRN notified. Patient does not want to be assess, but no swelling noted. Pt okay with me checking her oxygen saturations before she leaves, the pt is 98% on RA. Patient now being discharged home on prednisone. Discharge teaching completed.  "

## 2021-01-04 NOTE — DISCHARGE SUMMARY
DISCHARGE SUMMARY     Admit date: 1/4/2021  Date of Discharge:  1/4/2021    Discharge Diagnoses  Active Hospital Problems    Diagnosis   • **Allergic reaction caused by a drug   • Hyponatremia   • Thrombocytosis (CMS/HCC)   • Swollen tongue   • Acute frontal sinusitis   • Essential hypertension   • Mixed hyperlipidemia       Past Surgical History:   Procedure Laterality Date   • BREAST BIOPSY Left 06/18/2010    stereo bx   • CYST REMOVAL Right     wrist   • SQUAMOUS CELL CARCINOMA EXCISION Right 12/13/2017    outer corner of right eye       History of Present Illness    Patient is a 60 y.o. female presented with: Allergic reaction caused by a drug    Jocelyne Lindquist is a 60 year old female former smoker with PMH significant for HTN, Asthma, GERD who presented to the ED with 12 hours of swelling of the tongue.  She states that she was started on Augmentin 800 mg 4 days ago.  For a sinusitis at which point time she states that starting around 2 AM she started feeling of tongue swelling she woke up took Benadryl, she woke up again a few hours later and realized that her tongue continue to swell and she could not talk or swallow at that point.  Immediately to the ER.  Upon arrival to the ER she was given steroids, Benadryl, and Pepcid with significant relief.  She denies any other drug changes and has been on her lisinopril for many years at this point.  She states that she had never taken Augmentin before but taken amoxicillin multiple times during her life.  States that the sinusitis appears to be improved at this point.  She denies any chest pain, nausea, vomiting, or abdominal pain at this point.    Hospital Course    She was admitted to ICU and by mid-day felt completely back to baseline and was very insistent that she wanted to go home. She was able to speak normally and eat with no evidence of dysphagia and her tongue is normal. After discussion with Dr. Lorenzana, it was felt that she could safely be discharged  "home. She will be given a 5 day course of prednisone 40 mg daily. She will follow up with her PCP in one wek and has been instructed to return to ED immediately if symptoms return.     /79   Pulse 97   Temp 96.9 °F (36.1 °C) (Infrared)   Resp 16   Ht 157.5 cm (62\")   Wt 64.9 kg (143 lb)   SpO2 93%   BMI 26.16 kg/m²     Physical Exam  Constitutional:       General: She is not in acute distress.     Appearance: Normal appearance. She is not toxic-appearing.   HENT:      Mouth/Throat:      Mouth: Mucous membranes are moist.      Tongue: No lesions.      Pharynx: Oropharynx is clear. No oropharyngeal exudate or posterior oropharyngeal erythema.      Comments: Tongue without edema.   Eyes:      Pupils: Pupils are equal, round, and reactive to light.   Cardiovascular:      Rate and Rhythm: Normal rate and regular rhythm.      Heart sounds: No murmur. No friction rub. No gallop.    Pulmonary:      Effort: Pulmonary effort is normal. No respiratory distress.      Breath sounds: Normal breath sounds. No stridor. No wheezing, rhonchi or rales.   Abdominal:      General: Bowel sounds are normal. There is no distension.      Palpations: Abdomen is soft.      Tenderness: There is no abdominal tenderness.   Skin:     General: Skin is warm and dry.   Neurological:      General: No focal deficit present.      Mental Status: She is alert and oriented to person, place, and time. Mental status is at baseline.   Psychiatric:         Mood and Affect: Mood normal.         Behavior: Behavior normal.         Thought Content: Thought content normal.         Judgment: Judgment normal.           Procedures Performed: None    Consults: None    Pertinent Test Results:   Results from last 7 days   Lab Units 01/04/21  0612   WBC 10*3/mm3 11.61*   HEMOGLOBIN g/dL 12.9   HEMATOCRIT % 37.0   PLATELETS 10*3/mm3 592*     Results from last 7 days   Lab Units 01/04/21  0612   SODIUM mmol/L 126*   POTASSIUM mmol/L 3.6   CHLORIDE mmol/L 86* "   CO2 mmol/L 23.0   BUN mg/dL 21   CREATININE mg/dL 0.86   GLUCOSE mg/dL 136*   CALCIUM mg/dL 8.1*           Condition on Discharge:  Stable    Discharge Disposition: Home or Self Care    Discharge Medications:      Discharge Medications      New Medications      Instructions Start Date   predniSONE 20 MG tablet  Commonly known as: DELTASONE   40 mg, Oral, Daily         Continue These Medications      Instructions Start Date   amLODIPine 5 MG tablet  Commonly known as: NORVASC   Take 1 tablet by mouth once daily      benazepril 20 MG tablet  Commonly known as: LOTENSIN   Take 1 tablet by mouth once daily      cyclobenzaprine 10 MG tablet  Commonly known as: FLEXERIL   10 mg, Oral, 3 Times Daily PRN      omeprazole 20 MG capsule  Commonly known as: priLOSEC   Take 1 capsule by mouth once daily      potassium chloride 10 MEQ CR tablet  Commonly known as: K-DUR,KLOR-CON   Take 1 tablet by mouth once daily      ProAir  (90 Base) MCG/ACT inhaler  Generic drug: albuterol sulfate HFA   2 puffs, Inhalation, Every 6 Hours PRN      triamcinolone 0.1 % ointment  Commonly known as: KENALOG   Topical, 3 Times Daily      triamterene-hydrochlorothiazide 37.5-25 MG per capsule  Commonly known as: DYAZIDE   Take 1 capsule by mouth once daily         Stop These Medications    acyclovir 400 MG tablet  Commonly known as: ZOVIRAX     acyclovir 800 MG tablet  Commonly known as: ZOVIRAX     amoxicillin-clavulanate 875-125 MG per tablet  Commonly known as: Augmentin            Discharge Diet: Regular    Activity at Discharge: As tolerated    Follow-up Appointments  No future appointments.  Additional Instructions for the Follow-ups that You Need to Schedule     Discharge Follow-up with PCP   As directed       Currently Documented PCP:    Uzma Duffy PA-C    PCP Phone Number:    [phone]     Follow Up Details: 1 week               Test Results Pending at Discharge       Code Status and Medical Interventions:   Ordered  at: 01/04/21 0756     Level Of Support Discussed With:    Patient     Code Status:    CPR     Medical Interventions (Level of Support Prior to Arrest):    Full       Camila Guajardo, APRN  01/04/21  13:02 EST    Discharge Time Spent: 10 Minutes

## 2021-01-04 NOTE — OUTREACH NOTE
Prep Survey      Responses   [facility] patient discharged from?  [facility]   Is LACE score < 7 ?  Yes   Emergency Room discharge w/ pulse ox?  No   Eligibility  [facility]   Date of Admission  01/04/21   Date of Discharge  01/04/21   Discharge Disposition  Home or Self Care   Discharge diagnosis  Allergic reaction caused by a drug   Does the patient have one of the following disease processes/diagnoses(primary or secondary)?  Other   Does the patient have Home health ordered?  No   Is there a DME ordered?  No   Prep survey completed?  Yes          Berenice Pulliam RN

## 2021-01-05 ENCOUNTER — TRANSITIONAL CARE MANAGEMENT TELEPHONE ENCOUNTER (OUTPATIENT)
Dept: CALL CENTER | Facility: HOSPITAL | Age: 61
End: 2021-01-05

## 2021-01-05 NOTE — ED PROVIDER NOTES
Subjective   Pt is a 60 year old female who presents with oral swelling and shortness of breath.  Her  is the primary historian as the patient is unable to speak.  He states that the symptoms began at 0300 today and have progressively worsened over the past two hours to the point that she is no longer able to speak.  She denies any similar episodes in the past and has no known family history of angioedema.  She notes that she was started on amoxicillin 4 days ago for a sinus infection.  Denies itching, rash, or other signs of allergic reaction prior to awaking at 0300 with her swollen tongue.  The swelling initially only affected the right side of the tongue, but quickly worsened.  She notes that she is most comfortable sitting up as it is easier for her to control her secretions.  She notes mild SOA also associated with this.  Pt does not believe she is on an ACE-I.      History provided by:  Spouse  Oral Swelling  Location:  Tongue  Severity:  Severe  Onset quality:  Sudden  Duration:  2 hours  Progression:  Worsening  Chronicity:  New  Ineffective treatments:  None  Associated symptoms: shortness of breath    Associated symptoms: no abdominal pain, no chest pain, no cough, no fever, no headaches, no loss of consciousness and no vomiting        Review of Systems   Constitutional: Negative for fever.   Respiratory: Positive for shortness of breath. Negative for cough.    Cardiovascular: Negative for chest pain.   Gastrointestinal: Negative for abdominal pain and vomiting.   Neurological: Negative for loss of consciousness and headaches.   All other systems reviewed and are negative.      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, right     outer corner of right eye   • Tobacco abuse 2/18/2017    Quit 12/25/17        Allergies   Allergen Reactions   • Amoxicillin Swelling   • Doxycycline Diarrhea   • Eggs Or Egg-Derived Products      Causes cramps   • Fish-Derived Products      Causes severe stomach cramps       Past Surgical History:   Procedure Laterality Date   • BREAST BIOPSY Left 06/18/2010    stereo bx   • CYST REMOVAL Right     wrist   • SQUAMOUS CELL CARCINOMA EXCISION Right 12/13/2017    outer corner of right eye       Family History   Problem Relation Age of Onset   • Cancer Mother         esophageal and liver   • Heart failure Father    • Breast cancer Neg Hx    • Ovarian cancer Neg Hx        Social History     Socioeconomic History   • Marital status:      Spouse name: Not on file   • Number of children: Not on file   • Years of education: Not on file   • Highest education level: Not on file   Tobacco Use   • Smoking status: Former Smoker     Packs/day: 1.00     Years: 20.00     Pack years: 20.00   • Smokeless tobacco: Never Used   • Tobacco comment: 12/25/17   Substance and Sexual Activity   • Alcohol use: No     Comment: not since 12/25/17   • Drug use: No   • Sexual activity: Defer           Objective   Physical Exam  Vitals signs and nursing note reviewed.   Constitutional:       General: She is in acute distress.      Appearance: She is ill-appearing.   HENT:      Head: Normocephalic and atraumatic.      Nose: Nose normal.      Mouth/Throat:      Comments: Severe edema of the tongue.  Unable to visualize the posterior pharynx.  Tongue is protruded out of the mouth and pt is unable to retract or close her mouth secondary to this.  Eyes:      Extraocular Movements: Extraocular movements intact.      Pupils: Pupils are equal, round, and reactive to light.   Neck:      Musculoskeletal: Normal range of motion. No neck rigidity or muscular tenderness.      Vascular: No carotid bruit.      Comments: Fullness to the floor of the mouth/submandibular fullness  Cardiovascular:      Rate and Rhythm: Regular  rhythm. Tachycardia present.   Pulmonary:      Effort: Pulmonary effort is normal. No respiratory distress.      Breath sounds: No stridor. No wheezing or rhonchi.      Comments: Despite significant oral swelling, pt has good air movement at this time.  Abdominal:      General: Abdomen is flat. Bowel sounds are normal. There is no distension.      Palpations: Abdomen is soft.      Tenderness: There is no abdominal tenderness.   Musculoskeletal: Normal range of motion.      Right lower leg: No edema.      Left lower leg: No edema.   Lymphadenopathy:      Cervical: No cervical adenopathy.   Skin:     General: Skin is warm and dry.      Capillary Refill: Capillary refill takes less than 2 seconds.      Findings: No erythema or rash.   Neurological:      General: No focal deficit present.      Mental Status: She is alert.   Psychiatric:      Comments: Anxious         Critical Care  Performed by: Manuelito Sharif DO  Authorized by: Manuelito Sharif DO     Critical care provider statement:     Critical care time (minutes):  45    Critical care time was exclusive of:  Separately billable procedures and treating other patients    Critical care was necessary to treat or prevent imminent or life-threatening deterioration of the following conditions:  Respiratory failure    Critical care was time spent personally by me on the following activities:  Development of treatment plan with patient or surrogate, discussions with consultants, evaluation of patient's response to treatment, examination of patient, obtaining history from patient or surrogate, ordering and performing treatments and interventions, ordering and review of laboratory studies, ordering and review of radiographic studies, pulse oximetry and re-evaluation of patient's condition    I assumed direction of critical care for this patient from another provider in my specialty: no                 ED Course      No results found for this or any previous visit (from the past 24  hour(s)).  Note: In addition to lab results from this visit, the labs listed above may include labs taken at another facility or during a different encounter within the last 24 hours. Please correlate lab times with ED admission and discharge times for further clarification of the services performed during this visit.    XR Chest 1 View   Final Result   No acute cardiopulmonary findings.      Signer Name: Quinten Roman MD    Signed: 1/4/2021 6:45 AM    Work[facility]     Radiology [facility]      XR Neck Soft Tissue   Final Result   The styloid process is somewhat elongated on both sides. There is cervical degenerative disc disease and facet arthropathy as described above. There is calcified plaque at the left carotid bifurcation.      Signer Name: Quinten Roman MD    Signed: 1/4/2021 6:45 AM    Workstation Name: [facility]     [facility]        Vitals:    01/04/21 1100 01/04/21 1130 01/04/21 1145 01/04/21 1211   BP: 111/75 113/61 120/79 129/69   BP Location: Left arm Left arm  Right arm   Patient Position: Sitting Sitting  Lying   Pulse: 97 96 97    Resp: 16 16     Temp:       TempSrc:       SpO2: 92% 95% 93% 98%   Weight:       Height:         Medications   diphenhydrAMINE (BENADRYL) injection 25 mg (25 mg Intravenous Given 1/4/21 0558)   methylPREDNISolone sodium succinate (SOLU-Medrol) injection 125 mg (125 mg Intravenous Given 1/4/21 0558)   famotidine (PEPCID) injection 20 mg (20 mg Intravenous Given 1/4/21 0558)   EPINEPHrine (Anaphylaxis) (ADRENALIN) injection 0.3 mg (0.3 mg Intramuscular Given 1/4/21 0558)     ECG/EMG Results (last 24 hours)     ** No results found for the last 24 hours. **        ECG 12 Lead   Final Result   Test Reason : ALLERGIC REACTION   Blood Pressure : **/** mmHG   Vent. Rate : 118 BPM     Atrial Rate : 118 BPM      P-R Int : 132 ms          QRS Dur : 074 ms       QT Int : 312 ms       P-R-T Axes : 062 052 058 degrees      QTc  Int : 437 ms      ** Poor data quality, interpretation may be adversely affected   Sinus tachycardia   Otherwise normal ECG   When compared with ECG of 07-JAN-2018 15:58,   No significant change was found   Confirmed by MD KO CORY ([id number]) on 1/4/2021 9:46:46 PM      Referred By:  EDMD           Confirmed By:CRISTINA KO MD        Pt was given epinephrine, solumedrol, benadryl, and Pepcid immediately upon arrival.  I spoke with anesthesia regarding the potential emergent intubation, preferably nasotracheal via fiberoptic scope.  We decided to give the patient five minutes following treatment with the medications upon arrival, and if pt does not have considerable improvement, I will escort the patient to the OR for fiberoptic intubation and surgery available for emergency surgical airway.    Fortunately pt did show improvement with the initial treatment.  I discussed the case with ICU who will admit for further observation and management.      1/5/21 update:  I called and spoke with the patinet this evening and she states that she if feeling great or no residual swelling.  She does take an ACE-I, which she has taken for the past 20 years and had not issues.  As the presentation was classic for angioedema, in addition to avoiding PCN antibiotics I instructed also to stop taking ACE-I.  She will contact her PCP in the AM to switch this to a different medication.  She understands to have a low threshold to return to the ED if any concerns arise.        MDM    Final diagnoses:   Angioedema, initial encounter            Cristina Ko DO  01/06/21 3216

## 2021-01-05 NOTE — OUTREACH NOTE
Call Center TCM Note      Responses   [facility] [place] patient discharged from?  [place]   Does the patient have one of the following disease processes/diagnoses(primary or secondary)?  Other   TCM attempt successful?  Yes   Call start time  1032   Call end time  1033   Discharge diagnosis  Allergic reaction caused by a drug   Meds reviewed with patient/caregiver?  Yes   Is the patient having any side effects they believe may be caused by any medication additions or changes?  No   Does the patient have all medications ordered at discharge?  Yes   Is the patient taking all medications as directed (includes completed medication regime)?  Yes   Does the patient have a primary care provider?   Yes   Does the patient have an appointment with their PCP within 7 days of discharge?  N/A   Comments regarding PCP  Patient will call to make f/u appts.   Has the patient kept scheduled appointments due by today?  N/A   Psychosocial issues?  No   Did the patient receive a copy of their discharge instructions?  Yes   Nursing interventions  Reviewed instructions with patient   What is the patient's perception of their health status since discharge?  Improving   Is the patient/caregiver able to teach back signs and symptoms related to disease process for when to call PCP?  Yes   Is the patient/caregiver able to teach back signs and symptoms related to disease process for when to call 911?  Yes   Is the patient/caregiver able to teach back the hierarchy of who to call/visit for symptoms/problems? PCP, Specialist, Home health nurse, Urgent Care, ED, 911  Yes   TCM call completed?  Yes   Wrap up additional comments  States she is doing well, no more swelling of her tongue noted, she will call to make her f/u appts.          Dai Navarrete RN    1/5/2021, 10:33 EST

## 2021-01-06 ENCOUNTER — TELEPHONE (OUTPATIENT)
Dept: FAMILY MEDICINE CLINIC | Facility: CLINIC | Age: 61
End: 2021-01-06

## 2021-01-06 NOTE — TELEPHONE ENCOUNTER
She reports that she had an adverse reaction to benazepril which caused her tongue to swell and caused an angioedema. She was advised to switch to a different ACE inhibitor    I advised the patient that she should have kept her appt that was scheduled today so she can follow up and discuss alternatives. She verbalized understanding and scheduled OV 1/7 with Uzma    I have requested [facility] ED records stat.

## 2021-01-06 NOTE — TELEPHONE ENCOUNTER
Patient is requesting a different blood pressure to be sent to [place] [address] [address] [facility] - [phone]  - [phone]   [phone]    Patient had to go to the ER on Monday and the doctor there told her to get off her current medication.    [phone]

## 2021-01-07 ENCOUNTER — OFFICE VISIT (OUTPATIENT)
Dept: FAMILY MEDICINE CLINIC | Facility: CLINIC | Age: 61
End: 2021-01-07

## 2021-01-07 ENCOUNTER — TELEPHONE (OUTPATIENT)
Dept: FAMILY MEDICINE CLINIC | Facility: CLINIC | Age: 61
End: 2021-01-07

## 2021-01-07 VITALS
TEMPERATURE: 98.2 F | HEIGHT: 62 IN | HEART RATE: 97 BPM | OXYGEN SATURATION: 96 % | BODY MASS INDEX: 25.4 KG/M2 | WEIGHT: 138 LBS | DIASTOLIC BLOOD PRESSURE: 80 MMHG | SYSTOLIC BLOOD PRESSURE: 150 MMHG

## 2021-01-07 DIAGNOSIS — T78.40XS ALLERGIC REACTION, SEQUELA: ICD-10-CM

## 2021-01-07 DIAGNOSIS — K14.8 TONGUE EDEMA: ICD-10-CM

## 2021-01-07 DIAGNOSIS — E78.2 MIXED HYPERLIPIDEMIA: ICD-10-CM

## 2021-01-07 DIAGNOSIS — R21 RASH AND NONSPECIFIC SKIN ERUPTION: ICD-10-CM

## 2021-01-07 DIAGNOSIS — Z09 HOSPITAL DISCHARGE FOLLOW-UP: Primary | ICD-10-CM

## 2021-01-07 DIAGNOSIS — I10 ESSENTIAL HYPERTENSION: ICD-10-CM

## 2021-01-07 PROCEDURE — 99214 OFFICE O/P EST MOD 30 MIN: CPT | Performed by: PHYSICIAN ASSISTANT

## 2021-01-07 RX ORDER — METOPROLOL SUCCINATE 25 MG/1
25 TABLET, EXTENDED RELEASE ORAL DAILY
Qty: 30 TABLET | Refills: 5 | Status: SHIPPED | OUTPATIENT
Start: 2021-01-07 | End: 2021-03-09 | Stop reason: SDUPTHER

## 2021-01-07 RX ORDER — PRAVASTATIN SODIUM 40 MG
40 TABLET ORAL NIGHTLY
Qty: 30 TABLET | Refills: 5 | Status: SHIPPED | OUTPATIENT
Start: 2021-01-07 | End: 2021-03-25 | Stop reason: SDUPTHER

## 2021-01-07 NOTE — TELEPHONE ENCOUNTER
THE PATIENT IS REQUESTING A CALL BACK BECAUSE SHE HAS A QUESTION ABOUT A NEW PRESCRIPTION SHE GOT TODAY CALLED PRAVASTATIN. THE PATIENT STATES SHE THOUGHT SHE WAS NOT GOING TO BE PUT ON A STATIN.  PLEASE CALL PATIENT AND ADVISE -[phone]

## 2021-01-07 NOTE — TELEPHONE ENCOUNTER
BRANDY relayed the information and I did as well. The patient wanted a message sent to Uzma.    She was under the impression that it was discussed that she shouldn't start a statin and that she should use something else. She is requesting that Uzma call back to explain this because she is confused and wanted clarification.

## 2021-01-07 NOTE — PATIENT INSTRUCTIONS
"Hypertension, Adult  High blood pressure (hypertension) is when the force of blood pumping through the arteries is too strong. The arteries are the blood vessels that carry blood from the heart throughout the body. Hypertension forces the heart to work harder to pump blood and may cause arteries to become narrow or stiff. Untreated or uncontrolled hypertension can cause a heart attack, heart failure, a stroke, kidney disease, and other problems.  A blood pressure reading consists of a higher number over a lower number. Ideally, your blood pressure should be below 120/80. The first (\"top\") number is called the systolic pressure. It is a measure of the pressure in your arteries as your heart beats. The second (\"bottom\") number is called the diastolic pressure. It is a measure of the pressure in your arteries as the heart relaxes.  What are the causes?  The exact cause of this condition is not known. There are some conditions that result in or are related to high blood pressure.  What increases the risk?  Some risk factors for high blood pressure are under your control. The following factors may make you more likely to develop this condition:  · Smoking.  · Having type 2 diabetes mellitus, high cholesterol, or both.  · Not getting enough exercise or physical activity.  · Being overweight.  · Having too much fat, sugar, calories, or salt (sodium) in your diet.  · Drinking too much alcohol.  Some risk factors for high blood pressure may be difficult or impossible to change. Some of these factors include:  · Having chronic kidney disease.  · Having a family history of high blood pressure.  · Age. Risk increases with age.  · Race. You may be at higher risk if you are .  · Gender. Men are at higher risk than women before age 45. After age 65, women are at higher risk than men.  · Having obstructive sleep apnea.  · Stress.  What are the signs or symptoms?  High blood pressure may not cause symptoms. Very high " blood pressure (hypertensive crisis) may cause:  · Headache.  · Anxiety.  · Shortness of breath.  · Nosebleed.  · Nausea and vomiting.  · Vision changes.  · Severe chest pain.  · Seizures.  How is this diagnosed?  This condition is diagnosed by measuring your blood pressure while you are seated, with your arm resting on a flat surface, your legs uncrossed, and your feet flat on the floor. The cuff of the blood pressure monitor will be placed directly against the skin of your upper arm at the level of your heart. It should be measured at least twice using the same arm. Certain conditions can cause a difference in blood pressure between your right and left arms.  Certain factors can cause blood pressure readings to be lower or higher than normal for a short period of time:  · When your blood pressure is higher when you are in a health care provider's office than when you are at home, this is called white coat hypertension. Most people with this condition do not need medicines.  · When your blood pressure is higher at home than when you are in a health care provider's office, this is called masked hypertension. Most people with this condition may need medicines to control blood pressure.  If you have a high blood pressure reading during one visit or you have normal blood pressure with other risk factors, you may be asked to:  · Return on a different day to have your blood pressure checked again.  · Monitor your blood pressure at home for 1 week or longer.  If you are diagnosed with hypertension, you may have other blood or imaging tests to help your health care provider understand your overall risk for other conditions.  How is this treated?  This condition is treated by making healthy lifestyle changes, such as eating healthy foods, exercising more, and reducing your alcohol intake. Your health care provider may prescribe medicine if lifestyle changes are not enough to get your blood pressure under control, and  if:  · Your systolic blood pressure is above 130.  · Your diastolic blood pressure is above 80.  Your personal target blood pressure may vary depending on your medical conditions, your age, and other factors.  Follow these instructions at home:  Eating and drinking    · Eat a diet that is high in fiber and potassium, and low in sodium, added sugar, and fat. An example eating plan is called the DASH (Dietary Approaches to Stop Hypertension) diet. To eat this way:  ? Eat plenty of fresh fruits and vegetables. Try to fill one half of your plate at each meal with fruits and vegetables.  ? Eat whole grains, such as whole-wheat pasta, brown rice, or whole-grain bread. Fill about one fourth of your plate with whole grains.  ? Eat or drink low-fat dairy products, such as skim milk or low-fat yogurt.  ? Avoid fatty cuts of meat, processed or cured meats, and poultry with skin. Fill about one fourth of your plate with lean proteins, such as fish, chicken without skin, beans, eggs, or tofu.  ? Avoid pre-made and processed foods. These tend to be higher in sodium, added sugar, and fat.  · Reduce your daily sodium intake. Most people with hypertension should eat less than 1,500 mg of sodium a day.  · Do not drink alcohol if:  ? Your health care provider tells you not to drink.  ? You are pregnant, may be pregnant, or are planning to become pregnant.  · If you drink alcohol:  ? Limit how much you use to:  § 0-1 drink a day for women.  § 0-2 drinks a day for men.  ? Be aware of how much alcohol is in your drink. In the [place] one drink equals one 12 oz bottle of beer (355 mL), one 5 oz glass of wine (148 mL), or one 1½ oz glass of hard liquor (44 mL).  Lifestyle    · Work with your health care provider to maintain a healthy body weight or to lose weight. Ask what an ideal weight is for you.  · Get at least 30 minutes of exercise most days of the week. Activities may include walking, swimming, or biking.  · Include exercise to  strengthen your muscles (resistance exercise), such as Pilates or lifting weights, as part of your weekly exercise routine. Try to do these types of exercises for 30 minutes at least 3 days a week.  · Do not use any products that contain nicotine or tobacco, such as cigarettes, e-cigarettes, and chewing tobacco. If you need help quitting, ask your health care provider.  · Monitor your blood pressure at home as told by your health care provider.  · Keep all follow-up visits as told by your health care provider. This is important.  Medicines  · Take over-the-counter and prescription medicines only as told by your health care provider. Follow directions carefully. Blood pressure medicines must be taken as prescribed.  · Do not skip doses of blood pressure medicine. Doing this puts you at risk for problems and can make the medicine less effective.  · Ask your health care provider about side effects or reactions to medicines that you should watch for.  Contact a health care provider if you:  · Think you are having a reaction to a medicine you are taking.  · Have headaches that keep coming back (recurring).  · Feel dizzy.  · Have swelling in your ankles.  · Have trouble with your vision.  Get help right away if you:  · Develop a severe headache or confusion.  · Have unusual weakness or numbness.  · Feel faint.  · Have severe pain in your chest or abdomen.  · Vomit repeatedly.  · Have trouble breathing.  Summary  · Hypertension is when the force of blood pumping through your arteries is too strong. If this condition is not controlled, it may put you at risk for serious complications.  · Your personal target blood pressure may vary depending on your medical conditions, your age, and other factors. For most people, a normal blood pressure is less than 120/80.  · Hypertension is treated with lifestyle changes, medicines, or a combination of both. Lifestyle changes include losing weight, eating a healthy, low-sodium diet,  exercising more, and limiting alcohol.  This information is not intended to replace advice given to you by your health care provider. Make sure you discuss any questions you have with your health care provider.  Document Revised: 08/28/2019 Document Reviewed: 08/28/2019  Elsevier Patient Education © 2020 [employer]

## 2021-01-07 NOTE — TELEPHONE ENCOUNTER
No answer, left message.  Starting a statin was discussed with patient today due to imaging showing that she had plague buildup along her carotid artery bifurcation.  I would want her to start this statin medication.    Okay for HUB to relay

## 2021-01-07 NOTE — PROGRESS NOTES
Chief Complaint   Patient presents with   • Allergic Reaction     Pt states she had an allergic reaction to her benazepril. Pt states she felt weird and her tounge swell and she took a benadryl. Pt states she then went to sleep and woke up with her tounge even more swollen and was afraid she wouldn't be able to breathe so she went to the emergency room.Pt states she has been taken the medication for 20+ years and its never done that before. Pt states she has stopped the medication and would like to dicuss other options with PCP.     • Allergic Reaction     Pt also states she was told to stop flexeril & amplodipine and she hasn't taken those since 1/4/2021        Patient presents today after being seen in the ER on 1/4 for allergic reaction with swollen tongue. Patient woke in the middle the night and her tongue was swollen causing difficulty breathing. She was seen at the ER and with medication her symptoms resolved. The doctor there felt that this was most likely related to her Benzapril prescription. Patient has been on this for 20+ years. Patient was also recently started on Augmentin and this could also be a possible allergen. She has not had issues with penicillins in the past. Patient has not been taking any of her medications since the incident.   She was previously on Benzapril, amlodipine and Dyazide with potassium. She has also avoided taking her Flexeril and omeprazole. She noticed reflux yesterday and will begin her omeprazole again. Her blood pressure on repeat is 150/80 today. She is asymptomatic. She has a cuff at home and will begin monitoring it. She has asthma but it is mild without any exacerbations. Patient has not been on beta-blockers previously. CT neck/soft tissue was completed in the ER showing a stable plaque along the left carotid bifurcation. Patient is not currently on a statin. Overall patient is doing well today.        Jocelyne Lindquist is a 60 y.o. female who presents for Allergic  Reaction (Pt states she had an allergic reaction to her benazepril. Pt states she felt weird and her tounge swell and she took a benadryl. Pt states she then went to sleep and woke up with her tounge even more swollen and was afraid she wouldn't be able to breathe so she went to the emergency room.Pt states she has been taken the medication for 20+ years and its never done that before. Pt states she has stopped the medication and would like to dicuss other options with PCP.  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, right     outer corner of right eye   • Tobacco abuse 2/18/2017    Quit 12/25/17       Past Surgical History:   Procedure Laterality Date   • BREAST BIOPSY Left 06/18/2010    stereo bx   • CYST REMOVAL Right     wrist   • SQUAMOUS CELL CARCINOMA EXCISION Right 12/13/2017    outer corner of right eye       Family History   Problem Relation Age of Onset   • Cancer Mother         esophageal and liver   • Heart failure Father    • Breast cancer Neg Hx    • Ovarian cancer Neg Hx        Social History     Socioeconomic History   • Marital status:      Spouse name: Not on file   • Number of children: Not on file   • Years of education: Not on file   • Highest education level: Not on file   Tobacco Use   • Smoking status: Former Smoker     Packs/day: 1.00     Years: 20.00     Pack years: 20.00   • Smokeless tobacco: Never Used   • Tobacco comment: 12/25/17   Substance and Sexual Activity   • Alcohol use: No     Comment: not since 12/25/17   • Drug use: No   • Sexual activity: Defer       Allergies   Allergen Reactions   • Amoxicillin Swelling   • Benazepril Swelling   • Doxycycline  "Diarrhea   • Eggs Or Egg-Derived Products      Causes cramps   • Fish-Derived Products      Causes severe stomach cramps       ROS    Review of Systems   Constitutional: Negative for chills and fever.   Respiratory: Negative for cough, shortness of breath and wheezing.    Cardiovascular: Negative for chest pain, palpitations and leg swelling.   Neurological: Negative for dizziness, light-headedness and headache.       Vitals:    01/07/21 1249 01/07/21 1326   BP: 125/70 150/80   Pulse: 97    Temp: 98.2 °F (36.8 °C)    SpO2: 96%    Weight: 62.6 kg (138 lb)    Height: 157.5 cm (62\")    PainSc: 0-No pain      Body mass index is 25.24 kg/m².    Current Outpatient Medications on File Prior to Visit   Medication Sig Dispense Refill   • omeprazole (priLOSEC) 20 MG capsule Take 1 capsule by mouth once daily 90 capsule 0   • PROAIR  (90 Base) MCG/ACT inhaler Inhale 2 puffs Every 6 (Six) Hours As Needed for Wheezing. 18 g 5   • [DISCONTINUED] potassium chloride (K-DUR,KLOR-CON) 10 MEQ CR tablet Take 1 tablet by mouth once daily 90 tablet 0   • [DISCONTINUED] predniSONE (DELTASONE) 20 MG tablet Take 2 tablets by mouth Daily for 5 days. 5 tablet 0   • [DISCONTINUED] triamcinolone (KENALOG) 0.1 % ointment Apply  topically 3 (Three) Times a Day. 60 g 1   • [DISCONTINUED] triamterene-hydrochlorothiazide (DYAZIDE) 37.5-25 MG per capsule Take 1 capsule by mouth once daily 90 capsule 0   • cyclobenzaprine (FLEXERIL) 10 MG tablet Take 1 tablet by mouth 3 (Three) Times a Day As Needed for Muscle Spasms. 30 tablet 0   • [DISCONTINUED] amLODIPine (NORVASC) 5 MG tablet Take 1 tablet by mouth once daily 90 tablet 0   • [DISCONTINUED] benazepril (LOTENSIN) 20 MG tablet Take 1 tablet by mouth once daily 90 tablet 0     No current facility-administered medications on file prior to visit.        Results for orders placed or performed during the hospital encounter of 01/04/21   COVID-19 and FLU A/B PCR - Swab, Nasopharynx    Specimen: " Nasopharynx; Swab   Result Value Ref Range    COVID19 Not Detected Not Detected - Ref. Range    Influenza A PCR Not Detected Not Detected    Influenza B PCR Not Detected Not Detected   Comprehensive Metabolic Panel    Specimen: Blood   Result Value Ref Range    Glucose 136 (H) 65 - 99 mg/dL    BUN 21 8 - 23 mg/dL    Creatinine 0.86 0.57 - 1.00 mg/dL    Sodium 126 (L) 136 - 145 mmol/L    Potassium 3.6 3.5 - 5.2 mmol/L    Chloride 86 (L) 98 - 107 mmol/L    CO2 23.0 22.0 - 29.0 mmol/L    Calcium 8.1 (L) 8.6 - 10.5 mg/dL    Total Protein 6.8 6.0 - 8.5 g/dL    Albumin 3.80 3.50 - 5.20 g/dL    ALT (SGPT) 24 1 - 33 U/L    AST (SGOT) 31 1 - 32 U/L    Alkaline Phosphatase 81 39 - 117 U/L    Total Bilirubin 0.4 0.0 - 1.2 mg/dL    eGFR Non African Amer 67 >60 mL/min/1.73    Globulin 3.0 gm/dL    A/G Ratio 1.3 g/dL    BUN/Creatinine Ratio 24.4 7.0 - 25.0    Anion Gap 17.0 (H) 5.0 - 15.0 mmol/L   BNP    Specimen: Blood   Result Value Ref Range    proBNP 6.9 0.0 - 900.0 pg/mL   Troponin    Specimen: Blood   Result Value Ref Range    Troponin T <0.010 0.000 - 0.030 ng/mL   CBC Auto Differential    Specimen: Blood   Result Value Ref Range    WBC 11.61 (H) 3.40 - 10.80 10*3/mm3    RBC 4.06 3.77 - 5.28 10*6/mm3    Hemoglobin 12.9 12.0 - 15.9 g/dL    Hematocrit 37.0 34.0 - 46.6 %    MCV 91.1 79.0 - 97.0 fL    MCH 31.8 26.6 - 33.0 pg    MCHC 34.9 31.5 - 35.7 g/dL    RDW 13.2 12.3 - 15.4 %    RDW-SD 44.3 37.0 - 54.0 fl    MPV 9.1 6.0 - 12.0 fL    Platelets 592 (H) 140 - 450 10*3/mm3    Neutrophil % 54.0 42.7 - 76.0 %    Lymphocyte % 32.5 19.6 - 45.3 %    Monocyte % 11.2 5.0 - 12.0 %    Eosinophil % 0.9 0.3 - 6.2 %    Basophil % 0.6 0.0 - 1.5 %    Immature Grans % 0.8 (H) 0.0 - 0.5 %    Neutrophils, Absolute 6.28 1.70 - 7.00 10*3/mm3    Lymphocytes, Absolute 3.77 (H) 0.70 - 3.10 10*3/mm3    Monocytes, Absolute 1.30 (H) 0.10 - 0.90 10*3/mm3    Eosinophils, Absolute 0.10 0.00 - 0.40 10*3/mm3    Basophils, Absolute 0.07 0.00 - 0.20 10*3/mm3     Immature Grans, Absolute 0.09 (H) 0.00 - 0.05 10*3/mm3    nRBC 0.0 0.0 - 0.2 /100 WBC   ECG 12 Lead   Result Value Ref Range    QT Interval 312 ms    QTC Interval 437 ms   Light Blue Top   Result Value Ref Range    Extra Tube hold for add-on    Green Top (Gel)   Result Value Ref Range    Extra Tube Hold for add-ons.    Lavender Top   Result Value Ref Range    Extra Tube hold for add-on    Gold Top - SST   Result Value Ref Range    Extra Tube Hold for add-ons.        PE    Physical Exam  Vitals signs reviewed.   Constitutional:       General: She is not in acute distress.     Appearance: Normal appearance. She is well-developed and normal weight. She is not ill-appearing or diaphoretic.   HENT:      Head: Normocephalic and atraumatic.   Eyes:      Extraocular Movements: Extraocular movements intact.      Conjunctiva/sclera: Conjunctivae normal.   Neck:      Musculoskeletal: Normal range of motion.   Cardiovascular:      Rate and Rhythm: Normal rate and regular rhythm.      Heart sounds: Normal heart sounds.   Pulmonary:      Effort: Pulmonary effort is normal.      Breath sounds: Normal breath sounds.   Musculoskeletal: Normal range of motion.      Right lower leg: No edema.      Left lower leg: No edema.   Skin:     General: Skin is warm.      Findings: No erythema or rash.   Neurological:      General: No focal deficit present.      Mental Status: She is alert.   Psychiatric:         Attention and Perception: Attention and perception normal. She is attentive.         Mood and Affect: Mood and affect normal.         Speech: Speech normal.         Behavior: Behavior normal. Behavior is cooperative.         Thought Content: Thought content normal.         Cognition and Memory: Cognition and memory normal.         Judgment: Judgment normal.          A/P    Diagnoses and all orders for this visit:    1. Hospital discharge follow-up (Primary)  Seen in ER on 01/04.    2. Allergic reaction, sequela  3. Swollen  tongue  Resolved at ER.  No further issues.  Was on augmentin at the time.  ER physician also felt it may be related to benzapril.  Patient had been on this for 20+ years.  She was also taking amlodipine.  Patient has stopped all home medications since the event.    4. Essential hypertension  -     metoprolol succinate XL (TOPROL-XL) 25 MG 24 hr tablet; Take 1 tablet by mouth Daily.  Dispense: 30 tablet; Refill: 5  Repeat BP today is 150/80.  Stop benzapril and amlodipine.  Prefer to avoid dyazide due to low sodium while hospitalized.  Trial of metoprolol 25 mg QD.  Has asthma, but only mild.  No exacerbations in the past that required hospital visit.  Uses albuterol very infrequently.  Patient will monitor at home and call if elevated.  Follow-up in 1 month.    5. Rash and nonspecific skin eruption  -     triamcinolone (KENALOG) 0.1 % ointment; Apply  topically to the appropriate area as directed 3 (Three) Times a Day.  Dispense: 453.6 g; Refill: 0    6. Mixed hyperlipidemia  -     pravastatin (Pravachol) 40 MG tablet; Take 1 tablet by mouth Every Night.  Dispense: 30 tablet; Refill: 5  History of elevated cholesterol in the past.  Patient is not currently on a statin and never has been.  Recent CT neck/soft tissue showed stable plaque deposit on left bifurcation of carotid artery.  Recommend that we start pravastatin 40 mg daily.       Plan of care reviewed with patient at the conclusion of today's visit. Education was provided regarding diagnosis, management and any prescribed or recommended OTC medications.  Patient verbalizes understanding of and agreement with management plan.    Return in about 4 weeks (around 2/4/2021) for Annual physical.     Uzma Duffy PA-C

## 2021-01-14 DIAGNOSIS — J45.20 MILD INTERMITTENT ASTHMA WITHOUT COMPLICATION: ICD-10-CM

## 2021-01-15 RX ORDER — ALBUTEROL SULFATE 90 UG/1
AEROSOL, METERED RESPIRATORY (INHALATION)
Qty: 18 G | Refills: 5 | Status: SHIPPED | OUTPATIENT
Start: 2021-01-15 | End: 2021-03-25 | Stop reason: SDUPTHER

## 2021-01-16 ENCOUNTER — APPOINTMENT (OUTPATIENT)
Dept: GENERAL RADIOLOGY | Facility: HOSPITAL | Age: 61
End: 2021-01-16

## 2021-01-16 ENCOUNTER — HOSPITAL ENCOUNTER (OUTPATIENT)
Facility: HOSPITAL | Age: 61
Setting detail: OBSERVATION
Discharge: HOME | End: 2021-01-17
Attending: EMERGENCY MEDICINE | Admitting: FAMILY MEDICINE

## 2021-01-16 ENCOUNTER — APPOINTMENT (OUTPATIENT)
Dept: CT IMAGING | Facility: HOSPITAL | Age: 61
End: 2021-01-16

## 2021-01-16 ENCOUNTER — APPOINTMENT (OUTPATIENT)
Dept: NEUROLOGY | Facility: HOSPITAL | Age: 61
End: 2021-01-16

## 2021-01-16 ENCOUNTER — APPOINTMENT (OUTPATIENT)
Dept: MRI IMAGING | Facility: HOSPITAL | Age: 61
End: 2021-01-16

## 2021-01-16 DIAGNOSIS — R56.9 SEIZURE (HCC): Primary | ICD-10-CM

## 2021-01-16 PROBLEM — Z78.9 ALCOHOL USE: Status: ACTIVE | Noted: 2021-01-16

## 2021-01-16 PROBLEM — F10.90 ALCOHOL USE: Status: ACTIVE | Noted: 2021-01-16

## 2021-01-16 LAB
25(OH)D3 SERPL-MCNC: 15.8 NG/ML (ref 30–100)
ALBUMIN SERPL-MCNC: 3.2 G/DL (ref 3.5–5.2)
ALBUMIN/GLOB SERPL: 1.1 G/DL
ALP SERPL-CCNC: 62 U/L (ref 39–117)
ALT SERPL W P-5'-P-CCNC: 12 U/L (ref 1–33)
AMPHET+METHAMPHET UR QL: NEGATIVE
AMPHETAMINES UR QL: NEGATIVE
ANION GAP SERPL CALCULATED.3IONS-SCNC: 13 MMOL/L (ref 5–15)
ANION GAP SERPL CALCULATED.3IONS-SCNC: 15 MMOL/L (ref 5–15)
AST SERPL-CCNC: 18 U/L (ref 1–32)
BACTERIA UR QL AUTO: ABNORMAL /HPF
BARBITURATES UR QL SCN: NEGATIVE
BASOPHILS # BLD AUTO: 0.03 10*3/MM3 (ref 0–0.2)
BASOPHILS # BLD AUTO: 0.05 10*3/MM3 (ref 0–0.2)
BASOPHILS NFR BLD AUTO: 0.4 % (ref 0–1.5)
BASOPHILS NFR BLD AUTO: 0.5 % (ref 0–1.5)
BENZODIAZ UR QL SCN: NEGATIVE
BILIRUB SERPL-MCNC: 0.3 MG/DL (ref 0–1.2)
BILIRUB UR QL STRIP: NEGATIVE
BUN SERPL-MCNC: 5 MG/DL (ref 8–23)
BUN SERPL-MCNC: 5 MG/DL (ref 8–23)
BUN/CREAT SERPL: 6.8 (ref 7–25)
BUN/CREAT SERPL: 8.1 (ref 7–25)
BUPRENORPHINE SERPL-MCNC: NEGATIVE NG/ML
CA-I SERPL ISE-MCNC: 0.93 MMOL/L (ref 1.12–1.32)
CA-I SERPL ISE-MCNC: 1.02 MMOL/L (ref 1.12–1.32)
CALCIUM SPEC-SCNC: 6.5 MG/DL (ref 8.6–10.5)
CALCIUM SPEC-SCNC: 7.2 MG/DL (ref 8.6–10.5)
CANNABINOIDS SERPL QL: POSITIVE
CHLORIDE SERPL-SCNC: 102 MMOL/L (ref 98–107)
CHLORIDE SERPL-SCNC: 103 MMOL/L (ref 98–107)
CLARITY UR: ABNORMAL
CO2 SERPL-SCNC: 23 MMOL/L (ref 22–29)
CO2 SERPL-SCNC: 27 MMOL/L (ref 22–29)
COCAINE UR QL: NEGATIVE
COLOR UR: YELLOW
CREAT SERPL-MCNC: 0.62 MG/DL (ref 0.57–1)
CREAT SERPL-MCNC: 0.74 MG/DL (ref 0.57–1)
DEPRECATED RDW RBC AUTO: 44.5 FL (ref 37–54)
DEPRECATED RDW RBC AUTO: 46.3 FL (ref 37–54)
EOSINOPHIL # BLD AUTO: 0.03 10*3/MM3 (ref 0–0.4)
EOSINOPHIL # BLD AUTO: 0.08 10*3/MM3 (ref 0–0.4)
EOSINOPHIL NFR BLD AUTO: 0.4 % (ref 0.3–6.2)
EOSINOPHIL NFR BLD AUTO: 0.8 % (ref 0.3–6.2)
ERYTHROCYTE [DISTWIDTH] IN BLOOD BY AUTOMATED COUNT: 13.5 % (ref 12.3–15.4)
ERYTHROCYTE [DISTWIDTH] IN BLOOD BY AUTOMATED COUNT: 13.7 % (ref 12.3–15.4)
ETHANOL BLD-MCNC: <10 MG/DL (ref 0–10)
FLUAV RNA RESP QL NAA+PROBE: NOT DETECTED
FLUBV RNA RESP QL NAA+PROBE: NOT DETECTED
GFR SERPL CREATININE-BSD FRML MDRD: 80 ML/MIN/1.73
GFR SERPL CREATININE-BSD FRML MDRD: 98 ML/MIN/1.73
GLOBULIN UR ELPH-MCNC: 2.9 GM/DL
GLUCOSE BLDC GLUCOMTR-MCNC: 133 MG/DL (ref 70–130)
GLUCOSE SERPL-MCNC: 124 MG/DL (ref 65–99)
GLUCOSE SERPL-MCNC: 91 MG/DL (ref 65–99)
GLUCOSE UR STRIP-MCNC: ABNORMAL MG/DL
HCT VFR BLD AUTO: 34.5 % (ref 34–46.6)
HCT VFR BLD AUTO: 35.3 % (ref 34–46.6)
HGB BLD-MCNC: 11.5 G/DL (ref 12–15.9)
HGB BLD-MCNC: 11.7 G/DL (ref 12–15.9)
HGB UR QL STRIP.AUTO: ABNORMAL
HOLD SPECIMEN: NORMAL
HOLD SPECIMEN: NORMAL
IMM GRANULOCYTES # BLD AUTO: 0.05 10*3/MM3 (ref 0–0.05)
IMM GRANULOCYTES # BLD AUTO: 0.07 10*3/MM3 (ref 0–0.05)
IMM GRANULOCYTES NFR BLD AUTO: 0.6 % (ref 0–0.5)
IMM GRANULOCYTES NFR BLD AUTO: 0.7 % (ref 0–0.5)
KETONES UR QL STRIP: ABNORMAL
LEUKOCYTE ESTERASE UR QL STRIP.AUTO: NEGATIVE
LYMPHOCYTES # BLD AUTO: 1.1 10*3/MM3 (ref 0.7–3.1)
LYMPHOCYTES # BLD AUTO: 1.5 10*3/MM3 (ref 0.7–3.1)
LYMPHOCYTES NFR BLD AUTO: 10.5 % (ref 19.6–45.3)
LYMPHOCYTES NFR BLD AUTO: 18 % (ref 19.6–45.3)
MAGNESIUM SERPL-MCNC: 0.4 MG/DL (ref 1.6–2.4)
MCH RBC QN AUTO: 29.8 PG (ref 26.6–33)
MCH RBC QN AUTO: 30.3 PG (ref 26.6–33)
MCHC RBC AUTO-ENTMCNC: 33.1 G/DL (ref 31.5–35.7)
MCHC RBC AUTO-ENTMCNC: 33.3 G/DL (ref 31.5–35.7)
MCV RBC AUTO: 89.8 FL (ref 79–97)
MCV RBC AUTO: 91 FL (ref 79–97)
METHADONE UR QL SCN: NEGATIVE
MONOCYTES # BLD AUTO: 0.47 10*3/MM3 (ref 0.1–0.9)
MONOCYTES # BLD AUTO: 0.54 10*3/MM3 (ref 0.1–0.9)
MONOCYTES NFR BLD AUTO: 5.2 % (ref 5–12)
MONOCYTES NFR BLD AUTO: 5.6 % (ref 5–12)
NEUTROPHILS NFR BLD AUTO: 6.24 10*3/MM3 (ref 1.7–7)
NEUTROPHILS NFR BLD AUTO: 75 % (ref 42.7–76)
NEUTROPHILS NFR BLD AUTO: 8.63 10*3/MM3 (ref 1.7–7)
NEUTROPHILS NFR BLD AUTO: 82.3 % (ref 42.7–76)
NITRITE UR QL STRIP: NEGATIVE
NRBC BLD AUTO-RTO: 0 /100 WBC (ref 0–0.2)
NRBC BLD AUTO-RTO: 0 /100 WBC (ref 0–0.2)
OPIATES UR QL: NEGATIVE
OXYCODONE UR QL SCN: NEGATIVE
PCP UR QL SCN: NEGATIVE
PH UR STRIP.AUTO: 6.5 [PH] (ref 5–8)
PLATELET # BLD AUTO: 438 10*3/MM3 (ref 140–450)
PLATELET # BLD AUTO: 444 10*3/MM3 (ref 140–450)
PMV BLD AUTO: 8.6 FL (ref 6–12)
PMV BLD AUTO: 9 FL (ref 6–12)
POTASSIUM SERPL-SCNC: 2.9 MMOL/L (ref 3.5–5.2)
POTASSIUM SERPL-SCNC: 3.5 MMOL/L (ref 3.5–5.2)
POTASSIUM SERPL-SCNC: 3.7 MMOL/L (ref 3.5–5.2)
PROPOXYPH UR QL: NEGATIVE
PROT SERPL-MCNC: 6.1 G/DL (ref 6–8.5)
PROT UR QL STRIP: ABNORMAL
PTH-INTACT SERPL-MCNC: 42.4 PG/ML (ref 15–65)
RBC # BLD AUTO: 3.79 10*6/MM3 (ref 3.77–5.28)
RBC # BLD AUTO: 3.93 10*6/MM3 (ref 3.77–5.28)
RBC # UR: ABNORMAL /HPF
REF LAB TEST METHOD: ABNORMAL
SARS-COV-2 RNA RESP QL NAA+PROBE: NOT DETECTED
SODIUM SERPL-SCNC: 141 MMOL/L (ref 136–145)
SODIUM SERPL-SCNC: 142 MMOL/L (ref 136–145)
SP GR UR STRIP: 1.02 (ref 1–1.03)
SQUAMOUS #/AREA URNS HPF: ABNORMAL /HPF
TRICYCLICS UR QL SCN: NEGATIVE
UROBILINOGEN UR QL STRIP: ABNORMAL
WBC # BLD AUTO: 10.47 10*3/MM3 (ref 3.4–10.8)
WBC # BLD AUTO: 8.32 10*3/MM3 (ref 3.4–10.8)
WBC UR QL AUTO: ABNORMAL /HPF
WHOLE BLOOD HOLD SPECIMEN: NORMAL
WHOLE BLOOD HOLD SPECIMEN: NORMAL

## 2021-01-16 PROCEDURE — 82306 VITAMIN D 25 HYDROXY: CPT | Performed by: INTERNAL MEDICINE

## 2021-01-16 PROCEDURE — 83735 ASSAY OF MAGNESIUM: CPT | Performed by: INTERNAL MEDICINE

## 2021-01-16 PROCEDURE — 25010000002 CALCIUM GLUCONATE PER 10 ML: Performed by: INTERNAL MEDICINE

## 2021-01-16 PROCEDURE — 71045 X-RAY EXAM CHEST 1 VIEW: CPT

## 2021-01-16 PROCEDURE — 25010000002 LEVETIRACETAM IN NACL 0.82% 500 MG/100ML SOLUTION: Performed by: NURSE PRACTITIONER

## 2021-01-16 PROCEDURE — G0378 HOSPITAL OBSERVATION PER HR: HCPCS

## 2021-01-16 PROCEDURE — 85025 COMPLETE CBC W/AUTO DIFF WBC: CPT | Performed by: EMERGENCY MEDICINE

## 2021-01-16 PROCEDURE — 0 GADOBENATE DIMEGLUMINE 529 MG/ML SOLUTION: Performed by: EMERGENCY MEDICINE

## 2021-01-16 PROCEDURE — 93005 ELECTROCARDIOGRAM TRACING: CPT | Performed by: EMERGENCY MEDICINE

## 2021-01-16 PROCEDURE — 70450 CT HEAD/BRAIN W/O DYE: CPT

## 2021-01-16 PROCEDURE — 95819 EEG AWAKE AND ASLEEP: CPT

## 2021-01-16 PROCEDURE — 80053 COMPREHEN METABOLIC PANEL: CPT | Performed by: EMERGENCY MEDICINE

## 2021-01-16 PROCEDURE — 96374 THER/PROPH/DIAG INJ IV PUSH: CPT

## 2021-01-16 PROCEDURE — 80306 DRUG TEST PRSMV INSTRMNT: CPT | Performed by: EMERGENCY MEDICINE

## 2021-01-16 PROCEDURE — 87636 SARSCOV2 & INF A&B AMP PRB: CPT | Performed by: PHYSICIAN ASSISTANT

## 2021-01-16 PROCEDURE — 85025 COMPLETE CBC W/AUTO DIFF WBC: CPT | Performed by: NURSE PRACTITIONER

## 2021-01-16 PROCEDURE — 82962 GLUCOSE BLOOD TEST: CPT

## 2021-01-16 PROCEDURE — 99223 1ST HOSP IP/OBS HIGH 75: CPT | Performed by: INTERNAL MEDICINE

## 2021-01-16 PROCEDURE — 99205 OFFICE O/P NEW HI 60 MIN: CPT | Performed by: PSYCHIATRY & NEUROLOGY

## 2021-01-16 PROCEDURE — 81001 URINALYSIS AUTO W/SCOPE: CPT | Performed by: EMERGENCY MEDICINE

## 2021-01-16 PROCEDURE — 82077 ASSAY SPEC XCP UR&BREATH IA: CPT | Performed by: EMERGENCY MEDICINE

## 2021-01-16 PROCEDURE — 80048 BASIC METABOLIC PNL TOTAL CA: CPT | Performed by: NURSE PRACTITIONER

## 2021-01-16 PROCEDURE — 83970 ASSAY OF PARATHORMONE: CPT | Performed by: INTERNAL MEDICINE

## 2021-01-16 PROCEDURE — 94640 AIRWAY INHALATION TREATMENT: CPT

## 2021-01-16 PROCEDURE — 25010000002 MAGNESIUM SULFATE 2 GM/50ML SOLUTION: Performed by: FAMILY MEDICINE

## 2021-01-16 PROCEDURE — 82330 ASSAY OF CALCIUM: CPT | Performed by: INTERNAL MEDICINE

## 2021-01-16 PROCEDURE — 87086 URINE CULTURE/COLONY COUNT: CPT | Performed by: EMERGENCY MEDICINE

## 2021-01-16 PROCEDURE — A9577 INJ MULTIHANCE: HCPCS | Performed by: EMERGENCY MEDICINE

## 2021-01-16 PROCEDURE — 84132 ASSAY OF SERUM POTASSIUM: CPT | Performed by: FAMILY MEDICINE

## 2021-01-16 PROCEDURE — 70553 MRI BRAIN STEM W/O & W/DYE: CPT

## 2021-01-16 PROCEDURE — 82330 ASSAY OF CALCIUM: CPT | Performed by: FAMILY MEDICINE

## 2021-01-16 PROCEDURE — 99285 EMERGENCY DEPT VISIT HI MDM: CPT

## 2021-01-16 PROCEDURE — 25010000003 LEVETIRACETAM IN NACL 0.75% 1000 MG/100ML SOLUTION: Performed by: EMERGENCY MEDICINE

## 2021-01-16 PROCEDURE — 82652 VIT D 1 25-DIHYDROXY: CPT | Performed by: INTERNAL MEDICINE

## 2021-01-16 RX ORDER — POTASSIUM CHLORIDE 1.5 G/1.77G
40 POWDER, FOR SOLUTION ORAL AS NEEDED
Status: DISCONTINUED | OUTPATIENT
Start: 2021-01-16 | End: 2021-01-17 | Stop reason: HOSPADM

## 2021-01-16 RX ORDER — MAGNESIUM SULFATE HEPTAHYDRATE 40 MG/ML
2 INJECTION, SOLUTION INTRAVENOUS AS NEEDED
Status: DISCONTINUED | OUTPATIENT
Start: 2021-01-16 | End: 2021-01-17 | Stop reason: HOSPADM

## 2021-01-16 RX ORDER — MAGNESIUM SULFATE HEPTAHYDRATE 40 MG/ML
4 INJECTION, SOLUTION INTRAVENOUS AS NEEDED
Status: DISCONTINUED | OUTPATIENT
Start: 2021-01-16 | End: 2021-01-17 | Stop reason: HOSPADM

## 2021-01-16 RX ORDER — POTASSIUM CHLORIDE 750 MG/1
40 CAPSULE, EXTENDED RELEASE ORAL AS NEEDED
Status: DISCONTINUED | OUTPATIENT
Start: 2021-01-16 | End: 2021-01-17 | Stop reason: HOSPADM

## 2021-01-16 RX ORDER — ACETAMINOPHEN 160 MG/5ML
650 SOLUTION ORAL EVERY 4 HOURS PRN
Status: DISCONTINUED | OUTPATIENT
Start: 2021-01-16 | End: 2021-01-17 | Stop reason: HOSPADM

## 2021-01-16 RX ORDER — PRAVASTATIN SODIUM 40 MG
40 TABLET ORAL NIGHTLY
Status: DISCONTINUED | OUTPATIENT
Start: 2021-01-16 | End: 2021-01-17 | Stop reason: HOSPADM

## 2021-01-16 RX ORDER — METOPROLOL SUCCINATE 25 MG/1
25 TABLET, EXTENDED RELEASE ORAL DAILY
Status: DISCONTINUED | OUTPATIENT
Start: 2021-01-16 | End: 2021-01-17 | Stop reason: HOSPADM

## 2021-01-16 RX ORDER — POTASSIUM CHLORIDE 7.45 MG/ML
10 INJECTION INTRAVENOUS
Status: DISCONTINUED | OUTPATIENT
Start: 2021-01-16 | End: 2021-01-17 | Stop reason: HOSPADM

## 2021-01-16 RX ORDER — LORAZEPAM 2 MG/ML
1 INJECTION INTRAMUSCULAR
Status: DISCONTINUED | OUTPATIENT
Start: 2021-01-16 | End: 2021-01-17 | Stop reason: HOSPADM

## 2021-01-16 RX ORDER — ACETAMINOPHEN 650 MG/1
650 SUPPOSITORY RECTAL EVERY 4 HOURS PRN
Status: DISCONTINUED | OUTPATIENT
Start: 2021-01-16 | End: 2021-01-17 | Stop reason: HOSPADM

## 2021-01-16 RX ORDER — LORAZEPAM 2 MG/ML
2 INJECTION INTRAMUSCULAR
Status: DISCONTINUED | OUTPATIENT
Start: 2021-01-16 | End: 2021-01-17 | Stop reason: HOSPADM

## 2021-01-16 RX ORDER — LEVETIRACETAM 5 MG/ML
500 INJECTION INTRAVASCULAR EVERY 12 HOURS SCHEDULED
Status: DISCONTINUED | OUTPATIENT
Start: 2021-01-16 | End: 2021-01-17 | Stop reason: HOSPADM

## 2021-01-16 RX ORDER — SODIUM CHLORIDE 0.9 % (FLUSH) 0.9 %
10 SYRINGE (ML) INJECTION AS NEEDED
Status: DISCONTINUED | OUTPATIENT
Start: 2021-01-16 | End: 2021-01-17 | Stop reason: HOSPADM

## 2021-01-16 RX ORDER — LEVETIRACETAM 10 MG/ML
1000 INJECTION INTRAVASCULAR ONCE
Status: COMPLETED | OUTPATIENT
Start: 2021-01-16 | End: 2021-01-16

## 2021-01-16 RX ORDER — SODIUM CHLORIDE 9 MG/ML
50 INJECTION, SOLUTION INTRAVENOUS CONTINUOUS
Status: ACTIVE | OUTPATIENT
Start: 2021-01-16 | End: 2021-01-16

## 2021-01-16 RX ORDER — ACETAMINOPHEN 325 MG/1
650 TABLET ORAL EVERY 4 HOURS PRN
Status: DISCONTINUED | OUTPATIENT
Start: 2021-01-16 | End: 2021-01-17 | Stop reason: HOSPADM

## 2021-01-16 RX ORDER — PANTOPRAZOLE SODIUM 40 MG/1
40 TABLET, DELAYED RELEASE ORAL
Status: DISCONTINUED | OUTPATIENT
Start: 2021-01-16 | End: 2021-01-17 | Stop reason: HOSPADM

## 2021-01-16 RX ORDER — LORAZEPAM 1 MG/1
2 TABLET ORAL
Status: DISCONTINUED | OUTPATIENT
Start: 2021-01-16 | End: 2021-01-17 | Stop reason: HOSPADM

## 2021-01-16 RX ORDER — LORAZEPAM 1 MG/1
1 TABLET ORAL
Status: DISCONTINUED | OUTPATIENT
Start: 2021-01-16 | End: 2021-01-17 | Stop reason: HOSPADM

## 2021-01-16 RX ORDER — ALBUTEROL SULFATE 2.5 MG/3ML
2.5 SOLUTION RESPIRATORY (INHALATION) EVERY 6 HOURS PRN
Status: DISCONTINUED | OUTPATIENT
Start: 2021-01-16 | End: 2021-01-17 | Stop reason: HOSPADM

## 2021-01-16 RX ADMIN — MAGNESIUM SULFATE HEPTAHYDRATE 2 G: 2 INJECTION, SOLUTION INTRAVENOUS at 09:57

## 2021-01-16 RX ADMIN — LEVETIRACETAM 500 MG: 5 INJECTION INTRAVENOUS at 22:07

## 2021-01-16 RX ADMIN — POTASSIUM CHLORIDE 40 MEQ: 10 CAPSULE, COATED, EXTENDED RELEASE ORAL at 12:49

## 2021-01-16 RX ADMIN — MAGNESIUM SULFATE HEPTAHYDRATE 2 G: 2 INJECTION, SOLUTION INTRAVENOUS at 08:00

## 2021-01-16 RX ADMIN — SODIUM CHLORIDE, PRESERVATIVE FREE 10 ML: 5 INJECTION INTRAVENOUS at 22:07

## 2021-01-16 RX ADMIN — LEVETIRACETAM 1000 MG: 10 INJECTION INTRAVASCULAR at 04:57

## 2021-01-16 RX ADMIN — PANTOPRAZOLE SODIUM 40 MG: 40 TABLET, DELAYED RELEASE ORAL at 08:35

## 2021-01-16 RX ADMIN — POTASSIUM CHLORIDE 40 MEQ: 10 CAPSULE, COATED, EXTENDED RELEASE ORAL at 16:56

## 2021-01-16 RX ADMIN — GADOBENATE DIMEGLUMINE 12 ML: 529 INJECTION, SOLUTION INTRAVENOUS at 06:21

## 2021-01-16 RX ADMIN — CALCIUM GLUCONATE 1 G: 98 INJECTION, SOLUTION INTRAVENOUS at 06:52

## 2021-01-16 RX ADMIN — ALBUTEROL SULFATE 2.5 MG: 2.5 SOLUTION RESPIRATORY (INHALATION) at 17:52

## 2021-01-16 RX ADMIN — MAGNESIUM SULFATE HEPTAHYDRATE 2 G: 2 INJECTION, SOLUTION INTRAVENOUS at 14:54

## 2021-01-16 RX ADMIN — SODIUM CHLORIDE, PRESERVATIVE FREE 10 ML: 5 INJECTION INTRAVENOUS at 08:06

## 2021-01-16 RX ADMIN — METOPROLOL SUCCINATE 25 MG: 25 TABLET, EXTENDED RELEASE ORAL at 08:35

## 2021-01-16 RX ADMIN — MAGNESIUM SULFATE HEPTAHYDRATE 2 G: 2 INJECTION, SOLUTION INTRAVENOUS at 12:49

## 2021-01-16 RX ADMIN — PRAVASTATIN SODIUM 40 MG: 40 TABLET ORAL at 22:07

## 2021-01-16 RX ADMIN — LEVETIRACETAM 500 MG: 5 INJECTION INTRAVENOUS at 10:19

## 2021-01-16 RX ADMIN — SODIUM CHLORIDE 50 ML/HR: 9 INJECTION, SOLUTION INTRAVENOUS at 08:33

## 2021-01-16 RX ADMIN — POTASSIUM CHLORIDE 40 MEQ: 10 CAPSULE, COATED, EXTENDED RELEASE ORAL at 08:35

## 2021-01-16 RX ADMIN — MAGNESIUM SULFATE HEPTAHYDRATE 2 G: 2 INJECTION, SOLUTION INTRAVENOUS at 11:32

## 2021-01-16 NOTE — H&P
[facility] Medicine Services  HISTORY AND PHYSICAL    Patient Name: Jocelyne Lindquist  : 1960  MRN: [id number]  Primary Care Physician: Uzma Duffy PA-C  Date of admission: 2021    Subjective   Subjective     Chief Complaint:  Seizure    HPI:  Jocelyne Lindquist is a 60 y.o. female with a medical hx significant for hypertension, hyperlipidemia, GERD and tobacco use presented to [facility] for a new onset seizure. The patient does not recall the events surrounding the seizure. The patient's  woke up to the patient seizing while lying in bed. The episode lasted approximately 5 minutes. He reports she was posturing and unable to respond. The patient was postictal when EMS arrived to the house. There is evidence of the patient biting her tongue. The patient denies personal or family hx of seizures or cancer. Of note, the patient is unable to recall her last admission here on 21 for angioedema. She denies fever, chills, body aches. She denies N/V/D or abdominal pain. She denies dizziness, vision changes, weakness, numbness, neck stiffness or headache. She denies known exposure to COVID-19. She admits to smoking 1-2 cigarettes per day and drinks 2 beers most nights of the week. She denies any drug use. The patient lives at home with her  and works as a .     While in the ED, the patient's vitals were all stable. Labs were unremarkable. Ethanol <10. UDS positive for THC. CT head unrevealing. The patient was loaded with Keppra in the ED. She did not exhibit any further seizure activity. The patient will be admitted to the hospitalist service for further medical management.     Current COVID Risks are:  [] Fever []  Cough [] Shortness of breath [] Fatigue [] Change in taste or smell    [] Exposure to COVID positive patient  [] High risk facility   [x]  NONE    Review of Systems   Constitutional: Negative for chills, diaphoresis, fatigue and fever.   HENT:  Negative for congestion, sore throat and trouble swallowing.    Eyes: Negative for pain and visual disturbance.   Respiratory: Negative for cough, shortness of breath and wheezing.    Cardiovascular: Negative for chest pain, palpitations and leg swelling.   Gastrointestinal: Negative for abdominal pain, blood in stool, constipation, diarrhea, nausea and vomiting.   Genitourinary: Negative for difficulty urinating and dysuria.   Musculoskeletal: Negative for back pain and gait problem.   Skin: Negative for rash and wound.   Neurological: Positive for seizures. Negative for dizziness, syncope, speech difficulty, weakness and headaches.   Hematological: Negative for adenopathy. Does not bruise/bleed easily.   Psychiatric/Behavioral: Negative for agitation and confusion.      All other systems reviewed and are negative.     Personal History     Past Medical History:   Diagnosis Date   • Asthma    • GERD (gastroesophageal reflux disease)    • Hypertension    • Hypotension 2/18/2017   • PVD (peripheral vascular disease) (CMS/HCC)    • Seizures (CMS/HCC)    • 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, right     outer corner of right eye   • Tobacco abuse 2/18/2017    Quit 12/25/17       Past Surgical History:   Procedure Laterality Date   • BREAST BIOPSY Left 06/18/2010    stereo bx   • CYST REMOVAL Right     wrist   • SQUAMOUS CELL CARCINOMA EXCISION Right 12/13/2017    outer corner of right eye       Family History: family history includes Cancer in her mother; Heart failure in her father. Otherwise pertinent FHx was reviewed and unremarkable.     Social History:  reports that she has quit smoking. She has a 20.00 pack-year smoking history. She has never used smokeless tobacco. She reports that she does not drink alcohol or use drugs.  Social History     Social History Narrative   • Not on file       Medications:  albuterol sulfate HFA, cyclobenzaprine, metoprolol succinate XL, omeprazole, pravastatin, and  triamcinolone    Allergies   Allergen Reactions   • Amoxicillin Swelling   • Benazepril Swelling   • Doxycycline Diarrhea   • Eggs Or Egg-Derived Products      Causes cramps   • Fish-Derived Products      Causes severe stomach cramps       Objective   Objective     Vital Signs:   Temp:  [98.9 °F (37.2 °C)] 98.9 °F (37.2 °C)  Heart Rate:  [75-96] 75  Resp:  [20] 20  BP: (150-161)/(80-94) 150/80    Physical Exam   Constitutional: Awake, alert, lying in bed   Eyes: PERRLA, sclerae anicteric, no conjunctival injection  HENT: NCAT, mucous membranes dry, dried blood on tip of tongue   Neck: Supple, no thyromegaly, no lymphadenopathy, trachea midline  Respiratory: Slight inspiratory wheeze in upper airways, nonlabored respirations   Cardiovascular: RRR, no murmurs appreciated, palpable pedal pulses bilaterally  Gastrointestinal: Positive bowel sounds, soft, nontender, no distention or guarding   Musculoskeletal: No bilateral ankle edema, no clubbing or cyanosis to extremities  Psychiatric: Nervous affect, cooperative  Neurologic: Oriented x 3 however unable to recall past events, strength and sensation symmetric in all extremities, Cranial Nerves grossly intact to confrontation, speech clear, no facial droop  Skin: No rashes or wounds    Results Reviewed:  I have personally reviewed most recent indicated data and agree with findings including:  [x]  Laboratory  [x]  Radiology  [x]  EKG/Telemetry  []  Pathology  []  Cardiac/Vascular Studies  []  Old records  []  Other:  Most pertinent findings include:      LAB RESULTS:      Lab 01/16/21  0409   WBC 10.47   HEMOGLOBIN 11.7*   HEMATOCRIT 35.3   PLATELETS 438   NEUTROS ABS 8.63*   IMMATURE GRANS (ABS) 0.07*   LYMPHS ABS 1.10   MONOS ABS 0.54   EOS ABS 0.08   MCV 89.8         Lab 01/16/21  0409   SODIUM 142   POTASSIUM 3.5   CHLORIDE 102   CO2 27.0   ANION GAP 13.0   BUN 5*   CREATININE 0.74   GLUCOSE 124*   CALCIUM 6.5*         Lab 01/16/21  0409   TOTAL PROTEIN 6.1   ALBUMIN  3.20*   GLOBULIN 2.9   ALT (SGPT) 12   AST (SGOT) 18   BILIRUBIN 0.3   ALK PHOS 62                     Brief Urine Lab Results     None        Microbiology Results (last 10 days)     ** No results found for the last 240 hours. **          Ct Head Without Contrast    Result Date: 1/16/2021  CT Head WO HISTORY: 60-year-old female with new onset seizure in the emergency compartment. TECHNIQUE: Axial unenhanced head CT. Radiation dose reduction techniques included automated exposure control or exposure modulation based on body size. Count of known CT and cardiac nuc med studies performed in previous 12 months: 0. Time of scan: 0445 hours COMPARISON: None. FINDINGS: No intracranial hemorrhage, mass, or infarct. No hydrocephalus or extra-axial fluid collection. There are senescent changes, including volume loss and nonspecific white matter change, but no acute abnormality is seen. The skull base, calvarium, and extracranial soft tissues are normal.     Impression: Senescent changes without acute abnormality. Signer Name: Anthony Hilton MD  Signed: 1/16/2021 5:09 AM  Workstation Name: [email or web address]  [facility]           Assessment/Plan   Assessment & Plan       New onset seizure (CMS/HCC)    Tobacco use    Essential hypertension    Mixed hyperlipidemia    Alcohol use    Jocelyne Lindquist is a 60 y.o. female with a medical hx significant for hypertension, hyperlipidemia, GERD and tobacco use presented to [facility] for a new onset seizure.    1. New onset seizure   - CT head unrevealing   - obtain MRI brain with and without contrast   - consult neurology   - will likely need an EEG   - received 1000 mg of Keppra, continue   - seizure precautions     2. Hypocalcemia  - check ionized calcium, PTH, Vitamin D   - give 1 g of calcium gluconate     3. Hypertension  - continue metoprolol     4. Hyperlipidemia  - continue statin    5. GERD  - continue PPI    6. Asthma / COPD  - prn albuterol inhaler     7. Alcohol  use  - ethanol WNL  - admits to drinking 2 beers most days   - CIWA prn protocol     8. Marijuana use  - patient denies   - UDS positive for THC    9. Tobacco use   - 1-2 cigarettes per day   - smoking cessation education   - pt declines nicotine replacement     DVT prophylaxis: mechanical     CODE STATUS:    Code Status and Medical Interventions:   Ordered at: 01/16/21 0531     Level Of Support Discussed With:    Patient     Code Status:    CPR     Medical Interventions (Level of Support Prior to Arrest):    Full     This note has been completed as part of a split-shared workflow.     Electronically signed by Landy Urban PA-C, 01/16/21, 6:04 AM EST.          Attending   Admission Attestation       I have seen and examined the patient, performing an independent face-to-face diagnostic evaluation with plan of care reviewed and developed with the advanced practice clinician (APC).      Brief Summary Statement:   Jocelyne Lindquist is a 60 y.o. female with past medical history of GERD, hypertension, hyperlipidemia who presents to the ER with complaints of new onset seizure.  Patient does not recall any of the events surrounding the seizure and therefore history is limited from her.  History is provided by the .  Apparently the  woke to the patient seizing beside him in bed.  She has never had history of this in the past.  Lasted approximately 5 minutes in duration.  Described as posturing and unable to respond.  Patient noted to be postictal by EMS upon arrival.  She did injure her tongue biting it.  Patient noted to be confused and did not recall the year upon arrival to the ER.  Since that time she has become more alert.  She is unable to recall her recent admission here on 1/4/2021 for angioedema.    Patient is somewhat vague about how much she drinks.  She does report smoking but denies THC use  Remainder of detailed HPI is as noted by APC and has been reviewed and/or edited by me for  completeness.    Attending Physical Exam:  Constitutional: Awake, alert, appears nontoxic.   Eyes: PERRLA, sclerae anicteric, no conjunctival injection  HENT: NCAT, mucous membranes moist  Neck: Supple, no thyromegaly, no lymphadenopathy, trachea midline  Respiratory: Clear to auscultation bilaterally, nonlabored respirations   Cardiovascular: RRR, no murmurs, rubs, or gallops, palpable pedal pulses bilaterally  Gastrointestinal: Positive bowel sounds, soft, nontender, nondistended  Musculoskeletal: No bilateral ankle edema, no clubbing or cyanosis to extremities  Psychiatric: Appropriate affect, cooperative  Neurologic: Oriented x 3 but remains mildly confused, strength symmetric in all extremities, Cranial Nerves grossly intact to confrontation, speech clear  Skin: No rashes      Brief Assessment/Plan :  See detailed assessment and plan developed with APC which I have reviewed and/or edited for completeness.        Admission Status: I believe that this patient meets INPATIENT status due to new onset seizure.  I feel patient’s risk for adverse outcomes and need for care warrant INPATIENT evaluation and I predict the patient’s care encounter to likely last beyond 2 midnights.        Anil Werner DO  01/16/21

## 2021-01-16 NOTE — PLAN OF CARE
Goal Outcome Evaluation:  Plan of Care Reviewed With: patient     Outcome Summary: VSS, RA, NSR on monitor. Pt denies pain or discomfort. Per pt's spouse, pt typically drinks approximately 10 beers per day. Last drink was about a week ago per spouse. CIWA 0-3. EEG completed. Mg and K repalced per protocol. Will continue to monitor.

## 2021-01-16 NOTE — ED PROVIDER NOTES
Subjective   Jocelyne Lindquist is a 60 y.o. female who presents to the ED with c/o seizures. Today the patient and her  were asleep in their bed when the  was awoken by her seizing. The episode lasted for 5 minutes and she was posturing and unresponsive. Her  contacted EMS to come to the scene and upon arrival she was post ictal, prompting EMS to transport her to the ED for evaluation. She has no prior history of seizures or any episodes similar to the current one. The patient is confused and bit her tongue during the episode but no urinary incontinence. She was seen in the ED on 1/4/2021 for an allergic reaction to a drug and diagnosed with angioedema. The patient has a past medical history of hypertension, peripheral vascular disease, asthma, and GERD. There are no other acute complaints at this time.      History provided by:  EMS personnel, patient and spouse  Seizures  Seizure activity on arrival: no    Seizure type: posturing.  Preceding symptoms: no euphoria, no hyperventilation and no panic    Initial focality:  Diffuse  Episode characteristics: stiffening and unresponsiveness    Postictal symptoms: confusion    Return to baseline: no    Severity:  Moderate  Duration:  5 minutes  Timing:  Once  Number of seizures this episode:  1  Progression:  Improving  Context: not alcohol withdrawal, not family hx of seizures, not fever, not intracranial lesion, not intracranial shunt, not pregnant and not previous head injury    Recent head injury:  No recent head injuries  PTA treatment:  None  History of seizures: no        Review of Systems   Constitutional: Negative for chills and fever.   HENT:        The patient bit her tongue during the episode.   Genitourinary:        She denies urinating on herself.   Neurological: Positive for seizures.        The patient does not recall her seizure-activity from this morning.   Psychiatric/Behavioral: Positive for confusion.   All other systems reviewed  and are negative.      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, right     outer corner of right eye   • Tobacco abuse 2/18/2017    Quit 12/25/17       Allergies   Allergen Reactions   • Amoxicillin Swelling   • Benazepril Swelling   • Doxycycline Diarrhea   • Eggs Or Egg-Derived Products      Causes cramps   • Fish-Derived Products      Causes severe stomach cramps       Past Surgical History:   Procedure Laterality Date   • BREAST BIOPSY Left 06/18/2010    stereo bx   • CYST REMOVAL Right     wrist   • SQUAMOUS CELL CARCINOMA EXCISION Right 12/13/2017    outer corner of right eye       Family History   Problem Relation Age of Onset   • Cancer Mother         esophageal and liver   • Heart failure Father    • Breast cancer Neg Hx    • Ovarian cancer Neg Hx        Social History     Socioeconomic History   • Marital status:      Spouse name: Not on file   • Number of children: Not on file   • Years of education: Not on file   • Highest education level: Not on file   Tobacco Use   • Smoking status: Former Smoker     Packs/day: 1.00     Years: 20.00     Pack years: 20.00   • Smokeless tobacco: Never Used   • Tobacco comment: 12/25/17   Substance and Sexual Activity   • Alcohol use: No     Comment: not since 12/25/17   • Drug use: No   • Sexual activity: Defer         Objective   Physical Exam  Vitals signs and nursing note reviewed.   Constitutional:       General: She is not in acute distress.     Appearance: She is well-developed.   HENT:      Head: Normocephalic and atraumatic.      Mouth/Throat:      Comments: Abrasion to the right side of the tongue consistent with seizure history.  Eyes:      General: No scleral icterus.     Conjunctiva/sclera: Conjunctivae normal.   Neck:      Musculoskeletal: Normal range of motion  and neck supple.   Cardiovascular:      Rate and Rhythm: Normal rate and regular rhythm.      Heart sounds: Normal heart sounds. No murmur.   Pulmonary:      Effort: Pulmonary effort is normal. No respiratory distress.      Breath sounds: Normal breath sounds.   Abdominal:      Palpations: Abdomen is soft.      Tenderness: There is no abdominal tenderness.   Musculoskeletal: Normal range of motion.   Skin:     General: Skin is warm and dry.   Neurological:      General: No focal deficit present.      Mental Status: She is alert. She is disoriented.      Comments: No focal neurological deficits. Oriented to person and place but not time.   Psychiatric:         Behavior: Behavior normal.         Procedures         ED Course     No results found for this or any previous visit (from the past 24 hour(s)).  Note: In addition to lab results from this visit, the labs listed above may include labs taken at another facility or during a different encounter within the last 24 hours. Please correlate lab times with ED admission and discharge times for further clarification of the services performed during this visit.    XR Chest 1 View   Final Result   No acute cardiopulmonary disease.        DICTATED:   01/16/2021   EDITED/ls :   01/16/2021       This report was finalized on 1/16/2021 1:56 PM by Dr. Amparo Bush MD.          MRI Brain With & Without Contrast   Final Result   Impression:   1.  No acute intracranial findings.      2.  No evidence of abnormal enhancement.      3.  Mild cerebral atrophy and scattered areas of FLAIR hyperintensity in the periventricular and subcortical white matter likely representing microvascular disease in this patient.            Signer Name: Trace Foreman MD    Signed: 1/16/2021 10:33 AM    Workstation Name: [email or web address]     [facility]      CT Head Without Contrast   Final Result   Senescent changes without acute abnormality.               Signer Name: Anthony  "MD Yobani    Signed: 1/16/2021 5:09 AM    Workstation Name: [facility]     [facility]        Vitals:    01/16/21 0312   BP: 161/94   BP Location: Right arm   Patient Position: Lying   Pulse: 96   Resp: 20   Temp: 98.9 °F (37.2 °C)   TempSrc: Oral   SpO2: 90%   Weight: 62.6 kg (138 lb)   Height: 157.5 cm (62\")     Medications   sodium chloride 0.9 % flush 10 mL (has no administration in time range)   levETIRAcetam in NaCl 0.75% (KEPPRA) IVPB 1,000 mg (has no administration in time range)     ECG/EMG Results (last 24 hours)     Procedure Component Value Units Date/Time    ECG 12 Lead [[id number]] Collected: 01/16/21 0315     Updated: 01/16/21 0316        ECG 12 Lead           Results for AUTUMN FONTAINE (MRN [id number]) as of 1/19/2021 09:25   Ref. Range 1/16/2021 04:09   Glucose Latest Ref Range: 65 - 99 mg/dL 124 (H)   Sodium Latest Ref Range: 136 - 145 mmol/L 142   Potassium Latest Ref Range: 3.5 - 5.2 mmol/L 3.5   CO2 Latest Ref Range: 22.0 - 29.0 mmol/L 27.0   Chloride Latest Ref Range: 98 - 107 mmol/L 102   Anion Gap Latest Ref Range: 5.0 - 15.0 mmol/L 13.0   Creatinine Latest Ref Range: 0.57 - 1.00 mg/dL 0.74   BUN Latest Ref Range: 8 - 23 mg/dL 5 (L)   BUN/Creatinine Ratio Latest Ref Range: 7.0 - 25.0  6.8 (L)   Calcium Latest Ref Range: 8.6 - 10.5 mg/dL 6.5 (L)   Ionized Calcium Latest Ref Range: 1.12 - 1.32 mmol/L 0.93 (L)   eGFR Non  Am Latest Ref Range: >60 mL/min/1.73 80   Alkaline Phosphatase Latest Ref Range: 39 - 117 U/L 62   Total Protein Latest Ref Range: 6.0 - 8.5 g/dL 6.1   ALT (SGPT) Latest Ref Range: 1 - 33 U/L 12   AST (SGOT) Latest Ref Range: 1 - 32 U/L 18   Total Bilirubin Latest Ref Range: 0.0 - 1.2 mg/dL 0.3   Albumin Latest Ref Range: 3.50 - 5.20 g/dL 3.20 (L)   Globulin Latest Units: gm/dL 2.9   A/G Ratio Latest Units: g/dL 1.1   PTH Intact (Serial Monitor) Latest Ref Range: 15.0 - 65.0 pg/mL 42.4   Magnesium Latest Ref Range: 1.6 - 2.4 mg/dL 0.4 (C) "   25 Hydroxy, Vitamin D Latest Ref Range: 30.0 - 100.0 ng/ml 15.8 (L)   WBC Latest Ref Range: 3.40 - 10.80 10*3/mm3 10.47   RBC Latest Ref Range: 3.77 - 5.28 10*6/mm3 3.93   Hemoglobin Latest Ref Range: 12.0 - 15.9 g/dL 11.7 (L)   Hematocrit Latest Ref Range: 34.0 - 46.6 % 35.3   RDW Latest Ref Range: 12.3 - 15.4 % 13.5   MCV Latest Ref Range: 79.0 - 97.0 fL 89.8   MCH Latest Ref Range: 26.6 - 33.0 pg 29.8   MCHC Latest Ref Range: 31.5 - 35.7 g/dL 33.1   MPV Latest Ref Range: 6.0 - 12.0 fL 8.6   Platelets Latest Ref Range: 140 - 450 10*3/mm3 438   RDW-SD Latest Ref Range: 37.0 - 54.0 fl 44.5   Neutrophil Rel % Latest Ref Range: 42.7 - 76.0 % 82.3 (H)   Lymphocyte Rel % Latest Ref Range: 19.6 - 45.3 % 10.5 (L)   Monocyte Rel % Latest Ref Range: 5.0 - 12.0 % 5.2   Eosinophil Rel % Latest Ref Range: 0.3 - 6.2 % 0.8   Basophil Rel % Latest Ref Range: 0.0 - 1.5 % 0.5   Immature Granulocyte Rel % Latest Ref Range: 0.0 - 0.5 % 0.7 (H)   Neutrophils Absolute Latest Ref Range: 1.70 - 7.00 10*3/mm3 8.63 (H)   Lymphocytes Absolute Latest Ref Range: 0.70 - 3.10 10*3/mm3 1.10   Monocytes Absolute Latest Ref Range: 0.10 - 0.90 10*3/mm3 0.54   Eosinophils Absolute Latest Ref Range: 0.00 - 0.40 10*3/mm3 0.08   Basophils Absolute Latest Ref Range: 0.00 - 0.20 10*3/mm3 0.05   Immature Grans, Absolute Latest Ref Range: 0.00 - 0.05 10*3/mm3 0.07 (H)   nRBC Latest Ref Range: 0.0 - 0.2 /100 WBC 0.0     Results for AUTUMN FONTAINE (MRN [id number]) as of 1/19/2021 09:25   Ref. Range 1/16/2021 03:57   Color, UA Latest Ref Range: Yellow, Straw  Yellow   Appearance, UA Latest Ref Range: Clear  Cloudy (A)   Specific [place], UA Latest Ref Range: 1.001 - 1.030  1.016   pH, UA Latest Ref Range: 5.0 - 8.0  6.5   Glucose Latest Ref Range: Negative  100 mg/dL (Trace) (A)   Ketones, UA Latest Ref Range: Negative  Trace (A)   Blood, UA Latest Ref Range: Negative  Small (1+) (A)   Nitrite, UA Latest Ref Range: Negative  Negative   Leukocytes, UA  Latest Ref Range: Negative  Negative   Protein, UA Latest Ref Range: Negative  >=300 mg/dL (3+) (A)   Bilirubin, UA Latest Ref Range: Negative  Negative   Urobilinogen, UA Latest Ref Range: 0.2 - 1.0 E.U./dL  1.0 E.U./dL   RBC, UA Latest Ref Range: None Seen, 0-2 /HPF 3-6 (A)   WBC, UA Latest Ref Range: None Seen, 0-2 /HPF 13-20 (A)   Bacteria, UA Latest Ref Range: None Seen, Trace /HPF Trace   Squamous Epithelial Cells, UA Latest Ref Range: None Seen, 0-2 /HPF 21-30 (A)   Methodology: Unknown Automated Microscopy   URINE CULTURE Unknown Rpt   Amphetamine, Urine Qual Latest Ref Range: Negative  Negative   Barbiturates Screen, Urine Latest Ref Range: Negative  Negative   Benzodiazepine Screen, Urine Latest Ref Range: Negative  Negative   Buprenorphine, Screen, Urine Latest Ref Range: Negative  Negative   Cocaine Screen, Urine Latest Ref Range: Negative  Negative   Methamphetamine, Ur Latest Ref Range: Negative  Negative   Methadone Screen , Urine Latest Ref Range: Negative  Negative   Opiate Screen Latest Ref Range: Negative  Negative   Oxycodone Screen, Urine Latest Ref Range: Negative  Negative   Phencyclidine (PCP), Urine Latest Ref Range: Negative  Negative   Propoxyphene Screen Latest Ref Range: Negative  Negative   THC Screen, Urine Latest Ref Range: Negative  Positive (A)   Tricyclic Antidepressants Screen Latest Ref Range: Negative  Negative                      MDM    Final diagnoses:   Seizure (CMS/HCC)       Documentation assistance provided by valentin Post.  Information recorded by the scribe was done at my direction and has been verified and validated by me.     Tho Post  01/16/21 0331       Manuelito Sharif DO  01/19/21 2973

## 2021-01-16 NOTE — PROGRESS NOTES
[facility] Medicine Services  ADMISSION FOLLOW-UP NOTE          Patient admitted after midnight, H&P by my partner performed earlier on today's date reviewed.  Interim findings, labs, and charting also reviewed.        [facility] Hospital Problem List has been managed and updated to include any new diagnoses:  Active Hospital Problems    Diagnosis  POA   • **New onset seizure (CMS/HCC) [R56.9]  Yes   • Alcohol use [Z72.89]  Yes   • GERD (gastroesophageal reflux disease) [K21.9]  Yes   • Essential hypertension [I10]  Yes   • Mixed hyperlipidemia [E78.2]  Yes   • Tobacco use [Z72.0]  Yes      Resolved Hospital Problems   No resolved problems to display.         ADDITIONAL PLAN:  - detailed assessment and plan from admission reviewed  - Pt seen and examined. Pt asleep on my exam. Neuro to see in consult. MRI reviewed. AM labs. Seizure precautions in place.     Shani Giraldo,   01/16/21

## 2021-01-16 NOTE — CONSULTS
Referring Provider: Dr. Werner  Reason for Consultation: New seizures    Patient Care Team:  Uzma Duffy PA-C as PCP - General (Physician Assistant)    Chief complaint new seizure    Subjective .     History of present illness: 60-year-old woman with PMH notable for HTN, HLD, asthma and tobacco use presented to the ED this morning due to new onset seizure witnessed by her .  Patient's  woke to the patient convulsing while lying in bed.  This lasted approximately 5 minutes during which time she was posturing and unable to respond.  EMS was called and when they arrived patient appeared postictal.  She had bitten her tongue but had no urinary incontinence.  In the ED she was partially oriented with no focal deficits.  She was loaded with Keppra.  Patient with no memory of the event.  On admission also unable to recall admission here on 1/4/2021 for angioedema.  Patient this is that her memory is back to normal although she does not remember the events overnight.  She states she recalls everything done this morning although the nurse notes she did not rather she had taken her potassium.  She denies muscle soreness or headache or any vision changes.  She reports she had been feeling generally weak for the last 7 to 8 days, and that her appetite was poor and she was eating very little.  She reports she will often drink 4-5 beers while watching a game on the weekend and notes no effect from that but denies drinking up to 10 as stated by her .  She has not had any alcohol over the last week or so since she has been feeling ill.  She denies any previous history of seizures or any history of CNS infection.  She denies any morning symptoms and was asleep when the seizure happened.      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, right     outer corner of right eye   • Tobacco abuse 2/18/2017    Quit 12/25/17   ,   Past Surgical History:   Procedure Laterality Date   • BREAST BIOPSY Left 06/18/2010    stereo bx   • CYST REMOVAL Right     wrist   • SQUAMOUS CELL CARCINOMA EXCISION Right 12/13/2017    outer corner of right eye   ,   Family History   Problem Relation Age of Onset   • Cancer Mother         esophageal and liver   • Heart failure Father    • Breast cancer Neg Hx    • Ovarian cancer Neg Hx    ,   Social History     Tobacco Use   • Smoking status: Former Smoker     Packs/day: 1.00     Years: 20.00     Pack years: 20.00   • Smokeless tobacco: Never Used   • Tobacco comment: 12/25/17   Substance Use Topics   • Alcohol use: No     Comment: not since 12/25/17   • Drug use: No     E-cigarette/Vaping     E-cigarette/Vaping Substances     E-cigarette/Vaping Devices       ,   Medications Prior to Admission   Medication Sig Dispense Refill Last Dose   • albuterol sulfate  (90 Base) MCG/ACT inhaler INHALE 2 PUFFS BY MOUTH EVERY 6 HOURS AS NEEDED FOR WHEEZING 18 g 5    • metoprolol succinate XL (TOPROL-XL) 25 MG 24 hr tablet Take 1 tablet by mouth Daily. 30 tablet 5    • omeprazole (priLOSEC) 20 MG capsule Take 1 capsule by mouth once daily 90 capsule 0    • pravastatin (Pravachol) 40 MG tablet Take 1 tablet by mouth Every Night. 30 tablet 5    • triamcinolone (KENALOG) 0.1 % ointment Apply  topically to the appropriate area as directed 3 (Three) Times a Day. 453.6 g 0    • cyclobenzaprine (FLEXERIL) 10 MG tablet Take 1 tablet by mouth 3 (Three) Times a Day As Needed for Muscle Spasms. 30 tablet 0    , Scheduled Meds:  levETIRAcetam, 500 mg, Intravenous, Q12H  metoprolol succinate XL, 25 mg, Oral, Daily  pantoprazole, 40 mg, Oral, Q AM  pravastatin, 40 mg, Oral, Nightly    ,  "Continuous Infusions:  sodium chloride, 50 mL/hr, Last Rate: 50 mL/hr (01/16/21 0833)    , PRN Meds:  •  acetaminophen **OR** acetaminophen **OR** acetaminophen  •  albuterol  •  LORazepam **OR** LORazepam **OR** LORazepam **OR** LORazepam **OR** LORazepam **OR** LORazepam  •  magnesium sulfate **OR** magnesium sulfate **OR** magnesium sulfate  •  potassium chloride **OR** potassium chloride **OR** potassium chloride  •  sodium chloride and Allergies:  Amoxicillin, Benazepril, Doxycycline, Eggs or egg-derived products, and Fish-derived products    Objective     Vital Signs   Blood pressure 151/79, pulse 69, temperature 98.4 °F (36.9 °C), temperature source Oral, resp. rate 18, height 160 cm (63\"), weight 60.6 kg (133 lb 9.6 oz), SpO2 91 %.    Physical Exam:   Well-developed middle-aged white woman in no acute distress, alert and fully oriented, with normal fund of knowledge, memory intact for remote events but impaired for events earlier this morning.  Concentration mildly decreased and somewhat tangential in speech.  Not aphasic.  No dysarthria.  Pupils 2.5 mm and reactive visual fields full to confrontation, extraocular movements intact, normal facial sensation and movement, hearing intact to voice, palate and shoulders elevates symmetrically and tongue protrudes midline  Motor: 5/5 throughout with no pronator drift  Reflexes 3+ throughout and symmetric, withdraws from plantar stim  Light touch symmetric throughout  Muscle tone normal  Coordination intact in upper and lower extremities including finger-nose-finger and heel-knee-shin; mild intention tremor  Neck supple, no carotid bruit appreciated  Heart RRR no murmur appreciated  Abdomen soft, NT, ND with positive bowel sounds; truncal obesity, thin extremities  Extremities without cyanosis or edema  Skin warm and dry, no rashes appreciated  Head normocephalic and atraumatic; lacerations on sides of tongue    Results Review:   I reviewed the patient's new " clinical results.  I reviewed the patient's new imaging results and agree with the interpretation.  I reviewed the patient's other test results and agree with the interpretation  Brain MRI images reviewed, agree unremarkable/no acute abnormalities  Labs reviewed  Covid 19 PCR negative  CMP with glucose 124 BUN 5 calcium 6.5 albumin 3.2 otherwise unremarkable  Ethanol less than 10  CBC unremarkable slight increase in percent neutrophils at 82 was a white count of 10.47  UDS positive for THC  UA with 100 glucose trace ketones small blood and greater than 300 protein, 3-6 red cells 13-20 white cells and 21-30 epithelial cells      Assessment/Plan       New onset seizure (CMS/HCC)    Tobacco use    Essential hypertension    Mixed hyperlipidemia    Alcohol use    GERD (gastroesophageal reflux disease)      New onset seizure-in the setting of recent malaise, decreased p.o. intake and decreased alcohol intake.  MRI is normal.  EEG is pending, ordered this morning.  Discussed with patient that we will discuss chance of recurrence when all tests available.  It is possible that this recent possible illness with malaise and/or decrease in alcohol use may have lowered seizure threshold although quite far out from last drink.  Will make further recommendations tomorrow pending review of all tests.    I discussed the patient's findings and my recommendations with patient and nursing staff    Lisa Sharpe MD  01/16/21  10:47 EST

## 2021-01-17 ENCOUNTER — READMISSION MANAGEMENT (OUTPATIENT)
Dept: CALL CENTER | Facility: HOSPITAL | Age: 61
End: 2021-01-17

## 2021-01-17 VITALS
OXYGEN SATURATION: 99 % | WEIGHT: 137.4 LBS | DIASTOLIC BLOOD PRESSURE: 76 MMHG | TEMPERATURE: 98.8 F | HEIGHT: 63 IN | SYSTOLIC BLOOD PRESSURE: 119 MMHG | RESPIRATION RATE: 18 BRPM | BODY MASS INDEX: 24.34 KG/M2 | HEART RATE: 82 BPM

## 2021-01-17 PROBLEM — E55.9 VITAMIN D DEFICIENCY: Status: ACTIVE | Noted: 2021-01-17

## 2021-01-17 PROBLEM — E83.51 HYPOCALCEMIA: Status: ACTIVE | Noted: 2021-01-17

## 2021-01-17 PROBLEM — E87.6 HYPOKALEMIA: Status: RESOLVED | Noted: 2017-02-17 | Resolved: 2021-01-17

## 2021-01-17 PROBLEM — R56.9 NEW ONSET SEIZURE (HCC): Status: RESOLVED | Noted: 2021-01-16 | Resolved: 2021-01-17

## 2021-01-17 PROBLEM — E83.42 HYPOMAGNESEMIA: Status: RESOLVED | Noted: 2017-02-18 | Resolved: 2021-01-17

## 2021-01-17 PROBLEM — R56.9 SEIZURE: Status: ACTIVE | Noted: 2021-01-17

## 2021-01-17 LAB
ANION GAP SERPL CALCULATED.3IONS-SCNC: 12 MMOL/L (ref 5–15)
BACTERIA SPEC AEROBE CULT: NORMAL
BUN SERPL-MCNC: 4 MG/DL (ref 8–23)
BUN/CREAT SERPL: 5.9 (ref 7–25)
CALCIUM SPEC-SCNC: 6.9 MG/DL (ref 8.6–10.5)
CHLORIDE SERPL-SCNC: 102 MMOL/L (ref 98–107)
CO2 SERPL-SCNC: 21 MMOL/L (ref 22–29)
CREAT SERPL-MCNC: 0.68 MG/DL (ref 0.57–1)
DEPRECATED RDW RBC AUTO: 47.5 FL (ref 37–54)
ERYTHROCYTE [DISTWIDTH] IN BLOOD BY AUTOMATED COUNT: 13.9 % (ref 12.3–15.4)
GFR SERPL CREATININE-BSD FRML MDRD: 88 ML/MIN/1.73
GLUCOSE SERPL-MCNC: 167 MG/DL (ref 65–99)
HCT VFR BLD AUTO: 36.6 % (ref 34–46.6)
HGB BLD-MCNC: 11.7 G/DL (ref 12–15.9)
MAGNESIUM SERPL-MCNC: 2.4 MG/DL (ref 1.6–2.4)
MCH RBC QN AUTO: 29.4 PG (ref 26.6–33)
MCHC RBC AUTO-ENTMCNC: 32 G/DL (ref 31.5–35.7)
MCV RBC AUTO: 92 FL (ref 79–97)
PLATELET # BLD AUTO: 430 10*3/MM3 (ref 140–450)
PMV BLD AUTO: 9.3 FL (ref 6–12)
POTASSIUM SERPL-SCNC: 3.7 MMOL/L (ref 3.5–5.2)
RBC # BLD AUTO: 3.98 10*6/MM3 (ref 3.77–5.28)
SODIUM SERPL-SCNC: 135 MMOL/L (ref 136–145)
WBC # BLD AUTO: 8.68 10*3/MM3 (ref 3.4–10.8)

## 2021-01-17 PROCEDURE — 99213 OFFICE O/P EST LOW 20 MIN: CPT | Performed by: PSYCHIATRY & NEUROLOGY

## 2021-01-17 PROCEDURE — 85027 COMPLETE CBC AUTOMATED: CPT | Performed by: FAMILY MEDICINE

## 2021-01-17 PROCEDURE — G0378 HOSPITAL OBSERVATION PER HR: HCPCS

## 2021-01-17 PROCEDURE — 80048 BASIC METABOLIC PNL TOTAL CA: CPT | Performed by: FAMILY MEDICINE

## 2021-01-17 PROCEDURE — 25010000002 LEVETIRACETAM IN NACL 0.82% 500 MG/100ML SOLUTION: Performed by: NURSE PRACTITIONER

## 2021-01-17 PROCEDURE — 99217 PR OBSERVATION CARE DISCHARGE MANAGEMENT: CPT | Performed by: FAMILY MEDICINE

## 2021-01-17 PROCEDURE — 83735 ASSAY OF MAGNESIUM: CPT | Performed by: FAMILY MEDICINE

## 2021-01-17 RX ORDER — MAGNESIUM OXIDE 400 MG/1
400 TABLET ORAL 2 TIMES DAILY
Qty: 60 TABLET | Refills: 0 | Status: SHIPPED | OUTPATIENT
Start: 2021-01-17 | End: 2021-02-28 | Stop reason: SDUPTHER

## 2021-01-17 RX ORDER — POTASSIUM CHLORIDE 750 MG/1
10 TABLET, FILM COATED, EXTENDED RELEASE ORAL 2 TIMES DAILY
Qty: 60 TABLET | Refills: 0 | Status: SHIPPED | OUTPATIENT
Start: 2021-01-17 | End: 2021-03-25

## 2021-01-17 RX ADMIN — LEVETIRACETAM 500 MG: 5 INJECTION INTRAVENOUS at 08:21

## 2021-01-17 RX ADMIN — Medication 1 TABLET: at 08:27

## 2021-01-17 RX ADMIN — METOPROLOL SUCCINATE 25 MG: 25 TABLET, EXTENDED RELEASE ORAL at 08:21

## 2021-01-17 RX ADMIN — SODIUM CHLORIDE, PRESERVATIVE FREE 10 ML: 5 INJECTION INTRAVENOUS at 08:22

## 2021-01-17 RX ADMIN — PANTOPRAZOLE SODIUM 40 MG: 40 TABLET, DELAYED RELEASE ORAL at 05:50

## 2021-01-17 NOTE — PROGRESS NOTES
"Neurology       Patient Care Team:  Uzma Duffy PA-C as PCP - General (Physician Assistant)    Chief complaint new onset seizure      Subjective .     History: No further seizures overnight.  Feels fine.  Patient says she felt certain seizure occurred because of recent illness/angioedema followed by malaise etc.  She does not intend to cut back her alcohol use.    ROS: No fever or chest pain    Objective     Vital Signs   Blood pressure 119/76, pulse 84, temperature 98.8 °F (37.1 °C), temperature source Oral, resp. rate 18, height 160 cm (63\"), weight 62.3 kg (137 lb 6.4 oz), SpO2 92 %.    Physical Exam:              Neuro: Well-developed middle-aged white woman in no acute distress, alert, fully oriented, with normal language.  Pupils 2.5 mm and reactive EOMI face symmetric  Symmetric antigravity strength and coordination intact    Results Review:              Brain MRI with and without contrast this admission was normal/unremarkable for age  EEG yesterday showed mild generalized slowing, no epileptiform activity  Labs reviewed  CBC unremarkable, BMP pending    Assessment/Plan     60-year-old woman with PMH HTN HLD, asthma and tobacco use as well as heavy alcohol use (10 beers per day per ) admitted after witnessed tonic-clonic seizure at home, in the setting of recent malaise, decreased p.o. intake and abrupt cessation of alcohol use.  MRI and EEG unremarkable  Do not recommend anticonvulsant use at this time given normal testing and situation in which the seizure occurred.  Discussed with patient that chance of recurrent seizures is less than 50% with normal tests, as long as she discontinues excessive alcohol use.  Discussed seizure threshold lowering properties of alcohol and that continued alcohol use will increase the chance of further seizures.  Discussed that anticonvulsants do not have their usual efficacy in the setting of alcohol use.  Reiterated this when patient stated that she does " not think alcohol was a factor and does not intend to cut back significantly or discontinue use.    Discussed safety issues and [place] driving law.  Discussed seizure pathophysiology and symptoms to monitor for that might indicate nonconvulsive or focal seizures.  Offered neurology follow-up appointment but patient declines.    I discussed the patients findings and my recommendations with patient, family and primary care team    Lias Sharpe MD  01/17/21  09:36 EST

## 2021-01-17 NOTE — OUTREACH NOTE
Prep Survey      Responses   [facility] facility patient discharged from?  [facility]   Is LACE score < 7 ?  Yes   Emergency Room discharge w/ pulse ox?  No   Eligibility  [facility]   Date of Admission  01/16/21   Date of Discharge  01/17/21   Discharge Disposition  Home or Self Care   Discharge diagnosis  new onset seizure   Does the patient have one of the following disease processes/diagnoses(primary or secondary)?  Other   Does the patient have Home health ordered?  No   Is there a DME ordered?  No   Prep survey completed?  Yes          Nay Alonso RN

## 2021-01-18 ENCOUNTER — TRANSITIONAL CARE MANAGEMENT TELEPHONE ENCOUNTER (OUTPATIENT)
Dept: CALL CENTER | Facility: HOSPITAL | Age: 61
End: 2021-01-18

## 2021-01-18 LAB
QT INTERVAL: 340 MS
QTC INTERVAL: 425 MS

## 2021-01-18 NOTE — OUTREACH NOTE
Call Center TCM Note      Responses   [facility] patient discharged from?  [facility]   Does the patient have one of the following disease processes/diagnoses(primary or secondary)?  Other   TCM attempt successful?  Yes   Call start time  1348   Call end time  1349   Discharge diagnosis  new onset seizure   Is patient permission given to speak with other caregiver?  Yes   List who call center can speak with  spouse- Thierno   Person spoke with today (if not patient) and relationship  spouse- Thierno/ Patient   Meds reviewed with patient/caregiver?  Yes   Is the patient having any side effects they believe may be caused by any medication additions or changes?  No   Does the patient have all medications ordered at discharge?  Yes   Is the patient taking all medications as directed (includes completed medication regime)?  Yes   Does the patient have a primary care provider?   Yes   Does the patient have an appointment with their PCP within 7 days of discharge?  N/A   Comments regarding PCP  Patient will call to make f/u appts.   Has the patient kept scheduled appointments due by today?  N/A   Psychosocial issues?  No   Did the patient receive a copy of their discharge instructions?  Yes   Nursing interventions  Reviewed instructions with patient   What is the patient's perception of their health status since discharge?  Improving   Is the patient/caregiver able to teach back signs and symptoms related to disease process for when to call PCP?  Yes   Is the patient/caregiver able to teach back signs and symptoms related to disease process for when to call 911?  Yes   Is the patient/caregiver able to teach back the hierarchy of who to call/visit for symptoms/problems? PCP, Specialist, Home health nurse, Urgent Care, ED, 911  Yes   TCM call completed?  Yes   Wrap up additional comments  States she is doing well, no questions or concerns at this time, she will call to make her f/u appts.          Dai Navarrete,  RN    1/18/2021, 13:49 EST

## 2021-01-19 LAB — 1,25(OH)2D SERPL-MCNC: 29 PG/ML (ref 19.9–79.3)

## 2021-01-21 ENCOUNTER — TELEPHONE (OUTPATIENT)
Dept: FAMILY MEDICINE CLINIC | Facility: CLINIC | Age: 61
End: 2021-01-21

## 2021-01-25 ENCOUNTER — TELEPHONE (OUTPATIENT)
Dept: FAMILY MEDICINE CLINIC | Facility: CLINIC | Age: 61
End: 2021-01-25

## 2021-02-05 ENCOUNTER — TELEPHONE (OUTPATIENT)
Dept: FAMILY MEDICINE CLINIC | Facility: CLINIC | Age: 61
End: 2021-02-05

## 2021-02-05 DIAGNOSIS — R73.02 IMPAIRED GLUCOSE TOLERANCE: ICD-10-CM

## 2021-02-05 DIAGNOSIS — D50.9 IRON DEFICIENCY ANEMIA, UNSPECIFIED IRON DEFICIENCY ANEMIA TYPE: ICD-10-CM

## 2021-02-05 DIAGNOSIS — R30.0 DYSURIA: ICD-10-CM

## 2021-02-05 DIAGNOSIS — I10 ESSENTIAL HYPERTENSION: Primary | ICD-10-CM

## 2021-02-05 DIAGNOSIS — Z13.29 SCREENING FOR THYROID DISORDER: ICD-10-CM

## 2021-02-05 DIAGNOSIS — E83.51 HYPOCALCEMIA: ICD-10-CM

## 2021-02-05 DIAGNOSIS — E78.2 MIXED HYPERLIPIDEMIA: ICD-10-CM

## 2021-02-05 NOTE — TELEPHONE ENCOUNTER
Caller: Jocelyne Lindquist    Relationship: Self    Best call back number: [phone]     What orders are you requesting (i.e. lab or imaging): PATIENT WOULD LIKE TO HAVE BLOOD WORK DONE BEFORE PHYSICAL APPOINTMENT SCHEDULED ON 2-26-21    ALSO PATIENT  IS HAVING SOME FREQUENCY  WITH URINATION   PATIENT WOULD TO HAVE A URIN TEST DONE BEFORE IT GET BAD   In what timeframe would the patient need to come in: AS SOON AS POSSIBLE     Where will you receive your lab/imaging services: IN OFFICE     Additional notes: PLEASE CALL PATIENT WHEN ORDER IS READY

## 2021-02-08 NOTE — TELEPHONE ENCOUNTER
Please call patient.  Lab orders placed.  She will need to be fasting for the labs.  I also placed a urine culture to be completed at lab.  It takes up to 5 days to get results for this from lab.  If she is having significant symptoms of UTI, recommend appointment in office for quick urinalysis.

## 2021-02-08 NOTE — TELEPHONE ENCOUNTER
Attempted to contact, no answer. LVM to report to [facility] lab fasting and prepared to provide urine sample.     Hub can relay and schedule

## 2021-02-09 NOTE — TELEPHONE ENCOUNTER
Attempted to contact, no answer LVM advising patient to report to the lab at her earliest convenience to have labs completed and an urinalysis before her upcoming appt on 2/26    Hub can relay message

## 2021-02-10 ENCOUNTER — TELEPHONE (OUTPATIENT)
Dept: FAMILY MEDICINE CLINIC | Facility: CLINIC | Age: 61
End: 2021-02-10

## 2021-02-10 DIAGNOSIS — E83.51 HYPOCALCEMIA: Primary | ICD-10-CM

## 2021-02-10 NOTE — TELEPHONE ENCOUNTER
PATIENT CALLED IN REQUESTING A REFERRAL TO A PODIATRIST SHE FURTHER STATED SHE HAS A TENDER AREA ON THE RIGHT FOOT BETWEEN THE BOTTOM OF FOOT AND BIG TOE IN THE JOINT ARE PLEASE ADVISE      CALLBACK NUMBER: [phone]

## 2021-02-10 NOTE — TELEPHONE ENCOUNTER
Please call patient.  She needs an office visit before I can place a referral.  She also needs follow-up with labs after her recent ED visit for seizure.

## 2021-02-11 NOTE — TELEPHONE ENCOUNTER
Contacted patient she states that she has a small bunion type cyst on her foot that has become more tender within the past couple of days.  I advised her that she will need to be evaluated before a referral is placed.     She states that she mentioned this issue in prior visit and refused to schedule an in office visit and would prefer to handle it at her upcoming apt on 2/26. She verbalized understanding about the labs and will have those completed once she is able to leave her home

## 2021-02-15 ENCOUNTER — TELEPHONE (OUTPATIENT)
Dept: FAMILY MEDICINE CLINIC | Facility: CLINIC | Age: 61
End: 2021-02-15

## 2021-02-15 NOTE — TELEPHONE ENCOUNTER
PATIENT HAS ORDERS IN FOR LABS BEFORE HER APPOINTMENT. BUT SHE HAS PUSHED BACK HER APPOINTMENT AND SHE NEEDS AN EXTENSION ON THOSE ORDERS SO SHE CAN GO LATER.     CONTACT: [phone]

## 2021-02-25 ENCOUNTER — LAB (OUTPATIENT)
Dept: LAB | Facility: HOSPITAL | Age: 61
End: 2021-02-25

## 2021-02-25 DIAGNOSIS — E83.51 HYPOCALCEMIA: ICD-10-CM

## 2021-02-25 DIAGNOSIS — E78.2 MIXED HYPERLIPIDEMIA: ICD-10-CM

## 2021-02-25 DIAGNOSIS — R73.02 IMPAIRED GLUCOSE TOLERANCE: ICD-10-CM

## 2021-02-25 DIAGNOSIS — R30.0 DYSURIA: ICD-10-CM

## 2021-02-25 DIAGNOSIS — D50.9 IRON DEFICIENCY ANEMIA, UNSPECIFIED IRON DEFICIENCY ANEMIA TYPE: ICD-10-CM

## 2021-02-25 DIAGNOSIS — Z13.29 SCREENING FOR THYROID DISORDER: ICD-10-CM

## 2021-02-25 LAB
ALBUMIN SERPL-MCNC: 4.4 G/DL (ref 3.5–5.2)
ALBUMIN/GLOB SERPL: 1.6 G/DL
ALP SERPL-CCNC: 68 U/L (ref 39–117)
ALT SERPL W P-5'-P-CCNC: 11 U/L (ref 1–33)
ANION GAP SERPL CALCULATED.3IONS-SCNC: 11.3 MMOL/L (ref 5–15)
AST SERPL-CCNC: 18 U/L (ref 1–32)
BILIRUB SERPL-MCNC: 0.6 MG/DL (ref 0–1.2)
BUN SERPL-MCNC: 6 MG/DL (ref 8–23)
BUN/CREAT SERPL: 9.2 (ref 7–25)
CALCIUM SPEC-SCNC: 10 MG/DL (ref 8.6–10.5)
CHLORIDE SERPL-SCNC: 100 MMOL/L (ref 98–107)
CHOLEST SERPL-MCNC: 172 MG/DL (ref 0–200)
CO2 SERPL-SCNC: 28.7 MMOL/L (ref 22–29)
CREAT SERPL-MCNC: 0.65 MG/DL (ref 0.57–1)
GFR SERPL CREATININE-BSD FRML MDRD: 92 ML/MIN/1.73
GLOBULIN UR ELPH-MCNC: 2.7 GM/DL
GLUCOSE SERPL-MCNC: 106 MG/DL (ref 65–99)
HBA1C MFR BLD: 5.82 % (ref 4.8–5.6)
HCT VFR BLD AUTO: 39.4 % (ref 34–46.6)
HDLC SERPL-MCNC: 103 MG/DL (ref 40–60)
HGB BLD-MCNC: 12.8 G/DL (ref 12–15.9)
LDLC SERPL CALC-MCNC: 50 MG/DL (ref 0–100)
LDLC/HDLC SERPL: 0.45 {RATIO}
MAGNESIUM SERPL-MCNC: 1.4 MG/DL (ref 1.6–2.4)
POTASSIUM SERPL-SCNC: 4.3 MMOL/L (ref 3.5–5.2)
PROT SERPL-MCNC: 7.1 G/DL (ref 6–8.5)
SODIUM SERPL-SCNC: 140 MMOL/L (ref 136–145)
TRIGL SERPL-MCNC: 113 MG/DL (ref 0–150)
TSH SERPL DL<=0.05 MIU/L-ACNC: 1.87 UIU/ML (ref 0.27–4.2)
VLDLC SERPL-MCNC: 19 MG/DL (ref 5–40)

## 2021-02-25 PROCEDURE — 85018 HEMOGLOBIN: CPT

## 2021-02-25 PROCEDURE — 83036 HEMOGLOBIN GLYCOSYLATED A1C: CPT

## 2021-02-25 PROCEDURE — 85014 HEMATOCRIT: CPT

## 2021-02-25 PROCEDURE — 84443 ASSAY THYROID STIM HORMONE: CPT

## 2021-02-25 PROCEDURE — 87086 URINE CULTURE/COLONY COUNT: CPT

## 2021-02-25 PROCEDURE — 80053 COMPREHEN METABOLIC PANEL: CPT

## 2021-02-25 PROCEDURE — 83735 ASSAY OF MAGNESIUM: CPT

## 2021-02-25 PROCEDURE — 80061 LIPID PANEL: CPT

## 2021-02-25 PROCEDURE — 36415 COLL VENOUS BLD VENIPUNCTURE: CPT

## 2021-02-26 LAB — BACTERIA SPEC AEROBE CULT: NO GROWTH

## 2021-02-28 RX ORDER — MAGNESIUM OXIDE 400 MG/1
400 TABLET ORAL 2 TIMES DAILY
Qty: 60 TABLET | Refills: 0 | Status: SHIPPED | OUTPATIENT
Start: 2021-02-28 | End: 2021-03-01 | Stop reason: SDUPTHER

## 2021-03-01 RX ORDER — MAGNESIUM OXIDE 400 MG/1
400 TABLET ORAL 2 TIMES DAILY
Qty: 60 TABLET | Refills: 3 | Status: SHIPPED | OUTPATIENT
Start: 2021-03-01 | End: 2021-03-25 | Stop reason: SDUPTHER

## 2021-03-03 ENCOUNTER — TELEPHONE (OUTPATIENT)
Dept: FAMILY MEDICINE CLINIC | Facility: CLINIC | Age: 61
End: 2021-03-03

## 2021-03-09 ENCOUNTER — OFFICE VISIT (OUTPATIENT)
Dept: FAMILY MEDICINE CLINIC | Facility: CLINIC | Age: 61
End: 2021-03-09

## 2021-03-09 VITALS
HEIGHT: 63 IN | TEMPERATURE: 98.2 F | SYSTOLIC BLOOD PRESSURE: 180 MMHG | HEART RATE: 86 BPM | DIASTOLIC BLOOD PRESSURE: 98 MMHG | BODY MASS INDEX: 25.69 KG/M2 | WEIGHT: 145 LBS | OXYGEN SATURATION: 95 %

## 2021-03-09 DIAGNOSIS — E83.42 HYPOMAGNESEMIA: ICD-10-CM

## 2021-03-09 DIAGNOSIS — Z00.00 PHYSICAL EXAM, ANNUAL: Primary | ICD-10-CM

## 2021-03-09 DIAGNOSIS — E55.9 VITAMIN D DEFICIENCY: ICD-10-CM

## 2021-03-09 DIAGNOSIS — E53.8 VITAMIN B12 DEFICIENCY: ICD-10-CM

## 2021-03-09 DIAGNOSIS — I10 ESSENTIAL HYPERTENSION: ICD-10-CM

## 2021-03-09 DIAGNOSIS — E78.2 MIXED HYPERLIPIDEMIA: ICD-10-CM

## 2021-03-09 DIAGNOSIS — Z12.4 CERVICAL CANCER SCREENING: ICD-10-CM

## 2021-03-09 DIAGNOSIS — E83.51 HYPOCALCEMIA: ICD-10-CM

## 2021-03-09 DIAGNOSIS — Z78.9 ALCOHOL USE: ICD-10-CM

## 2021-03-09 DIAGNOSIS — E87.6 HYPOKALEMIA: ICD-10-CM

## 2021-03-09 PROCEDURE — 99396 PREV VISIT EST AGE 40-64: CPT | Performed by: PHYSICIAN ASSISTANT

## 2021-03-09 RX ORDER — LANOLIN ALCOHOL/MO/W.PET/CERES
400 CREAM (GRAM) TOPICAL DAILY
Qty: 30 TABLET | Refills: 1 | Status: SHIPPED | OUTPATIENT
Start: 2021-03-09 | End: 2021-03-25

## 2021-03-09 RX ORDER — METHION/INOS/CHOL BT/B COM/LIV 110MG-86MG
100 CAPSULE ORAL DAILY
Qty: 30 TABLET | Refills: 1 | Status: SHIPPED | OUTPATIENT
Start: 2021-03-09 | End: 2021-03-25

## 2021-03-09 RX ORDER — METOPROLOL SUCCINATE 50 MG/1
50 TABLET, EXTENDED RELEASE ORAL DAILY
Qty: 90 TABLET | Refills: 1 | Status: SHIPPED | OUTPATIENT
Start: 2021-03-09 | End: 2021-03-25 | Stop reason: SDUPTHER

## 2021-03-09 NOTE — PROGRESS NOTES
Patient Care Team:  Uzma Duffy PA-C as PCP - General (Physician Assistant)  Flynn Diaz MD as Consulting Physician (Gastroenterology)     Chief complaint: Patient is in today for a physical     Jocelyne Lindquist is a 60 y.o. female who presents for her yearly physical exam.     Patient presents for routine annual physical exam.  She presents for management of hypertension and hyperlipidemia.  Patient's blood pressure is elevated today.  She discontinued taking lisinopril because she had a swollen tongue.  She is currently on metoprolol 25 mg daily and tolerating medication well.  Admits to alcohol use on a nightly basis between 2 and 4 beers.  She was recently admitted at [facility] on 1 16-1/17 for possible seizure.  She was found to have hypomagnesia, hypocalcemia, hypokalemia and hyponatremia.  Her fluids and minerals were replaced.  She was started on potassium and magnesium at the hospital and discharged with it.  Repeat labs continue to show magnesium deficiency.  Is only taking magnesium once a day currently.  She was seen by neurology and they felt this was not related to an actual seizure but her alcohol use.  She was not started on folate or thiamine.  Nor were any labs done regarding these levels.  She has not had another episode since being admitted to the hospital.  Overall she reports she feels fine today.  She is due for Pap smear.  He is up-to-date on mammogram and colonoscopy.  She is due for DEXA scan.  She denies any skin lesions or moles of concern today.       Review of Systems   Constitutional: Negative for chills, diaphoresis, fatigue and fever.   HENT: Negative for congestion, ear pain, hearing loss, postnasal drip, rhinorrhea and sore throat.    Eyes: Negative for blurred vision and pain.   Respiratory: Negative for cough, shortness of breath and wheezing.    Cardiovascular: Negative for chest pain and leg swelling.   Gastrointestinal: Negative for abdominal  pain, blood in stool, constipation, diarrhea, nausea, vomiting and indigestion.   Endocrine: Negative for polyuria.   Genitourinary: Negative for dysuria, flank pain and hematuria.   Musculoskeletal: Negative for arthralgias, gait problem and myalgias.   Skin: Negative for rash and skin lesions.   Neurological: Negative for dizziness and headache.   Psychiatric/Behavioral: Negative for self-injury, sleep disturbance, suicidal ideas, depressed mood and stress. The patient is not nervous/anxious.         History  Past Medical History:   Diagnosis Date   • Asthma    • GERD (gastroesophageal reflux disease)    • Hypertension    • Hypotension 2/18/2017   • PVD (peripheral vascular disease) (CMS/HCC)    • Seizures (CMS/HCC)    • 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, right     outer corner of right eye   • Tobacco abuse 2/18/2017    Quit 12/25/17      Past Surgical History:   Procedure Laterality Date   • BREAST BIOPSY Left 06/18/2010    stereo bx   • CYST REMOVAL Right     wrist   • SQUAMOUS CELL CARCINOMA EXCISION Right 12/13/2017    outer corner of right eye      Allergies   Allergen Reactions   • Amoxicillin Swelling   • Benazepril Swelling   • Doxycycline Diarrhea   • Eggs Or Egg-Derived Products      Causes cramps   • Fish-Derived Products      Causes severe stomach cramps      Family History   Problem Relation Age of Onset   • Cancer Mother         esophageal and liver   • Heart failure Father    • Breast cancer Neg Hx    • Ovarian cancer Neg Hx       Social History     Socioeconomic History   • Marital status:      Spouse name: Not on file   • Number of children: Not on file   • Years of education: Not on file   • Highest education level: Not on file   Tobacco Use   • Smoking status: Former Smoker     Packs/day: 1.00     Years: 20.00     Pack years: 20.00   • Smokeless tobacco: Never Used   • Tobacco comment: 12/25/17   Substance and Sexual Activity   • Alcohol use: No     Comment: not since 12/25/17   •  Drug use: No   • Sexual activity: Defer      Current Outpatient Medications on File Prior to Visit   Medication Sig Dispense Refill   • albuterol sulfate  (90 Base) MCG/ACT inhaler INHALE 2 PUFFS BY MOUTH EVERY 6 HOURS AS NEEDED FOR WHEEZING 18 g 5   • calcium carb-cholecalciferol 600-800 MG-UNIT tablet Take 1 tablet by mouth 2 (Two) Times a Day With Meals. 60 tablet 0   • cyclobenzaprine (FLEXERIL) 10 MG tablet Take 1 tablet by mouth 3 (Three) Times a Day As Needed for Muscle Spasms. 30 tablet 0   • magnesium oxide (MAG-OX) 400 MG tablet Take 1 tablet by mouth 2 (Two) Times a Day. 60 tablet 3   • omeprazole (priLOSEC) 20 MG capsule Take 1 capsule by mouth once daily 90 capsule 0   • potassium chloride 10 MEQ CR tablet Take 1 tablet by mouth 2 (Two) Times a Day. 60 tablet 0   • pravastatin (Pravachol) 40 MG tablet Take 1 tablet by mouth Every Night. 30 tablet 5   • triamcinolone (KENALOG) 0.1 % ointment Apply  topically to the appropriate area as directed 3 (Three) Times a Day. 453.6 g 0     No current facility-administered medications on file prior to visit.       Results for orders placed or performed in visit on 02/25/21   Urine Culture - Urine, Urine, Random Void    Specimen: Urine, Random Void   Result Value Ref Range    Urine Culture No growth    Comprehensive Metabolic Panel    Specimen: Blood   Result Value Ref Range    Glucose 106 (H) 65 - 99 mg/dL    BUN 6 (L) 8 - 23 mg/dL    Creatinine 0.65 0.57 - 1.00 mg/dL    Sodium 140 136 - 145 mmol/L    Potassium 4.3 3.5 - 5.2 mmol/L    Chloride 100 98 - 107 mmol/L    CO2 28.7 22.0 - 29.0 mmol/L    Calcium 10.0 8.6 - 10.5 mg/dL    Total Protein 7.1 6.0 - 8.5 g/dL    Albumin 4.40 3.50 - 5.20 g/dL    ALT (SGPT) 11 1 - 33 U/L    AST (SGOT) 18 1 - 32 U/L    Alkaline Phosphatase 68 39 - 117 U/L    Total Bilirubin 0.6 0.0 - 1.2 mg/dL    eGFR Non African Amer 92 >60 mL/min/1.73    Globulin 2.7 gm/dL    A/G Ratio 1.6 g/dL    BUN/Creatinine Ratio 9.2 7.0 - 25.0     Anion Gap 11.3 5.0 - 15.0 mmol/L   Lipid Panel    Specimen: Blood   Result Value Ref Range    Total Cholesterol 172 0 - 200 mg/dL    Triglycerides 113 0 - 150 mg/dL    HDL Cholesterol 103 (H) 40 - 60 mg/dL    LDL Cholesterol  50 0 - 100 mg/dL    VLDL Cholesterol 19 5 - 40 mg/dL    LDL/HDL Ratio 0.45    Hemoglobin and hematocrit, blood    Specimen: Blood   Result Value Ref Range    Hemoglobin 12.8 12.0 - 15.9 g/dL    Hematocrit 39.4 34.0 - 46.6 %   TSH Rfx On Abnormal To Free T4    Specimen: Blood   Result Value Ref Range    TSH 1.870 0.270 - 4.200 uIU/mL   Hemoglobin A1c    Specimen: Blood   Result Value Ref Range    Hemoglobin A1C 5.82 (H) 4.80 - 5.60 %   Magnesium    Specimen: Blood   Result Value Ref Range    Magnesium 1.4 (L) 1.6 - 2.4 mg/dL       Health Maintenance   Topic Date Due   • COVID-19 Vaccine (1 of 2) Never done   • DXA SCAN  02/23/2020   • LIPID PANEL  02/25/2022   • ANNUAL PHYSICAL  03/10/2022   • MAMMOGRAM  06/18/2022   • TDAP/TD VACCINES (2 - Td) 01/01/2023   • COLONOSCOPY  01/29/2024   • PAP SMEAR  03/09/2024   • HEPATITIS C SCREENING  Completed   • Pneumococcal Vaccine 0-64  Completed   • ZOSTER VACCINE  Completed   • MENINGOCOCCAL VACCINE  Aged Out       Immunizations  Td/Tdap(Booster Q 10 yrs):  Completed  Flu (Yearly):  Refused  Pneumovax (1 yr after Prevnar):  Completed  [id number] (1 yr after Pneumo):  N/A  Hep B:  N/A  Shringrix:  Completed}   Immunization History   Administered Date(s) Administered   • Flu Vaccine Quad PF >36MO 11/01/2018   • Flublock Quad =>18yrs 11/01/2020   • Flulaval/Fluarix/Fluzone Quad 11/01/2018, 10/14/2019   • Hepatitis A 10/14/2019   • Influenza, Unspecified 11/01/2020   • Pneumococcal Polysaccharide (PPSV23) 03/02/2016   • Shingrix 10/19/2019, 12/22/2019, 08/09/2020   • Tdap 01/01/2013         Diabetes:  No   Eye Exam: N/A   Foot Exam:  N/A  Obesity Counseling:  N/A  Hemoglobin A1C   Date Value Ref Range Status   02/25/2021 5.82 (H) 4.80 - 5.60 % Final        Patient's Body mass index is 25.69 kg/m². BMI is within normal parameters. No follow-up required..      Colorectal Screening:  Complete  Pap:  Ordered Today  Mammogram:  Complete  PSA(Over age 50):  N/A  US Aorta (For male smokers, age 65):  N/A  CT for Smoker (Age 55-75, 30pk yr):  N/A  Bone Density/DEXA:  Due will order  Hep C ( 6761-2850):  Complete      Results for orders placed or performed in visit on 21   Urine Culture - Urine, Urine, Random Void    Specimen: Urine, Random Void   Result Value Ref Range    Urine Culture No growth    Comprehensive Metabolic Panel    Specimen: Blood   Result Value Ref Range    Glucose 106 (H) 65 - 99 mg/dL    BUN 6 (L) 8 - 23 mg/dL    Creatinine 0.65 0.57 - 1.00 mg/dL    Sodium 140 136 - 145 mmol/L    Potassium 4.3 3.5 - 5.2 mmol/L    Chloride 100 98 - 107 mmol/L    CO2 28.7 22.0 - 29.0 mmol/L    Calcium 10.0 8.6 - 10.5 mg/dL    Total Protein 7.1 6.0 - 8.5 g/dL    Albumin 4.40 3.50 - 5.20 g/dL    ALT (SGPT) 11 1 - 33 U/L    AST (SGOT) 18 1 - 32 U/L    Alkaline Phosphatase 68 39 - 117 U/L    Total Bilirubin 0.6 0.0 - 1.2 mg/dL    eGFR Non African Amer 92 >60 mL/min/1.73    Globulin 2.7 gm/dL    A/G Ratio 1.6 g/dL    BUN/Creatinine Ratio 9.2 7.0 - 25.0    Anion Gap 11.3 5.0 - 15.0 mmol/L   Lipid Panel    Specimen: Blood   Result Value Ref Range    Total Cholesterol 172 0 - 200 mg/dL    Triglycerides 113 0 - 150 mg/dL    HDL Cholesterol 103 (H) 40 - 60 mg/dL    LDL Cholesterol  50 0 - 100 mg/dL    VLDL Cholesterol 19 5 - 40 mg/dL    LDL/HDL Ratio 0.45    Hemoglobin and hematocrit, blood    Specimen: Blood   Result Value Ref Range    Hemoglobin 12.8 12.0 - 15.9 g/dL    Hematocrit 39.4 34.0 - 46.6 %   TSH Rfx On Abnormal To Free T4    Specimen: Blood   Result Value Ref Range    TSH 1.870 0.270 - 4.200 uIU/mL   Hemoglobin A1c    Specimen: Blood   Result Value Ref Range    Hemoglobin A1C 5.82 (H) 4.80 - 5.60 %   Magnesium    Specimen: Blood   Result Value Ref Range     "Magnesium 1.4 (L) 1.6 - 2.4 mg/dL            Vitals:    03/09/21 1424 03/09/21 1506   BP: 158/100 180/98   Pulse: 86    Temp: 98.2 °F (36.8 °C)    SpO2: 95%    Weight: 65.8 kg (145 lb)    Height: 160 cm (63\")    PainSc: 0-No pain        Body mass index is 25.69 kg/m².    Physical Exam  Vitals reviewed.   Constitutional:       General: She is not in acute distress.     Appearance: Normal appearance. She is well-developed and normal weight. She is not ill-appearing or diaphoretic.   HENT:      Head: Normocephalic and atraumatic.      Right Ear: Hearing, tympanic membrane, ear canal and external ear normal.      Left Ear: Hearing, tympanic membrane, ear canal and external ear normal.   Eyes:      General: Lids are normal.      Extraocular Movements: Extraocular movements intact.      Conjunctiva/sclera: Conjunctivae normal.   Neck:      Thyroid: No thyroid mass or thyromegaly.      Trachea: Trachea and phonation normal.   Cardiovascular:      Rate and Rhythm: Normal rate and regular rhythm.      Heart sounds: Normal heart sounds.   Pulmonary:      Effort: Pulmonary effort is normal.      Breath sounds: Normal breath sounds.   Abdominal:      General: Bowel sounds are normal. There is no distension.      Palpations: Abdomen is soft. Abdomen is not rigid.      Tenderness: There is no abdominal tenderness. There is no guarding.   Musculoskeletal:         General: Normal range of motion.      Cervical back: Normal range of motion.      Right lower leg: No edema.      Left lower leg: No edema.   Lymphadenopathy:      Cervical: No cervical adenopathy.      Right cervical: No superficial cervical adenopathy.     Left cervical: No superficial cervical adenopathy.   Skin:     General: Skin is warm.      Findings: No erythema or rash.      Nails: There is no clubbing.   Neurological:      Mental Status: She is alert and oriented to person, place, and time.      Coordination: Coordination normal.      Gait: Gait normal.      Deep " Tendon Reflexes: Reflexes are normal and symmetric.      Comments: CN grossly intact   Psychiatric:         Attention and Perception: Attention and perception normal. She is attentive.         Mood and Affect: Mood and affect normal.         Speech: Speech normal.         Behavior: Behavior normal. Behavior is cooperative.         Thought Content: Thought content normal.         Cognition and Memory: Cognition and memory normal.         Judgment: Judgment normal.             Counseling provided on diet and nutrition, exercise, alcohol use, supplements, mental health concerns and hypertension.    Diagnoses and all orders for this visit:    1. Physical exam, annual (Primary)    2. Essential hypertension  -     metoprolol succinate XL (TOPROL-XL) 50 MG 24 hr tablet; Take 1 tablet by mouth Daily.  Dispense: 90 tablet; Refill: 1  Blood pressure is elevated today.  Patient had an allergic reaction to lisinopril with swelling of the tongue.  Will increase metoprolol to 50 mg daily.  Discussed monitoring at home and calling if her blood pressure remains elevated.  Return in 2 to 3 weeks for repeat blood pressure evaluation.    3. Mixed hyperlipidemia  On pravastatin 40 mg nightly.    4. Hypomagnesemia  -     Magnesium; Future  Discussed increasing her magnesium supplement intake to twice daily.  Repeat magnesium in 4 weeks.    5. Hypokalemia  -     Basic Metabolic Panel; Future  Continue potassium supplement.  Recent potassium level was well within normal limits.  We will repeat blood work in 4 weeks.    6. Hypocalcemia  -     Basic Metabolic Panel; Future  Recent calcium level was normal.  We will plan on repeating BMP in 4 weeks.    7. Alcohol use  -     Folate; Future  -     thiamine (thiamine) 100 MG tablet tablet; Take 1 tablet by mouth Daily.  Dispense: 30 tablet; Refill: 1  -     folic acid (Folate) 400 MCG tablet; Take 1 tablet by mouth Daily.  Dispense: 30 tablet; Refill: 1  Discussed the importance of alcohol  discontinuation.  Patient does not have an interest in stopping at this time.  She is drinking 2-4 beers daily.  She does not feel this is a problem.  We will add thiamine and folic acid supplementation daily.    8. Vitamin D deficiency  -     cholecalciferol (VITAMIN D3) 1.25 MG (25441 UT) capsule; Take 1 capsule by mouth 1 (One) Time Per Week.  Dispense: 12 capsule; Refill: 1    9. Vitamin B12 deficiency  -     Vitamin B12; Future    10. Cervical cancer screening  -     Liquid-based Pap Smear, Screening; Future       Uzma Duffy PA-C   3/10/2021   11:17 EST

## 2021-03-10 DIAGNOSIS — Z13.820 SCREENING FOR OSTEOPOROSIS: ICD-10-CM

## 2021-03-10 DIAGNOSIS — Z78.0 POSTMENOPAUSAL: Primary | ICD-10-CM

## 2021-03-16 DIAGNOSIS — Z12.4 CERVICAL CANCER SCREENING: ICD-10-CM

## 2021-03-22 ENCOUNTER — LAB (OUTPATIENT)
Dept: LAB | Facility: HOSPITAL | Age: 61
End: 2021-03-22

## 2021-03-22 DIAGNOSIS — E83.51 HYPOCALCEMIA: ICD-10-CM

## 2021-03-22 DIAGNOSIS — E83.42 HYPOMAGNESEMIA: ICD-10-CM

## 2021-03-22 DIAGNOSIS — E53.8 VITAMIN B12 DEFICIENCY: ICD-10-CM

## 2021-03-22 DIAGNOSIS — E87.6 HYPOKALEMIA: ICD-10-CM

## 2021-03-22 DIAGNOSIS — Z78.9 ALCOHOL USE: ICD-10-CM

## 2021-03-22 LAB
ANION GAP SERPL CALCULATED.3IONS-SCNC: 11.3 MMOL/L (ref 5–15)
BUN SERPL-MCNC: 9 MG/DL (ref 8–23)
BUN/CREAT SERPL: 11.7 (ref 7–25)
CALCIUM SPEC-SCNC: 9.9 MG/DL (ref 8.6–10.5)
CHLORIDE SERPL-SCNC: 101 MMOL/L (ref 98–107)
CO2 SERPL-SCNC: 26.7 MMOL/L (ref 22–29)
CREAT SERPL-MCNC: 0.77 MG/DL (ref 0.57–1)
FOLATE SERPL-MCNC: >20 NG/ML (ref 4.78–24.2)
GFR SERPL CREATININE-BSD FRML MDRD: 76 ML/MIN/1.73
GLUCOSE SERPL-MCNC: 98 MG/DL (ref 65–99)
MAGNESIUM SERPL-MCNC: 1.6 MG/DL (ref 1.6–2.4)
POTASSIUM SERPL-SCNC: 4 MMOL/L (ref 3.5–5.2)
SODIUM SERPL-SCNC: 139 MMOL/L (ref 136–145)
VIT B12 BLD-MCNC: 186 PG/ML (ref 211–946)

## 2021-03-22 PROCEDURE — 82746 ASSAY OF FOLIC ACID SERUM: CPT

## 2021-03-22 PROCEDURE — 82607 VITAMIN B-12: CPT

## 2021-03-22 PROCEDURE — 80048 BASIC METABOLIC PNL TOTAL CA: CPT

## 2021-03-22 PROCEDURE — 83735 ASSAY OF MAGNESIUM: CPT

## 2021-03-25 ENCOUNTER — OFFICE VISIT (OUTPATIENT)
Dept: FAMILY MEDICINE CLINIC | Facility: CLINIC | Age: 61
End: 2021-03-25

## 2021-03-25 VITALS
WEIGHT: 146 LBS | BODY MASS INDEX: 25.87 KG/M2 | OXYGEN SATURATION: 98 % | HEIGHT: 63 IN | SYSTOLIC BLOOD PRESSURE: 132 MMHG | TEMPERATURE: 97.9 F | HEART RATE: 93 BPM | DIASTOLIC BLOOD PRESSURE: 90 MMHG

## 2021-03-25 DIAGNOSIS — E83.42 HYPOMAGNESEMIA: ICD-10-CM

## 2021-03-25 DIAGNOSIS — J45.20 MILD INTERMITTENT ASTHMA WITHOUT COMPLICATION: ICD-10-CM

## 2021-03-25 DIAGNOSIS — K21.9 GASTROESOPHAGEAL REFLUX DISEASE WITHOUT ESOPHAGITIS: ICD-10-CM

## 2021-03-25 DIAGNOSIS — E78.2 MIXED HYPERLIPIDEMIA: ICD-10-CM

## 2021-03-25 DIAGNOSIS — I10 ESSENTIAL HYPERTENSION: ICD-10-CM

## 2021-03-25 DIAGNOSIS — E53.8 VITAMIN B12 DEFICIENCY: Primary | ICD-10-CM

## 2021-03-25 PROCEDURE — 99214 OFFICE O/P EST MOD 30 MIN: CPT | Performed by: PHYSICIAN ASSISTANT

## 2021-03-25 RX ORDER — MAGNESIUM OXIDE 400 MG/1
400 TABLET ORAL 2 TIMES DAILY
Qty: 180 TABLET | Refills: 3 | Status: SHIPPED | OUTPATIENT
Start: 2021-03-25 | End: 2022-04-22

## 2021-03-25 RX ORDER — ALBUTEROL SULFATE 90 UG/1
2 AEROSOL, METERED RESPIRATORY (INHALATION) EVERY 6 HOURS PRN
Qty: 18 G | Refills: 11 | Status: SHIPPED | OUTPATIENT
Start: 2021-03-25 | End: 2022-04-15

## 2021-03-25 RX ORDER — MAGNESIUM 200 MG
1 TABLET ORAL DAILY
Qty: 90 EACH | Refills: 3 | Status: SHIPPED | OUTPATIENT
Start: 2021-03-25

## 2021-03-25 RX ORDER — METOPROLOL SUCCINATE 100 MG/1
100 TABLET, EXTENDED RELEASE ORAL DAILY
Qty: 90 TABLET | Refills: 3 | Status: SHIPPED | OUTPATIENT
Start: 2021-03-25 | End: 2022-04-15 | Stop reason: SDUPTHER

## 2021-03-25 RX ORDER — PRAVASTATIN SODIUM 40 MG
40 TABLET ORAL NIGHTLY
Qty: 90 TABLET | Refills: 3 | Status: SHIPPED | OUTPATIENT
Start: 2021-03-25 | End: 2022-04-22 | Stop reason: SDUPTHER

## 2021-03-25 RX ORDER — OMEPRAZOLE 20 MG/1
20 CAPSULE, DELAYED RELEASE ORAL DAILY
Qty: 90 CAPSULE | Refills: 3 | Status: SHIPPED | OUTPATIENT
Start: 2021-03-25 | End: 2022-04-15 | Stop reason: SDUPTHER

## 2021-03-25 NOTE — PROGRESS NOTES
Chief Complaint   Patient presents with   • Hypertension     Pt is here for a follow up. Pt states she's only taken it once at home and it was 128/79. Pt states she would also like to discuss her recent lab results.        HPI     Jocelyne Lindquist is a pleasant 60 y.o. female who is here for routine follow-up of blood pressure and recent lab work.  Patient's blood pressure on repeat is elevated today at 160/84.  She has only checked her blood pressure once at home and it was elevated then.  She denies any symptoms of hypertension.  She is compliant on her metoprolol 50 mg daily. Thorough discussion of her recent labs and current medications with patient today.    Past Medical History:   Diagnosis Date   • Asthma    • GERD (gastroesophageal reflux disease)    • Hypertension    • Hypotension 2/18/2017   • PVD (peripheral vascular disease) (CMS/HCC)    • Seizures (CMS/HCC)    • 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, right     outer corner of right eye   • Tobacco abuse 2/18/2017    Quit 12/25/17       Past Surgical History:   Procedure Laterality Date   • BREAST BIOPSY Left 06/18/2010    stereo bx   • CYST REMOVAL Right     wrist   • SQUAMOUS CELL CARCINOMA EXCISION Right 12/13/2017    outer corner of right eye       Family History   Problem Relation Age of Onset   • Cancer Mother         esophageal and liver   • Heart failure Father    • Breast cancer Neg Hx    • Ovarian cancer Neg Hx        Social History     Socioeconomic History   • Marital status:      Spouse name: Not on file   • Number of children: Not on file   • Years of education: Not on file   • Highest education level: Not on file   Tobacco Use   • Smoking status: Former Smoker     Packs/day: 1.00     Years: 20.00     Pack years: 20.00   • Smokeless tobacco: Never Used   • Tobacco comment: 12/25/17   Substance and Sexual Activity   • Alcohol use: No     Comment: not since 12/25/17   • Drug use: No   • Sexual activity: Defer       Allergies   Allergen  "Reactions   • Amoxicillin Swelling   • Benazepril Swelling   • Doxycycline Diarrhea   • Eggs Or Egg-Derived Products      Causes cramps   • Fish-Derived Products      Causes severe stomach cramps       ROS  Review of Systems   Constitutional: Negative for chills, diaphoresis, fatigue and fever.   Respiratory: Negative for cough, shortness of breath and wheezing.    Cardiovascular: Negative for chest pain and leg swelling.   Neurological: Negative for dizziness and headache.   Psychiatric/Behavioral: Positive for stress. Negative for self-injury, sleep disturbance, suicidal ideas and depressed mood. The patient is not nervous/anxious.        Vitals:    03/25/21 1255   BP: 132/90   Pulse: 93   Temp: 97.9 °F (36.6 °C)   SpO2: 98%   Weight: 66.2 kg (146 lb)   Height: 160 cm (63\")   PainSc: 0-No pain     Body mass index is 25.86 kg/m².    Current Outpatient Medications on File Prior to Visit   Medication Sig Dispense Refill   • calcium carb-cholecalciferol 600-800 MG-UNIT tablet Take 1 tablet by mouth 2 (Two) Times a Day With Meals. 60 tablet 0   • cyclobenzaprine (FLEXERIL) 10 MG tablet Take 1 tablet by mouth 3 (Three) Times a Day As Needed for Muscle Spasms. 30 tablet 0   • triamcinolone (KENALOG) 0.1 % ointment Apply  topically to the appropriate area as directed 3 (Three) Times a Day. 453.6 g 0   • [DISCONTINUED] albuterol sulfate  (90 Base) MCG/ACT inhaler INHALE 2 PUFFS BY MOUTH EVERY 6 HOURS AS NEEDED FOR WHEEZING 18 g 5   • [DISCONTINUED] cholecalciferol (VITAMIN D3) 1.25 MG (01978 UT) capsule Take 1 capsule by mouth 1 (One) Time Per Week. 12 capsule 1   • [DISCONTINUED] folic acid (Folate) 400 MCG tablet Take 1 tablet by mouth Daily. 30 tablet 1   • [DISCONTINUED] magnesium oxide (MAG-OX) 400 MG tablet Take 1 tablet by mouth 2 (Two) Times a Day. 60 tablet 3   • [DISCONTINUED] metoprolol succinate XL (TOPROL-XL) 50 MG 24 hr tablet Take 1 tablet by mouth Daily. 90 tablet 1   • [DISCONTINUED] omeprazole " (priLOSEC) 20 MG capsule Take 1 capsule by mouth once daily 90 capsule 0   • [DISCONTINUED] potassium chloride 10 MEQ CR tablet Take 1 tablet by mouth 2 (Two) Times a Day. 60 tablet 0   • [DISCONTINUED] pravastatin (Pravachol) 40 MG tablet Take 1 tablet by mouth Every Night. 30 tablet 5   • [DISCONTINUED] thiamine (thiamine) 100 MG tablet tablet Take 1 tablet by mouth Daily. 30 tablet 1     No current facility-administered medications on file prior to visit.       Results for orders placed or performed in visit on 03/22/21   Basic Metabolic Panel    Specimen: Blood   Result Value Ref Range    Glucose 98 65 - 99 mg/dL    BUN 9 8 - 23 mg/dL    Creatinine 0.77 0.57 - 1.00 mg/dL    Sodium 139 136 - 145 mmol/L    Potassium 4.0 3.5 - 5.2 mmol/L    Chloride 101 98 - 107 mmol/L    CO2 26.7 22.0 - 29.0 mmol/L    Calcium 9.9 8.6 - 10.5 mg/dL    eGFR Non African Amer 76 >60 mL/min/1.73    BUN/Creatinine Ratio 11.7 7.0 - 25.0    Anion Gap 11.3 5.0 - 15.0 mmol/L   Magnesium    Specimen: Blood   Result Value Ref Range    Magnesium 1.6 1.6 - 2.4 mg/dL   Folate    Specimen: Blood   Result Value Ref Range    Folate >20.00 4.78 - 24.20 ng/mL   Vitamin B12    Specimen: Blood   Result Value Ref Range    Vitamin B-12 186 (L) 211 - 946 pg/mL       PE    Physical Exam  Vitals reviewed.   Constitutional:       General: She is not in acute distress.     Appearance: Normal appearance. She is well-developed and normal weight. She is not ill-appearing or diaphoretic.   HENT:      Head: Normocephalic and atraumatic.   Eyes:      Extraocular Movements: Extraocular movements intact.      Conjunctiva/sclera: Conjunctivae normal.   Cardiovascular:      Rate and Rhythm: Normal rate and regular rhythm.      Heart sounds: Normal heart sounds.   Pulmonary:      Effort: Pulmonary effort is normal.      Breath sounds: Normal breath sounds.   Musculoskeletal:         General: Normal range of motion.      Cervical back: Normal range of motion.      Right  lower leg: No edema.      Left lower leg: No edema.   Skin:     General: Skin is warm.      Findings: No erythema or rash.   Neurological:      General: No focal deficit present.      Mental Status: She is alert.   Psychiatric:         Attention and Perception: Attention and perception normal. She is attentive.         Mood and Affect: Mood and affect normal.         Speech: Speech normal.         Behavior: Behavior normal. Behavior is cooperative.         Thought Content: Thought content normal.         Cognition and Memory: Cognition and memory normal.         Judgment: Judgment normal.         A/P    Diagnoses and all orders for this visit:    1. Vitamin B12 deficiency (Primary)  -     Cyanocobalamin (Vitamin B-12) 1000 MCG sublingual tablet; Place 1 tablet under the tongue Daily.  Dispense: 90 each; Refill: 3  Recent labs show low vitamin B12.  Patient wants to trial oral supplement first if possible.    2. Mild intermittent asthma without complication  -     albuterol sulfate  (90 Base) MCG/ACT inhaler; Inhale 2 puffs Every 6 (Six) Hours As Needed for Wheezing.  Dispense: 18 g; Refill: 11  States she only has to use albuterol when her allergies are bad.  Has history of smoking for 20+ years, no longer smoking now.  Suspect possible COPD component.  Gave stiolto samples today.    3. Hypomagnesemia  -     magnesium oxide (MAG-OX) 400 MG tablet; Take 1 tablet by mouth 2 (Two) Times a Day.  Dispense: 180 tablet; Refill: 3  Recent magnesium is normal.  Continue current dose.    4. Gastroesophageal reflux disease without esophagitis  -     omeprazole (priLOSEC) 20 MG capsule; Take 1 capsule by mouth Daily.  Dispense: 90 capsule; Refill: 3    5. Mixed hyperlipidemia  -     pravastatin (Pravachol) 40 MG tablet; Take 1 tablet by mouth Every Night.  Dispense: 90 tablet; Refill: 3    6. Essential hypertension  -     metoprolol succinate XL (TOPROL-XL) 100 MG 24 hr tablet; Take 1 tablet by mouth Daily.  Dispense: 90  tablet; Refill: 3  Elevated today on repeat at 160/84.  Increase metoprolol from 50 mg to 100 mg.  Monitor at home and call if greater than 140/90.       Plan of care reviewed with patient at the conclusion of today's visit. Education was provided regarding diagnosis, management and any prescribed or recommended OTC medications.  Patient verbalizes understanding of and agreement with management plan.    Return in about 3 months (around 6/25/2021) for Recheck.     Uzma Duffy PA-C

## 2021-03-25 NOTE — PATIENT INSTRUCTIONS
Continue magnesium 400 mg twice a day.  If you develop diarrhea, reduce to once a day.  Continue folic acid until prescription is completed.  Do not need to take after prescription is done.  Start vitamin B12 1,000 mcg daily.  Ongoing indefinitely.  Okay to stop vitamin B1 (thiamine) if causing diarrhea.  Continue vitamin D3 6,000 units daily until Spring (March) of 2022 and then reduce to 2,000 units.

## 2021-03-29 ENCOUNTER — TELEPHONE (OUTPATIENT)
Dept: FAMILY MEDICINE CLINIC | Facility: CLINIC | Age: 61
End: 2021-03-29

## 2021-03-29 NOTE — TELEPHONE ENCOUNTER
Contacted patient, she states she only wants to speak with Uzma and she is only available after 4:30. I advised her that her PCP is very busy in clinic and if she needs to schedule an upcoming appt she can do so; she declined.

## 2021-03-29 NOTE — TELEPHONE ENCOUNTER
Caller: Micky Lindquistie    Relationship: Self    Best call back number: [phone]    What medications are you currently taking:   Current Outpatient Medications on File Prior to Visit   Medication Sig Dispense Refill   • albuterol sulfate  (90 Base) MCG/ACT inhaler Inhale 2 puffs Every 6 (Six) Hours As Needed for Wheezing. 18 g 11   • calcium carb-cholecalciferol 600-800 MG-UNIT tablet Take 1 tablet by mouth 2 (Two) Times a Day With Meals. 60 tablet 0   • Cyanocobalamin (Vitamin B-12) 1000 MCG sublingual tablet Place 1 tablet under the tongue Daily. 90 each 3   • cyclobenzaprine (FLEXERIL) 10 MG tablet Take 1 tablet by mouth 3 (Three) Times a Day As Needed for Muscle Spasms. 30 tablet 0   • magnesium oxide (MAG-OX) 400 MG tablet Take 1 tablet by mouth 2 (Two) Times a Day. 180 tablet 3   • metoprolol succinate XL (TOPROL-XL) 100 MG 24 hr tablet Take 1 tablet by mouth Daily. 90 tablet 3   • omeprazole (priLOSEC) 20 MG capsule Take 1 capsule by mouth Daily. 90 capsule 3   • pravastatin (Pravachol) 40 MG tablet Take 1 tablet by mouth Every Night. 90 tablet 3   • triamcinolone (KENALOG) 0.1 % ointment Apply  topically to the appropriate area as directed 3 (Three) Times a Day. 453.6 g 0     No current facility-administered medications on file prior to visit.        When did you start taking these medications: N/A    Which medication are you concerned about: HYDROXYCHLOROQUINE    Who prescribed you this medication: N/A    What are your concerns: PATIENT HAD QUESTIONS ABOUT THE MEDICATION. SHE IS NOT TAKING THE MEDICATION CURRENTLY. SHE WANTED TO SPEAK WITH PCP ABOUT THE MEDICATION AND WOULD NOT PROVIDE FURTHER DETAILS. PATIENT IS ASKING TO BE CALLED BACK.     How long have you been taking these medications: N/A    How long have you had these concerns: N/A

## 2021-05-18 ENCOUNTER — TRANSCRIBE ORDERS (OUTPATIENT)
Dept: ADMINISTRATIVE | Facility: HOSPITAL | Age: 61
End: 2021-05-18

## 2021-05-18 DIAGNOSIS — Z12.31 VISIT FOR SCREENING MAMMOGRAM: Primary | ICD-10-CM

## 2021-05-25 ENCOUNTER — HOSPITAL ENCOUNTER (OUTPATIENT)
Dept: BONE DENSITY | Facility: HOSPITAL | Age: 61
Discharge: HOME | End: 2021-05-25
Admitting: PHYSICIAN ASSISTANT

## 2021-05-25 PROCEDURE — 77080 DXA BONE DENSITY AXIAL: CPT

## 2021-07-16 ENCOUNTER — APPOINTMENT (OUTPATIENT)
Dept: MAMMOGRAPHY | Facility: HOSPITAL | Age: 61
End: 2021-07-16

## 2021-08-31 ENCOUNTER — HOSPITAL ENCOUNTER (OUTPATIENT)
Dept: MAMMOGRAPHY | Facility: HOSPITAL | Age: 61
Discharge: HOME | End: 2021-08-31
Admitting: PHYSICIAN ASSISTANT

## 2021-08-31 DIAGNOSIS — Z12.31 VISIT FOR SCREENING MAMMOGRAM: ICD-10-CM

## 2021-08-31 PROCEDURE — 77063 BREAST TOMOSYNTHESIS BI: CPT

## 2021-08-31 PROCEDURE — 77063 BREAST TOMOSYNTHESIS BI: CPT | Performed by: RADIOLOGY

## 2021-08-31 PROCEDURE — 77067 SCR MAMMO BI INCL CAD: CPT | Performed by: RADIOLOGY

## 2021-08-31 PROCEDURE — 77067 SCR MAMMO BI INCL CAD: CPT

## 2022-04-14 DIAGNOSIS — J45.20 MILD INTERMITTENT ASTHMA WITHOUT COMPLICATION: ICD-10-CM

## 2022-04-15 DIAGNOSIS — I10 ESSENTIAL HYPERTENSION: ICD-10-CM

## 2022-04-15 DIAGNOSIS — K21.9 GASTROESOPHAGEAL REFLUX DISEASE WITHOUT ESOPHAGITIS: ICD-10-CM

## 2022-04-15 RX ORDER — ALBUTEROL SULFATE 90 UG/1
AEROSOL, METERED RESPIRATORY (INHALATION)
Qty: 18 G | Refills: 0 | Status: SHIPPED | OUTPATIENT
Start: 2022-04-15 | End: 2022-04-18

## 2022-04-15 NOTE — TELEPHONE ENCOUNTER
Rx Refill Note  Requested Prescriptions     Pending Prescriptions Disp Refills   • albuterol sulfate  (90 Base) MCG/ACT inhaler [Pharmacy Med Name: Albuterol Sulfate  (90 Base) MCG/ACT Inhalation Aerosol Solution] 18 g 0     Sig: INHALE 2 PUFFS BY MOUTH EVERY 6 HOURS AS NEEDED FOR WHEEZING      Last office visit with prescribing clinician: 11/5/2021     Next office visit with prescribing clinician: Visit date not found            Yolie Salmon MA  04/15/22, 08:10 EDT

## 2022-04-16 DIAGNOSIS — J45.20 MILD INTERMITTENT ASTHMA WITHOUT COMPLICATION: ICD-10-CM

## 2022-04-18 RX ORDER — ALBUTEROL SULFATE 90 UG/1
AEROSOL, METERED RESPIRATORY (INHALATION)
Qty: 18 G | Refills: 0 | Status: SHIPPED | OUTPATIENT
Start: 2022-04-18 | End: 2022-06-27 | Stop reason: SDUPTHER

## 2022-04-18 RX ORDER — OMEPRAZOLE 20 MG/1
20 CAPSULE, DELAYED RELEASE ORAL DAILY
Qty: 90 CAPSULE | Refills: 0 | Status: SHIPPED | OUTPATIENT
Start: 2022-04-18 | End: 2022-04-22 | Stop reason: SDUPTHER

## 2022-04-18 RX ORDER — METOPROLOL SUCCINATE 100 MG/1
100 TABLET, EXTENDED RELEASE ORAL DAILY
Qty: 90 TABLET | Refills: 0 | Status: SHIPPED | OUTPATIENT
Start: 2022-04-18 | End: 2022-04-22 | Stop reason: SDUPTHER

## 2022-04-18 NOTE — TELEPHONE ENCOUNTER
Rx Refill Note  Requested Prescriptions     Pending Prescriptions Disp Refills   • albuterol sulfate  (90 Base) MCG/ACT inhaler [Pharmacy Med Name: Albuterol Sulfate  (90 Base) MCG/ACT Inhalation Aerosol Solution] 18 g 0     Sig: INHALE 2 PUFFS BY MOUTH EVERY 6 HOURS AS NEEDED FOR WHEEZING      Last office visit with prescribing clinician: 11/5/2021  Next office visit with prescribing clinician: 4/22/2022   23}  Lakshmi Boone MA  04/18/22, 08:08 EDT     Last fill: 04/15/2022

## 2022-04-18 NOTE — TELEPHONE ENCOUNTER
Rx Refill Note  Requested Prescriptions     Pending Prescriptions Disp Refills   • omeprazole (priLOSEC) 20 MG capsule 90 capsule 3     Sig: Take 1 capsule by mouth Daily.   • metoprolol succinate XL (TOPROL-XL) 100 MG 24 hr tablet 90 tablet 3     Sig: Take 1 tablet by mouth Daily.      Last office visit with prescribing clinician: 3/25/2021      Next office visit with prescribing clinician: 4/22/2022            Lila Fiore MA  04/18/22, 08:17 EDT

## 2022-04-22 ENCOUNTER — LAB (OUTPATIENT)
Dept: LAB | Facility: HOSPITAL | Age: 62
End: 2022-04-22

## 2022-04-22 ENCOUNTER — OFFICE VISIT (OUTPATIENT)
Dept: FAMILY MEDICINE CLINIC | Facility: CLINIC | Age: 62
End: 2022-04-22

## 2022-04-22 VITALS
WEIGHT: 140.6 LBS | TEMPERATURE: 98.6 F | BODY MASS INDEX: 24.91 KG/M2 | SYSTOLIC BLOOD PRESSURE: 130 MMHG | HEIGHT: 63 IN | HEART RATE: 80 BPM | DIASTOLIC BLOOD PRESSURE: 82 MMHG | OXYGEN SATURATION: 96 %

## 2022-04-22 DIAGNOSIS — I10 ESSENTIAL HYPERTENSION: Primary | ICD-10-CM

## 2022-04-22 DIAGNOSIS — Z13.220 SCREENING FOR CHOLESTEROL LEVEL: ICD-10-CM

## 2022-04-22 DIAGNOSIS — Z13.0 SCREENING FOR DEFICIENCY ANEMIA: ICD-10-CM

## 2022-04-22 DIAGNOSIS — E53.8 VITAMIN B12 DEFICIENCY: ICD-10-CM

## 2022-04-22 DIAGNOSIS — E61.2 MAGNESIUM DEFICIENCY: ICD-10-CM

## 2022-04-22 DIAGNOSIS — E55.9 VITAMIN D DEFICIENCY: ICD-10-CM

## 2022-04-22 DIAGNOSIS — Z13.1 SCREENING FOR DIABETES MELLITUS: ICD-10-CM

## 2022-04-22 DIAGNOSIS — K21.9 GASTROESOPHAGEAL REFLUX DISEASE WITHOUT ESOPHAGITIS: ICD-10-CM

## 2022-04-22 DIAGNOSIS — Z12.2 ENCOUNTER FOR SCREENING FOR LUNG CANCER: ICD-10-CM

## 2022-04-22 DIAGNOSIS — Z13.29 SCREENING FOR THYROID DISORDER: ICD-10-CM

## 2022-04-22 DIAGNOSIS — E78.2 MIXED HYPERLIPIDEMIA: ICD-10-CM

## 2022-04-22 DIAGNOSIS — F17.210 CIGARETTE SMOKER: ICD-10-CM

## 2022-04-22 LAB
BASOPHILS # BLD AUTO: 0.05 10*3/MM3 (ref 0–0.2)
BASOPHILS NFR BLD AUTO: 0.8 % (ref 0–1.5)
DEPRECATED RDW RBC AUTO: 51.4 FL (ref 37–54)
EOSINOPHIL # BLD AUTO: 0.38 10*3/MM3 (ref 0–0.4)
EOSINOPHIL NFR BLD AUTO: 6.2 % (ref 0.3–6.2)
ERYTHROCYTE [DISTWIDTH] IN BLOOD BY AUTOMATED COUNT: 16.1 % (ref 12.3–15.4)
HCT VFR BLD AUTO: 42.4 % (ref 34–46.6)
HGB BLD-MCNC: 14.2 G/DL (ref 12–15.9)
IMM GRANULOCYTES # BLD AUTO: 0.02 10*3/MM3 (ref 0–0.05)
IMM GRANULOCYTES NFR BLD AUTO: 0.3 % (ref 0–0.5)
LYMPHOCYTES # BLD AUTO: 2.1 10*3/MM3 (ref 0.7–3.1)
LYMPHOCYTES NFR BLD AUTO: 34.3 % (ref 19.6–45.3)
MCH RBC QN AUTO: 29.8 PG (ref 26.6–33)
MCHC RBC AUTO-ENTMCNC: 33.5 G/DL (ref 31.5–35.7)
MCV RBC AUTO: 89.1 FL (ref 79–97)
MONOCYTES # BLD AUTO: 0.41 10*3/MM3 (ref 0.1–0.9)
MONOCYTES NFR BLD AUTO: 6.7 % (ref 5–12)
NEUTROPHILS NFR BLD AUTO: 3.17 10*3/MM3 (ref 1.7–7)
NEUTROPHILS NFR BLD AUTO: 51.7 % (ref 42.7–76)
NRBC BLD AUTO-RTO: 0 /100 WBC (ref 0–0.2)
PLATELET # BLD AUTO: 276 10*3/MM3 (ref 140–450)
PMV BLD AUTO: 10.7 FL (ref 6–12)
RBC # BLD AUTO: 4.76 10*6/MM3 (ref 3.77–5.28)
WBC NRBC COR # BLD: 6.13 10*3/MM3 (ref 3.4–10.8)

## 2022-04-22 PROCEDURE — 80050 GENERAL HEALTH PANEL: CPT

## 2022-04-22 PROCEDURE — 99214 OFFICE O/P EST MOD 30 MIN: CPT | Performed by: PHYSICIAN ASSISTANT

## 2022-04-22 PROCEDURE — 83735 ASSAY OF MAGNESIUM: CPT

## 2022-04-22 PROCEDURE — 82306 VITAMIN D 25 HYDROXY: CPT

## 2022-04-22 PROCEDURE — 80061 LIPID PANEL: CPT

## 2022-04-22 PROCEDURE — 82607 VITAMIN B-12: CPT

## 2022-04-22 RX ORDER — OMEPRAZOLE 20 MG/1
20 CAPSULE, DELAYED RELEASE ORAL DAILY
Qty: 90 CAPSULE | Refills: 3 | Status: SHIPPED | OUTPATIENT
Start: 2022-04-22 | End: 2022-07-02 | Stop reason: SDUPTHER

## 2022-04-22 RX ORDER — PRAVASTATIN SODIUM 40 MG
40 TABLET ORAL NIGHTLY
Qty: 90 TABLET | Refills: 3 | Status: SHIPPED | OUTPATIENT
Start: 2022-04-22

## 2022-04-22 RX ORDER — METOPROLOL SUCCINATE 100 MG/1
100 TABLET, EXTENDED RELEASE ORAL DAILY
Qty: 90 TABLET | Refills: 3 | Status: SHIPPED | OUTPATIENT
Start: 2022-04-22

## 2022-04-22 NOTE — PROGRESS NOTES
Chief Complaint   Patient presents with   • Hypertension       HPI     Jocelyne Lindquist is a pleasant 61 y.o. female who is here for routine follow-up of hypertension, hyperlipidemia, reflux, vitamin B12 and magnesium deficiency.  Patient's blood pressure is stable and well-controlled.  She is compliant on all of her medications.  She denies any issues with reflux since taking her omeprazole 20 mg daily.  She is taking vitamin D and vitamin B12 supplements regularly.  Not currently taking magnesium supplements.  She quit smoking in 2017.  She smoked 0.5-1 pack a day for at least 30 years.  She would like to have the preventative lung cancer screening.    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, right     outer corner of right eye   • Tobacco abuse 2017    Quit 15       Past Surgical History:   Procedure Laterality Date   • BREAST BIOPSY Left 2010    stereo bx   • CYST REMOVAL Right     wrist   • SQUAMOUS CELL CARCINOMA EXCISION Right 2017    outer corner of right eye       Family History   Problem Relation Age of Onset   • Cancer Mother         esophageal and liver   • Heart failure Father    • Breast cancer Neg Hx    • Ovarian cancer Neg Hx        Social History     Socioeconomic History   • Marital status:    Tobacco Use   • Smoking status: Former Smoker     Packs/day: 1.00     Years: 30.00     Pack years: 30.00     Start date: 2000     Quit date: 2017     Years since quittin.3   • Smokeless tobacco: Never Used   • Tobacco comment: 15   Substance and Sexual Activity   • Alcohol use: No     Comment: not since 15   • Drug use: No   • Sexual activity: Defer       Allergies   Allergen Reactions   • Amoxicillin Swelling   • Benazepril Swelling   • Doxycycline  "Diarrhea   • Eggs Or Egg-Derived Products      Causes cramps   • Fish-Derived Products      Causes severe stomach cramps       ROS  Review of Systems   Constitutional: Negative for chills, diaphoresis, fatigue and fever.   HENT: Negative for congestion, postnasal drip and rhinorrhea.    Respiratory: Negative for cough, shortness of breath and wheezing.    Cardiovascular: Negative for chest pain and leg swelling.   Neurological: Negative for dizziness and headache.       Vitals:    04/22/22 1339   BP: 130/82   BP Location: Left arm   Patient Position: Sitting   Cuff Size: Adult   Pulse: 80   Temp: 98.6 °F (37 °C)   SpO2: 96%   Weight: 63.8 kg (140 lb 9.6 oz)   Height: 160 cm (62.99\")   PainSc: 0-No pain     Body mass index is 24.91 kg/m².    Current Outpatient Medications on File Prior to Visit   Medication Sig Dispense Refill   • albuterol sulfate  (90 Base) MCG/ACT inhaler INHALE 2 PUFFS BY MOUTH EVERY 6 HOURS AS NEEDED FOR WHEEZING 18 g 0   • calcium carb-cholecalciferol 600-800 MG-UNIT tablet Take 1 tablet by mouth 2 (Two) Times a Day With Meals. 60 tablet 0   • Cyanocobalamin (Vitamin B-12) 1000 MCG sublingual tablet Place 1 tablet under the tongue Daily. 90 each 3   • triamcinolone (KENALOG) 0.1 % ointment Apply  topically to the appropriate area as directed 3 (Three) Times a Day. 453.6 g 0   • [DISCONTINUED] cefdinir (OMNICEF) 300 MG capsule Take 1 capsule by mouth 2 (Two) Times a Day. 20 capsule 0   • [DISCONTINUED] cyclobenzaprine (FLEXERIL) 10 MG tablet Take 1 tablet by mouth 3 (Three) Times a Day As Needed for Muscle Spasms. 30 tablet 0   • [DISCONTINUED] magnesium oxide (MAG-OX) 400 MG tablet Take 1 tablet by mouth 2 (Two) Times a Day. 180 tablet 3   • [DISCONTINUED] metoprolol succinate XL (TOPROL-XL) 100 MG 24 hr tablet Take 1 tablet by mouth Daily. 90 tablet 0   • [DISCONTINUED] omeprazole (priLOSEC) 20 MG capsule Take 1 capsule by mouth Daily. 90 capsule 0   • [DISCONTINUED] pravastatin " (Pravachol) 40 MG tablet Take 1 tablet by mouth Every Night. 90 tablet 3     No current facility-administered medications on file prior to visit.       Results for orders placed or performed in visit on 03/22/21   Basic Metabolic Panel    Specimen: Blood   Result Value Ref Range    Glucose 98 65 - 99 mg/dL    BUN 9 8 - 23 mg/dL    Creatinine 0.77 0.57 - 1.00 mg/dL    Sodium 139 136 - 145 mmol/L    Potassium 4.0 3.5 - 5.2 mmol/L    Chloride 101 98 - 107 mmol/L    CO2 26.7 22.0 - 29.0 mmol/L    Calcium 9.9 8.6 - 10.5 mg/dL    eGFR Non African Amer 76 >60 mL/min/1.73    BUN/Creatinine Ratio 11.7 7.0 - 25.0    Anion Gap 11.3 5.0 - 15.0 mmol/L   Magnesium    Specimen: Blood   Result Value Ref Range    Magnesium 1.6 1.6 - 2.4 mg/dL   Folate    Specimen: Blood   Result Value Ref Range    Folate >20.00 4.78 - 24.20 ng/mL   Vitamin B12    Specimen: Blood   Result Value Ref Range    Vitamin B-12 186 (L) 211 - 946 pg/mL       PE    Physical Exam  Vitals reviewed.   Constitutional:       General: She is not in acute distress.     Appearance: Normal appearance. She is well-developed and normal weight. She is not ill-appearing or diaphoretic.   HENT:      Head: Normocephalic and atraumatic.   Eyes:      Extraocular Movements: Extraocular movements intact.      Conjunctiva/sclera: Conjunctivae normal.   Cardiovascular:      Rate and Rhythm: Normal rate and regular rhythm.      Heart sounds: Normal heart sounds.   Pulmonary:      Effort: Pulmonary effort is normal.      Breath sounds: Normal breath sounds.   Musculoskeletal:         General: Normal range of motion.      Cervical back: Normal range of motion.      Right lower leg: No edema.      Left lower leg: No edema.   Skin:     General: Skin is warm.      Findings: No erythema or rash.   Neurological:      General: No focal deficit present.      Mental Status: She is alert.   Psychiatric:         Attention and Perception: Attention and perception normal. She is attentive.          Mood and Affect: Mood and affect normal.         Speech: Speech normal.         Behavior: Behavior normal. Behavior is cooperative.         Thought Content: Thought content normal.         Cognition and Memory: Cognition and memory normal.         Judgment: Judgment normal.         A/P    Diagnoses and all orders for this visit:    1. Essential hypertension (Primary)  -     metoprolol succinate XL (TOPROL-XL) 100 MG 24 hr tablet; Take 1 tablet by mouth Daily.  Dispense: 90 tablet; Refill: 3  Stable, well-controlled.  Compliant on medication.    2. Mixed hyperlipidemia  -     pravastatin (Pravachol) 40 MG tablet; Take 1 tablet by mouth Every Night.  Dispense: 90 tablet; Refill: 3    3. Gastroesophageal reflux disease without esophagitis  -     omeprazole (priLOSEC) 20 MG capsule; Take 1 capsule by mouth Daily.  Dispense: 90 capsule; Refill: 3  Stable with current medication.    4. Vitamin B12 deficiency  -     Vitamin B12; Future  Taking daily vitamin B 12 supplementation.    5. Magnesium deficiency  -     Magnesium; Future  Not on any magnesium supplements.    6. Vitamin D deficiency  -     Vitamin D 25 Hydroxy; Future  Taking vitamin D supplementation.    7. Screening for thyroid disorder  -     TSH Rfx On Abnormal To Free T4; Future    8. Screening for deficiency anemia  -     CBC Auto Differential; Future    9. Screening for cholesterol level  -     Lipid Panel; Future    10. Screening for diabetes mellitus  -     Comprehensive Metabolic Panel; Future    11. Cigarette smoker  -      CT Chest Low Dose Cancer Screening WO; Future    12. Encounter for screening for lung cancer  -      CT Chest Low Dose Cancer Screening WO; Future         Plan of care reviewed with patient at the conclusion of today's visit. Education was provided regarding diagnosis, management and any prescribed or recommended OTC medications.  Patient verbalizes understanding of and agreement with management plan.    Return in about 2 weeks  (around 5/6/2022) for Annual physical.     Uzma Duffy PA-C

## 2022-04-23 LAB
25(OH)D3 SERPL-MCNC: 70.4 NG/ML (ref 30–100)
ALBUMIN SERPL-MCNC: 4.9 G/DL (ref 3.5–5.2)
ALBUMIN/GLOB SERPL: 1.7 G/DL
ALP SERPL-CCNC: 91 U/L (ref 39–117)
ALT SERPL W P-5'-P-CCNC: 39 U/L (ref 1–33)
ANION GAP SERPL CALCULATED.3IONS-SCNC: 12.7 MMOL/L (ref 5–15)
AST SERPL-CCNC: 73 U/L (ref 1–32)
BILIRUB SERPL-MCNC: 0.7 MG/DL (ref 0–1.2)
BUN SERPL-MCNC: 7 MG/DL (ref 8–23)
BUN/CREAT SERPL: 10 (ref 7–25)
CALCIUM SPEC-SCNC: 9.6 MG/DL (ref 8.6–10.5)
CHLORIDE SERPL-SCNC: 106 MMOL/L (ref 98–107)
CHOLEST SERPL-MCNC: 166 MG/DL (ref 0–200)
CO2 SERPL-SCNC: 23.3 MMOL/L (ref 22–29)
CREAT SERPL-MCNC: 0.7 MG/DL (ref 0.57–1)
EGFRCR SERPLBLD CKD-EPI 2021: 97.3 ML/MIN/1.73
GLOBULIN UR ELPH-MCNC: 2.9 GM/DL
GLUCOSE SERPL-MCNC: 100 MG/DL (ref 65–99)
HDLC SERPL-MCNC: 99 MG/DL (ref 40–60)
LDLC SERPL CALC-MCNC: 38 MG/DL (ref 0–100)
LDLC/HDLC SERPL: 0.29 {RATIO}
MAGNESIUM SERPL-MCNC: 1.3 MG/DL (ref 1.6–2.4)
POTASSIUM SERPL-SCNC: 4.4 MMOL/L (ref 3.5–5.2)
PROT SERPL-MCNC: 7.8 G/DL (ref 6–8.5)
SODIUM SERPL-SCNC: 142 MMOL/L (ref 136–145)
TRIGL SERPL-MCNC: 190 MG/DL (ref 0–150)
TSH SERPL DL<=0.05 MIU/L-ACNC: 0.96 UIU/ML (ref 0.27–4.2)
VIT B12 BLD-MCNC: 971 PG/ML (ref 211–946)
VLDLC SERPL-MCNC: 29 MG/DL (ref 5–40)

## 2022-04-24 DIAGNOSIS — E83.42 HYPOMAGNESEMIA: Primary | ICD-10-CM

## 2022-04-24 RX ORDER — MULTIVITAMIN WITH IRON
TABLET ORAL
Qty: 120 TABLET | Refills: 0 | Status: SHIPPED | OUTPATIENT
Start: 2022-04-24

## 2022-05-09 ENCOUNTER — APPOINTMENT (OUTPATIENT)
Dept: CT IMAGING | Facility: HOSPITAL | Age: 62
End: 2022-05-09

## 2022-05-10 DIAGNOSIS — Z12.2 ENCOUNTER FOR SCREENING FOR LUNG CANCER: Primary | ICD-10-CM

## 2022-05-10 DIAGNOSIS — F17.210 CIGARETTE SMOKER: ICD-10-CM

## 2022-05-16 ENCOUNTER — LAB (OUTPATIENT)
Dept: LAB | Facility: HOSPITAL | Age: 62
End: 2022-05-16

## 2022-05-16 ENCOUNTER — OFFICE VISIT (OUTPATIENT)
Dept: FAMILY MEDICINE CLINIC | Facility: CLINIC | Age: 62
End: 2022-05-16

## 2022-05-16 VITALS
TEMPERATURE: 98.7 F | OXYGEN SATURATION: 96 % | DIASTOLIC BLOOD PRESSURE: 82 MMHG | HEIGHT: 63 IN | BODY MASS INDEX: 24.88 KG/M2 | SYSTOLIC BLOOD PRESSURE: 120 MMHG | HEART RATE: 72 BPM | WEIGHT: 140.4 LBS

## 2022-05-16 DIAGNOSIS — F51.01 PRIMARY INSOMNIA: ICD-10-CM

## 2022-05-16 DIAGNOSIS — Z00.00 PHYSICAL EXAM, ANNUAL: Primary | ICD-10-CM

## 2022-05-16 DIAGNOSIS — E83.42 HYPOMAGNESEMIA: ICD-10-CM

## 2022-05-16 DIAGNOSIS — E78.2 MIXED HYPERLIPIDEMIA: ICD-10-CM

## 2022-05-16 DIAGNOSIS — F10.10 ALCOHOL ABUSE: ICD-10-CM

## 2022-05-16 DIAGNOSIS — R74.8 ABNORMAL LIVER ENZYMES: ICD-10-CM

## 2022-05-16 DIAGNOSIS — Z87.891 HISTORY OF SMOKING 30 OR MORE PACK YEARS: ICD-10-CM

## 2022-05-16 DIAGNOSIS — I10 ESSENTIAL HYPERTENSION: ICD-10-CM

## 2022-05-16 DIAGNOSIS — R91.1 INCIDENTAL LUNG NODULE, > 3MM AND < 8MM: ICD-10-CM

## 2022-05-16 LAB — MAGNESIUM SERPL-MCNC: 1.4 MG/DL (ref 1.6–2.4)

## 2022-05-16 PROCEDURE — 99396 PREV VISIT EST AGE 40-64: CPT | Performed by: PHYSICIAN ASSISTANT

## 2022-05-16 PROCEDURE — 83735 ASSAY OF MAGNESIUM: CPT

## 2022-05-16 RX ORDER — THIAMINE MONONITRATE (VIT B1) 100 MG
100 TABLET ORAL DAILY
Qty: 30 TABLET | Refills: 5 | Status: SHIPPED | OUTPATIENT
Start: 2022-05-16

## 2022-05-16 RX ORDER — MIRTAZAPINE 15 MG/1
15 TABLET, FILM COATED ORAL NIGHTLY
Qty: 30 TABLET | Refills: 1 | Status: SHIPPED | OUTPATIENT
Start: 2022-05-16

## 2022-05-16 NOTE — PATIENT INSTRUCTIONS
Reduce to 2 beers a night for 7 days, then 1 beer nightly for 7 days, then 1 beer every other night for 7 days and discontinue.

## 2022-05-16 NOTE — PROGRESS NOTES
Patient Care Team:  Uzma Duffy PA-C as PCP - General (Physician Assistant)  Flynn Diaz MD as Consulting Physician (Gastroenterology)     Chief complaint: Patient is in today for a physical     Jocelynejaci Lindquist is a 61 y.o. female who presents for her yearly physical exam.     Patient is a very pleasant 61-year-old female who presents for annual physical exam.  She presents for management of hypertension, hyperlipidemia, hypomagnesia, recent CT chest showing a 6 mm lung nodule, history of cigarette smoking, reflux, normal liver enzymes, insomnia, abnormal dreams and alcohol use.  Patient reports drinking 3-4 beers a night.  Some days she will drink more it just depends and sometimes she drinks less.  She has been drinking for a long time.  Her recent labs show that she has elevated AST and ALT.  There is no imaging of her liver on file she recently had a CT chest low-dose which showed a 6 mm lung nodule.  She has no previous imaging to compare to.  She quit smoking in 2017.  Denies any shortness of breath.  Does report seasonal allergies as responsible for her wheezing.  Her blood pressure is stable and well-controlled she is compliant on all medications.  She is currently taking a magnesium supplement twice daily, unsure of dose.  She reports that she has insomnia and this is the main reason she drinks at night.  She has significant abnormal dreams.  She has tried melatonin without benefit.  She has taken trazodone and does not like how this makes her feel.  She has not tried mirtazapine, Ambien, or Seroquel.  She took temazepam in the past and this worked well.  Is up-to-date on colonoscopy, mammogram and bone scan.  She declines COVID-vaccine.       Review of Systems   Constitutional: Positive for fatigue. Negative for chills, diaphoresis and fever.   HENT: Positive for congestion and rhinorrhea. Negative for ear pain, hearing loss, postnasal drip and sore throat.    Eyes: Negative  for blurred vision and pain.   Respiratory: Positive for wheezing. Negative for cough and shortness of breath.    Cardiovascular: Negative for chest pain and leg swelling.   Gastrointestinal: Negative for abdominal pain, blood in stool, constipation, diarrhea, nausea, vomiting and indigestion.   Endocrine: Negative for polyuria.   Genitourinary: Negative for dysuria, flank pain and hematuria.   Musculoskeletal: Negative for arthralgias, gait problem and myalgias.   Skin: Negative for rash and skin lesions.   Neurological: Negative for dizziness and headache.   Psychiatric/Behavioral: Positive for sleep disturbance and stress. Negative for self-injury, suicidal ideas and depressed mood. The patient is not nervous/anxious.         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, right     outer corner of right eye   • Tobacco abuse 2/18/2017    Quit 12/25/17      Past Surgical History:   Procedure Laterality Date   • BREAST BIOPSY Left 06/18/2010    stereo bx   • CYST REMOVAL Right     wrist   • SQUAMOUS CELL CARCINOMA EXCISION Right 12/13/2017    outer corner of right eye      Allergies   Allergen Reactions   • Amoxicillin Swelling   • Benazepril Swelling   • Doxycycline Diarrhea   • Eggs Or Egg-Derived Products      Causes cramps   • Fish-Derived Products      Causes severe stomach cramps      Family History   Problem Relation Age of Onset   • Cancer Mother         esophageal and liver   • Heart failure Father    • Breast cancer Neg Hx    • Ovarian cancer Neg Hx       Social History     Socioeconomic History   • Marital status:    Tobacco Use   • Smoking status: Former Smoker     Packs/day: 1.00     Years: 30.00     Pack years: 30.00     Start date: 1/1/2000     Quit date: 1/1/2017     Years since  quittin.3   • Smokeless tobacco: Never Used   • Tobacco comment: 15   Vaping Use   • Vaping Use: Never used   Substance and Sexual Activity   • Alcohol use: No     Comment: not since 15   • Drug use: No   • Sexual activity: Defer      Current Outpatient Medications on File Prior to Visit   Medication Sig Dispense Refill   • albuterol sulfate  (90 Base) MCG/ACT inhaler INHALE 2 PUFFS BY MOUTH EVERY 6 HOURS AS NEEDED FOR WHEEZING 18 g 0   • calcium carb-cholecalciferol 600-800 MG-UNIT tablet Take 1 tablet by mouth 2 (Two) Times a Day With Meals. 60 tablet 0   • Cyanocobalamin (Vitamin B-12) 1000 MCG sublingual tablet Place 1 tablet under the tongue Daily. 90 each 3   • Magnesium 250 MG tablet Take 1 tablet twice daily for 30 days, then 1 tab once daily 120 tablet 0   • metoprolol succinate XL (TOPROL-XL) 100 MG 24 hr tablet Take 1 tablet by mouth Daily. 90 tablet 3   • omeprazole (priLOSEC) 20 MG capsule Take 1 capsule by mouth Daily. 90 capsule 3   • pravastatin (Pravachol) 40 MG tablet Take 1 tablet by mouth Every Night. 90 tablet 3   • triamcinolone (KENALOG) 0.1 % ointment Apply  topically to the appropriate area as directed 3 (Three) Times a Day. 453.6 g 0     No current facility-administered medications on file prior to visit.       Results for orders placed or performed in visit on 22   CBC Auto Differential    Specimen: Blood   Result Value Ref Range    WBC 6.13 3.40 - 10.80 10*3/mm3    RBC 4.76 3.77 - 5.28 10*6/mm3    Hemoglobin 14.2 12.0 - 15.9 g/dL    Hematocrit 42.4 34.0 - 46.6 %    MCV 89.1 79.0 - 97.0 fL    MCH 29.8 26.6 - 33.0 pg    MCHC 33.5 31.5 - 35.7 g/dL    RDW 16.1 (H) 12.3 - 15.4 %    RDW-SD 51.4 37.0 - 54.0 fl    MPV 10.7 6.0 - 12.0 fL    Platelets 276 140 - 450 10*3/mm3    Neutrophil % 51.7 42.7 - 76.0 %    Lymphocyte % 34.3 19.6 - 45.3 %    Monocyte % 6.7 5.0 - 12.0 %    Eosinophil % 6.2 0.3 - 6.2 %    Basophil % 0.8 0.0 - 1.5 %    Immature Grans % 0.3 0.0 - 0.5 %     Neutrophils, Absolute 3.17 1.70 - 7.00 10*3/mm3    Lymphocytes, Absolute 2.10 0.70 - 3.10 10*3/mm3    Monocytes, Absolute 0.41 0.10 - 0.90 10*3/mm3    Eosinophils, Absolute 0.38 0.00 - 0.40 10*3/mm3    Basophils, Absolute 0.05 0.00 - 0.20 10*3/mm3    Immature Grans, Absolute 0.02 0.00 - 0.05 10*3/mm3    nRBC 0.0 0.0 - 0.2 /100 WBC   Comprehensive Metabolic Panel    Specimen: Blood   Result Value Ref Range    Glucose 100 (H) 65 - 99 mg/dL    BUN 7 (L) 8 - 23 mg/dL    Creatinine 0.70 0.57 - 1.00 mg/dL    Sodium 142 136 - 145 mmol/L    Potassium 4.4 3.5 - 5.2 mmol/L    Chloride 106 98 - 107 mmol/L    CO2 23.3 22.0 - 29.0 mmol/L    Calcium 9.6 8.6 - 10.5 mg/dL    Total Protein 7.8 6.0 - 8.5 g/dL    Albumin 4.90 3.50 - 5.20 g/dL    ALT (SGPT) 39 (H) 1 - 33 U/L    AST (SGOT) 73 (H) 1 - 32 U/L    Alkaline Phosphatase 91 39 - 117 U/L    Total Bilirubin 0.7 0.0 - 1.2 mg/dL    Globulin 2.9 gm/dL    A/G Ratio 1.7 g/dL    BUN/Creatinine Ratio 10.0 7.0 - 25.0    Anion Gap 12.7 5.0 - 15.0 mmol/L    eGFR 97.3 >60.0 mL/min/1.73   TSH Rfx On Abnormal To Free T4    Specimen: Blood   Result Value Ref Range    TSH 0.962 0.270 - 4.200 uIU/mL   Lipid Panel    Specimen: Blood   Result Value Ref Range    Total Cholesterol 166 0 - 200 mg/dL    Triglycerides 190 (H) 0 - 150 mg/dL    HDL Cholesterol 99 (H) 40 - 60 mg/dL    LDL Cholesterol  38 0 - 100 mg/dL    VLDL Cholesterol 29 5 - 40 mg/dL    LDL/HDL Ratio 0.29    Vitamin B12    Specimen: Blood   Result Value Ref Range    Vitamin B-12 971 (H) 211 - 946 pg/mL   Magnesium    Specimen: Blood   Result Value Ref Range    Magnesium 1.3 (L) 1.6 - 2.4 mg/dL   Vitamin D 25 Hydroxy    Specimen: Blood   Result Value Ref Range    25 Hydroxy, Vitamin D 70.4 30.0 - 100.0 ng/ml       Health Maintenance   Topic Date Due   • COVID-19 Vaccine (1) Never done   • LUNG CANCER SCREENING  Never done   • Pneumococcal Vaccine 0-64 (2 - PCV) 03/02/2017   • TDAP/TD VACCINES (2 - Td or Tdap) 01/01/2023   • LIPID  PANEL  04/22/2023   • ANNUAL PHYSICAL  05/17/2023   • MAMMOGRAM  08/31/2023   • COLORECTAL CANCER SCREENING  01/29/2024   • PAP SMEAR  03/09/2024   • DXA SCAN  05/25/2024   • HEPATITIS C SCREENING  Completed   • ZOSTER VACCINE  Completed       Immunization History   Administered Date(s) Administered   • Flu Vaccine Quad PF >36MO 11/01/2018   • FluLaval/Fluarix/Fluzone >6 11/01/2018, 10/14/2019   • Flublock Quad =>18yrs 11/01/2020   • Flublok 18+yrs 11/01/2020   • Hepatitis A 10/14/2019   • Influenza, Unspecified 11/01/2020   • Pneumococcal Polysaccharide (PPSV23) 03/02/2016   • Shingrix 10/19/2019, 12/22/2019, 08/09/2020   • Tdap 01/01/2013       BMI is within normal parameters. No follow-up required.      Results for orders placed or performed in visit on 04/22/22   CBC Auto Differential    Specimen: Blood   Result Value Ref Range    WBC 6.13 3.40 - 10.80 10*3/mm3    RBC 4.76 3.77 - 5.28 10*6/mm3    Hemoglobin 14.2 12.0 - 15.9 g/dL    Hematocrit 42.4 34.0 - 46.6 %    MCV 89.1 79.0 - 97.0 fL    MCH 29.8 26.6 - 33.0 pg    MCHC 33.5 31.5 - 35.7 g/dL    RDW 16.1 (H) 12.3 - 15.4 %    RDW-SD 51.4 37.0 - 54.0 fl    MPV 10.7 6.0 - 12.0 fL    Platelets 276 140 - 450 10*3/mm3    Neutrophil % 51.7 42.7 - 76.0 %    Lymphocyte % 34.3 19.6 - 45.3 %    Monocyte % 6.7 5.0 - 12.0 %    Eosinophil % 6.2 0.3 - 6.2 %    Basophil % 0.8 0.0 - 1.5 %    Immature Grans % 0.3 0.0 - 0.5 %    Neutrophils, Absolute 3.17 1.70 - 7.00 10*3/mm3    Lymphocytes, Absolute 2.10 0.70 - 3.10 10*3/mm3    Monocytes, Absolute 0.41 0.10 - 0.90 10*3/mm3    Eosinophils, Absolute 0.38 0.00 - 0.40 10*3/mm3    Basophils, Absolute 0.05 0.00 - 0.20 10*3/mm3    Immature Grans, Absolute 0.02 0.00 - 0.05 10*3/mm3    nRBC 0.0 0.0 - 0.2 /100 WBC   Comprehensive Metabolic Panel    Specimen: Blood   Result Value Ref Range    Glucose 100 (H) 65 - 99 mg/dL    BUN 7 (L) 8 - 23 mg/dL    Creatinine 0.70 0.57 - 1.00 mg/dL    Sodium 142 136 - 145 mmol/L    Potassium 4.4 3.5 -  "5.2 mmol/L    Chloride 106 98 - 107 mmol/L    CO2 23.3 22.0 - 29.0 mmol/L    Calcium 9.6 8.6 - 10.5 mg/dL    Total Protein 7.8 6.0 - 8.5 g/dL    Albumin 4.90 3.50 - 5.20 g/dL    ALT (SGPT) 39 (H) 1 - 33 U/L    AST (SGOT) 73 (H) 1 - 32 U/L    Alkaline Phosphatase 91 39 - 117 U/L    Total Bilirubin 0.7 0.0 - 1.2 mg/dL    Globulin 2.9 gm/dL    A/G Ratio 1.7 g/dL    BUN/Creatinine Ratio 10.0 7.0 - 25.0    Anion Gap 12.7 5.0 - 15.0 mmol/L    eGFR 97.3 >60.0 mL/min/1.73   TSH Rfx On Abnormal To Free T4    Specimen: Blood   Result Value Ref Range    TSH 0.962 0.270 - 4.200 uIU/mL   Lipid Panel    Specimen: Blood   Result Value Ref Range    Total Cholesterol 166 0 - 200 mg/dL    Triglycerides 190 (H) 0 - 150 mg/dL    HDL Cholesterol 99 (H) 40 - 60 mg/dL    LDL Cholesterol  38 0 - 100 mg/dL    VLDL Cholesterol 29 5 - 40 mg/dL    LDL/HDL Ratio 0.29    Vitamin B12    Specimen: Blood   Result Value Ref Range    Vitamin B-12 971 (H) 211 - 946 pg/mL   Magnesium    Specimen: Blood   Result Value Ref Range    Magnesium 1.3 (L) 1.6 - 2.4 mg/dL   Vitamin D 25 Hydroxy    Specimen: Blood   Result Value Ref Range    25 Hydroxy, Vitamin D 70.4 30.0 - 100.0 ng/ml            Vitals:    05/16/22 1321   BP: 120/82   Pulse: 72   Temp: 98.7 °F (37.1 °C)   SpO2: 96%   Weight: 63.7 kg (140 lb 6.4 oz)   Height: 160 cm (62.99\")   PainSc: 0-No pain       Body mass index is 24.88 kg/m².    Physical Exam  Vitals reviewed.   Constitutional:       General: She is not in acute distress.     Appearance: Normal appearance. She is well-developed and normal weight. She is not ill-appearing or diaphoretic.   HENT:      Head: Normocephalic and atraumatic.      Right Ear: Hearing, tympanic membrane, ear canal and external ear normal.      Left Ear: Hearing, tympanic membrane, ear canal and external ear normal.      Nose: Nose normal.      Right Sinus: No maxillary sinus tenderness or frontal sinus tenderness.      Left Sinus: No maxillary sinus tenderness or " frontal sinus tenderness.      Mouth/Throat:      Pharynx: Uvula midline.   Eyes:      General: Lids are normal.      Extraocular Movements: Extraocular movements intact.      Conjunctiva/sclera: Conjunctivae normal.   Neck:      Thyroid: No thyroid mass or thyromegaly.      Trachea: Trachea and phonation normal.   Cardiovascular:      Rate and Rhythm: Normal rate and regular rhythm.      Heart sounds: Normal heart sounds.   Pulmonary:      Effort: Pulmonary effort is normal.      Breath sounds: Normal breath sounds.   Abdominal:      General: Bowel sounds are normal. There is no distension.      Palpations: Abdomen is soft. Abdomen is not rigid.      Tenderness: There is no abdominal tenderness. There is no guarding.   Musculoskeletal:         General: Normal range of motion.      Cervical back: Normal range of motion.      Right lower leg: No edema.      Left lower leg: No edema.   Lymphadenopathy:      Cervical: No cervical adenopathy.      Right cervical: No superficial cervical adenopathy.     Left cervical: No superficial cervical adenopathy.   Skin:     General: Skin is warm.      Findings: No erythema or rash.      Nails: There is no clubbing.   Neurological:      Mental Status: She is alert and oriented to person, place, and time.      Coordination: Coordination normal.      Gait: Gait normal.      Deep Tendon Reflexes: Reflexes are normal and symmetric.      Comments: CN grossly intact   Psychiatric:         Attention and Perception: Attention and perception normal. She is attentive.         Mood and Affect: Mood and affect normal.         Speech: Speech normal.         Behavior: Behavior normal. Behavior is cooperative.         Thought Content: Thought content normal.         Cognition and Memory: Cognition and memory normal.         Judgment: Judgment normal.             Counseling provided on diet and nutrition, alcohol use, supplements, insomnia, hypertension and preventative screenings and  vaccinations.    Diagnoses and all orders for this visit:    1. Physical exam, annual (Primary)  PE completed  Preventative labs reviewed with patient  Colonoscopy is up-to-date  Mammogram is up-to-date  DEXA is up-to-date  Gynecologist - pap smear completed in March 2021  Declines Covid vaccination    2. Essential hypertension  Stable, well-controlled.  Compliant on medication.    3. Mixed hyperlipidemia  On pravastatin.    4. History of smoking 30 or more pack years  -     CT Chest Without Contrast; Future  Quit smoking in 2017.    5. Incidental lung nodule, > 3mm and < 8mm  -     CT Chest Without Contrast; Future  Recent CT chest low-dose showed a 6 mm lung nodule.  Recommendation was a 12-month follow-up but there are no previous imaging to compare to.  Given patient's extensive history of smoking, recommend that she repeat the CT chest low-dose in 6 months instead of 12 months.  She is agreeable if it is affordable.  Order placed for [facility].    6. Hypomagnesemia  -     Magnesium; Future  Taking magnesium twice daily.  Repeat magnesium lab work and adjust medication as needed.    7. Primary insomnia  -     mirtazapine (Remeron) 15 MG tablet; Take 1 tablet by mouth Every Night.  Dispense: 30 tablet; Refill: 1  Failed trazodone and melatonin.  Trial of mirtazapine 15 mg nightly.  Did well on Restoril in the past, unable to prescribe given it is a benzodiazapine.    8. Alcohol abuse  -     thiamine (VITAMIN B-1) 100 MG tablet; Take 1 tablet by mouth Daily.  Dispense: 30 tablet; Refill: 5    9. Abnormal liver enzymes  Elevated AST>ALT which indicates liver dysfunction due to alcohol use.  Avoid tylenol.  Stop drinking alcohol.  Taper provided.  Start thiamine vitamin supplementation.  Call if she has trouble quitting alcohol.  Repeat liver enzymes in 6 months.  May need liver ultrasound and additional blood work if still elevated.  May need referral to GI.     Uzma Duffy PA-C    5/16/2022   17:51 EDT

## 2022-06-27 DIAGNOSIS — J45.20 MILD INTERMITTENT ASTHMA WITHOUT COMPLICATION: ICD-10-CM

## 2022-06-27 RX ORDER — ALBUTEROL SULFATE 90 UG/1
2 AEROSOL, METERED RESPIRATORY (INHALATION) EVERY 6 HOURS PRN
Qty: 18 G | Refills: 0 | Status: SHIPPED | OUTPATIENT
Start: 2022-06-27 | End: 2022-08-03 | Stop reason: SDUPTHER

## 2022-06-27 NOTE — TELEPHONE ENCOUNTER
Rx Refill Note  Requested Prescriptions     Pending Prescriptions Disp Refills   • albuterol sulfate  (90 Base) MCG/ACT inhaler 18 g 0     Sig: Inhale 2 puffs Every 6 (Six) Hours As Needed for Wheezing.      Last office visit with prescribing clinician: 5/16/2022      Next office visit with prescribing clinician: Visit date not found            Christine Loza MA  06/27/22, 13:59 EDT

## 2022-07-02 DIAGNOSIS — K21.9 GASTROESOPHAGEAL REFLUX DISEASE WITHOUT ESOPHAGITIS: ICD-10-CM

## 2022-07-05 RX ORDER — OMEPRAZOLE 20 MG/1
20 CAPSULE, DELAYED RELEASE ORAL DAILY
Qty: 90 CAPSULE | Refills: 3 | Status: SHIPPED | OUTPATIENT
Start: 2022-07-05 | End: 2022-08-03 | Stop reason: SDUPTHER

## 2022-07-05 NOTE — TELEPHONE ENCOUNTER
Rx Refill Note  Requested Prescriptions     Pending Prescriptions Disp Refills   • omeprazole (priLOSEC) 20 MG capsule 90 capsule 3     Sig: Take 1 capsule by mouth Daily.      Last office visit with prescribing clinician: 5/16/2022      Next office visit with prescribing clinician: Visit date not found            Lila Fiore MA  07/05/22, 10:52 EDT

## 2022-08-03 DIAGNOSIS — K21.9 GASTROESOPHAGEAL REFLUX DISEASE WITHOUT ESOPHAGITIS: ICD-10-CM

## 2022-08-03 DIAGNOSIS — J45.20 MILD INTERMITTENT ASTHMA WITHOUT COMPLICATION: ICD-10-CM

## 2022-08-03 RX ORDER — OMEPRAZOLE 20 MG/1
20 CAPSULE, DELAYED RELEASE ORAL DAILY
Qty: 90 CAPSULE | Refills: 3 | Status: SHIPPED | OUTPATIENT
Start: 2022-08-03

## 2022-08-03 RX ORDER — ALBUTEROL SULFATE 90 UG/1
2 AEROSOL, METERED RESPIRATORY (INHALATION) EVERY 6 HOURS PRN
Qty: 18 G | Refills: 0 | Status: SHIPPED | OUTPATIENT
Start: 2022-08-03 | End: 2022-09-09

## 2022-08-03 NOTE — TELEPHONE ENCOUNTER
Caller: Jocelyne Lindquist    Relationship: Self    Best call back number: [phone]    Requested Prescriptions:   Requested Prescriptions     Pending Prescriptions Disp Refills   • albuterol sulfate  (90 Base) MCG/ACT inhaler [Pharmacy Med Name: Albuterol Sulfate  (90 Base) MCG/ACT Inhalation Aerosol Solution] 18 g 0     Sig: INHALE 2 PUFFS BY MOUTH EVERY 6 HOURS AS NEEDED FOR WHEEZING        Pharmacy where request should be sent: [address] [phone] [facility] [phone]      Additional details provided by patient: COMPLETELY OUT    Does the patient have less than a 3 day supply:  [x] Yes  [] No    Ritu Yip Rep   08/03/22 11:39 EDT

## 2022-08-04 RX ORDER — ALBUTEROL SULFATE 90 UG/1
AEROSOL, METERED RESPIRATORY (INHALATION)
Qty: 18 G | Refills: 0 | OUTPATIENT
Start: 2022-08-04

## 2022-08-13 DIAGNOSIS — I10 ESSENTIAL HYPERTENSION: ICD-10-CM

## 2022-08-13 DIAGNOSIS — J45.20 MILD INTERMITTENT ASTHMA WITHOUT COMPLICATION: ICD-10-CM

## 2022-08-13 RX ORDER — ALBUTEROL SULFATE 90 UG/1
2 AEROSOL, METERED RESPIRATORY (INHALATION) EVERY 6 HOURS PRN
Qty: 18 G | Refills: 0 | Status: CANCELLED | OUTPATIENT
Start: 2022-08-13

## 2022-08-13 RX ORDER — METOPROLOL SUCCINATE 100 MG/1
100 TABLET, EXTENDED RELEASE ORAL DAILY
Qty: 90 TABLET | Refills: 3 | Status: CANCELLED | OUTPATIENT
Start: 2022-08-13

## 2022-08-15 DIAGNOSIS — I10 ESSENTIAL HYPERTENSION: ICD-10-CM

## 2022-08-15 DIAGNOSIS — J45.20 MILD INTERMITTENT ASTHMA WITHOUT COMPLICATION: ICD-10-CM

## 2022-08-15 RX ORDER — ALBUTEROL SULFATE 90 UG/1
2 AEROSOL, METERED RESPIRATORY (INHALATION) EVERY 6 HOURS PRN
Qty: 18 G | Refills: 0 | Status: CANCELLED | OUTPATIENT
Start: 2022-08-15

## 2022-08-15 RX ORDER — METOPROLOL SUCCINATE 100 MG/1
100 TABLET, EXTENDED RELEASE ORAL DAILY
Qty: 90 TABLET | Refills: 3 | Status: CANCELLED | OUTPATIENT
Start: 2022-08-15

## 2022-09-08 DIAGNOSIS — J45.20 MILD INTERMITTENT ASTHMA WITHOUT COMPLICATION: ICD-10-CM

## 2022-09-09 ENCOUNTER — TELEPHONE (OUTPATIENT)
Dept: FAMILY MEDICINE CLINIC | Facility: CLINIC | Age: 62
End: 2022-09-09

## 2022-09-09 ENCOUNTER — TRANSCRIBE ORDERS (OUTPATIENT)
Dept: FAMILY MEDICINE CLINIC | Facility: CLINIC | Age: 62
End: 2022-09-09

## 2022-09-09 DIAGNOSIS — Z12.31 ENCOUNTER FOR SCREENING MAMMOGRAM FOR BREAST CANCER: Primary | ICD-10-CM

## 2022-09-09 DIAGNOSIS — J45.20 MILD INTERMITTENT ASTHMA WITHOUT COMPLICATION: ICD-10-CM

## 2022-09-09 RX ORDER — ALBUTEROL SULFATE 90 UG/1
2 AEROSOL, METERED RESPIRATORY (INHALATION) EVERY 6 HOURS PRN
Qty: 18 G | Refills: 0 | OUTPATIENT
Start: 2022-09-09

## 2022-09-09 RX ORDER — ALBUTEROL SULFATE 90 UG/1
AEROSOL, METERED RESPIRATORY (INHALATION)
Qty: 18 G | Refills: 0 | Status: SHIPPED | OUTPATIENT
Start: 2022-09-09 | End: 2022-09-09 | Stop reason: SDUPTHER

## 2022-09-09 RX ORDER — ALBUTEROL SULFATE 90 UG/1
2 AEROSOL, METERED RESPIRATORY (INHALATION) EVERY 6 HOURS PRN
Qty: 18 G | Refills: 5 | Status: SHIPPED | OUTPATIENT
Start: 2022-09-09

## 2022-09-09 RX ORDER — ALBUTEROL SULFATE 90 UG/1
2 AEROSOL, METERED RESPIRATORY (INHALATION) EVERY 6 HOURS PRN
Qty: 18 G | Refills: 2 | Status: CANCELLED | OUTPATIENT
Start: 2022-09-09

## 2022-09-09 NOTE — TELEPHONE ENCOUNTER
Rx Refill Note  Requested Prescriptions     Pending Prescriptions Disp Refills   • albuterol sulfate  (90 Base) MCG/ACT inhaler [Pharmacy Med Name: Albuterol Sulfate  (90 Base) MCG/ACT Inhalation Aerosol Solution] 18 g 0     Sig: INHALE 2 PUFFS BY MOUTH EVERY 6 HOURS AS NEEDED FOR WHEEZING      Last office visit with prescribing clinician: 5/16/2022      Next office visit with prescribing clinician:             Edgardo Cortez MA  09/09/22, 13:36 EDT

## 2022-09-09 NOTE — TELEPHONE ENCOUNTER
THE PATIENT CALLED AND WAS UPSET THAT SHE DID NOT GET ANY REFILLS ON HER MEDICATION. SHE DOES NOT WANT TO HAVE TO CALL BACK EVERY MONTH

## 2022-09-14 ENCOUNTER — HOSPITAL ENCOUNTER (OUTPATIENT)
Dept: MAMMOGRAPHY | Facility: HOSPITAL | Age: 62
Discharge: HOME | End: 2022-09-14
Admitting: PHYSICIAN ASSISTANT

## 2022-09-14 DIAGNOSIS — Z12.31 ENCOUNTER FOR SCREENING MAMMOGRAM FOR BREAST CANCER: ICD-10-CM

## 2022-09-14 PROCEDURE — 77063 BREAST TOMOSYNTHESIS BI: CPT | Performed by: RADIOLOGY

## 2022-09-14 PROCEDURE — 77067 SCR MAMMO BI INCL CAD: CPT

## 2022-09-14 PROCEDURE — 77063 BREAST TOMOSYNTHESIS BI: CPT

## 2022-09-14 PROCEDURE — 77067 SCR MAMMO BI INCL CAD: CPT | Performed by: RADIOLOGY

## 2022-12-08 NOTE — TELEPHONE ENCOUNTER
Called pt, no answer. LVM for pt to return call.    25-May-2022 PLEASE INCLUDE MORE SPECIFIC DOCUMENTATION IN YOUR PROGRESS NOTE AND DISCHARGE SUMMARY.  The documentation in this patient's medical record requires additional clarification to ensure that we accurately capture the patients diagnosis(es), treatment and/or severity of illness. Please document to the greatest level of specificity all corresponding diagnoses (either known or suspected) and/or treatment associated with the clinical information described below.

## 2023-01-23 ENCOUNTER — TELEMEDICINE (OUTPATIENT)
Dept: FAMILY MEDICINE CLINIC | Facility: CLINIC | Age: 63
End: 2023-01-23
Payer: COMMERCIAL

## 2023-01-23 DIAGNOSIS — R05.1 ACUTE COUGH: ICD-10-CM

## 2023-01-23 DIAGNOSIS — U07.1 COVID-19 VIRUS INFECTION: Primary | ICD-10-CM

## 2023-01-23 PROCEDURE — 99213 OFFICE O/P EST LOW 20 MIN: CPT | Performed by: PHYSICIAN ASSISTANT

## 2023-01-23 RX ORDER — PREDNISONE 20 MG/1
20 TABLET ORAL DAILY
Qty: 5 TABLET | Refills: 0 | Status: SHIPPED | OUTPATIENT
Start: 2023-01-23 | End: 2023-01-28

## 2023-01-23 RX ORDER — DEXTROMETHORPHAN HYDROBROMIDE AND PROMETHAZINE HYDROCHLORIDE 15; 6.25 MG/5ML; MG/5ML
5 SYRUP ORAL 4 TIMES DAILY PRN
Qty: 240 ML | Refills: 0 | Status: SHIPPED | OUTPATIENT
Start: 2023-01-23 | End: 2023-03-10 | Stop reason: SDUPTHER

## 2023-01-23 NOTE — PROGRESS NOTES
Chief Complaint   Patient presents with   • Covid-19 infection         Jocelyne Lindquist is a 62 y.o. female who presents for Covid-19 infection.  Patient tested positive for Covid 3 days ago.  Denies fever, chills.  Patient reports productive cough, headache and nasal congestion.  No shortness of breath.  Taking cough drops and mucinex.      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, right     outer corner of right eye   • Tobacco abuse 2017    Quit 15       Past Surgical History:   Procedure Laterality Date   • BREAST BIOPSY Left 2010    stereo bx   • CYST REMOVAL Right     wrist   • SQUAMOUS CELL CARCINOMA EXCISION Right 2017    outer corner of right eye       Family History   Problem Relation Age of Onset   • Cancer Mother         esophageal and liver   • Heart failure Father    • Breast cancer Neg Hx    • Ovarian cancer Neg Hx        Social History     Socioeconomic History   • Marital status:    Tobacco Use   • Smoking status: Former     Packs/day: 1.00     Years: 30.00     Pack years: 30.00     Types: Cigarettes     Start date: 2000     Quit date: 2017     Years since quittin.0   • Smokeless tobacco: Never   • Tobacco comments:     17   Vaping Use   • Vaping Use: Never used   Substance and Sexual Activity   • Alcohol use: No     Comment: not since 17   • Drug use: No   • Sexual activity: Defer       Allergies   Allergen Reactions   • Amoxicillin Swelling   • Benazepril Swelling   • Doxycycline Diarrhea   • Eggs Or Egg-Derived Products      Causes cramps   • Fish-Derived Products      Causes severe stomach cramps       ROS    Review of Systems   Constitutional: Positive for fatigue. Negative for chills and fever.   HENT: Positive for rhinorrhea and sinus  pressure. Negative for congestion, ear pain and sore throat.    Respiratory: Positive for cough. Negative for shortness of breath and wheezing.    Musculoskeletal: Negative for myalgias.   Neurological: Negative for dizziness and headache.       There were no vitals filed for this visit.  There is no height or weight on file to calculate BMI.    Current Outpatient Medications on File Prior to Visit   Medication Sig Dispense Refill   • albuterol sulfate  (90 Base) MCG/ACT inhaler Inhale 2 puffs Every 6 (Six) Hours As Needed for Wheezing. 18 g 5   • calcium carb-cholecalciferol 600-800 MG-UNIT tablet Take 1 tablet by mouth 2 (Two) Times a Day With Meals. 60 tablet 0   • Cyanocobalamin (Vitamin B-12) 1000 MCG sublingual tablet Place 1 tablet under the tongue Daily. 90 each 3   • Magnesium 250 MG tablet Take 1 tablet twice daily for 30 days, then 1 tab once daily 120 tablet 0   • metoprolol succinate XL (TOPROL-XL) 100 MG 24 hr tablet Take 1 tablet by mouth Daily. 90 tablet 3   • mirtazapine (Remeron) 15 MG tablet Take 1 tablet by mouth Every Night. 30 tablet 1   • omeprazole (priLOSEC) 20 MG capsule Take 1 capsule by mouth Daily. 90 capsule 3   • pravastatin (Pravachol) 40 MG tablet Take 1 tablet by mouth Every Night. 90 tablet 3   • thiamine (VITAMIN B-1) 100 MG tablet Take 1 tablet by mouth Daily. 30 tablet 5   • triamcinolone (KENALOG) 0.1 % ointment Apply  topically to the appropriate area as directed 3 (Three) Times a Day. 453.6 g 0     No current facility-administered medications on file prior to visit.       Results for orders placed or performed in visit on 05/16/22   Magnesium    Specimen: Blood   Result Value Ref Range    Magnesium 1.4 (L) 1.6 - 2.4 mg/dL       PE    Physical Exam  Vitals reviewed.   Constitutional:       General: She is not in acute distress.     Appearance: Normal appearance. She is well-developed. She is not ill-appearing or diaphoretic.   HENT:      Head: Normocephalic and  atraumatic.   Eyes:      Extraocular Movements: Extraocular movements intact.      Conjunctiva/sclera: Conjunctivae normal.   Pulmonary:      Effort: No respiratory distress.   Musculoskeletal:         General: Normal range of motion.      Cervical back: Normal range of motion.   Neurological:      General: No focal deficit present.      Mental Status: She is alert.   Psychiatric:         Attention and Perception: She is attentive.         Mood and Affect: Mood normal.         Speech: Speech normal.         Behavior: Behavior normal. Behavior is cooperative.         Thought Content: Thought content normal.         Judgment: Judgment normal.          A/P    Diagnoses and all orders for this visit:    1. COVID-19 virus infection (Primary)  Tested positive Saturday.  Declines Paxlovid.  Will cover with steroids given ongoing cough and chest discomfort with coughing.  Recommend over-the-counter medications.  If cough persists, would want office visit and chest x-ray.    2. Acute cough  -     predniSONE (DELTASONE) 20 MG tablet; Take 1 tablet by mouth Daily for 5 days.  Dispense: 5 tablet; Refill: 0  -     promethazine-dextromethorphan (PROMETHAZINE-DM) 6.25-15 MG/5ML syrup; Take 5 mL by mouth 4 (Four) Times a Day As Needed for Cough.  Dispense: 240 mL; Refill: 0       You have chosen to receive care through a telehealth visit.  Do you consent to use a video/audio connection for your medical care today? Yes.  Patient is at home in [place], provider is at work in [place].    Plan of care reviewed with patient at the conclusion of today's visit. Education was provided regarding diagnosis, management and any prescribed or recommended OTC medications.  Patient verbalizes understanding of and agreement with management plan.    Dictated Utilizing Dragon Dictation     Please note that portions of this note were completed with a voice recognition program.     Part of this note may be an electronic transcription/translation of  spoken language to printed text using the Dragon Dictation System.    No follow-ups on file.     Uzma Duffy PA-C

## 2023-02-15 ENCOUNTER — LAB (OUTPATIENT)
Dept: LAB | Facility: HOSPITAL | Age: 63
End: 2023-02-15
Payer: COMMERCIAL

## 2023-02-15 PROCEDURE — 88304 TISSUE EXAM BY PATHOLOGIST: CPT

## 2023-02-15 PROCEDURE — 88342 IMHCHEM/IMCYTCHM 1ST ANTB: CPT

## 2023-03-10 ENCOUNTER — OFFICE VISIT (OUTPATIENT)
Dept: FAMILY MEDICINE CLINIC | Facility: CLINIC | Age: 63
End: 2023-03-10
Payer: COMMERCIAL

## 2023-03-10 ENCOUNTER — TELEPHONE (OUTPATIENT)
Dept: FAMILY MEDICINE CLINIC | Facility: CLINIC | Age: 63
End: 2023-03-10

## 2023-03-10 VITALS
HEART RATE: 89 BPM | OXYGEN SATURATION: 95 % | DIASTOLIC BLOOD PRESSURE: 80 MMHG | HEIGHT: 63 IN | SYSTOLIC BLOOD PRESSURE: 130 MMHG | TEMPERATURE: 98.1 F | WEIGHT: 141.2 LBS | BODY MASS INDEX: 25.02 KG/M2

## 2023-03-10 DIAGNOSIS — E83.42 HYPOMAGNESEMIA: ICD-10-CM

## 2023-03-10 DIAGNOSIS — Z00.00 PHYSICAL EXAM, ANNUAL: Primary | ICD-10-CM

## 2023-03-10 DIAGNOSIS — R05.1 ACUTE COUGH: ICD-10-CM

## 2023-03-10 DIAGNOSIS — Z13.1 SCREENING FOR DIABETES MELLITUS: ICD-10-CM

## 2023-03-10 DIAGNOSIS — Z13.0 SCREENING FOR DEFICIENCY ANEMIA: ICD-10-CM

## 2023-03-10 DIAGNOSIS — Z13.29 SCREENING FOR THYROID DISORDER: ICD-10-CM

## 2023-03-10 DIAGNOSIS — J01.91 ACUTE RECURRENT SINUSITIS, UNSPECIFIED LOCATION: Primary | ICD-10-CM

## 2023-03-10 DIAGNOSIS — Z13.220 SCREENING FOR CHOLESTEROL LEVEL: ICD-10-CM

## 2023-03-10 LAB
EXPIRATION DATE: NORMAL
FLUAV AG UPPER RESP QL IA.RAPID: NOT DETECTED
FLUBV AG UPPER RESP QL IA.RAPID: NOT DETECTED
INTERNAL CONTROL: NORMAL
Lab: NORMAL
SARS-COV-2 AG UPPER RESP QL IA.RAPID: NOT DETECTED

## 2023-03-10 PROCEDURE — 87428 SARSCOV & INF VIR A&B AG IA: CPT | Performed by: STUDENT IN AN ORGANIZED HEALTH CARE EDUCATION/TRAINING PROGRAM

## 2023-03-10 PROCEDURE — 99213 OFFICE O/P EST LOW 20 MIN: CPT | Performed by: STUDENT IN AN ORGANIZED HEALTH CARE EDUCATION/TRAINING PROGRAM

## 2023-03-10 RX ORDER — CEFDINIR 300 MG/1
300 CAPSULE ORAL 2 TIMES DAILY
Qty: 14 CAPSULE | Refills: 0 | Status: SHIPPED | OUTPATIENT
Start: 2023-03-10

## 2023-03-10 RX ORDER — DEXTROMETHORPHAN HYDROBROMIDE AND PROMETHAZINE HYDROCHLORIDE 15; 6.25 MG/5ML; MG/5ML
5 SYRUP ORAL 4 TIMES DAILY PRN
Qty: 240 ML | Refills: 0 | Status: SHIPPED | OUTPATIENT
Start: 2023-03-10

## 2023-03-10 NOTE — TELEPHONE ENCOUNTER
Contacted patient to notify. Verbalized understanding     She will call back to schedule her annual exam in May

## 2023-03-10 NOTE — TELEPHONE ENCOUNTER
Patient requested PCP order her annual lab work. She would like to get this done next week if possible and is planning to schedule her annual next month. Wants to be notified if orders are placed.

## 2023-03-10 NOTE — TELEPHONE ENCOUNTER
Please call and notify patient that I have ordered her annual physical exam labs.  Her annual physical exam is not due until May - please go ahead and schedule this.

## 2023-03-10 NOTE — PROGRESS NOTES
Established Office Visit      Patient Name: Jocelyne Lindquist  : 1960   MRN: [id number]   Care Team: Patient Care Team:  Uzma Duffy PA-C as PCP - General (Physician Assistant)  Flynn Diaz MD as Consulting Physician (Gastroenterology)    Chief Complaint:    Chief Complaint   Patient presents with   • Cough     Phlegm x 2 days    • Nasal Congestion     X2 days        History of Present Illness: Jocelyne Lindquist is a 62 y.o. female who is here today to discuss URI symptoms.     She reports 1 day of productive coughing, congestion, runny nose, sneezing. No fever, nausea, vomiting, diarrhea. She has history of allergies and chronic rhinitis. She is taking OTC zyrtec without improvement.   She had covid a few months ago.   covid and flu negative.  is sick with similar symptoms.     Subjective      Review of Systems:   Review of Systems - See HPI    I have reviewed and the following portions of the patient's history were updated as appropriate: past family history, past medical history, past social history, past surgical history and problem list.    Medications:     Current Outpatient Medications:   •  albuterol sulfate  (90 Base) MCG/ACT inhaler, Inhale 2 puffs Every 6 (Six) Hours As Needed for Wheezing., Disp: 18 g, Rfl: 5  •  calcium carb-cholecalciferol 600-800 MG-UNIT tablet, Take 1 tablet by mouth 2 (Two) Times a Day With Meals., Disp: 60 tablet, Rfl: 0  •  Cyanocobalamin (Vitamin B-12) 1000 MCG sublingual tablet, Place 1 tablet under the tongue Daily., Disp: 90 each, Rfl: 3  •  Magnesium 250 MG tablet, Take 1 tablet twice daily for 30 days, then 1 tab once daily, Disp: 120 tablet, Rfl: 0  •  metoprolol succinate XL (TOPROL-XL) 100 MG 24 hr tablet, Take 1 tablet by mouth Daily., Disp: 90 tablet, Rfl: 3  •  mirtazapine (Remeron) 15 MG tablet, Take 1 tablet by mouth Every Night., Disp: 30 tablet, Rfl: 1  •  omeprazole (priLOSEC) 20 MG capsule, Take 1 capsule by mouth  "Daily., Disp: 90 capsule, Rfl: 3  •  pravastatin (Pravachol) 40 MG tablet, Take 1 tablet by mouth Every Night., Disp: 90 tablet, Rfl: 3  •  promethazine-dextromethorphan (PROMETHAZINE-DM) 6.25-15 MG/5ML syrup, Take 5 mL by mouth 4 (Four) Times a Day As Needed for Cough., Disp: 240 mL, Rfl: 0  •  thiamine (VITAMIN B-1) 100 MG tablet, Take 1 tablet by mouth Daily., Disp: 30 tablet, Rfl: 5  •  triamcinolone (KENALOG) 0.1 % ointment, Apply  topically to the appropriate area as directed 3 (Three) Times a Day., Disp: 453.6 g, Rfl: 0  •  cefdinir (OMNICEF) 300 MG capsule, Take 1 capsule by mouth 2 (Two) Times a Day., Disp: 14 capsule, Rfl: 0    Allergies:   Allergies   Allergen Reactions   • Amoxicillin Swelling   • Benazepril Swelling   • Doxycycline Diarrhea   • Eggs Or Egg-Derived Products      Causes cramps   • Fish-Derived Products      Causes severe stomach cramps       Objective     Physical Exam:  Vital Signs:   Vitals:    03/10/23 1514   BP: 130/80   Pulse: 89   Temp: 98.1 °F (36.7 °C)   SpO2: 95%   Weight: 64 kg (141 lb 3.2 oz)   Height: 160 cm (62.99\")     Body mass index is 25.02 kg/m².     Physical Exam  Vitals reviewed.   Constitutional:       Appearance: Normal appearance.   HENT:      Right Ear: Tympanic membrane, ear canal and external ear normal.      Left Ear: Tympanic membrane, ear canal and external ear normal.      Nose: Congestion and rhinorrhea present.      Mouth/Throat:      Pharynx: Posterior oropharyngeal erythema present. No oropharyngeal exudate.   Eyes:      Comments: Erythematous left conjunctiva (had recent eye surgery) without discharge, stye, or swelling   Cardiovascular:      Rate and Rhythm: Normal rate and regular rhythm.      Pulses: Normal pulses.      Heart sounds: Normal heart sounds.   Pulmonary:      Effort: Pulmonary effort is normal. No respiratory distress.      Breath sounds: Normal breath sounds. No wheezing or rales.   Skin:     General: Skin is warm and dry. " "  Neurological:      Mental Status: She is alert.   Psychiatric:         Mood and Affect: Mood normal.         Behavior: Behavior normal.         Judgment: Judgment normal.         Assessment / Plan      Assessment/Plan:   Problems Addressed This Visit  Diagnoses and all orders for this visit:    1. Acute recurrent sinusitis, unspecified location (Primary)  -     cefdinir (OMNICEF) 300 MG capsule; Take 1 capsule by mouth 2 (Two) Times a Day.  Dispense: 14 capsule; Refill: 0    2. Acute cough  -     POCT SARS-CoV-2 Antigen MARIE + Flu  -     promethazine-dextromethorphan (PROMETHAZINE-DM) 6.25-15 MG/5ML syrup; Take 5 mL by mouth 4 (Four) Times a Day As Needed for Cough.  Dispense: 240 mL; Refill: 0      Discussed with patient that symptoms have been ongoing for 1 day and most URI/bronchitis are viral in nature, meaning they do not benefit from use of antibiotics. Instead, supportive treatment is preferred. Discussed importance of avoiding unnecessary antibiotics to prevent antibiotic resistance. Patient states \"she knows what will happen if she waits and it always turns to infection\". Has concerns that symptoms will worsen over the weekend and office will not be open. Will provide rx for cefdinir for empiric coverage (has allergies to amoxicillin and doxy). She also requests promethazine DM as this has worked well for her in the past - I have sent this in. Will let us know if symptoms worsen or do not improve.     Plan of care reviewed with patient at the conclusion of today's visit. Education was provided regarding diagnosis and management.  Patient verbalizes understanding of and agreement with management plan.    Follow Up:   No follow-ups on file.        DO IZZY Zapata RD  [facility]  [address]  [facility] [address]  Fax [phone]  Phone [phone]      "

## 2023-03-22 ENCOUNTER — TELEPHONE (OUTPATIENT)
Dept: FAMILY MEDICINE CLINIC | Facility: CLINIC | Age: 63
End: 2023-03-22
Payer: COMMERCIAL

## 2023-03-22 NOTE — TELEPHONE ENCOUNTER
Contacted pt & relayed PCP's message. Pt verbalized understanding and declined a this time. She did not have any additional questions at this time.

## 2023-03-22 NOTE — TELEPHONE ENCOUNTER
Patient reports that she had improved with round of abx, she states that starting last week after completion she began to regress, Symptoms have returned and worsened    She states promethazine was very effective for  but she has linger chest congestion and productive cough. She is requesting refills of cefdinir

## 2023-03-22 NOTE — TELEPHONE ENCOUNTER
Caller: Jocelyne Lindquist    Relationship: Self    Best call back number: [phone]    What medication are you requesting: CEFDINIR OR SOMETHING LIKE THAT     What are your current symptoms: PRODUCTIVE COUGH, CONGESTION, RUNNING NOSE    Have you had these symptoms before:    [x] Yes  [] No    Have you been treated for these symptoms before:   [x] Yes  [] No    If a prescription is needed, what is your preferred pharmacy and phone number:    [address] [phone] [facility] [phone]      Additional notes: PATIENT AND HER   WERE SEEN FOR A SINUS INFECTION ON 03.10.23. THEY HAVE FINISHED THE MEDICATION THEY WERE GIVEN  AND THEY ARE NOT FEELING ANY BETTER. SHE SAYS IT'S LIKE IT WAS WHEN SHE FIRST HAD IT. CAN MORE MEDICATION BE CALLED IN? PLEASE LET THEM KNOW IF THIS CAN BE DONE OR NOT.

## 2023-04-19 ENCOUNTER — HOSPITAL ENCOUNTER (INPATIENT)
Facility: HOSPITAL | Age: 63
LOS: 1 days | Discharge: HOME | DRG: 743 | End: 2023-04-22
Attending: EMERGENCY MEDICINE | Admitting: HOSPITALIST
Payer: COMMERCIAL

## 2023-04-19 ENCOUNTER — APPOINTMENT (OUTPATIENT)
Dept: CT IMAGING | Facility: HOSPITAL | Age: 63
DRG: 743 | End: 2023-04-19
Payer: COMMERCIAL

## 2023-04-19 DIAGNOSIS — R19.00 PELVIC MASS: ICD-10-CM

## 2023-04-19 DIAGNOSIS — N83.8 OVARIAN MASS, LEFT: ICD-10-CM

## 2023-04-19 DIAGNOSIS — R33.8 ACUTE URINARY RETENTION: Primary | ICD-10-CM

## 2023-04-19 PROBLEM — E87.1 HYPONATREMIA: Status: RESOLVED | Noted: 2021-01-04 | Resolved: 2023-04-19

## 2023-04-19 PROBLEM — E83.42 HYPOMAGNESEMIA: Status: RESOLVED | Noted: 2017-02-18 | Resolved: 2023-04-19

## 2023-04-19 LAB
ALBUMIN SERPL-MCNC: 4.1 G/DL (ref 3.5–5.2)
ALBUMIN/GLOB SERPL: 1.5 G/DL
ALP SERPL-CCNC: 97 U/L (ref 39–117)
ALT SERPL W P-5'-P-CCNC: 17 U/L (ref 1–33)
ANION GAP SERPL CALCULATED.3IONS-SCNC: 11 MMOL/L (ref 5–15)
AST SERPL-CCNC: 19 U/L (ref 1–32)
BASOPHILS # BLD AUTO: 0.07 10*3/MM3 (ref 0–0.2)
BASOPHILS NFR BLD AUTO: 0.6 % (ref 0–1.5)
BILIRUB SERPL-MCNC: 0.7 MG/DL (ref 0–1.2)
BILIRUB UR QL STRIP: NEGATIVE
BUN SERPL-MCNC: 7 MG/DL (ref 8–23)
BUN/CREAT SERPL: 10.1 (ref 7–25)
CALCIUM SPEC-SCNC: 9.4 MG/DL (ref 8.6–10.5)
CHLORIDE SERPL-SCNC: 98 MMOL/L (ref 98–107)
CLARITY UR: CLEAR
CO2 SERPL-SCNC: 29 MMOL/L (ref 22–29)
COLOR UR: YELLOW
CREAT SERPL-MCNC: 0.69 MG/DL (ref 0.57–1)
DEPRECATED RDW RBC AUTO: 37.6 FL (ref 37–54)
EGFRCR SERPLBLD CKD-EPI 2021: 97.1 ML/MIN/1.73
EOSINOPHIL # BLD AUTO: 0.36 10*3/MM3 (ref 0–0.4)
EOSINOPHIL NFR BLD AUTO: 3.2 % (ref 0.3–6.2)
ERYTHROCYTE [DISTWIDTH] IN BLOOD BY AUTOMATED COUNT: 12.2 % (ref 12.3–15.4)
GLOBULIN UR ELPH-MCNC: 2.7 GM/DL
GLUCOSE SERPL-MCNC: 116 MG/DL (ref 65–99)
GLUCOSE UR STRIP-MCNC: NEGATIVE MG/DL
HBA1C MFR BLD: 5.9 % (ref 4.8–5.6)
HCT VFR BLD AUTO: 41 % (ref 34–46.6)
HGB BLD-MCNC: 13.4 G/DL (ref 12–15.9)
HGB UR QL STRIP.AUTO: NEGATIVE
IMM GRANULOCYTES # BLD AUTO: 0.06 10*3/MM3 (ref 0–0.05)
IMM GRANULOCYTES NFR BLD AUTO: 0.5 % (ref 0–0.5)
KETONES UR QL STRIP: NEGATIVE
LEUKOCYTE ESTERASE UR QL STRIP.AUTO: NEGATIVE
LYMPHOCYTES # BLD AUTO: 1.53 10*3/MM3 (ref 0.7–3.1)
LYMPHOCYTES NFR BLD AUTO: 13.6 % (ref 19.6–45.3)
MCH RBC QN AUTO: 27.8 PG (ref 26.6–33)
MCHC RBC AUTO-ENTMCNC: 32.7 G/DL (ref 31.5–35.7)
MCV RBC AUTO: 85.1 FL (ref 79–97)
MONOCYTES # BLD AUTO: 0.36 10*3/MM3 (ref 0.1–0.9)
MONOCYTES NFR BLD AUTO: 3.2 % (ref 5–12)
NEUTROPHILS NFR BLD AUTO: 78.9 % (ref 42.7–76)
NEUTROPHILS NFR BLD AUTO: 8.91 10*3/MM3 (ref 1.7–7)
NITRITE UR QL STRIP: NEGATIVE
NRBC BLD AUTO-RTO: 0 /100 WBC (ref 0–0.2)
PH UR STRIP.AUTO: 6.5 [PH] (ref 5–8)
PLATELET # BLD AUTO: 305 10*3/MM3 (ref 140–450)
PMV BLD AUTO: 9.3 FL (ref 6–12)
POTASSIUM SERPL-SCNC: 5 MMOL/L (ref 3.5–5.2)
PROT SERPL-MCNC: 6.8 G/DL (ref 6–8.5)
PROT UR QL STRIP: NEGATIVE
RBC # BLD AUTO: 4.82 10*6/MM3 (ref 3.77–5.28)
SODIUM SERPL-SCNC: 138 MMOL/L (ref 136–145)
SP GR UR STRIP: <=1.005 (ref 1–1.03)
UROBILINOGEN UR QL STRIP: NORMAL
WBC NRBC COR # BLD: 11.29 10*3/MM3 (ref 3.4–10.8)

## 2023-04-19 PROCEDURE — 86301 IMMUNOASSAY TUMOR CA 19-9: CPT | Performed by: STUDENT IN AN ORGANIZED HEALTH CARE EDUCATION/TRAINING PROGRAM

## 2023-04-19 PROCEDURE — 99222 1ST HOSP IP/OBS MODERATE 55: CPT | Performed by: INTERNAL MEDICINE

## 2023-04-19 PROCEDURE — 85025 COMPLETE CBC W/AUTO DIFF WBC: CPT | Performed by: PHYSICIAN ASSISTANT

## 2023-04-19 PROCEDURE — G0378 HOSPITAL OBSERVATION PER HR: HCPCS

## 2023-04-19 PROCEDURE — 25510000001 IOPAMIDOL 61 % SOLUTION: Performed by: EMERGENCY MEDICINE

## 2023-04-19 PROCEDURE — 93005 ELECTROCARDIOGRAM TRACING: CPT | Performed by: OBSTETRICS & GYNECOLOGY

## 2023-04-19 PROCEDURE — 80053 COMPREHEN METABOLIC PANEL: CPT | Performed by: PHYSICIAN ASSISTANT

## 2023-04-19 PROCEDURE — 83036 HEMOGLOBIN GLYCOSYLATED A1C: CPT | Performed by: OBSTETRICS & GYNECOLOGY

## 2023-04-19 PROCEDURE — 51702 INSERT TEMP BLADDER CATH: CPT

## 2023-04-19 PROCEDURE — 86304 IMMUNOASSAY TUMOR CA 125: CPT | Performed by: STUDENT IN AN ORGANIZED HEALTH CARE EDUCATION/TRAINING PROGRAM

## 2023-04-19 PROCEDURE — 99284 EMERGENCY DEPT VISIT MOD MDM: CPT

## 2023-04-19 PROCEDURE — 86900 BLOOD TYPING SEROLOGIC ABO: CPT

## 2023-04-19 PROCEDURE — 36415 COLL VENOUS BLD VENIPUNCTURE: CPT

## 2023-04-19 PROCEDURE — 86901 BLOOD TYPING SEROLOGIC RH(D): CPT

## 2023-04-19 PROCEDURE — 74177 CT ABD & PELVIS W/CONTRAST: CPT

## 2023-04-19 PROCEDURE — 99232 SBSQ HOSP IP/OBS MODERATE 35: CPT | Performed by: OBSTETRICS & GYNECOLOGY

## 2023-04-19 PROCEDURE — 82378 CARCINOEMBRYONIC ANTIGEN: CPT | Performed by: STUDENT IN AN ORGANIZED HEALTH CARE EDUCATION/TRAINING PROGRAM

## 2023-04-19 PROCEDURE — 81003 URINALYSIS AUTO W/O SCOPE: CPT | Performed by: PHYSICIAN ASSISTANT

## 2023-04-19 RX ORDER — SODIUM CHLORIDE 9 MG/ML
50 INJECTION, SOLUTION INTRAVENOUS CONTINUOUS
Status: DISCONTINUED | OUTPATIENT
Start: 2023-04-20 | End: 2023-04-20

## 2023-04-19 RX ORDER — PRAVASTATIN SODIUM 40 MG
40 TABLET ORAL NIGHTLY
Status: DISCONTINUED | OUTPATIENT
Start: 2023-04-19 | End: 2023-04-22 | Stop reason: HOSPADM

## 2023-04-19 RX ORDER — SODIUM CHLORIDE 0.9 % (FLUSH) 0.9 %
10 SYRINGE (ML) INJECTION AS NEEDED
Status: DISCONTINUED | OUTPATIENT
Start: 2023-04-19 | End: 2023-04-20

## 2023-04-19 RX ORDER — METOPROLOL SUCCINATE 100 MG/1
100 TABLET, EXTENDED RELEASE ORAL DAILY
Status: DISCONTINUED | OUTPATIENT
Start: 2023-04-20 | End: 2023-04-22 | Stop reason: HOSPADM

## 2023-04-19 RX ORDER — SODIUM CHLORIDE 9 MG/ML
100 INJECTION, SOLUTION INTRAVENOUS CONTINUOUS
Status: DISCONTINUED | OUTPATIENT
Start: 2023-04-19 | End: 2023-04-20

## 2023-04-19 RX ADMIN — SODIUM CHLORIDE 100 ML/HR: 9 INJECTION, SOLUTION INTRAVENOUS at 14:20

## 2023-04-19 RX ADMIN — SODIUM CHLORIDE 1000 ML: 9 INJECTION, SOLUTION INTRAVENOUS at 21:55

## 2023-04-19 RX ADMIN — SODIUM CHLORIDE 50 ML/HR: 9 INJECTION, SOLUTION INTRAVENOUS at 23:32

## 2023-04-19 RX ADMIN — IOPAMIDOL 80 ML: 612 INJECTION, SOLUTION INTRAVENOUS at 10:12

## 2023-04-19 RX ADMIN — SODIUM CHLORIDE 1000 ML: 9 INJECTION, SOLUTION INTRAVENOUS at 12:26

## 2023-04-19 NOTE — ED PROVIDER NOTES
"Subjective   History of Present Illness  Ms. Lindquist is a 62-year-old female with a history of hypertension, hyperlipidemia and GERD who presents to the emergency department with complaints of urinary retention.  The patient states that she typically has urinary frequency but over the past 12 hours, she has been able to urinate.  She denies any constipation.  No new medications.  She is not on any pain meds or decongestants.  She denies any associated burning with urination.  No fever or chills.  No abdominal pain, nausea, vomiting or diarrhea.  She notes a \"pressure sensation\" in the vaginal and suprapubic region.        Review of Systems   Constitutional: Negative for chills and fever.   HENT: Negative for sore throat.    Respiratory: Negative for cough and shortness of breath.    Cardiovascular: Negative for chest pain.   Gastrointestinal: Positive for abdominal pain (Suprapubic pressure). Negative for diarrhea, nausea and vomiting.   Genitourinary: Positive for difficulty urinating. Negative for flank pain and hematuria.   Musculoskeletal: Negative for back pain.   Skin: Negative for pallor and rash.   Allergic/Immunologic: Negative for immunocompromised state.   Neurological: Negative for light-headedness and headaches.   Hematological: Negative.    Psychiatric/Behavioral: Negative.        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, right     outer corner of right eye   • Tobacco abuse 2/18/2017    Quit 12/25/17       Allergies   Allergen Reactions   • Amoxicillin Swelling   • Benazepril Swelling   • Doxycycline Diarrhea   • Eggs Or Egg-Derived Products      Causes cramps   • Fish-Derived Products      Causes severe stomach cramps       Past Surgical History:   Procedure Laterality Date   • BREAST BIOPSY Left 06/18/2010    " stereo bx   • CYST REMOVAL Right     wrist   • SQUAMOUS CELL CARCINOMA EXCISION Right 2017    outer corner of right eye       Family History   Problem Relation Age of Onset   • Cancer Mother         esophageal and liver   • Heart failure Father    • Breast cancer Neg Hx    • Ovarian cancer Neg Hx        Social History     Socioeconomic History   • Marital status:    Tobacco Use   • Smoking status: Former     Packs/day: 1.00     Years: 30.00     Pack years: 30.00     Types: Cigarettes     Start date: 2000     Quit date: 2017     Years since quittin.2   • Smokeless tobacco: Never   • Tobacco comments:     17   Vaping Use   • Vaping Use: Never used   Substance and Sexual Activity   • Alcohol use: No     Comment: not since 17   • Drug use: No   • Sexual activity: Defer           Objective   Physical Exam  Constitutional:       General: She is not in acute distress.     Appearance: Normal appearance. She is not ill-appearing.   HENT:      Head: Normocephalic.      Nose: Nose normal.      Mouth/Throat:      Mouth: Mucous membranes are moist.   Eyes:      General: No scleral icterus.     Conjunctiva/sclera: Conjunctivae normal.      Pupils: Pupils are equal, round, and reactive to light.   Cardiovascular:      Rate and Rhythm: Normal rate and regular rhythm.      Pulses: Normal pulses.      Heart sounds: Normal heart sounds.   Pulmonary:      Effort: Pulmonary effort is normal.      Breath sounds: Normal breath sounds.   Abdominal:      General: Bowel sounds are normal.      Tenderness: There is no abdominal tenderness.   Musculoskeletal:         General: No tenderness. Normal range of motion.      Cervical back: Normal range of motion and neck supple.   Skin:     General: Skin is warm and dry.   Neurological:      General: No focal deficit present.      Mental Status: She is alert and oriented to person, place, and time.   Psychiatric:         Mood and Affect: Mood normal.  "        Procedures           ED Course      In summary, 62-year-old female presents with urinary retention over the past 12 hours.  No new medications.  No constipation.  No over-the-counter meds.  The patient notes that she typically has urinary frequency but has never had problems with retention before.  She notes a \"pressure sensation\" in the pelvic and suprapubic region.  Past medical history of hypertension, hyperlipidemia and GERD.    MDM: Differential includes urinary retention secondary to medication or over-the-counter meds, bladder outlet obstruction, malignancy, renal failure, bladder prolapse, uterine prolapse, etc.    Labs and UA collected.  Bladder scan showed 250 cc postvoid residual, however, on placement of Nuñez catheter, the patient had nearly 1500 cc of urine out.  Nuñez catheter was anchored.    Labs are bland.  Creatinine is normal at 0.69.  White count is 11.29.  No anemia.  No concerning electrolytes.  Glucose is 116.  Urinalysis shows no evidence of UTI.    Patient sent for CT abdomen pelvis to look for cause of obstructive uropathy.    CT abdomen pelvis with IV contrast:  Large cystic mass within the pelvis with internal rounded septation/internal cyst. This appears to be left ovarian in origin as the left gonadal vein appears to terminate here. Additionally there was a left ovarian cyst seen on prior CT which may've been   the precursor for this lesion. This most likely reflects a left ovarian cystic neoplasm such as serous cystadenoma but incompletely characterized on this examination. This mass exerts mass effect on the bladder and uterus anteriorly and to the right.    I spoke with the patient about the work-up.  I feel that her pelvic mass is causing compression against the bladder outlet, resulting in urinary retention.    I spoke with Dr. Kristin Penn about the patient.  She reviewed the CT scan and felt that admission with plans for surgical intervention tomorrow was appropriate.  " The patient is agreeable with plans for admission and surgery.  The hospitalist has been notified.                                           MDM    Final diagnoses:   Acute urinary retention   Ovarian mass, left       ED Disposition  ED Disposition     ED Disposition   Decision to Admit    Condition   --    Comment   Level of Care: Telemetry [5]   Diagnosis: Acute urinary retention [300528]   Admitting Physician: VERA GOLD [[id number]]   Attending Physician: VERA GOLD [[id number]]               Kristin Dimas MD  [address] [place] [address]  [phone]      call tomorrow for follow up in office to discuss left ovarian mass         Medication List      No changes were made to your prescriptions during this visit.          Nav Quiroz PA  04/19/23 4296

## 2023-04-19 NOTE — CONSULTS
Gynecologic Oncology Admission H&P     Patient Name: Jocelyne Lindquist  : 1960   MRN: [id number]     Reason for Consultation: Pelvic mass, urinary retention    History of Present Illness: Jocelyne Lindquist is a 62 y.o. female who presents with urinary retention. Past medical history notable for SCC of eyelid, peripheral vascular disease, GERD, asthma, HTN. Reports that she has a history of chronic urinary frequency which worsened over the last month and was accompanied by sensation of incomplete emptying, double voiding, and straining to urinate. She suddenly had acute urinary retention at 1am on  and she ultimately presented to the ED later that morning after abdominal pain worsened with her inability to urinate. Upon presentation to the ED a padron catheter was anchored with 1500mL of urine drained. She had immediate relief of symptoms. To further evaluate etiology, a CT abdomen/pelvis was performed which revealed a 14cm cystic pelvic mass with internal septation and mass effect on uterus and bladder. Gynecologic oncology was consulted for further evaluation.     Patient reports besides aforementioned urinary symptoms, she has not had any health issues recently. No changes in energy level, appetite, bowel function, unexpected weight loss, chest pain, dyspnea, abdominal bloating. She does report she was informed of a small ovarian cyst incidentally discovered on abdominal imaging during an ED visit in 2016 which she was told not to worry about. She is otherwise up to date on cancer screenings including pap smear, colonoscopy, and mammograms. No significant family history of cancer, specifically denies family history of breast, ovarian, uterine, pancreatic, lung, colorectal.     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    Past Surgical History:   Past Surgical History:   Procedure Laterality Date   • BREAST BIOPSY Left 2010    stereo bx   • CYST REMOVAL Right     wrist   • SQUAMOUS CELL CARCINOMA EXCISION Right 2017    outer corner of right eye       Family History:   Family History   Problem Relation Age of Onset   • Cancer Mother         esophageal and liver   • Heart failure Father    • Breast cancer Neg Hx    • Ovarian cancer Neg Hx        Social History:   Social History     Socioeconomic History   • Marital status:    Tobacco Use   • Smoking status: Former     Packs/day: 1.00     Years: 30.00     Pack years: 30.00     Types: Cigarettes     Start date: 2000     Quit date: 2017     Years since quittin.2   • Smokeless tobacco: Never   • Tobacco comments:     17   Vaping Use   • Vaping Use: Never used   Substance and Sexual Activity   • Alcohol use: No     Comment: not since 17   • Drug use: No   • Sexual activity: Defer     Medications:     Current Facility-Administered Medications:   •  sodium chloride 0.9 % bolus 1,000 mL, 1,000 mL, Intravenous, Once, Mira Aranda MD, Last Rate: 250 mL/hr at 23 1226, 1,000 mL at 23 1226  •  [COMPLETED] Insert Peripheral IV, , , Once **AND** sodium chloride 0.9 % flush 10 mL, 10 mL, Intravenous, PRN, Nav Quiroz, PA  •  sodium chloride 0.9 % infusion, 100 mL/hr, Intravenous, Continuous, Mira Aranda MD    Current Outpatient Medications:   •  albuterol sulfate  (90 Base) MCG/ACT inhaler, Inhale 2 puffs Every 6 (Six) Hours As Needed for Wheezing., Disp: 18 g, Rfl: 5  •  calcium carb-cholecalciferol 600-800 MG-UNIT tablet, Take 1 tablet by mouth 2 (Two) Times a Day With  Meals., Disp: 60 tablet, Rfl: 0  •  cefdinir (OMNICEF) 300 MG capsule, Take 1 capsule by mouth 2 (Two) Times a Day., Disp: 14 capsule, Rfl: 0  •  Cyanocobalamin (Vitamin B-12) 1000 MCG sublingual tablet, Place 1 tablet under the tongue Daily., Disp: 90 each, Rfl: 3  •  Magnesium 250 MG tablet, Take 1 tablet twice daily for 30 days, then 1 tab once daily, Disp: 120 tablet, Rfl: 0  •  metoprolol succinate XL (TOPROL-XL) 100 MG 24 hr tablet, Take 1 tablet by mouth Daily., Disp: 90 tablet, Rfl: 3  •  mirtazapine (Remeron) 15 MG tablet, Take 1 tablet by mouth Every Night., Disp: 30 tablet, Rfl: 1  •  omeprazole (priLOSEC) 20 MG capsule, Take 1 capsule by mouth Daily., Disp: 90 capsule, Rfl: 3  •  pravastatin (Pravachol) 40 MG tablet, Take 1 tablet by mouth Every Night., Disp: 90 tablet, Rfl: 3  •  promethazine-dextromethorphan (PROMETHAZINE-DM) 6.25-15 MG/5ML syrup, Take 5 mL by mouth 4 (Four) Times a Day As Needed for Cough., Disp: 240 mL, Rfl: 0  •  thiamine (VITAMIN B-1) 100 MG tablet, Take 1 tablet by mouth Daily., Disp: 30 tablet, Rfl: 5  •  triamcinolone (KENALOG) 0.1 % ointment, Apply  topically to the appropriate area as directed 3 (Three) Times a Day., Disp: 453.6 g, Rfl: 0    Allergies:   Allergies   Allergen Reactions   • Amoxicillin Swelling   • Benazepril Swelling   • Doxycycline Diarrhea   • Eggs Or Egg-Derived Products      Causes cramps   • Fish-Derived Products      Causes severe stomach cramps       Subjective   Review of Systems:   As per HPI. Otherwise, negative.    Objective   Physical Exam:  Vital Signs:   Vitals:    04/19/23 0930 04/19/23 1030 04/19/23 1130 04/19/23 1200   BP: 120/58 135/82 125/48 124/77   BP Location:       Patient Position:       Pulse: 67 71 74 74   Resp:       Temp:       TempSrc:       SpO2: 93% 93% 93% 92%   Weight:       Height:         BMI: Body mass index is 24.8 kg/m².   Weight:   Wt Readings from Last 3 Encounters:   04/19/23 63.5 kg (140 lb)   03/10/23 64 kg (141 lb 3.2  oz)   05/16/22 63.7 kg (140 lb 6.4 oz)       ECOG PS: 0    Physical Examination:   General appearance - alert, well appearing, and in no distress and oriented to person, place, and time  Mental status - normal mood, behavior, speech, dress, motor activity, and thought processes  Eyes - sclera anicteric, left eye normal, right eye normal  Ears - right ear normal, left ear normal  Lymphatics - no palpable lymphadenopathy, no hepatosplenomegaly  Lungs - normal respiratory effort, clear to auscultation bilaterally  Heart - normal rate, regular rhythm, normal S1, S2, no murmurs, rubs, clicks or gallops  Abdomen - soft, nontender, mildly distended, mass palpated below umbilicus, no surgical scars  Neurological - alert, oriented, normal speech, no focal findings or movement disorder noted  Musculoskeletal - no joint tenderness, deformity or swelling  Extremities - Varicose veins noted on lower extremities. Skin warm and dry. No swelling.  Skin - normal coloration and turgor, no rashes, no suspicious skin lesions noted     Results:   Lab Results   Component Value Date    WBC 11.29 (H) 04/19/2023    HGB 13.4 04/19/2023    HCT 41.0 04/19/2023    MCV 85.1 04/19/2023     04/19/2023       Lab Results   Component Value Date    GLUCOSE 116 (H) 04/19/2023    BUN 7 (L) 04/19/2023    CREATININE 0.69 04/19/2023    EGFR 97.1 04/19/2023    BCR 10.1 04/19/2023    K 5.0 04/19/2023    CO2 29.0 04/19/2023    CALCIUM 9.4 04/19/2023    ALBUMIN 4.1 04/19/2023    BILITOT 0.7 04/19/2023    AST 19 04/19/2023    ALT 17 04/19/2023      Imaging:  CT Abdomen Pelvis With Contrast 04/19/2023    Narrative  CT ABDOMEN PELVIS W CONTRAST    Date of Exam: 4/19/2023 9:47 AM EDT    Indication: urinary retention.    Comparison: 2/17/2017    Technique: Axial CT images were obtained of the abdomen and pelvis following the uneventful intravenous administration of 80 mL Isovue-300. Reconstructed coronal and sagittal images were also obtained. Automated  exposure control and iterative  construction methods were used.      Findings:  Lower thorax:Lung bases are clear. No coronary artery calcifications seen in the visualized heart. No pericardial effusion.  No evidence of pulmonary embolus in the visualized pulmonary arteries.    Liver: No focal hepatic lesions seen.  Normal hepatic size. Normal density of the liver.    Gallbladder and bile ducts: Cholelithiasis without evidence of cholecystitis. No intra- or extra- hepatic biliary ductal dilatation.    Spleen: Calcified granulomas.    Pancreas: Normal appearance of the pancreas. Main pancreatic duct is nondilated.    Adrenals: Normal appearance of the adrenal glands.    Kidneys: Normal appearance of the kidneys. Symmetric enhancement and excretion of contrast. No nephrolithiasis.  Normal caliber of the ureters.    Bowel: Normal caliber of the bowels. Normal appearance of the appendix. Diverticulosis without diverticulitis.    Pelvis: Decompressed bladder with Nuñez catheter. Small amount of gas in the bladder is likely due to catheterization. Large cystic lesion seen within the pelvis measuring approximately 10.4 x 14.0 x 9.4 cm. There is an internal rounded  septation/internal cyst component. This lesion exerts mass effect on the uterus and bladder pushing them anteriorly and to the right. The left gonadal vein appears to terminate at the cystic lesion suggesting that this is a left ovarian cystic neoplasm.  On prior CT there was a 4.2 x 3.7 cm left ovarian cyst which may have been the precursor for this lesion.    Peritoneum: No free air. No free fluid. No peritoneal nodularity.    Vessels: Normal aortic caliber. Atherosclerotic calcification of the aorta. Celiac artery, splenic artery, superior mesenteric artery, inferior mesenteric artery, renal arteries and iliac arteries appear patent. Iliac veins, inferior vena cava, superior  mesenteric vein, renal veins, portal vein and splenic vein are patent.    Lymph  nodes: No enlarged or suspicious adenopathy.    Bones: No acute osseous abnormality. Degenerative changes of the spine.    Soft tissues: Unremarkable appearance of the soft tissues.    Impression  Impression:  Large cystic mass within the pelvis with internal rounded septation/internal cyst. This appears to be left ovarian in origin as the left gonadal vein appears to terminate here. Additionally there was a left ovarian cyst seen on prior CT which may've been  the precursor for this lesion. This most likely reflects a left ovarian cystic neoplasm such as serous cystadenoma but incompletely characterized on this examination. This mass exerts mass effect on the bladder and uterus anteriorly and to the right.    Electronically Signed: Jamie Rangel  4/19/2023 10:42 AM EDT  Workstation ID: [id number]      Assessment / Plan    This is a 62 y.o. G0 woman presenting with urinary retention found to have large pelvic mass.     1. Pelvic mass  - CT AP w/ contrast 4/19: 14cm cystic pelvic mass with internal septation causing mass effect on bladder and uterus. No peritoneal nodules, no lymphadenopathy. There is diverticulosis without diverticulitis. Normal kidneys and ureters.  - History of 4cm L ovarian cyst on previous imaging which may be precursor lesion  - Tumor markers pending  - Discussed that without tissue diagnosis cannot say whether or not this is cancer, but that removal is recommended due to the size of the mass.  - Does have peripheral vascular disease and hypertension, but no history of MI and does not see cardiologist or pulmonologist outpatient so should not require additional pre-operative clearance.  - Planning for exploratory laparotomy with mass excision, possible ESMER/BSO and debulking. Plan for cystoscopy during case to evaluate bladder. Will try to schedule for OR tomorrow, 4/20. Please make NPO at midnight with gentle maintenance fluids, and hold anticoagulation.    2. Urinary retention  - 1500mL urine  drained with padron   - Denies hematuria; urinalysis with small amount of blood, protein.  - Cr normal at  - Suspect related to mass effect from mass  - Will plan for cystoscopy during surgery for further evaluation; keep padron anchored until then.      Rest of management per primary team.    I saw and evaluated the patient at about pm today. I agree with the findings and the plan of care as documented in the note.    Patient was consented for exploratory laparotomy, total abdominal hysterectomy, bilateral salpingo-oophorectomy, cystoscopy with temporary bilateral ureteral catheters, possible cancer staging.      Risks and benefits of surgery were discussed.  This included, but was not limited to, infection and bleeding like when the skin is cut; damage to surrounding structures; and incisional complications.  Risk of DVT was addressed for major surgeries.  Standard of care efforts to minimize these risks were reviewed.  Typical hospital stay and recovery were discussed as well as post-procedure precautions.  Surgical implications of chronic illnesses on recovery and surgical outcome were reviewed.     Pain medication regimen for postoperative care was discussed.  Typical regimen and avoidance of narcotics was discussed.  Patient was educated that other factors, such as existing narcotic use, can impact postoperative pain management.      Risks and benefits of lymph node dissection were further discussed.  This included lymphedema, vascular injury, and nerve injury.  Patient verbalized understanding of the plan including the risks and benefits.  Appropriate perioperative testing including laboratory evaluation, EKG as clinically indicated, chest x-ray as clinically indicated, and preadmission evaluation were all ordered as a part of this patient's care.    Patient understands the possibility that she may need a catheter at the time of discharge home depending on if her urinary retention has resolved or not.  She has  a history of normal Pap smears and no suspicious family history regarding malignancy although her mother did die from esophageal cancer.  We discussed cancer staging in the context of biopsies, lymph node dissection, omentectomy, appendectomy, and other indicated procedures.  She was reassured that her mass is not likely to be malignancy as it has slowly grown over a prolonged period of time.    EKG, type and screen, tumor markers, ERAS medications all ordered as a part of patient's perioperative care.  Kristin Dimas MD  04/19/23  21:21 EDT

## 2023-04-19 NOTE — H&P
[facility] Medicine Services  HISTORY AND PHYSICAL    Patient Name: Jocelyne Lindquist  : 1960  MRN: [id number]  Primary Care Physician: Uzma Duffy PA-C    Subjective   Subjective     Chief Complaint:unable to urinate    HPI:  Jocelyne Lindquist is a 62 y.o. 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 who presented with acute onset of inability to urinate. She reports chronic urinary frequency, denies abdominal pain, fever, chills, night sweats or weight loss.  She was found to have a large cystic mass in her pelvis causing mass effect on her bladder and is being admitted for evaluation and probable surgical treatment.  She reports knowing she had an ovarian cyst in  but it was deemed benign at that time.  A padron was placed in the ED and she has already made 2 liters of urine.    Review of Systems   Patient denies weight loss, headaches, changes in vision, fever, chills, sore throat, shortness of breath, cough, nausea or vomiting, diarrhea, abdominal pain or distension,  joint pain, rash, itching or bleeding.    Otherwise complete ROS is negative except as mentioned in the HPI.    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    Past Surgical History:   Procedure Laterality Date   • BREAST BIOPSY Left 2010    stereo bx   • CYST REMOVAL Right     wrist   • SQUAMOUS CELL CARCINOMA EXCISION Right 2017    outer corner of right eye       Family History: family history  includes Cancer in her mother; Heart failure in her father.     Social History:  reports that she quit smoking about 6 years ago. Her smoking use included cigarettes. She started smoking about 23 years ago. She has a 30.00 pack-year smoking history. She has never used smokeless tobacco. She reports that she does not drink alcohol and does not use drugs.  She is retired from [employer] [place] she is   Medications:  Magnesium, Vitamin B-12, albuterol sulfate HFA, calcium carb-cholecalciferol, metoprolol succinate XL, mirtazapine, omeprazole, pravastatin, promethazine-dextromethorphan, thiamine, and triamcinolone    Allergies   Allergen Reactions   • Amoxicillin Swelling   • Benazepril Swelling   • Doxycycline Diarrhea   • Eggs Or Egg-Derived Products      Causes cramps   • Fish-Derived Products      Causes severe stomach cramps       Objective   Objective     Vital Signs:   Temp:  [98.3 °F (36.8 °C)-99.2 °F (37.3 °C)] 99.2 °F (37.3 °C)  Heart Rate:  [67-88] 88  Resp:  [18] 18  BP: (120-181)/(48-95) 132/68    Constitutional: Awake, alert, interactive and pleasant  Eyes: clear sclerae, no conjunctival injection, scarring lateral right eye  HENT: NCAT, mucous membranes dry  Neck: no masses or lymphadenopathy, trachea midline  Respiratory: good breath sounds bilaterally, respirations unlabored  Cardiovascular: RRR, no murmurs appreciated, palpable peripheral pulses  Abdomen:  soft, no HSM, palpable fullness in her lower abdomen, not tender  Musculoskeletal: No peripheral edema, clubbing or cyanosis  Neurologic: Oriented x 3,                       Strength symmetric in all extremities                     Cranial Nerves grossly intact, speech clear  Skin: No rashes or jaundice, face is red and irritate, covered in freckles  Psychiatric: Appropriate mood, insight      Result Review:  I have personally reviewed the results from the time of this admission   to 4/19/2023 21:20 EDT and agree with these findings:  [x]  Laboratory  [x]   Microbiology  [x]  Radiology  [x]  EKG/Telemetry   []  Cardiology/Vascular   []  Pathology  []  Old records  []  Other:  Most notable findings include:normal labs, abnormal CT reviewed    LAB RESULTS:      Lab 04/19/23  0826   WBC 11.29*   HEMOGLOBIN 13.4   HEMATOCRIT 41.0   PLATELETS 305   NEUTROS ABS 8.91*   IMMATURE GRANS (ABS) 0.06*   LYMPHS ABS 1.53   MONOS ABS 0.36   EOS ABS 0.36   MCV 85.1         Lab 04/19/23  0826   SODIUM 138   POTASSIUM 5.0   CHLORIDE 98   CO2 29.0   ANION GAP 11.0   BUN 7*   CREATININE 0.69   EGFR 97.1   GLUCOSE 116*   CALCIUM 9.4         Lab 04/19/23  0826   TOTAL PROTEIN 6.8   ALBUMIN 4.1   GLOBULIN 2.7   ALT (SGPT) 17   AST (SGOT) 19   BILIRUBIN 0.7   ALK PHOS 97                     Brief Urine Lab Results  (Last result in the past 365 days)      Color   Clarity   Blood   Leuk Est   Nitrite   Protein   CREAT   Urine HCG        04/19/23 0737 Yellow   Clear   Negative   Negative   Negative   Negative               COVID19   Date Value Ref Range Status   01/16/2021 Not Detected Not Detected - Ref. Range Final       CT Abdomen Pelvis With Contrast    Result Date: 4/19/2023  CT ABDOMEN PELVIS W CONTRAST Date of Exam: 4/19/2023 9:47 AM EDT Indication: urinary retention. Comparison: 2/17/2017 Technique: Axial CT images were obtained of the abdomen and pelvis following the uneventful intravenous administration of 80 mL Isovue-300. Reconstructed coronal and sagittal images were also obtained. Automated exposure control and iterative construction methods were used. Findings: Lower thorax:Lung bases are clear. No coronary artery calcifications seen in the visualized heart. No pericardial effusion.  No evidence of pulmonary embolus in the visualized pulmonary arteries. Liver: No focal hepatic lesions seen.  Normal hepatic size. Normal density of the liver. Gallbladder and bile ducts: Cholelithiasis without evidence of cholecystitis. No intra- or extra- hepatic biliary ductal dilatation. Spleen:  Calcified granulomas. Pancreas: Normal appearance of the pancreas. Main pancreatic duct is nondilated. Adrenals: Normal appearance of the adrenal glands. Kidneys: Normal appearance of the kidneys. Symmetric enhancement and excretion of contrast. No nephrolithiasis.  Normal caliber of the ureters. Bowel: Normal caliber of the bowels. Normal appearance of the appendix. Diverticulosis without diverticulitis. Pelvis: Decompressed bladder with Nuñez catheter. Small amount of gas in the bladder is likely due to catheterization. Large cystic lesion seen within the pelvis measuring approximately 10.4 x 14.0 x 9.4 cm. There is an internal rounded septation/internal cyst component. This lesion exerts mass effect on the uterus and bladder pushing them anteriorly and to the right. The left gonadal vein appears to terminate at the cystic lesion suggesting that this is a left ovarian cystic neoplasm. On prior CT there was a 4.2 x 3.7 cm left ovarian cyst which may have been the precursor for this lesion. Peritoneum: No free air. No free fluid. No peritoneal nodularity. Vessels: Normal aortic caliber. Atherosclerotic calcification of the aorta. Celiac artery, splenic artery, superior mesenteric artery, inferior mesenteric artery, renal arteries and iliac arteries appear patent. Iliac veins, inferior vena cava, superior mesenteric vein, renal veins, portal vein and splenic vein are patent. Lymph nodes: No enlarged or suspicious adenopathy. Bones: No acute osseous abnormality. Degenerative changes of the spine. Soft tissues: Unremarkable appearance of the soft tissues.     Impression: Impression: Large cystic mass within the pelvis with internal rounded septation/internal cyst. This appears to be left ovarian in origin as the left gonadal vein appears to terminate here. Additionally there was a left ovarian cyst seen on prior CT which may've been  the precursor for this lesion. This most likely reflects a left ovarian cystic  neoplasm such as serous cystadenoma but incompletely characterized on this examination. This mass exerts mass effect on the bladder and uterus anteriorly and to the right. Electronically Signed: Jamie Claire  4/19/2023 10:42 AM EDT  Workstation ID: [id number]          The patient qualifies to receive the vaccine, but they have not yet received it.    Assessment & Plan   Assessment / Plan       Acute urinary retention    Cyst of left ovary    Mixed hyperlipidemia    Pelvic mass      Assessment & Plan:    Jocelyne Lindquist is a 62 y.o. 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 who presented with acute onset of inability to urinate and  found to have a large cystic mass    Pelvic Cystic Mass  -Appreciate GynOnc seeing her and making plans for removal 4/20    Urinary retention s/p padron with postop obstructive diuresis  -monitor output closely and replace electrolytes as needed    Prior HO Tobacco and alcohol use        DVT prophylaxis:SCDS      CODE STATUS:FULL CODE     Expected Discharge  Expected Discharge Date and Time     Expected Discharge Date Expected Discharge Time    Apr 24, 2023           Electronically signed by Mira Aranda MD 04/19/23 21:20 EDT

## 2023-04-19 NOTE — CASE MANAGEMENT/SOCIAL WORK
Discharge Planning Assessment  [facility]     Patient Name: Jocelyne Lindquist  MRN: [id number]  Today's Date: 4/19/2023    Admit Date: 4/19/2023    Plan: IDP   Discharge Needs Assessment     Row Name 04/19/23 1151       Living Environment    People in Home spouse    Name(s) of People in Home  [place]: [phone]    Current Living Arrangements home    Primary Care Provided by self    Provides Primary Care For no one    Family Caregiver if Needed spouse    Quality of Family Relationships helpful;involved    Able to Return to Prior Arrangements yes       Transition Planning    Patient/Family Anticipates Transition to home with family    Transportation Anticipated family or friend will provide       Discharge Needs Assessment    Readmission Within the Last 30 Days no previous admission in last 30 days    Equipment Currently Used at Home none    Concerns to be Addressed denies needs/concerns at this time               Discharge Plan     Row Name 04/19/23 1152       Plan    Plan IDP    Plan Comments MSW met with Pt at bedside to complete IDP. Pt lives with  in [facility]. Pt confirmed demographics and reports PCP is Uzma Duffy. Pt has [employer] insurance coverage. Pt is independent with ADL’s; Pt reports no DME, HH, or O2. Pt’s preferred pharmacy is [facility]. Pt’s  able to transport when medically ready. Pt denied needs or concerns. CM will continue to follow and assist with discharge needs.    Final Discharge Disposition Code 30 - still a patient              Continued Care and Services - Admitted Since 4/19/2023    Coordination has not been started for this encounter.          Demographic Summary     Row Name 04/19/23 1159       General Information    Admission Type observation    Arrived From home    Referral Source admission list;emergency department    Reason for Consult discharge planning    Preferred Language English               Functional Status     Row Name 04/19/23 1156        Functional Status    Usual Activity Tolerance good    Current Activity Tolerance good       Functional Status, IADL    Medications independent    Meal Preparation independent    Housekeeping independent    Laundry independent    Shopping independent                         LYLA Giordano

## 2023-04-19 NOTE — Clinical Note
Level of Care: Telemetry [5]   Diagnosis: Acute urinary retention [948247]   Admitting Physician: VERA GOLD [[id number]]   Attending Physician: VERA GOLD [[id number]]

## 2023-04-20 ENCOUNTER — ANESTHESIA EVENT CONVERTED (OUTPATIENT)
Dept: ANESTHESIOLOGY | Facility: HOSPITAL | Age: 63
DRG: 743 | End: 2023-04-20
Payer: COMMERCIAL

## 2023-04-20 ENCOUNTER — ANESTHESIA (OUTPATIENT)
Dept: PERIOP | Facility: HOSPITAL | Age: 63
DRG: 743 | End: 2023-04-20
Payer: COMMERCIAL

## 2023-04-20 ENCOUNTER — ANESTHESIA EVENT (OUTPATIENT)
Dept: PERIOP | Facility: HOSPITAL | Age: 63
DRG: 743 | End: 2023-04-20
Payer: COMMERCIAL

## 2023-04-20 LAB
ABO GROUP BLD: NORMAL
ABO GROUP BLD: NORMAL
ANION GAP SERPL CALCULATED.3IONS-SCNC: 9 MMOL/L (ref 5–15)
BASOPHILS # BLD AUTO: 0.03 10*3/MM3 (ref 0–0.2)
BASOPHILS NFR BLD AUTO: 0.4 % (ref 0–1.5)
BLD GP AB SCN SERPL QL: NEGATIVE
BUN SERPL-MCNC: 8 MG/DL (ref 8–23)
BUN/CREAT SERPL: 12.7 (ref 7–25)
CALCIUM SPEC-SCNC: 8.9 MG/DL (ref 8.6–10.5)
CANCER AG125 SERPL QL: 11.2 U/ML (ref 0–38.1)
CANCER AG19-9 SERPL-ACNC: 7.3 U/ML
CEA SERPL-MCNC: <0.6 NG/ML
CHLORIDE SERPL-SCNC: 110 MMOL/L (ref 98–107)
CO2 SERPL-SCNC: 26 MMOL/L (ref 22–29)
CREAT SERPL-MCNC: 0.63 MG/DL (ref 0.57–1)
DEPRECATED RDW RBC AUTO: 38.9 FL (ref 37–54)
EGFRCR SERPLBLD CKD-EPI 2021: 99.2 ML/MIN/1.73
EOSINOPHIL # BLD AUTO: 0.29 10*3/MM3 (ref 0–0.4)
EOSINOPHIL NFR BLD AUTO: 4 % (ref 0.3–6.2)
ERYTHROCYTE [DISTWIDTH] IN BLOOD BY AUTOMATED COUNT: 12.6 % (ref 12.3–15.4)
GLUCOSE SERPL-MCNC: 121 MG/DL (ref 65–99)
HCT VFR BLD AUTO: 38.3 % (ref 34–46.6)
HGB BLD-MCNC: 12.6 G/DL (ref 12–15.9)
IMM GRANULOCYTES # BLD AUTO: 0.02 10*3/MM3 (ref 0–0.05)
IMM GRANULOCYTES NFR BLD AUTO: 0.3 % (ref 0–0.5)
LYMPHOCYTES # BLD AUTO: 2.14 10*3/MM3 (ref 0.7–3.1)
LYMPHOCYTES NFR BLD AUTO: 29.8 % (ref 19.6–45.3)
MAGNESIUM SERPL-MCNC: 1.4 MG/DL (ref 1.6–2.4)
MCH RBC QN AUTO: 28.2 PG (ref 26.6–33)
MCHC RBC AUTO-ENTMCNC: 32.9 G/DL (ref 31.5–35.7)
MCV RBC AUTO: 85.7 FL (ref 79–97)
MONOCYTES # BLD AUTO: 0.46 10*3/MM3 (ref 0.1–0.9)
MONOCYTES NFR BLD AUTO: 6.4 % (ref 5–12)
NEUTROPHILS NFR BLD AUTO: 4.24 10*3/MM3 (ref 1.7–7)
NEUTROPHILS NFR BLD AUTO: 59.1 % (ref 42.7–76)
NRBC BLD AUTO-RTO: 0 /100 WBC (ref 0–0.2)
PLATELET # BLD AUTO: 275 10*3/MM3 (ref 140–450)
PMV BLD AUTO: 9.7 FL (ref 6–12)
POTASSIUM SERPL-SCNC: 3.6 MMOL/L (ref 3.5–5.2)
POTASSIUM SERPL-SCNC: 4.2 MMOL/L (ref 3.5–5.2)
QT INTERVAL: 396 MS
QTC INTERVAL: 456 MS
RBC # BLD AUTO: 4.47 10*6/MM3 (ref 3.77–5.28)
RH BLD: POSITIVE
RH BLD: POSITIVE
SODIUM SERPL-SCNC: 145 MMOL/L (ref 136–145)
T&S EXPIRATION DATE: NORMAL
WBC NRBC COR # BLD: 7.18 10*3/MM3 (ref 3.4–10.8)

## 2023-04-20 PROCEDURE — 25010000002 CEFAZOLIN IN DEXTROSE 2-4 GM/100ML-% SOLUTION: Performed by: OBSTETRICS & GYNECOLOGY

## 2023-04-20 PROCEDURE — G0378 HOSPITAL OBSERVATION PER HR: HCPCS

## 2023-04-20 PROCEDURE — 25010000002 DEXAMETHASONE SODIUM PHOSPHATE 10 MG/ML SOLUTION: Performed by: NURSE ANESTHETIST, CERTIFIED REGISTERED

## 2023-04-20 PROCEDURE — 0 POTASSIUM CHLORIDE 10 MEQ/100ML SOLUTION: Performed by: INTERNAL MEDICINE

## 2023-04-20 PROCEDURE — 99232 SBSQ HOSP IP/OBS MODERATE 35: CPT | Performed by: HOSPITALIST

## 2023-04-20 PROCEDURE — 86900 BLOOD TYPING SEROLOGIC ABO: CPT | Performed by: OBSTETRICS & GYNECOLOGY

## 2023-04-20 PROCEDURE — 25010000002 FENTANYL CITRATE (PF) 100 MCG/2ML SOLUTION: Performed by: NURSE ANESTHETIST, CERTIFIED REGISTERED

## 2023-04-20 PROCEDURE — 84132 ASSAY OF SERUM POTASSIUM: CPT | Performed by: OBSTETRICS & GYNECOLOGY

## 2023-04-20 PROCEDURE — 83735 ASSAY OF MAGNESIUM: CPT | Performed by: INTERNAL MEDICINE

## 2023-04-20 PROCEDURE — 0T9880Z DRAINAGE OF BILATERAL URETERS WITH DRAINAGE DEVICE, VIA NATURAL OR ARTIFICIAL OPENING ENDOSCOPIC: ICD-10-PCS | Performed by: OBSTETRICS & GYNECOLOGY

## 2023-04-20 PROCEDURE — 25010000002 PROPOFOL 10 MG/ML EMULSION: Performed by: NURSE ANESTHETIST, CERTIFIED REGISTERED

## 2023-04-20 PROCEDURE — 80048 BASIC METABOLIC PNL TOTAL CA: CPT | Performed by: INTERNAL MEDICINE

## 2023-04-20 PROCEDURE — 0UT70ZZ RESECTION OF BILATERAL FALLOPIAN TUBES, OPEN APPROACH: ICD-10-PCS | Performed by: OBSTETRICS & GYNECOLOGY

## 2023-04-20 PROCEDURE — 25010000002 SUGAMMADEX 200 MG/2ML SOLUTION: Performed by: NURSE ANESTHETIST, CERTIFIED REGISTERED

## 2023-04-20 PROCEDURE — 85025 COMPLETE CBC W/AUTO DIFF WBC: CPT | Performed by: INTERNAL MEDICINE

## 2023-04-20 PROCEDURE — 58150 TOTAL HYSTERECTOMY: CPT

## 2023-04-20 PROCEDURE — 88307 TISSUE EXAM BY PATHOLOGIST: CPT | Performed by: OBSTETRICS & GYNECOLOGY

## 2023-04-20 PROCEDURE — 25010000002 MAGNESIUM SULFATE 2 GM/50ML SOLUTION: Performed by: INTERNAL MEDICINE

## 2023-04-20 PROCEDURE — 0UT20ZZ RESECTION OF BILATERAL OVARIES, OPEN APPROACH: ICD-10-PCS | Performed by: OBSTETRICS & GYNECOLOGY

## 2023-04-20 PROCEDURE — 86901 BLOOD TYPING SEROLOGIC RH(D): CPT | Performed by: OBSTETRICS & GYNECOLOGY

## 2023-04-20 PROCEDURE — 0UT90ZZ RESECTION OF UTERUS, OPEN APPROACH: ICD-10-PCS | Performed by: OBSTETRICS & GYNECOLOGY

## 2023-04-20 PROCEDURE — 88331 PATH CONSLTJ SURG 1 BLK 1SPC: CPT | Performed by: OBSTETRICS & GYNECOLOGY

## 2023-04-20 PROCEDURE — 25010000002 ONDANSETRON PER 1 MG: Performed by: NURSE ANESTHETIST, CERTIFIED REGISTERED

## 2023-04-20 PROCEDURE — 25010000002 HEPARIN (PORCINE) PER 1000 UNITS: Performed by: OBSTETRICS & GYNECOLOGY

## 2023-04-20 PROCEDURE — 86850 RBC ANTIBODY SCREEN: CPT | Performed by: OBSTETRICS & GYNECOLOGY

## 2023-04-20 PROCEDURE — 58150 TOTAL HYSTERECTOMY: CPT | Performed by: OBSTETRICS & GYNECOLOGY

## 2023-04-20 PROCEDURE — 52005 CYSTO W/URTRL CATHJ: CPT | Performed by: OBSTETRICS & GYNECOLOGY

## 2023-04-20 RX ORDER — LIDOCAINE HYDROCHLORIDE 10 MG/ML
0.5 INJECTION, SOLUTION EPIDURAL; INFILTRATION; INTRACAUDAL; PERINEURAL ONCE AS NEEDED
Status: CANCELLED | OUTPATIENT
Start: 2023-04-20

## 2023-04-20 RX ORDER — IPRATROPIUM BROMIDE AND ALBUTEROL SULFATE 2.5; .5 MG/3ML; MG/3ML
3 SOLUTION RESPIRATORY (INHALATION) ONCE AS NEEDED
Status: DISCONTINUED | OUTPATIENT
Start: 2023-04-20 | End: 2023-04-20 | Stop reason: HOSPADM

## 2023-04-20 RX ORDER — PROMETHAZINE HYDROCHLORIDE 12.5 MG/1
12.5 TABLET ORAL EVERY 6 HOURS PRN
Status: DISCONTINUED | OUTPATIENT
Start: 2023-04-20 | End: 2023-04-22 | Stop reason: HOSPADM

## 2023-04-20 RX ORDER — SODIUM CHLORIDE 9 MG/ML
40 INJECTION, SOLUTION INTRAVENOUS AS NEEDED
Status: DISCONTINUED | OUTPATIENT
Start: 2023-04-20 | End: 2023-04-20 | Stop reason: HOSPADM

## 2023-04-20 RX ORDER — ONDANSETRON 2 MG/ML
INJECTION INTRAMUSCULAR; INTRAVENOUS AS NEEDED
Status: DISCONTINUED | OUTPATIENT
Start: 2023-04-20 | End: 2023-04-20 | Stop reason: SURG

## 2023-04-20 RX ORDER — MAGNESIUM SULFATE HEPTAHYDRATE 40 MG/ML
2 INJECTION, SOLUTION INTRAVENOUS
Status: COMPLETED | OUTPATIENT
Start: 2023-04-20 | End: 2023-04-20

## 2023-04-20 RX ORDER — ONDANSETRON 4 MG/1
4 TABLET, FILM COATED ORAL EVERY 6 HOURS PRN
Status: DISCONTINUED | OUTPATIENT
Start: 2023-04-20 | End: 2023-04-22 | Stop reason: HOSPADM

## 2023-04-20 RX ORDER — POLYETHYLENE GLYCOL 3350 17 G/17G
17 POWDER, FOR SOLUTION ORAL DAILY PRN
Status: DISCONTINUED | OUTPATIENT
Start: 2023-04-21 | End: 2023-04-22 | Stop reason: HOSPADM

## 2023-04-20 RX ORDER — ONDANSETRON 2 MG/ML
4 INJECTION INTRAMUSCULAR; INTRAVENOUS ONCE AS NEEDED
Status: DISCONTINUED | OUTPATIENT
Start: 2023-04-20 | End: 2023-04-20 | Stop reason: HOSPADM

## 2023-04-20 RX ORDER — OXYCODONE HYDROCHLORIDE 5 MG/1
10 TABLET ORAL EVERY 4 HOURS PRN
Status: DISCONTINUED | OUTPATIENT
Start: 2023-04-20 | End: 2023-04-22 | Stop reason: HOSPADM

## 2023-04-20 RX ORDER — ACETAMINOPHEN 500 MG
1000 TABLET ORAL ONCE
Status: COMPLETED | OUTPATIENT
Start: 2023-04-20 | End: 2023-04-20

## 2023-04-20 RX ORDER — CLINDAMYCIN PHOSPHATE 600 MG/50ML
900 INJECTION INTRAVENOUS ONCE
Status: DISCONTINUED | OUTPATIENT
Start: 2023-04-20 | End: 2023-04-20

## 2023-04-20 RX ORDER — DROPERIDOL 2.5 MG/ML
0.62 INJECTION, SOLUTION INTRAMUSCULAR; INTRAVENOUS ONCE AS NEEDED
Status: DISCONTINUED | OUTPATIENT
Start: 2023-04-20 | End: 2023-04-20 | Stop reason: HOSPADM

## 2023-04-20 RX ORDER — BUPIVACAINE HYDROCHLORIDE 2.5 MG/ML
INJECTION, SOLUTION EPIDURAL; INFILTRATION; INTRACAUDAL
Status: COMPLETED | OUTPATIENT
Start: 2023-04-20 | End: 2023-04-20

## 2023-04-20 RX ORDER — SODIUM CHLORIDE 0.9 % (FLUSH) 0.9 %
3-10 SYRINGE (ML) INJECTION AS NEEDED
Status: DISCONTINUED | OUTPATIENT
Start: 2023-04-20 | End: 2023-04-20 | Stop reason: HOSPADM

## 2023-04-20 RX ORDER — HYDROCODONE BITARTRATE AND ACETAMINOPHEN 5; 325 MG/1; MG/1
1 TABLET ORAL ONCE AS NEEDED
Status: DISCONTINUED | OUTPATIENT
Start: 2023-04-20 | End: 2023-04-20 | Stop reason: HOSPADM

## 2023-04-20 RX ORDER — OXYCODONE HYDROCHLORIDE 5 MG/1
5 TABLET ORAL ONCE
Status: COMPLETED | OUTPATIENT
Start: 2023-04-20 | End: 2023-04-20

## 2023-04-20 RX ORDER — HYDRALAZINE HYDROCHLORIDE 20 MG/ML
5 INJECTION INTRAMUSCULAR; INTRAVENOUS
Status: DISCONTINUED | OUTPATIENT
Start: 2023-04-20 | End: 2023-04-20 | Stop reason: HOSPADM

## 2023-04-20 RX ORDER — FENTANYL CITRATE 50 UG/ML
50 INJECTION, SOLUTION INTRAMUSCULAR; INTRAVENOUS
Status: DISCONTINUED | OUTPATIENT
Start: 2023-04-20 | End: 2023-04-20 | Stop reason: HOSPADM

## 2023-04-20 RX ORDER — PROPOFOL 10 MG/ML
VIAL (ML) INTRAVENOUS AS NEEDED
Status: DISCONTINUED | OUTPATIENT
Start: 2023-04-20 | End: 2023-04-20 | Stop reason: SURG

## 2023-04-20 RX ORDER — CETIRIZINE HYDROCHLORIDE 10 MG/1
10 TABLET ORAL DAILY
Status: DISCONTINUED | OUTPATIENT
Start: 2023-04-20 | End: 2023-04-22 | Stop reason: HOSPADM

## 2023-04-20 RX ORDER — NALOXONE HCL 0.4 MG/ML
0.4 VIAL (ML) INJECTION AS NEEDED
Status: DISCONTINUED | OUTPATIENT
Start: 2023-04-20 | End: 2023-04-20 | Stop reason: HOSPADM

## 2023-04-20 RX ORDER — IBUPROFEN 600 MG/1
600 TABLET ORAL EVERY 6 HOURS PRN
Status: DISCONTINUED | OUTPATIENT
Start: 2023-04-20 | End: 2023-04-22 | Stop reason: HOSPADM

## 2023-04-20 RX ORDER — SODIUM CHLORIDE 9 MG/ML
40 INJECTION, SOLUTION INTRAVENOUS AS NEEDED
Status: CANCELLED | OUTPATIENT
Start: 2023-04-20

## 2023-04-20 RX ORDER — NALOXONE HCL 0.4 MG/ML
0.1 VIAL (ML) INJECTION
Status: DISCONTINUED | OUTPATIENT
Start: 2023-04-20 | End: 2023-04-22 | Stop reason: HOSPADM

## 2023-04-20 RX ORDER — ACETAMINOPHEN 325 MG/1
650 TABLET ORAL EVERY 6 HOURS SCHEDULED
Status: DISCONTINUED | OUTPATIENT
Start: 2023-04-20 | End: 2023-04-22 | Stop reason: HOSPADM

## 2023-04-20 RX ORDER — FENTANYL CITRATE 50 UG/ML
INJECTION, SOLUTION INTRAMUSCULAR; INTRAVENOUS AS NEEDED
Status: DISCONTINUED | OUTPATIENT
Start: 2023-04-20 | End: 2023-04-20 | Stop reason: SURG

## 2023-04-20 RX ORDER — EPHEDRINE SULFATE 50 MG/ML
INJECTION INTRAVENOUS AS NEEDED
Status: DISCONTINUED | OUTPATIENT
Start: 2023-04-20 | End: 2023-04-20 | Stop reason: SURG

## 2023-04-20 RX ORDER — PROMETHAZINE HYDROCHLORIDE 25 MG/1
25 TABLET ORAL ONCE AS NEEDED
Status: DISCONTINUED | OUTPATIENT
Start: 2023-04-20 | End: 2023-04-20 | Stop reason: HOSPADM

## 2023-04-20 RX ORDER — SODIUM CHLORIDE 0.9 % (FLUSH) 0.9 %
10 SYRINGE (ML) INJECTION AS NEEDED
Status: DISCONTINUED | OUTPATIENT
Start: 2023-04-20 | End: 2023-04-20 | Stop reason: HOSPADM

## 2023-04-20 RX ORDER — HYDROMORPHONE HYDROCHLORIDE 1 MG/ML
0.5 INJECTION, SOLUTION INTRAMUSCULAR; INTRAVENOUS; SUBCUTANEOUS
Status: DISCONTINUED | OUTPATIENT
Start: 2023-04-20 | End: 2023-04-22 | Stop reason: HOSPADM

## 2023-04-20 RX ORDER — AMOXICILLIN 250 MG
2 CAPSULE ORAL 2 TIMES DAILY
Status: DISCONTINUED | OUTPATIENT
Start: 2023-04-20 | End: 2023-04-22 | Stop reason: HOSPADM

## 2023-04-20 RX ORDER — NALOXONE HCL 0.4 MG/ML
0.4 VIAL (ML) INJECTION
Status: DISCONTINUED | OUTPATIENT
Start: 2023-04-20 | End: 2023-04-22 | Stop reason: HOSPADM

## 2023-04-20 RX ORDER — HEPARIN SODIUM 5000 [USP'U]/ML
5000 INJECTION, SOLUTION INTRAVENOUS; SUBCUTANEOUS ONCE
Status: COMPLETED | OUTPATIENT
Start: 2023-04-20 | End: 2023-04-20

## 2023-04-20 RX ORDER — SODIUM CHLORIDE, SODIUM LACTATE, POTASSIUM CHLORIDE, CALCIUM CHLORIDE 600; 310; 30; 20 MG/100ML; MG/100ML; MG/100ML; MG/100ML
75 INJECTION, SOLUTION INTRAVENOUS CONTINUOUS
Status: DISCONTINUED | OUTPATIENT
Start: 2023-04-20 | End: 2023-04-21

## 2023-04-20 RX ORDER — LABETALOL HYDROCHLORIDE 5 MG/ML
5 INJECTION, SOLUTION INTRAVENOUS
Status: DISCONTINUED | OUTPATIENT
Start: 2023-04-20 | End: 2023-04-20 | Stop reason: HOSPADM

## 2023-04-20 RX ORDER — SODIUM CHLORIDE, SODIUM LACTATE, POTASSIUM CHLORIDE, CALCIUM CHLORIDE 600; 310; 30; 20 MG/100ML; MG/100ML; MG/100ML; MG/100ML
9 INJECTION, SOLUTION INTRAVENOUS CONTINUOUS
Status: DISCONTINUED | OUTPATIENT
Start: 2023-04-20 | End: 2023-04-20

## 2023-04-20 RX ORDER — MIDAZOLAM HYDROCHLORIDE 1 MG/ML
1 INJECTION INTRAMUSCULAR; INTRAVENOUS
Status: DISCONTINUED | OUTPATIENT
Start: 2023-04-20 | End: 2023-04-20 | Stop reason: HOSPADM

## 2023-04-20 RX ORDER — HEPARIN SODIUM 5000 [USP'U]/ML
5000 INJECTION, SOLUTION INTRAVENOUS; SUBCUTANEOUS EVERY 8 HOURS SCHEDULED
Status: DISCONTINUED | OUTPATIENT
Start: 2023-04-21 | End: 2023-04-22 | Stop reason: HOSPADM

## 2023-04-20 RX ORDER — PREGABALIN 150 MG/1
150 CAPSULE ORAL ONCE
Status: COMPLETED | OUTPATIENT
Start: 2023-04-20 | End: 2023-04-20

## 2023-04-20 RX ORDER — SODIUM CHLORIDE 9 MG/ML
INJECTION, SOLUTION INTRAVENOUS AS NEEDED
Status: DISCONTINUED | OUTPATIENT
Start: 2023-04-20 | End: 2023-04-20 | Stop reason: HOSPADM

## 2023-04-20 RX ORDER — MAGNESIUM HYDROXIDE 1200 MG/15ML
LIQUID ORAL AS NEEDED
Status: DISCONTINUED | OUTPATIENT
Start: 2023-04-20 | End: 2023-04-20 | Stop reason: HOSPADM

## 2023-04-20 RX ORDER — CELECOXIB 200 MG/1
200 CAPSULE ORAL ONCE
Status: COMPLETED | OUTPATIENT
Start: 2023-04-20 | End: 2023-04-20

## 2023-04-20 RX ORDER — SODIUM CHLORIDE 0.9 % (FLUSH) 0.9 %
3 SYRINGE (ML) INJECTION EVERY 12 HOURS SCHEDULED
Status: DISCONTINUED | OUTPATIENT
Start: 2023-04-20 | End: 2023-04-20 | Stop reason: HOSPADM

## 2023-04-20 RX ORDER — DROPERIDOL 2.5 MG/ML
0.62 INJECTION, SOLUTION INTRAMUSCULAR; INTRAVENOUS
Status: DISCONTINUED | OUTPATIENT
Start: 2023-04-20 | End: 2023-04-20 | Stop reason: HOSPADM

## 2023-04-20 RX ORDER — LIDOCAINE HYDROCHLORIDE 10 MG/ML
INJECTION, SOLUTION EPIDURAL; INFILTRATION; INTRACAUDAL; PERINEURAL AS NEEDED
Status: DISCONTINUED | OUTPATIENT
Start: 2023-04-20 | End: 2023-04-20 | Stop reason: SURG

## 2023-04-20 RX ORDER — MEPERIDINE HYDROCHLORIDE 25 MG/ML
12.5 INJECTION INTRAMUSCULAR; INTRAVENOUS; SUBCUTANEOUS
Status: DISCONTINUED | OUTPATIENT
Start: 2023-04-20 | End: 2023-04-20 | Stop reason: HOSPADM

## 2023-04-20 RX ORDER — FAMOTIDINE 20 MG/1
20 TABLET, FILM COATED ORAL ONCE
Status: COMPLETED | OUTPATIENT
Start: 2023-04-20 | End: 2023-04-20

## 2023-04-20 RX ORDER — DEXAMETHASONE SODIUM PHOSPHATE 10 MG/ML
INJECTION, SOLUTION INTRAMUSCULAR; INTRAVENOUS AS NEEDED
Status: DISCONTINUED | OUTPATIENT
Start: 2023-04-20 | End: 2023-04-20 | Stop reason: SURG

## 2023-04-20 RX ORDER — FAMOTIDINE 10 MG/ML
20 INJECTION, SOLUTION INTRAVENOUS ONCE
Status: DISCONTINUED | OUTPATIENT
Start: 2023-04-20 | End: 2023-04-20 | Stop reason: HOSPADM

## 2023-04-20 RX ORDER — CEFAZOLIN SODIUM 2 G/100ML
2 INJECTION, SOLUTION INTRAVENOUS ONCE
Status: COMPLETED | OUTPATIENT
Start: 2023-04-20 | End: 2023-04-20

## 2023-04-20 RX ORDER — PHENYLEPHRINE HCL IN 0.9% NACL 1 MG/10 ML
SYRINGE (ML) INTRAVENOUS AS NEEDED
Status: DISCONTINUED | OUTPATIENT
Start: 2023-04-20 | End: 2023-04-20 | Stop reason: SURG

## 2023-04-20 RX ORDER — ONDANSETRON 2 MG/ML
4 INJECTION INTRAMUSCULAR; INTRAVENOUS EVERY 6 HOURS PRN
Status: DISCONTINUED | OUTPATIENT
Start: 2023-04-20 | End: 2023-04-22 | Stop reason: HOSPADM

## 2023-04-20 RX ORDER — SODIUM CHLORIDE 0.9 % (FLUSH) 0.9 %
10 SYRINGE (ML) INJECTION EVERY 12 HOURS SCHEDULED
Status: CANCELLED | OUTPATIENT
Start: 2023-04-20

## 2023-04-20 RX ORDER — ROCURONIUM BROMIDE 10 MG/ML
INJECTION, SOLUTION INTRAVENOUS AS NEEDED
Status: DISCONTINUED | OUTPATIENT
Start: 2023-04-20 | End: 2023-04-20 | Stop reason: SURG

## 2023-04-20 RX ORDER — DEXAMETHASONE SODIUM PHOSPHATE 10 MG/ML
INJECTION, SOLUTION INTRAMUSCULAR; INTRAVENOUS
Status: COMPLETED | OUTPATIENT
Start: 2023-04-20 | End: 2023-04-20

## 2023-04-20 RX ORDER — PROMETHAZINE HYDROCHLORIDE 12.5 MG/1
12.5 SUPPOSITORY RECTAL EVERY 6 HOURS PRN
Status: DISCONTINUED | OUTPATIENT
Start: 2023-04-20 | End: 2023-04-22 | Stop reason: HOSPADM

## 2023-04-20 RX ORDER — OXYCODONE HYDROCHLORIDE 5 MG/1
5 TABLET ORAL EVERY 4 HOURS PRN
Status: DISCONTINUED | OUTPATIENT
Start: 2023-04-20 | End: 2023-04-22 | Stop reason: HOSPADM

## 2023-04-20 RX ORDER — POTASSIUM CHLORIDE 7.45 MG/ML
10 INJECTION INTRAVENOUS
Status: DISPENSED | OUTPATIENT
Start: 2023-04-20 | End: 2023-04-20

## 2023-04-20 RX ORDER — PROMETHAZINE HYDROCHLORIDE 25 MG/1
25 SUPPOSITORY RECTAL ONCE AS NEEDED
Status: DISCONTINUED | OUTPATIENT
Start: 2023-04-20 | End: 2023-04-20 | Stop reason: HOSPADM

## 2023-04-20 RX ADMIN — Medication 100 MCG: at 14:40

## 2023-04-20 RX ADMIN — ACETAMINOPHEN 325MG 650 MG: 325 TABLET ORAL at 18:35

## 2023-04-20 RX ADMIN — SENNOSIDES AND DOCUSATE SODIUM 2 TABLET: 8.6; 5 TABLET ORAL at 20:41

## 2023-04-20 RX ADMIN — LIDOCAINE HYDROCHLORIDE 50 MG: 10 INJECTION, SOLUTION EPIDURAL; INFILTRATION; INTRACAUDAL; PERINEURAL at 14:20

## 2023-04-20 RX ADMIN — BUPIVACAINE HYDROCHLORIDE 60 ML: 2.5 INJECTION, SOLUTION EPIDURAL; INFILTRATION; INTRACAUDAL; PERINEURAL at 14:30

## 2023-04-20 RX ADMIN — PRAVASTATIN SODIUM 40 MG: 40 TABLET ORAL at 20:41

## 2023-04-20 RX ADMIN — EPHEDRINE SULFATE 10 MG: 50 INJECTION INTRAVENOUS at 14:58

## 2023-04-20 RX ADMIN — ACETAMINOPHEN 1000 MG: 500 TABLET ORAL at 12:14

## 2023-04-20 RX ADMIN — HEPARIN SODIUM 5000 UNITS: 5000 INJECTION INTRAVENOUS; SUBCUTANEOUS at 12:30

## 2023-04-20 RX ADMIN — Medication 10 ML: at 12:13

## 2023-04-20 RX ADMIN — DEXAMETHASONE SODIUM PHOSPHATE 4 MG: 10 INJECTION, SOLUTION INTRAMUSCULAR; INTRAVENOUS at 14:30

## 2023-04-20 RX ADMIN — POTASSIUM CHLORIDE 10 MEQ: 7.46 INJECTION, SOLUTION INTRAVENOUS at 05:40

## 2023-04-20 RX ADMIN — EPHEDRINE SULFATE 10 MG: 50 INJECTION INTRAVENOUS at 14:50

## 2023-04-20 RX ADMIN — SODIUM CHLORIDE, POTASSIUM CHLORIDE, SODIUM LACTATE AND CALCIUM CHLORIDE 9 ML/HR: 600; 310; 30; 20 INJECTION, SOLUTION INTRAVENOUS at 12:10

## 2023-04-20 RX ADMIN — CELECOXIB 200 MG: 200 CAPSULE ORAL at 12:14

## 2023-04-20 RX ADMIN — ONDANSETRON 4 MG: 2 INJECTION INTRAMUSCULAR; INTRAVENOUS at 14:33

## 2023-04-20 RX ADMIN — CETIRIZINE HYDROCHLORIDE 10 MG: 10 TABLET, FILM COATED ORAL at 10:08

## 2023-04-20 RX ADMIN — PREGABALIN 150 MG: 150 CAPSULE ORAL at 12:14

## 2023-04-20 RX ADMIN — SODIUM CHLORIDE, POTASSIUM CHLORIDE, SODIUM LACTATE AND CALCIUM CHLORIDE 75 ML/HR: 600; 310; 30; 20 INJECTION, SOLUTION INTRAVENOUS at 18:33

## 2023-04-20 RX ADMIN — FAMOTIDINE 20 MG: 20 TABLET, FILM COATED ORAL at 12:15

## 2023-04-20 RX ADMIN — OXYCODONE HYDROCHLORIDE 5 MG: 5 TABLET ORAL at 12:15

## 2023-04-20 RX ADMIN — CEFAZOLIN SODIUM 2 G: 2 INJECTION, SOLUTION INTRAVENOUS at 14:24

## 2023-04-20 RX ADMIN — SUGAMMADEX 200 MG: 100 INJECTION, SOLUTION INTRAVENOUS at 16:14

## 2023-04-20 RX ADMIN — FENTANYL CITRATE 100 MCG: 0.05 INJECTION, SOLUTION INTRAMUSCULAR; INTRAVENOUS at 14:20

## 2023-04-20 RX ADMIN — PROPOFOL 150 MG: 10 INJECTION, EMULSION INTRAVENOUS at 14:20

## 2023-04-20 RX ADMIN — MAGNESIUM SULFATE HEPTAHYDRATE 2 G: 2 INJECTION, SOLUTION INTRAVENOUS at 07:59

## 2023-04-20 RX ADMIN — SODIUM CHLORIDE, POTASSIUM CHLORIDE, SODIUM LACTATE AND CALCIUM CHLORIDE: 600; 310; 30; 20 INJECTION, SOLUTION INTRAVENOUS at 16:03

## 2023-04-20 RX ADMIN — DEXAMETHASONE SODIUM PHOSPHATE 6 MG: 10 INJECTION, SOLUTION INTRAMUSCULAR; INTRAVENOUS at 14:33

## 2023-04-20 RX ADMIN — MAGNESIUM SULFATE HEPTAHYDRATE 2 G: 2 INJECTION, SOLUTION INTRAVENOUS at 09:53

## 2023-04-20 RX ADMIN — ROCURONIUM BROMIDE 30 MG: 10 SOLUTION INTRAVENOUS at 14:20

## 2023-04-20 RX ADMIN — POTASSIUM CHLORIDE 10 MEQ: 7.46 INJECTION, SOLUTION INTRAVENOUS at 09:58

## 2023-04-20 RX ADMIN — METOPROLOL SUCCINATE 100 MG: 100 TABLET, EXTENDED RELEASE ORAL at 08:04

## 2023-04-20 RX ADMIN — Medication 200 MCG: at 14:31

## 2023-04-20 RX ADMIN — MAGNESIUM SULFATE HEPTAHYDRATE 2 G: 2 INJECTION, SOLUTION INTRAVENOUS at 05:41

## 2023-04-20 NOTE — ANESTHESIA POSTPROCEDURE EVALUATION
Patient: Jocelyne Lindquist    Procedure Summary     Date: 04/20/23 Room / Location:  [facility]    Anesthesia Start: 1414 Anesthesia Stop: 1656    Procedures:       EXPLORATORY LAPAROTOMY, TOTAL ABDOMINAL HYSTERECTOMY BILATERAL SALPINGO-OOPHORECTOMY (Abdomen)      CYSTOSCOPY TEMPORARY PLACEMENT BILATERAL URETERAL CATHETER (Urethra) Diagnosis:       Acute urinary retention      Pelvic mass      (Acute urinary retention [R33.8])      (Pelvic mass [R19.00])    Surgeons: Kristin Dimas MD Provider: Yogi Zhu MD    Anesthesia Type: general with block ASA Status: 2          Anesthesia Type: general with block    Vitals  Vitals Value Taken Time   /63 04/20/23 1653   Temp     Pulse 64 04/20/23 1654   Resp     SpO2 94 % 04/20/23 1654   Vitals shown include unvalidated device data.        Post Anesthesia Care and Evaluation    Patient location during evaluation: PACU  Patient participation: complete - patient participated  Level of consciousness: awake  Pain score: 0  Pain management: adequate    Airway patency: patent  Anesthetic complications: No anesthetic complications  PONV Status: none  Cardiovascular status: acceptable and stable  Respiratory status: nasal cannula, unassisted, acceptable and spontaneous ventilation  Hydration status: acceptable

## 2023-04-20 NOTE — ANESTHESIA PREPROCEDURE EVALUATION
Anesthesia Evaluation     Patient summary reviewed and Nursing notes reviewed   NPO Solid Status: > 8 hours  NPO Liquid Status: > 2 hours           Airway   Mallampati: III  TM distance: >3 FB  Neck ROM: full  Possible difficult intubation and Small opening  Dental      Pulmonary     breath sounds clear to auscultation  (+) asthma,  Cardiovascular     ECG reviewed  Rhythm: regular  Rate: normal    (+) hypertension, PVD, hyperlipidemia,       Neuro/Psych  GI/Hepatic/Renal/Endo    (+)  GERD,      Musculoskeletal     Abdominal    Substance History      OB/GYN          Other      history of cancer                    Anesthesia Plan    ASA 2     general with block     intravenous induction     Anesthetic plan, risks, benefits, and alternatives have been provided, discussed and informed consent has been obtained with: patient.    Plan discussed with CRNA.        CODE STATUS:    Code Status (Patient has no pulse and is not breathing): CPR (Attempt to Resuscitate)  Medical Interventions (Patient has pulse or is breathing): Full

## 2023-04-20 NOTE — CONSULTS
Urology Consult Note    Jocelyne Lindquist  LOS: 0      ASSESSMENT:    62 y.o. female with a very large pelvic mass consistent with ovarian origin and development of urinary retention over the past week     DISCUSSION/RECOMMENDATIONS/PLAN:  She is planned for exploration and excision of ovarian mass today.  Hopefully she will be able to void once this has been removed and once she recovers.    HPI:  Jocelyne Lindquist is a 62 y.o. female who was admitted 4/19/2023  for Acute urinary retention [R33.8].  She had abdominal pain and difficulty urinating and was seen in the ER.  A very large pelvic mass consistent with ovarian origin was noted.  Bladder was found to be massively distended.  She had developed urinary difficulties just in the last week.  Previously she has had no urologic symptoms or history of significance.    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 90 tablet 3 4/19/2023   • pravastatin (Pravachol) 40 MG tablet Take 1 tablet by mouth Every Night. 90 tablet 3 4/19/2023   • albuterol sulfate  (90 Base) MCG/ACT inhaler Inhale 2 puffs Every 6 (Six) Hours As Needed for Wheezing. 18 g 5    • calcium carb-cholecalciferol 600-800 MG-UNIT tablet Take 1 tablet by mouth 2 (Two) Times a Day With Meals. 60 tablet 0    • Cyanocobalamin (Vitamin B-12) 1000 MCG  "sublingual tablet Place 1 tablet under the tongue Daily. 90 each 3    • Magnesium 250 MG tablet Take 1 tablet twice daily for 30 days, then 1 tab once daily 120 tablet 0    • mirtazapine (Remeron) 15 MG tablet Take 1 tablet by mouth Every Night. 30 tablet 1    • omeprazole (priLOSEC) 20 MG capsule Take 1 capsule by mouth Daily. 90 capsule 3    • promethazine-dextromethorphan (PROMETHAZINE-DM) 6.25-15 MG/5ML syrup Take 5 mL by mouth 4 (Four) Times a Day As Needed for Cough. 240 mL 0    • thiamine (VITAMIN B-1) 100 MG tablet Take 1 tablet by mouth Daily. 30 tablet 5    • triamcinolone (KENALOG) 0.1 % ointment Apply  topically to the appropriate area as directed 3 (Three) Times a Day. 453.6 g 0      Allergies   Allergen Reactions   • Amoxicillin Swelling   • Benazepril Swelling   • Doxycycline Diarrhea   • Eggs Or Egg-Derived Products      Causes cramps   • Fish-Derived Products      Causes severe stomach cramps     Family History   Problem Relation Age of Onset   • Cancer Mother         esophageal and liver   • Heart failure Father    • Breast cancer Neg Hx    • Ovarian cancer Neg Hx      Social History     Socioeconomic History   • Marital status:    Tobacco Use   • Smoking status: Former     Packs/day: 1.00     Years: 30.00     Pack years: 30.00     Types: Cigarettes     Start date: 2000     Quit date: 2017     Years since quittin.3   • Smokeless tobacco: Never   • Tobacco comments:     15   Vaping Use   • Vaping Use: Never used   Substance and Sexual Activity   • Alcohol use: No     Comment: not since 15   • Drug use: No   • Sexual activity: Defer         Review of Systems  Constitutional: negative  Cardiovascular: negative  Gastrointestinal: negative  Genitourinary:negative, As above    Objective     Blood pressure 162/65, pulse 81, temperature 97.6 °F (36.4 °C), temperature source Oral, resp. rate 19, height 160 cm (63\"), weight 64.5 kg (142 lb 3.2 oz), SpO2 94 %.  /65 (BP " "Location: Right arm, Patient Position: Lying)   Pulse 81   Temp 97.6 °F (36.4 °C) (Oral)   Resp 19   Ht 160 cm (63\")   Wt 64.5 kg (142 lb 3.2 oz)   SpO2 94%   BMI 25.19 kg/m²     Physicial Exam    General: WDWN female in NAD  Neck: Supple with no masses or adenopathy  Back: No CVAT, spine linear and non-tender   Abdomen: Soft and non-tender with no masses, organomegaly or guarding.  : Normal female  Extremities: FROM with no edema, pulses full  Neuro: Nonfocal        Imaging  CT Abdomen: As stated above    Labs  Urine Microscopy:       Invalid input(s): LABCAST, Urinalysis:   Results from last 7 days   Lab Units 04/19/23  0737   PH, URINE  6.5   PROTEIN UA  Negative   GLUCOSE UA  Negative   KETONES UA  Negative   , BMP:   Results from last 7 days   Lab Units 04/20/23  0321 04/19/23  0826   SODIUM mmol/L 145 138   POTASSIUM mmol/L 3.6 5.0   CALCIUM mg/dL 8.9 9.4   CHLORIDE mmol/L 110* 98   CO2 mmol/L 26.0 29.0   BUN mg/dL 8 7*   CREATININE mg/dL 0.63 0.69   ALBUMIN g/dL  --  4.1   BILIRUBIN mg/dL  --  0.7   ALK PHOS U/L  --  97    and CBC:   Results from last 7 days   Lab Units 04/20/23  0321   WBC 10*3/mm3 7.18   RBC 10*6/mm3 4.47   HEMOGLOBIN g/dL 12.6   HEMATOCRIT % 38.3   PLATELETS 10*3/mm3 275       Bobby Hilliard MD - 4/20/2023, 10:09 EDT  "

## 2023-04-20 NOTE — PLAN OF CARE
Goal Outcome Evaluation:  Plan of Care Reviewed With: patient        Progress: no change  Outcome Evaluation: VSS, afebrile, no c/o pain, adequate uop, pt slept well most of the night.      Problem: Adult Inpatient Plan of Care  Goal: Absence of Hospital-Acquired Illness or Injury  Intervention: Identify and Manage Fall Risk  Recent Flowsheet Documentation  Taken 4/19/2023 2030 by Rich Schaefer RN  Safety Promotion/Fall Prevention: activity supervised  Intervention: Prevent Skin Injury  Recent Flowsheet Documentation  Taken 4/19/2023 2030 by Rich Schaefer RN  Body Position: position changed independently  Intervention: Prevent and Manage VTE (Venous Thromboembolism) Risk  Recent Flowsheet Documentation  Taken 4/19/2023 2030 by Rich Schaefer RN  Activity Management: ambulated in room     Problem: Adult Inpatient Plan of Care  Goal: Absence of Hospital-Acquired Illness or Injury  Intervention: Prevent and Manage VTE (Venous Thromboembolism) Risk  Recent Flowsheet Documentation  Taken 4/19/2023 2030 by Rich Schaefer RN  Activity Management: ambulated in room

## 2023-04-20 NOTE — ANESTHESIA PROCEDURE NOTES
"Peripheral Block      Patient reassessed immediately prior to procedure    Patient location during procedure: OR  Start time: 4/20/2023 2:30 PM  Reason for block: at surgeon's request and post-op pain management  Performed by  Anesthesiologist: Yogi Zhu MD  Preanesthetic Checklist  Completed: patient identified, IV checked, site marked, risks and benefits discussed, surgical consent, monitors and equipment checked, pre-op evaluation and timeout performed  Prep:  Pt Position: supine  Sterile barriers:cap, gloves, mask and washed/disinfected hands  Prep: ChloraPrep  Patient monitoring: blood pressure monitoring, continuous pulse oximetry and EKG  Procedure    Sedation: yes  Performed under: general  Guidance:ultrasound guided  Images:still images obtained, printed/placed on chart    Laterality:Bilateral  Block Type:TAP  Injection Technique:single-shot  Needle Type:short-bevel and echogenic  Needle Gauge:20 G  Resistance on Injection: none    Medications Used: bupivacaine PF (MARCAINE) 0.25 % injection - Injection   60 mL - 4/20/2023 2:30:00 PM  dexamethasone sodium phosphate injection - Injection   4 mg - 4/20/2023 2:30:00 PM      Medications  Comment:Block Injection:  LA dose divided between Right and Left block        Post Assessment  Injection Assessment: negative aspiration for heme, incremental injection and no paresthesia on injection  Patient Tolerance:comfortable throughout block  Complications:no  Additional Notes    Subcostal TAPs    A high-frequency linear transducer, with sterile cover, was placed sub-xiphoid to identify [place], right and left Rectus Abdominus Muscles (NIMESH). The transducer was moved either right or left subcostally to identify the NIMESH and the Transverse Abdominus Muscle (SOLANO). The insertion site was prepped in sterile fashion and then localized with 2-5 ml of 1% Lidocaine. Using ultrasound-guidance, a 20-gauge B-Pleitez 4\" Ultraplex 360 non-stimulating echogenic needle was " "advanced in plane, from medial to lateral, until the tip of the needle was in the fascial plane between the NIMESH and SOLANO. 1-3ml of preservative free normal saline was used to hydro-dissect the fascial planes. After the fascial plane was verified, the local anesthetic (LA) was injected. The procedure was repeated on the opposite side for bilateral coverage. Aspiration every 5 ml to prevent intravascular injection. Injection was completed with negative aspiration of blood and negative intravascular injection. Injection pressures were normal with minimal resistance. The subcostal approach to the TAP nerve block ideally anesthetizes the intercostal nerves T6-T9.     Mid-Axillary/Lateral TAPs    A high-frequency linear transducer, with sterile cover, was placed in the midaxillary line between the ASIS and costal margin. The External Oblique Muscle (EOM), Internal Oblique Muscle (IOM), Transverse Abdominus Muscle (SOLANO), and Peritoneum were identified. The insertion site was prepped in sterile fashion and then localized with 2-5 ml of 1% Lidocaine. Using ultrasound-guidance, a 20-gauge B-Pleitez 4\" Ultraplex 360 non-stimulating echogenic needle was advanced in plane, from medial to lateral, until the tip of the needle was in the fascial plane between the IOM and SOLANO. 1-3ml of preservative free normal saline was used to hydro-dissect the fascial planes. After the fascial plane was verified, the local anesthetic (LA) was injected. The procedure was repeated on the opposite side for bilateral coverage. Aspiration every 5 ml to prevent intravascular injection. Injection was completed with negative aspiration of blood and negative intravascular injection. Injection pressures were normal with minimal resistance. Midaxillary TAPs should reach intercostal nerves T10- T11 and the subcostal nerve T12.            "

## 2023-04-20 NOTE — PROGRESS NOTES
Gynecologic Oncology   Daily Progress Note    Chief Complaint: hospital follow-up, pelvic mass    Subjective   Patient did well overnight.  Her pain is controlled.  She is not having nausea or emesis.  She has been NPO since midnight. Nuñez anchored. Denies chest pain, dyspnea, fever/chills.     Updated ROS is negative except as reviewed in HPI.    Inpatient Medications:     Current Facility-Administered Medications:   •  Magnesium Standard Dose Replacement - Follow Nurse / BPA Driven Protocol, , Does not apply, PRN, Mira Aranda MD  •  magnesium sulfate 2g/50 mL (PREMIX) infusion, 2 g, Intravenous, Q2H, Mira Aranda MD, 2 g at 04/20/23 0541  •  metoprolol succinate XL (TOPROL-XL) 24 hr tablet 100 mg, 100 mg, Oral, Daily, Mira Aranda MD  •  potassium chloride 10 mEq in 100 mL IVPB, 10 mEq, Intravenous, Q1H, Mira Aranda MD, Last Rate: 100 mL/hr at 04/20/23 0540, 10 mEq at 04/20/23 0540  •  Potassium Replacement - Follow Nurse / BPA Driven Protocol, , Does not apply, PRN, Mira Aranda MD  •  pravastatin (PRAVACHOL) tablet 40 mg, 40 mg, Oral, Nightly, Mira Aranda MD  •  [COMPLETED] Insert Peripheral IV, , , Once **AND** sodium chloride 0.9 % flush 10 mL, 10 mL, Intravenous, PRN, Nav Quiroz PA  •  sodium chloride 0.9 % infusion, 50 mL/hr, Intravenous, Continuous, Kristin Dimas MD, Last Rate: 50 mL/hr at 04/19/23 2332, 50 mL/hr at 04/19/23 2332     Objective   Temp:  [98.2 °F (36.8 °C)-99.2 °F (37.3 °C)] 98.2 °F (36.8 °C)  Heart Rate:  [67-88] 74  Resp:  [18] 18  BP: (120-181)/(48-95) 163/62  Vitals:    04/20/23 0442   BP: 163/62   Pulse: 74   Resp: 18   Temp: 98.2 °F (36.8 °C)   SpO2: 93%     I/O last 3 completed shifts:  In: 1000 [IV Piggyback:1000]  Out: 3000 [Urine:3000]                 GENERAL: Alert, well-appearing female in no apparent distress.    CARDIOVASCULAR: Normal rate, regular rhythm, no murmurs, rubs, or gallops.    RESPIRATORY: Clear to auscultation  bilaterally, normal respiratory effort. Nasal cannula in place.  GASTROINTESTINAL:  Soft, non tender, non-distended, no rebound or guarding.   GENITOURINARY: Nuñez in place with clear urine.  SKIN:  Warm, dry, well-perfused.    PSYCHIATRIC: AO x3, with appropriate affect, normal thought processes  EXREMITIES: Symmetric. No peripheral edema. Venous stasis in bilateral LE.     Lab Results   Component Value Date    WBC 7.18 04/20/2023    HGB 12.6 04/20/2023    HCT 38.3 04/20/2023    MCV 85.7 04/20/2023     04/20/2023    NEUTROABS 4.24 04/20/2023    GLUCOSE 121 (H) 04/20/2023    BUN 8 04/20/2023    CREATININE 0.63 04/20/2023    EGFRIFNONA 76 03/22/2021     04/20/2023    K 3.6 04/20/2023     (H) 04/20/2023    CO2 26.0 04/20/2023    MG 1.4 (L) 04/20/2023    CALCIUM 8.9 04/20/2023     Lab Results   Component Value Date     11.2 04/19/2023    CEA <0.60 04/19/2023     7.3 04/19/2023         Assessment & Plan   This is a 62 y.o. G0 woman presenting with urinary retention found to have large pelvic mass.      1. Pelvic mass  - CT AP w/ contrast 4/19: 14cm cystic pelvic mass with internal septation causing mass effect on bladder and uterus. No peritoneal nodules, no lymphadenopathy. There is diverticulosis without diverticulitis. Normal kidneys and ureters.  - History of 4cm L ovarian cyst on previous imaging which may be precursor lesion  - Tumor markers within normal limits.  - Discussed that without tissue diagnosis cannot say whether or not this is cancer, but that removal is recommended due to the size of the mass. This is less likely to be malignancy given slow growing nature, negative tumor markers, and lack of other systemic signs of cancer, but final pathology will be the determining factor.  - Does have peripheral vascular disease and hypertension, but no history of MI and does not see cardiologist or pulmonologist outpatient so should not require additional pre-operative clearance. EKG  and T&S completed.  - Planning for exploratory laparotomy, total abdominal hysterectomy, bilateral salpingo-oophorectomy, cystoscopy with temporary bilateral ureteral catheters, possible cancer staging. Currently on OR board for 1pm. Keep NPO with gentle mIVF, hold anticoagulation.     2. Urinary retention  - 1500mL urine drained with padron upon anchoring; continues to have normal UOP.  - Denies hematuria; urinalysis with small amount of blood, protein.  - Cr normal at 0.63  - Suspect related to mass effect from mass  - Will plan for cystoscopy during surgery for further evaluation; keep padron anchored until then.  - May need catheter at time of discharge home pending resolution of urinary retention postoperatively    3.  Disposition  -To OR today for exploratory laparotomy, total abdominal hysterectomy, bilateral salpingo-oophorectomy, cystoscopy with temporary bilateral ureteral catheters, possible cancer staging. Previously consented on 4/19/23.         I saw and evaluated the patient. I agree with the findings and the plan of care as documented in the note.    Kristin Dimas MD  04/20/23  13:38 EDT

## 2023-04-20 NOTE — ANESTHESIA PROCEDURE NOTES
Airway  Urgency: elective    Date/Time: 4/20/2023 2:23 PM  Airway not difficult    General Information and Staff    Patient location during procedure: OR  Anesthesiologist: Yogi Zhu MD CRNA/CAA: Kristen Vo CRNA    Indications and Patient Condition  Indications for airway management: airway protection    Preoxygenated: yes  MILS not maintained throughout  Mask difficulty assessment: 1 - vent by mask    Final Airway Details  Final airway type: endotracheal airway      Successful airway: ETT  Cuffed: yes   Successful intubation technique: video laryngoscopy  Facilitating devices/methods: intubating stylet  Endotracheal tube insertion site: oral  Blade: Ramos  Blade size: 3  ETT size (mm): 7.0  Cormack-Lehane Classification: grade I - full view of glottis  Placement verified by: chest auscultation and capnometry   Measured from: lips  ETT/EBT  to lips (cm): 20  Number of attempts at approach: 1  Assessment: lips, teeth, and gum same as pre-op and atraumatic intubation    Additional Comments  Negative epigastric sounds, Breath sound equal bilaterally with symmetric chest rise and fall

## 2023-04-20 NOTE — OP NOTE
Subjective     Date of Service:  04/20/23  Time of Service:  16:28 EDT    Surgical Staff: Surgeon(s) and Role:     * Kristin Dimas MD - Primary   Additional Staff:   Assistant: Ankit Curry PA-C  was responsible for performing the following activities: Retraction, Suction, Irrigation, Closing and Placing Dressing and their skilled assistance was necessary for the success of this case.     Pre-operative diagnosis(es): Pre-Op Diagnosis Codes:     * Acute urinary retention [R33.8]     * Pelvic mass [R19.00]     Post-operative diagnosis(es): Post-Op Diagnosis Codes:     * Acute urinary retention [R33.8]     * Pelvic mass [R19.00]   Procedure(s): Procedure(s):  EXPLORATORY LAPAROTOMY, TOTAL ABDOMINAL HYSTERECTOMY BILATERAL SALPINGO-OOPHORECTOMY  CYSTOSCOPY TEMPORARY PLACEMENT BILATERAL URETERAL CATHETER     Antibiotics: cefazolin (Ancef) ordered on call to OR     Anesthesia: Type: General with Block  ASA:  II     Objective      Operative findings:  The time of cystoscopy, distended bladder was noted.  Ureteral orifices were unremarkable bilaterally.  At the time of laparotomy, there is a large cystic mass arising on the left adnexa.  On frozen section, this is noncancerous.  Uterus and right adnexa were atrophic.   Specimens removed: ID Type Source Tests Collected by Time   A : left tube and ovary for frozen  Tissue Ovary, Left with Fallopian Tube TISSUE PATHOLOGY EXAM Kristin Dimas MD 4/20/2023 1518   B : uterus, cervix, right tube and ovary for permanent Tissue Uterus, Cervix, R Fallopian Tube, R Ovary TISSUE PATHOLOGY EXAM Kristin Dimas MD 4/20/2023 1543      Fluid Intake and Output: I/O this shift:  In: 1000 [I.V.:1000]  Out: 900 [Urine:800; Blood:100]   Blood products used: No   Drains: Urethral Catheter Silicone 16 Fr. (Active)       [REMOVED] Urethral Catheter Straight-tip 16 Fr. (Removed)   Daily Indications Acute Urinary Retention 04/20/23 1000   Site Assessment Clean;Skin intact 04/20/23  1224   Collection Container Standard drainage bag 04/20/23 1224   Securement Method Securing device 04/20/23 1224   Catheter care complete Yes 04/20/23 0800   Output (mL) 1250 mL 04/20/23 0500      Implant Information: Nothing was implanted during the procedure   Complications:  No immediate   Intraoperative consult(s):    Condition: stable   Disposition: to PACU and then admit to  medical - surgical floor       Indications:  Patient is a pleasant 62-year-old woman who presented to the emergency room with urinary retention.  She was found to have a large cystic pelvic mass.  This had been previously noted on imaging and had grown in size.  Risks and benefits of surgery were discussed.  Consent was signed and on chart.    Procedure: After obtaining informed consent, patient was taken to the operating room and underwent general endotracheal anesthesia after patient and site verification.  Regional block was performed by anesthesia team.  Feet were placed in Chester stirrups.  Abdomen, perineum, and vagina were prepped and draped in usual sterile fashion.  Cystoscopy was performed with the above findings.  Bilateral 5 Azeri catheters were placed without difficulty under direct visualization.  Nuñez catheter was anchored and the ureteral catheters were attached to a sterile glove.  At the completion of the surgery, the intact temporary bilateral ureteral catheters were removed.  Attention was turned to the abdomen.  Vertical midline incision was made and carried from the pubic symphysis to the umbilicus.  Abdomen was entered without difficulty.  Bladder was noted to be significantly enlarged and was manually decompressed to allow all further drainage of the cystoscopy fluid.  Pelvic washings were obtained and discarded after frozen section results.  Edyta clamp was used to grasp the left uterine cornua.  The ligament was divided with Bovie electrocautery.  Utero-ovarian ligament was divided using Enseal.  The uterine  side of the pedicle was secured with 0 Vicryl suture.  Peritoneum was divided and infundibulopelvic ligament was isolated.  Greater curved clamp was placed across the pedicle which was divided using Enseal.  0 Vicryl suture was used to further secure the pedicle.  Specimen was sent for frozen section with the above diagnosis.  Bookwalter self-retaining retractor was placed.  Moist laparotomy sponges were used to pack the viscera away from the pelvis.  Edyta clamps were placed at the cornua bilaterally.  Bladder flap was developed with Bovie electrocautery.  Round ligament on the right was divided Bovie electrocautery.  Peritoneum overlying the pelvic sidewall was divided in a similar fashion.  Infundibulopelvic ligament on the right was isolated from surrounding structures and pedicle was created with right angle, Enseal, and 0 Vicryl tie.  Uterine vessels and cardinal ligament complexes were divided using Enseal.  When the level distal to the cervix was reached, greater curved clamps were used to secure the angles and pedicle was created using Bovie electrocautery and 0 Vicryl suture.  Suture was clamped and retained.  Cervix was divided from the vagina and specimen was sent off the field for permanent pathology.  Vaginal cuff was closed with 0 Vicryl suture in a running locking fashion.  The surgical field was copiously irrigated and aspirated.  Good hemostasis was noted.  All laparotomy sponges, instruments, retractors were removed from the abdominal cavity.  Fascia was reapproximated with #1 looped PDS in a bulk closure.  All suprafascial tissue was irrigated aspirated and made hemostatic.  Deep dermis was reapproximated with 2-0 Vicryl in interrupted fashion.  Skin was closed with 3-0 Monocryl subcuticular stitch.  Exofin/pernio dressing was placed at the skin.  Patient was taken to the recovery room in good condition.  There were no immediate complications.  All counts were correct.      Kristin Dimas,  MD  04/20/23  16:27 EDT

## 2023-04-20 NOTE — PROGRESS NOTES
[facility] Medicine Services  PROGRESS NOTE    Patient Name: Jocelyne Lindquist  : 1960  MRN: [id number]    Date of Admission: 2023  Primary Care Physician: Uzma Duffy PA-C    Subjective   Subjective     CC: Urinary retention    HPI: No issues beyond burning from IV. Wants zyrtec for allergies/congestion. Ready for surgery    ROS:  No new issues     Objective   Objective     Vital Signs:   Temp:  [97.6 °F (36.4 °C)-99.2 °F (37.3 °C)] 97.6 °F (36.4 °C)  Heart Rate:  [71-88] 81  Resp:  [18-19] 19  BP: (124-163)/(48-88) 162/65  Flow (L/min):  [2] 2     Physical Exam:  NAD, alert and oriented  OP clear, MMM  Neck supple  No LAD  RRR  CTAB  +BS, ND, palpable fullness in LLQ  CORBETT  Normal affect    Results Reviewed:  LAB RESULTS:      Lab 23  0321 23  0826   WBC 7.18 11.29*   HEMOGLOBIN 12.6 13.4   HEMATOCRIT 38.3 41.0   PLATELETS 275 305   NEUTROS ABS 4.24 8.91*   IMMATURE GRANS (ABS) 0.02 0.06*   LYMPHS ABS 2.14 1.53   MONOS ABS 0.46 0.36   EOS ABS 0.29 0.36   MCV 85.7 85.1         Lab 23  0321 23  0826   SODIUM 145 138   POTASSIUM 3.6 5.0   CHLORIDE 110* 98   CO2 26.0 29.0   ANION GAP 9.0 11.0   BUN 8 7*   CREATININE 0.63 0.69   EGFR 99.2 97.1   GLUCOSE 121* 116*   CALCIUM 8.9 9.4   MAGNESIUM 1.4*  --    HEMOGLOBIN A1C  --  5.90*         Lab 23  0826   TOTAL PROTEIN 6.8   ALBUMIN 4.1   GLOBULIN 2.7   ALT (SGPT) 17   AST (SGOT) 19   BILIRUBIN 0.7   ALK PHOS 97                 Lab 23  0321   ABO TYPING O   RH TYPING Positive   ANTIBODY SCREEN Negative         Brief Urine Lab Results  (Last result in the past 365 days)      Color   Clarity   Blood   Leuk Est   Nitrite   Protein   CREAT   Urine HCG        23 0737 Yellow   Clear   Negative   Negative   Negative   Negative                 Microbiology Results Abnormal     None          CT Abdomen Pelvis With Contrast    Result Date: 2023  CT ABDOMEN PELVIS W CONTRAST Date of  Exam: 4/19/2023 9:47 AM EDT Indication: urinary retention. Comparison: 2/17/2017 Technique: Axial CT images were obtained of the abdomen and pelvis following the uneventful intravenous administration of 80 mL Isovue-300. Reconstructed coronal and sagittal images were also obtained. Automated exposure control and iterative construction methods were used. Findings: Lower thorax:Lung bases are clear. No coronary artery calcifications seen in the visualized heart. No pericardial effusion.  No evidence of pulmonary embolus in the visualized pulmonary arteries. Liver: No focal hepatic lesions seen.  Normal hepatic size. Normal density of the liver. Gallbladder and bile ducts: Cholelithiasis without evidence of cholecystitis. No intra- or extra- hepatic biliary ductal dilatation. Spleen: Calcified granulomas. Pancreas: Normal appearance of the pancreas. Main pancreatic duct is nondilated. Adrenals: Normal appearance of the adrenal glands. Kidneys: Normal appearance of the kidneys. Symmetric enhancement and excretion of contrast. No nephrolithiasis.  Normal caliber of the ureters. Bowel: Normal caliber of the bowels. Normal appearance of the appendix. Diverticulosis without diverticulitis. Pelvis: Decompressed bladder with Nuñez catheter. Small amount of gas in the bladder is likely due to catheterization. Large cystic lesion seen within the pelvis measuring approximately 10.4 x 14.0 x 9.4 cm. There is an internal rounded septation/internal cyst component. This lesion exerts mass effect on the uterus and bladder pushing them anteriorly and to the right. The left gonadal vein appears to terminate at the cystic lesion suggesting that this is a left ovarian cystic neoplasm. On prior CT there was a 4.2 x 3.7 cm left ovarian cyst which may have been the precursor for this lesion. Peritoneum: No free air. No free fluid. No peritoneal nodularity. Vessels: Normal aortic caliber. Atherosclerotic calcification of the aorta. Celiac  artery, splenic artery, superior mesenteric artery, inferior mesenteric artery, renal arteries and iliac arteries appear patent. Iliac veins, inferior vena cava, superior mesenteric vein, renal veins, portal vein and splenic vein are patent. Lymph nodes: No enlarged or suspicious adenopathy. Bones: No acute osseous abnormality. Degenerative changes of the spine. Soft tissues: Unremarkable appearance of the soft tissues.     Impression: Impression: Large cystic mass within the pelvis with internal rounded septation/internal cyst. This appears to be left ovarian in origin as the left gonadal vein appears to terminate here. Additionally there was a left ovarian cyst seen on prior CT which may've been  the precursor for this lesion. This most likely reflects a left ovarian cystic neoplasm such as serous cystadenoma but incompletely characterized on this examination. This mass exerts mass effect on the bladder and uterus anteriorly and to the right. Electronically Signed: Jamie Rangel  4/19/2023 10:42 AM EDT  Workstation ID: [id number]          Current medications:  Scheduled Meds:cetirizine, 10 mg, Oral, Daily  magnesium sulfate, 2 g, Intravenous, Q2H  metoprolol succinate XL, 100 mg, Oral, Daily  potassium chloride, 10 mEq, Intravenous, Q1H  pravastatin, 40 mg, Oral, Nightly      Continuous Infusions:sodium chloride, 50 mL/hr, Last Rate: 50 mL/hr (04/19/23 8712)      PRN Meds:.•  Magnesium Standard Dose Replacement - Follow Nurse / BPA Driven Protocol  •  Potassium Replacement - Follow Nurse / BPA Driven Protocol  •  [COMPLETED] Insert Peripheral IV **AND** sodium chloride    Assessment & Plan   Assessment & Plan     Active Hospital Problems    Diagnosis  POA   • **Acute urinary retention [R33.8]  Yes   • Pelvic mass [R19.00]  Unknown   • Mixed hyperlipidemia [E78.2]  Yes   • Cyst of left ovary [N83.202]  Yes      Resolved Hospital Problems   No resolved problems to display.        Brief Hospital Course to  date:  Jocelyne Lindquist is a 62 y.o. 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 who presented with acute onset of inability to urinate and  found to have a large cystic mass     Pelvic Cystic Mass  -Plans for surgery today 4/20     Urinary retention    Seasonal allergies/congestion  -zyrtec     Prior history of tobacco and alcohol use    Expected Discharge Location and Transportation: Home  Expected Discharge   Expected Discharge Date and Time     Expected Discharge Date Expected Discharge Time    Apr 24, 2023            DVT prophylaxis:  Mechanical DVT prophylaxis orders are present.          CODE STATUS:   Code Status and Medical Interventions:   Ordered at: 04/19/23 3366     Code Status (Patient has no pulse and is not breathing):    CPR (Attempt to Resuscitate)     Medical Interventions (Patient has pulse or is breathing):    Full       Catrachito Lockhart MD  04/20/23

## 2023-04-20 NOTE — PLAN OF CARE
Problem: Adult Inpatient Plan of Care  Goal: Plan of Care Review  4/20/2023 1848 by Adalid Butt RN  Outcome: Ongoing, Progressing  4/20/2023 1045 by Adalid Butt RN  Outcome: Ongoing, Progressing  Goal: Patient-Specific Goal (Individualized)  4/20/2023 1848 by Adalid Butt RN  Outcome: Ongoing, Progressing  4/20/2023 1045 by Adalid Butt RN  Outcome: Ongoing, Progressing  Goal: Absence of Hospital-Acquired Illness or Injury  4/20/2023 1848 by Adalid Butt RN  Outcome: Ongoing, Progressing  4/20/2023 1045 by Adalid Butt RN  Outcome: Ongoing, Progressing  Goal: Optimal Comfort and Wellbeing  4/20/2023 1848 by Adalid Butt RN  Outcome: Ongoing, Progressing  4/20/2023 1045 by Adalid Butt RN  Outcome: Ongoing, Progressing  Goal: Readiness for Transition of Care  4/20/2023 1848 by Adalid Butt RN  Outcome: Ongoing, Progressing  4/20/2023 1045 by Adalid Butt RN  Outcome: Ongoing, Progressing     Problem: Pain Acute  Goal: Acceptable Pain Control and Functional Ability  4/20/2023 1848 by Adalid Butt RN  Outcome: Ongoing, Progressing  4/20/2023 1045 by Adalid Butt RN  Outcome: Ongoing, Progressing     Problem: Fall Injury Risk  Goal: Absence of Fall and Fall-Related Injury  4/20/2023 1848 by Adalid Butt RN  Outcome: Ongoing, Progressing  4/20/2023 1045 by Adalid Butt RN  Outcome: Ongoing, Progressing     Problem: Infection  Goal: Absence of Infection Signs and Symptoms  4/20/2023 1848 by Adalid Butt RN  Outcome: Ongoing, Progressing  4/20/2023 1045 by Adalid Butt RN  Outcome: Ongoing, Progressing   Goal Outcome Evaluation:

## 2023-04-21 PROBLEM — Z90.710 S/P TAH (TOTAL ABDOMINAL HYSTERECTOMY): Status: ACTIVE | Noted: 2023-04-21

## 2023-04-21 LAB
ANION GAP SERPL CALCULATED.3IONS-SCNC: 10 MMOL/L (ref 5–15)
BASOPHILS # BLD AUTO: 0.01 10*3/MM3 (ref 0–0.2)
BASOPHILS NFR BLD AUTO: 0.1 % (ref 0–1.5)
BUN SERPL-MCNC: 7 MG/DL (ref 8–23)
BUN/CREAT SERPL: 10.9 (ref 7–25)
CALCIUM SPEC-SCNC: 9.5 MG/DL (ref 8.6–10.5)
CHLORIDE SERPL-SCNC: 104 MMOL/L (ref 98–107)
CO2 SERPL-SCNC: 25 MMOL/L (ref 22–29)
CREAT SERPL-MCNC: 0.64 MG/DL (ref 0.57–1)
DEPRECATED RDW RBC AUTO: 40.2 FL (ref 37–54)
EGFRCR SERPLBLD CKD-EPI 2021: 98.8 ML/MIN/1.73
EOSINOPHIL # BLD AUTO: 0 10*3/MM3 (ref 0–0.4)
EOSINOPHIL NFR BLD AUTO: 0 % (ref 0.3–6.2)
ERYTHROCYTE [DISTWIDTH] IN BLOOD BY AUTOMATED COUNT: 12.7 % (ref 12.3–15.4)
GLUCOSE BLDC GLUCOMTR-MCNC: 169 MG/DL (ref 70–130)
GLUCOSE SERPL-MCNC: 149 MG/DL (ref 65–99)
HCT VFR BLD AUTO: 38.9 % (ref 34–46.6)
HGB BLD-MCNC: 12.5 G/DL (ref 12–15.9)
IMM GRANULOCYTES # BLD AUTO: 0.04 10*3/MM3 (ref 0–0.05)
IMM GRANULOCYTES NFR BLD AUTO: 0.4 % (ref 0–0.5)
LYMPHOCYTES # BLD AUTO: 1.29 10*3/MM3 (ref 0.7–3.1)
LYMPHOCYTES NFR BLD AUTO: 11.5 % (ref 19.6–45.3)
MAGNESIUM SERPL-MCNC: 1.8 MG/DL (ref 1.6–2.4)
MCH RBC QN AUTO: 28 PG (ref 26.6–33)
MCHC RBC AUTO-ENTMCNC: 32.1 G/DL (ref 31.5–35.7)
MCV RBC AUTO: 87.2 FL (ref 79–97)
MONOCYTES # BLD AUTO: 0.36 10*3/MM3 (ref 0.1–0.9)
MONOCYTES NFR BLD AUTO: 3.2 % (ref 5–12)
NEUTROPHILS NFR BLD AUTO: 84.8 % (ref 42.7–76)
NEUTROPHILS NFR BLD AUTO: 9.47 10*3/MM3 (ref 1.7–7)
NRBC BLD AUTO-RTO: 0 /100 WBC (ref 0–0.2)
PLATELET # BLD AUTO: 311 10*3/MM3 (ref 140–450)
PMV BLD AUTO: 10.1 FL (ref 6–12)
POTASSIUM SERPL-SCNC: 4.5 MMOL/L (ref 3.5–5.2)
RBC # BLD AUTO: 4.46 10*6/MM3 (ref 3.77–5.28)
SODIUM SERPL-SCNC: 139 MMOL/L (ref 136–145)
WBC NRBC COR # BLD: 11.17 10*3/MM3 (ref 3.4–10.8)

## 2023-04-21 PROCEDURE — 99232 SBSQ HOSP IP/OBS MODERATE 35: CPT | Performed by: HOSPITALIST

## 2023-04-21 PROCEDURE — 25010000002 HEPARIN (PORCINE) PER 1000 UNITS: Performed by: OBSTETRICS & GYNECOLOGY

## 2023-04-21 PROCEDURE — 82962 GLUCOSE BLOOD TEST: CPT

## 2023-04-21 PROCEDURE — 83735 ASSAY OF MAGNESIUM: CPT | Performed by: OBSTETRICS & GYNECOLOGY

## 2023-04-21 PROCEDURE — 80048 BASIC METABOLIC PNL TOTAL CA: CPT | Performed by: OBSTETRICS & GYNECOLOGY

## 2023-04-21 PROCEDURE — 85025 COMPLETE CBC W/AUTO DIFF WBC: CPT | Performed by: OBSTETRICS & GYNECOLOGY

## 2023-04-21 RX ADMIN — ACETAMINOPHEN 325MG 650 MG: 325 TABLET ORAL at 23:13

## 2023-04-21 RX ADMIN — HEPARIN SODIUM 5000 UNITS: 5000 INJECTION, SOLUTION INTRAVENOUS; SUBCUTANEOUS at 06:21

## 2023-04-21 RX ADMIN — SENNOSIDES AND DOCUSATE SODIUM 2 TABLET: 8.6; 5 TABLET ORAL at 21:27

## 2023-04-21 RX ADMIN — CETIRIZINE HYDROCHLORIDE 10 MG: 10 TABLET, FILM COATED ORAL at 08:51

## 2023-04-21 RX ADMIN — HEPARIN SODIUM 5000 UNITS: 5000 INJECTION, SOLUTION INTRAVENOUS; SUBCUTANEOUS at 15:05

## 2023-04-21 RX ADMIN — SENNOSIDES AND DOCUSATE SODIUM 2 TABLET: 8.6; 5 TABLET ORAL at 08:51

## 2023-04-21 RX ADMIN — ACETAMINOPHEN 325MG 650 MG: 325 TABLET ORAL at 17:40

## 2023-04-21 RX ADMIN — SODIUM CHLORIDE, POTASSIUM CHLORIDE, SODIUM LACTATE AND CALCIUM CHLORIDE 75 ML/HR: 600; 310; 30; 20 INJECTION, SOLUTION INTRAVENOUS at 06:21

## 2023-04-21 RX ADMIN — ACETAMINOPHEN 325MG 650 MG: 325 TABLET ORAL at 00:39

## 2023-04-21 RX ADMIN — PRAVASTATIN SODIUM 40 MG: 40 TABLET ORAL at 21:27

## 2023-04-21 RX ADMIN — METOPROLOL SUCCINATE 100 MG: 100 TABLET, EXTENDED RELEASE ORAL at 08:51

## 2023-04-21 RX ADMIN — ACETAMINOPHEN 325MG 650 MG: 325 TABLET ORAL at 12:14

## 2023-04-21 RX ADMIN — ACETAMINOPHEN 325MG 650 MG: 325 TABLET ORAL at 06:21

## 2023-04-21 RX ADMIN — HEPARIN SODIUM 5000 UNITS: 5000 INJECTION, SOLUTION INTRAVENOUS; SUBCUTANEOUS at 21:27

## 2023-04-21 NOTE — CASE MANAGEMENT/SOCIAL WORK
Continued Stay Note  [facility]     Patient Name: Jocelyne Lindquist  MRN: [id number]  Today's Date: 4/21/2023    Admit Date: 4/19/2023    Plan: Home with family   Discharge Plan     Row Name 04/21/23 0911       Plan    Plan Home with family    Patient/Family in Agreement with Plan yes    Plan Comments I spoke with the patient at the bedside. She denies any needs from case management today. CM to follow and assist as needed.    Final Discharge Disposition Code 01 - home or self-care               Discharge Codes    No documentation.               Expected Discharge Date and Time     Expected Discharge Date Expected Discharge Time    Apr 22, 2023             Lexie Sepulveda RN

## 2023-04-21 NOTE — PROGRESS NOTES
[facility] Medicine Services  PROGRESS NOTE    Patient Name: Jocelyne Lindquist  : 1960  MRN: [id number]    Date of Admission: 2023  Primary Care Physician: Uzma Duffy PA-C    Subjective   Subjective     CC: Pelvic mass    HPI: Up in chair. Walking well. Pain controlled. Nuñez out and voiding. No f/c. No n/v. Tolerating PO, +flatus, and appetite increasing.     Review of Systems   Constitutional: Positive for activity change and fatigue.   HENT: Negative.    Respiratory: Negative.    Cardiovascular: Negative.    Gastrointestinal: Positive for abdominal pain.   Genitourinary: Positive for hematuria.         Objective   Objective     Vital Signs:   Temp:  [97.2 °F (36.2 °C)-98.1 °F (36.7 °C)] 97.7 °F (36.5 °C)  Heart Rate:  [59-77] 75  Resp:  [12-18] 16  BP: (118-189)/(63-96) 147/90  Flow (L/min):  [2-3] 2  FiO2 (%):  [95 %-100 %] 95 %     Physical Exam:  NAD, alert and oriented  OP clear, MMM  Neck supple  No LAD  RRR  CTAB  +BS, soft, TTP appropriately post-op  No c/c/e  No rashes  CORBETT  Normal affect    Results Reviewed:  LAB RESULTS:      Lab 23  0826   WBC 11.17* 7.18 11.29*   HEMOGLOBIN 12.5 12.6 13.4   HEMATOCRIT 38.9 38.3 41.0   PLATELETS 311 275 305   NEUTROS ABS 9.47* 4.24 8.91*   IMMATURE GRANS (ABS) 0.04 0.02 0.06*   LYMPHS ABS 1.29 2.14 1.53   MONOS ABS 0.36 0.46 0.36   EOS ABS 0.00 0.29 0.36   MCV 87.2 85.7 85.1         Lab 23  03223  0826   SODIUM 139  --  145 138   POTASSIUM 4.5 4.2 3.6 5.0   CHLORIDE 104  --  110* 98   CO2 25.0  --  26.0 29.0   ANION GAP 10.0  --  9.0 11.0   BUN 7*  --  8 7*   CREATININE 0.64  --  0.63 0.69   EGFR 98.8  --  99.2 97.1   GLUCOSE 149*  --  121* 116*   CALCIUM 9.5  --  8.9 9.4   MAGNESIUM 1.8  --  1.4*  --    HEMOGLOBIN A1C  --   --   --  5.90*         Lab 23  0826   TOTAL PROTEIN 6.8   ALBUMIN 4.1   GLOBULIN 2.7   ALT (SGPT) 17   AST  (SGOT) 19   BILIRUBIN 0.7   ALK PHOS 97                 Lab 04/20/23  0321   ABO TYPING O   RH TYPING Positive   ANTIBODY SCREEN Negative         Brief Urine Lab Results  (Last result in the past 365 days)      Color   Clarity   Blood   Leuk Est   Nitrite   Protein   CREAT   Urine HCG        04/19/23 0737 Yellow   Clear   Negative   Negative   Negative   Negative                 Microbiology Results Abnormal     None          CT Abdomen Pelvis With Contrast    Result Date: 4/19/2023  CT ABDOMEN PELVIS W CONTRAST Date of Exam: 4/19/2023 9:47 AM EDT Indication: urinary retention. Comparison: 2/17/2017 Technique: Axial CT images were obtained of the abdomen and pelvis following the uneventful intravenous administration of 80 mL Isovue-300. Reconstructed coronal and sagittal images were also obtained. Automated exposure control and iterative construction methods were used. Findings: Lower thorax:Lung bases are clear. No coronary artery calcifications seen in the visualized heart. No pericardial effusion.  No evidence of pulmonary embolus in the visualized pulmonary arteries. Liver: No focal hepatic lesions seen.  Normal hepatic size. Normal density of the liver. Gallbladder and bile ducts: Cholelithiasis without evidence of cholecystitis. No intra- or extra- hepatic biliary ductal dilatation. Spleen: Calcified granulomas. Pancreas: Normal appearance of the pancreas. Main pancreatic duct is nondilated. Adrenals: Normal appearance of the adrenal glands. Kidneys: Normal appearance of the kidneys. Symmetric enhancement and excretion of contrast. No nephrolithiasis.  Normal caliber of the ureters. Bowel: Normal caliber of the bowels. Normal appearance of the appendix. Diverticulosis without diverticulitis. Pelvis: Decompressed bladder with Nuñez catheter. Small amount of gas in the bladder is likely due to catheterization. Large cystic lesion seen within the pelvis measuring approximately 10.4 x 14.0 x 9.4 cm. There is an  internal rounded septation/internal cyst component. This lesion exerts mass effect on the uterus and bladder pushing them anteriorly and to the right. The left gonadal vein appears to terminate at the cystic lesion suggesting that this is a left ovarian cystic neoplasm. On prior CT there was a 4.2 x 3.7 cm left ovarian cyst which may have been the precursor for this lesion. Peritoneum: No free air. No free fluid. No peritoneal nodularity. Vessels: Normal aortic caliber. Atherosclerotic calcification of the aorta. Celiac artery, splenic artery, superior mesenteric artery, inferior mesenteric artery, renal arteries and iliac arteries appear patent. Iliac veins, inferior vena cava, superior mesenteric vein, renal veins, portal vein and splenic vein are patent. Lymph nodes: No enlarged or suspicious adenopathy. Bones: No acute osseous abnormality. Degenerative changes of the spine. Soft tissues: Unremarkable appearance of the soft tissues.     Impression: Impression: Large cystic mass within the pelvis with internal rounded septation/internal cyst. This appears to be left ovarian in origin as the left gonadal vein appears to terminate here. Additionally there was a left ovarian cyst seen on prior CT which may've been  the precursor for this lesion. This most likely reflects a left ovarian cystic neoplasm such as serous cystadenoma but incompletely characterized on this examination. This mass exerts mass effect on the bladder and uterus anteriorly and to the right. Electronically Signed: Jamie Rangel  4/19/2023 10:42 AM EDT  Workstation ID: [id number]    Peripheral Block    Result Date: 4/20/2023  Kristen Vo CRNA     4/20/2023  3:09 PM Peripheral Block Patient reassessed immediately prior to procedure Patient location during procedure: OR Start time: 4/20/2023 2:30 PM Reason for block: at surgeon's request and post-op pain management Performed by Anesthesiologist: Yogi Zhu MD Preanesthetic Checklist  "Completed: patient identified, IV checked, site marked, risks and benefits discussed, surgical consent, monitors and equipment checked, pre-op evaluation and timeout performed Prep: Pt Position: supine Sterile barriers:cap, gloves, mask and washed/disinfected hands Prep: ChloraPrep Patient monitoring: blood pressure monitoring, continuous pulse oximetry and EKG Procedure Sedation: yes Performed under: general Guidance:ultrasound guided Images:still images obtained, printed/placed on chart Laterality:Bilateral Block Type:TAP Injection Technique:single-shot Needle Type:short-bevel and echogenic Needle Gauge:20 G Resistance on Injection: none Medications Used: bupivacaine PF (MARCAINE) 0.25 % injection - Injection  60 mL - 4/20/2023 2:30:00 PM dexamethasone sodium phosphate injection - Injection  4 mg - 4/20/2023 2:30:00 PM Medications Comment:Block Injection:  LA dose divided between Right and Left block Post Assessment Injection Assessment: negative aspiration for heme, incremental injection and no paresthesia on injection Patient Tolerance:comfortable throughout block Complications:no Additional Notes Subcostal TAPs A high-frequency linear transducer, with sterile cover, was placed sub-xiphoid to identify [place], right and left Rectus Abdominus Muscles (NIMESH). The transducer was moved either right or left subcostally to identify the NIMESH and the Transverse Abdominus Muscle (SOLANO). The insertion site was prepped in sterile fashion and then localized with 2-5 ml of 1% Lidocaine. Using ultrasound-guidance, a 20-gauge B-Pleitez 4\" Ultraplex 360 non-stimulating echogenic needle was advanced in plane, from medial to lateral, until the tip of the needle was in the fascial plane between the NIMESH and SOLANO. 1-3ml of preservative free normal saline was used to hydro-dissect the fascial planes. After the fascial plane was verified, the local anesthetic (LA) was injected. The procedure was repeated on the opposite side for " "bilateral coverage. Aspiration every 5 ml to prevent intravascular injection. Injection was completed with negative aspiration of blood and negative intravascular injection. Injection pressures were normal with minimal resistance. The subcostal approach to the TAP nerve block ideally anesthetizes the intercostal nerves T6-T9. Mid-Axillary/Lateral TAPs A high-frequency linear transducer, with sterile cover, was placed in the midaxillary line between the ASIS and costal margin. The External Oblique Muscle (EOM), Internal Oblique Muscle (IOM), Transverse Abdominus Muscle (SOLANO), and Peritoneum were identified. The insertion site was prepped in sterile fashion and then localized with 2-5 ml of 1% Lidocaine. Using ultrasound-guidance, a 20-gauge B-Pleitez 4\" Ultraplex 360 non-stimulating echogenic needle was advanced in plane, from medial to lateral, until the tip of the needle was in the fascial plane between the IOM and SOLANO. 1-3ml of preservative free normal saline was used to hydro-dissect the fascial planes. After the fascial plane was verified, the local anesthetic (LA) was injected. The procedure was repeated on the opposite side for bilateral coverage. Aspiration every 5 ml to prevent intravascular injection. Injection was completed with negative aspiration of blood and negative intravascular injection. Injection pressures were normal with minimal resistance. Midaxillary TAPs should reach intercostal nerves T10- T11 and the subcostal nerve T12.            Current medications:  Scheduled Meds:acetaminophen, 650 mg, Oral, Q6H  cetirizine, 10 mg, Oral, Daily  heparin (porcine), 5,000 Units, Subcutaneous, Q8H  metoprolol succinate XL, 100 mg, Oral, Daily  pravastatin, 40 mg, Oral, Nightly  sennosides-docusate, 2 tablet, Oral, BID      Continuous Infusions:lactated ringers, 75 mL/hr, Last Rate: 75 mL/hr (04/21/23 0621)      PRN Meds:.•  HYDROmorphone **AND** naloxone  •  HYDROmorphone **AND** naloxone  •  ibuprofen  •  " Magnesium Standard Dose Replacement - Follow Nurse / BPA Driven Protocol  •  ondansetron **OR** ondansetron  •  oxyCODONE  •  oxyCODONE  •  polyethylene glycol  •  Potassium Replacement - Follow Nurse / BPA Driven Protocol  •  promethazine **OR** promethazine    Assessment & Plan   Assessment & Plan     Active Hospital Problems    Diagnosis  POA   • **Acute urinary retention [R33.8]  Yes   • S/P ESMER (total abdominal hysterectomy) [Z90.710]  Yes   • Pelvic mass [R19.00]  Unknown   • Mixed hyperlipidemia [E78.2]  Yes   • Cyst of left ovary [N83.202]  Yes      Resolved Hospital Problems   No resolved problems to display.        Brief Hospital Course to date:  Jocelyne Lindquist is a 62 y.o. female with acute urinary retention and pelvic mass found on admission.    Pelvic mass  -s/p exploratory lap, ESMER/BSO, cystoscopy with ureteral stents-removed, with unremarkable intra-operative pathology, awaiting final path  -padron out and voiding already  -mobilize  -advance po as tolerated    Urinary retention  -resolved    Continue to mobilize, doing well.    Expected Discharge Location and Transportation: Home  Expected Discharge   Expected Discharge Date and Time     Expected Discharge Date Expected Discharge Time    Apr 22, 2023            DVT prophylaxis:  Medical and mechanical DVT prophylaxis orders are present.          CODE STATUS:   Code Status and Medical Interventions:   Ordered at: 04/19/23 2204     Code Status (Patient has no pulse and is not breathing):    CPR (Attempt to Resuscitate)     Medical Interventions (Patient has pulse or is breathing):    Full       Catrachito Lockhart MD  04/21/23

## 2023-04-21 NOTE — PLAN OF CARE
Problem: Adult Inpatient Plan of Care  Goal: Plan of Care Review  Outcome: Ongoing, Progressing  Goal: Patient-Specific Goal (Individualized)  Outcome: Ongoing, Progressing  Goal: Absence of Hospital-Acquired Illness or Injury  Outcome: Ongoing, Progressing  Intervention: Identify and Manage Fall Risk  Recent Flowsheet Documentation  Taken 4/21/2023 1200 by Adalid Butt RN  Safety Promotion/Fall Prevention:   activity supervised   assistive device/personal items within reach   clutter free environment maintained   fall prevention program maintained   lighting adjusted   nonskid shoes/slippers when out of bed   room organization consistent   safety round/check completed  Taken 4/21/2023 1000 by Adalid Butt RN  Safety Promotion/Fall Prevention:   activity supervised   assistive device/personal items within reach   clutter free environment maintained   fall prevention program maintained   lighting adjusted   nonskid shoes/slippers when out of bed   room organization consistent   safety round/check completed  Taken 4/21/2023 0800 by Adalid Butt RN  Safety Promotion/Fall Prevention:   activity supervised   assistive device/personal items within reach   clutter free environment maintained   fall prevention program maintained   mobility aid in reach   nonskid shoes/slippers when out of bed   room organization consistent   safety round/check completed  Intervention: Prevent Skin Injury  Recent Flowsheet Documentation  Taken 4/21/2023 1200 by Adalid Butt RN  Body Position:   position changed independently   weight shifting  Skin Protection:   adhesive use limited   incontinence pads utilized   skin-to-device areas padded   skin-to-skin areas padded  Taken 4/21/2023 1000 by Adalid Butt RN  Body Position:   position changed independently   weight shifting  Skin Protection:   adhesive use limited   skin-to-device areas padded   skin-to-skin areas padded   incontinence pads utilized   tubing/devices free  from skin contact  Taken 4/21/2023 0800 by Adalid Butt RN  Body Position:   position changed independently   weight shifting  Skin Protection:   adhesive use limited   skin-to-skin areas padded   skin-to-device areas padded   incontinence pads utilized  Intervention: Prevent and Manage VTE (Venous Thromboembolism) Risk  Recent Flowsheet Documentation  Taken 4/21/2023 1200 by Adalid Butt RN  Activity Management: up ad keshia  Taken 4/21/2023 1000 by Adalid Butt RN  Activity Management: up ad keshia  Taken 4/21/2023 0800 by Adalid Butt RN  Activity Management: ambulated in room  Range of Motion: active ROM (range of motion) encouraged  Intervention: Prevent Infection  Recent Flowsheet Documentation  Taken 4/21/2023 1200 by Adalid Butt RN  Infection Prevention:   environmental surveillance performed   equipment surfaces disinfected   hand hygiene promoted   personal protective equipment utilized   rest/sleep promoted   single patient room provided  Taken 4/21/2023 1000 by Adalid Butt RN  Infection Prevention:   environmental surveillance performed   equipment surfaces disinfected   hand hygiene promoted   personal protective equipment utilized   rest/sleep promoted   single patient room provided  Taken 4/21/2023 0800 by Adalid Butt RN  Infection Prevention:   environmental surveillance performed   equipment surfaces disinfected   hand hygiene promoted   personal protective equipment utilized   rest/sleep promoted   single patient room provided  Goal: Optimal Comfort and Wellbeing  Outcome: Ongoing, Progressing  Intervention: Provide Person-Centered Care  Recent Flowsheet Documentation  Taken 4/21/2023 0800 by Adalid Butt RN  Trust Relationship/Rapport: care explained  Goal: Readiness for Transition of Care  Outcome: Ongoing, Progressing     Problem: Pain Acute  Goal: Acceptable Pain Control and Functional Ability  Outcome: Ongoing, Progressing  Intervention: Prevent or Manage Pain  Recent  Flowsheet Documentation  Taken 4/21/2023 1200 by Adalid Butt RN  Sensory Stimulation Regulation:   auditory stimulation minimized   care clustered   lighting decreased   quiet environment promoted   visual stimulation minimized  Sleep/Rest Enhancement:   awakenings minimized   natural light exposure provided   regular sleep/rest pattern promoted   relaxation techniques promoted  Medication Review/Management:   medications reviewed   high-risk medications identified  Taken 4/21/2023 1000 by Adalid Butt RN  Sensory Stimulation Regulation:   television on   quiet environment promoted   auditory stimulation minimized   visual stimulation minimized  Sleep/Rest Enhancement:   awakenings minimized   family presence promoted   natural light exposure provided   relaxation techniques promoted   therapeutic touch utilized  Medication Review/Management:   medications reviewed   high-risk medications identified  Taken 4/21/2023 0800 by Adalid Butt RN  Sensory Stimulation Regulation:   television on   auditory stimulation minimized   care clustered   quiet environment promoted   visual stimulation minimized  Sleep/Rest Enhancement:   awakenings minimized   natural light exposure provided   regular sleep/rest pattern promoted  Medication Review/Management: medications reviewed  Intervention: Optimize Psychosocial Wellbeing  Recent Flowsheet Documentation  Taken 4/21/2023 1200 by Adalid Butt RN  Supportive Measures:   active listening utilized   self-care encouraged   self-reflection promoted  Spiritual Activities Assistance:   affirmation provided   personal rituals encouraged  Taken 4/21/2023 1000 by Adalid Butt RN  Supportive Measures:   active listening utilized   decision-making supported   positive reinforcement provided   self-care encouraged   self-reflection promoted  Spiritual Activities Assistance: affirmation provided  Taken 4/21/2023 0800 by Adalid Butt RN  Supportive Measures:   active  listening utilized   self-care encouraged   self-reflection promoted  Diversional Activities: television  Spiritual Activities Assistance: affirmation provided     Problem: Fall Injury Risk  Goal: Absence of Fall and Fall-Related Injury  Outcome: Ongoing, Progressing  Intervention: Identify and Manage Contributors  Recent Flowsheet Documentation  Taken 4/21/2023 1200 by Adalid Butt RN  Medication Review/Management:   medications reviewed   high-risk medications identified  Self-Care Promotion: independence encouraged  Taken 4/21/2023 1000 by Adalid Butt RN  Medication Review/Management:   medications reviewed   high-risk medications identified  Self-Care Promotion: independence encouraged  Taken 4/21/2023 0800 by Adalid Butt RN  Medication Review/Management: medications reviewed  Self-Care Promotion: independence encouraged  Intervention: Promote Injury-Free Environment  Recent Flowsheet Documentation  Taken 4/21/2023 1200 by Adalid Butt RN  Safety Promotion/Fall Prevention:   activity supervised   assistive device/personal items within reach   clutter free environment maintained   fall prevention program maintained   lighting adjusted   nonskid shoes/slippers when out of bed   room organization consistent   safety round/check completed  Taken 4/21/2023 1000 by Adalid Butt RN  Safety Promotion/Fall Prevention:   activity supervised   assistive device/personal items within reach   clutter free environment maintained   fall prevention program maintained   lighting adjusted   nonskid shoes/slippers when out of bed   room organization consistent   safety round/check completed  Taken 4/21/2023 0800 by Adalid Butt RN  Safety Promotion/Fall Prevention:   activity supervised   assistive device/personal items within reach   clutter free environment maintained   fall prevention program maintained   mobility aid in reach   nonskid shoes/slippers when out of bed   room organization consistent   safety  round/check completed     Problem: Infection  Goal: Absence of Infection Signs and Symptoms  Outcome: Ongoing, Progressing  Intervention: Prevent or Manage Infection  Recent Flowsheet Documentation  Taken 4/21/2023 1200 by Adalid Butt RN  Infection Management: aseptic technique maintained  Taken 4/21/2023 1000 by Adalid Butt, RN  Infection Management: aseptic technique maintained  Taken 4/21/2023 0800 by Adalid Butt, RN  Infection Management: aseptic technique maintained   Goal Outcome Evaluation:

## 2023-04-21 NOTE — PROGRESS NOTES
Gynecologic Oncology   Daily Progress Note    Chief Complaint: postoperative follow-up    Subjective   Status post exploratory laparotomy, ESMER/BSO, cystoscopy with temporary ureteral stents (removed intraop) for pelvic mass. Intraoperative findings were not concerning for malignancy.    She is doing well this morning. Her pain is controlled. Tolerating clear liquid diet. She is not having nausea or emesis. Nuñez anchored. Passing flatus. Denies chest pain, dyspnea, fever/chills.     Updated ROS is negative except as reviewed in HPI.    Inpatient Medications:     Current Facility-Administered Medications:   •  acetaminophen (TYLENOL) tablet 650 mg, 650 mg, Oral, Q6H, Kristin Dimas MD, 650 mg at 04/21/23 0621  •  cetirizine (zyrTEC) tablet 10 mg, 10 mg, Oral, Daily, Kristin Dimas MD, 10 mg at 04/20/23 1008  •  heparin (porcine) 5000 UNIT/ML injection 5,000 Units, 5,000 Units, Subcutaneous, Q8H, Kristin Dimas MD, 5,000 Units at 04/21/23 0621  •  HYDROmorphone (DILAUDID) injection 0.5 mg, 0.5 mg, Intravenous, Q2H PRN **AND** naloxone (NARCAN) injection 0.4 mg, 0.4 mg, Intravenous, Q5 Min PRN, Kristin Dimas MD  •  HYDROmorphone (DILAUDID) injection 0.5 mg, 0.5 mg, Intravenous, Q2H PRN **AND** naloxone (NARCAN) injection 0.1 mg, 0.1 mg, Intravenous, Q5 Min PRN, Kristin Dimas MD  •  ibuprofen (ADVIL,MOTRIN) tablet 600 mg, 600 mg, Oral, Q6H PRN, Kristin Dimas MD  •  lactated ringers infusion, 75 mL/hr, Intravenous, Continuous, Kristin Dimas MD, Last Rate: 75 mL/hr at 04/21/23 0621, 75 mL/hr at 04/21/23 0621  •  Magnesium Standard Dose Replacement - Follow Nurse / BPA Driven Protocol, , Does not apply, PRN, Kristin Dimas MD  •  metoprolol succinate XL (TOPROL-XL) 24 hr tablet 100 mg, 100 mg, Oral, Daily, Kristin Dimas MD, 100 mg at 04/20/23 0804  •  ondansetron (ZOFRAN) tablet 4 mg, 4 mg, Oral, Q6H PRN **OR** ondansetron (ZOFRAN) injection 4 mg, 4 mg, Intravenous, Q6H PRN,  Kristin Dimas MD  •  oxyCODONE (ROXICODONE) immediate release tablet 10 mg, 10 mg, Oral, Q4H PRN, Kristin Dimas MD  •  oxyCODONE (ROXICODONE) immediate release tablet 5 mg, 5 mg, Oral, Q4H PRN, Kristin Dimas MD  •  polyethylene glycol (MIRALAX) packet 17 g, 17 g, Oral, Daily PRN, Kristin Dimas MD  •  Potassium Replacement - Follow Nurse / BPA Driven Protocol, , Does not apply, PRN, Kristin Dimas MD  •  pravastatin (PRAVACHOL) tablet 40 mg, 40 mg, Oral, Nightly, Kristin Dimas MD, 40 mg at 04/20/23 2041  •  promethazine (PHENERGAN) suppository 12.5 mg, 12.5 mg, Rectal, Q6H PRN **OR** promethazine (PHENERGAN) tablet 12.5 mg, 12.5 mg, Oral, Q6H PRN, Kristin Dimas MD  •  sennosides-docusate (PERICOLACE) 8.6-50 MG per tablet 2 tablet, 2 tablet, Oral, BID, Kristin Dimas MD, 2 tablet at 04/20/23 2041     Objective   Temp:  [97.2 °F (36.2 °C)-98.1 °F (36.7 °C)] 97.8 °F (36.6 °C)  Heart Rate:  [59-81] 67  Resp:  [12-19] 16  BP: (118-189)/(63-96) 145/75  FiO2 (%):  [95 %-100 %] 95 %  Vitals:    04/21/23 0339   BP: 145/75   Pulse: 67   Resp: 16   Temp: 97.8 °F (36.6 °C)   SpO2: 94%     I/O last 3 completed shifts:  In: 1500 [I.V.:1500]  Out: 3550 [Urine:3450; Blood:100]                 GENERAL: Alert, well-appearing female in no apparent distress.    CARDIOVASCULAR: Normal rate, regular rhythm, no murmurs, rubs, or gallops.    RESPIRATORY: Clear to auscultation bilaterally, normal respiratory effort. Nasal cannula in place.  GASTROINTESTINAL:  Soft, appropriately tender, non-distended, no rebound or guarding. Incision clean/dry/intact. Bowel sounds present.  GENITOURINARY: Nuñez in place with clear urine.  SKIN:  Warm, dry, well-perfused.    PSYCHIATRIC: AO x3, with appropriate affect, normal thought processes  EXREMITIES: Symmetric. No peripheral edema. Venous stasis in bilateral LE.     Lab Results   Component Value Date    WBC 11.17 (H) 04/21/2023    HGB 12.5 04/21/2023    HCT 38.9  04/21/2023    MCV 87.2 04/21/2023     04/21/2023    NEUTROABS 9.47 (H) 04/21/2023    GLUCOSE 149 (H) 04/21/2023    BUN 7 (L) 04/21/2023    CREATININE 0.64 04/21/2023    EGFRIFNONA 76 03/22/2021     04/21/2023    K 4.5 04/21/2023     04/21/2023    CO2 25.0 04/21/2023    MG 1.8 04/21/2023    CALCIUM 9.5 04/21/2023     Lab Results   Component Value Date     11.2 04/19/2023    CEA <0.60 04/19/2023     7.3 04/19/2023         Assessment & Plan   This is a 62 y.o. G0 woman presenting with urinary retention found to have large pelvic mass. Now s/p exploratory laparotomy, ESMER/BSO, cystoscopy with temporary ureteral stents (removed intraop) on 4/20/23. POD#1.    1. Postop care  - PO pain control  - Advance diet as tolerated, HLIV when tolerating PO intake  - SCDs + prophylactic anticoagulation  - Remove padron this morning with voiding trial (ordered) as below  - Encourage ambulation     2. Pelvic mass  - CT AP w/ contrast 4/19: 14cm cystic pelvic mass with internal septation causing mass effect on bladder and uterus. No peritoneal nodules, no lymphadenopathy. There is diverticulosis without diverticulitis. Normal kidneys and ureters.  - History of 4cm L ovarian cyst on previous imaging which may be precursor lesion  - Tumor markers within normal limits.  - s/p exploratory laparotomy, ESMER/BSO, cystoscopy with temporary ureteral stents (removed intraop) on 4/20/23  - Not concerning for malignancy intraoperatively, awaiting final pathology     3. Urinary retention  - 1500mL urine drained with padron upon anchoring; continues to have normal UOP.  - Denies hematuria; urinalysis with small amount of blood, protein.  - Cr normal at 0.63  - Suspect related to mass effect from mass  - Adequate urine output postoperatively  - Voiding trial this morning: Insert 200 mL of sterile solution into bladder via Padron catheter.  Discontinue Padron catheter.  Patient has 1 hour to void 100 mL or more.  If patient is  unable to void as noted, re-anchor Padron catheter and teach Padron care.  Notify M.D.  - May need to be discharged with padron in place if unable to pass voiding trial    Rest of management per primary team.    Kristen Hernandez MD  Gynecologic Oncology  Resident Physician, PGY2    I saw and evaluated the patient. I agree with the findings and the plan of care as documented in the note.  Anticipate D/C home in am.  Reevaluate in am to see how much narcotic needed at D/C home.    Kristin Dimas MD  04/21/23  16:19 EDT

## 2023-04-21 NOTE — PROGRESS NOTES
progress note    Appears to be recovering well.  I would recommend voiding trial that would have her point in time gynecologic service feels appropriate.  Please reconsult as needed   no assistive device

## 2023-04-21 NOTE — NURSING NOTE
Bladder instilled with 200ml of 0.9 NS sterile saline, and padron cath d/c'ed per MD order. Pending patient to void at this time. Will cont. To monitor

## 2023-04-21 NOTE — PAYOR COMM NOTE
"Ref# [id number]  4/19/23 Admitted as Observation status  4/21/23 Flipped to Inpatient status    Utilization Review  Phone [phone]  Fax [phone]    [place] [id number]          Jocelyne Lindquist (62 y.o. Female)     Date of Birth   1960    Social Security Number       Address   [address] [place] [address]    Home Phone   [phone]    MRN   [id number]       Anglican   Judaism    Marital Status                               Admission Date   4/19/23    Admission Type   Emergency    Admitting Provider   Catrachito Lockhart MD    Attending Provider   Catrachito Lockhart MD    Department, Room/Bed   [address], S549/1       Discharge Date       Discharge Disposition       Discharge Destination                               Attending Provider: Catrachito Lockhart MD    Allergies: Benazepril, Doxycycline, Eggs Or Egg-derived Products, Fish-derived Products    Isolation: None   Infection: None   Code Status: CPR    Ht: 160 cm (63\")   Wt: 64.5 kg (142 lb 3.2 oz)    Admission Cmt: None   Principal Problem: Acute urinary retention [R33.8]                 Active Insurance as of 4/19/2023     Primary Coverage     Payor Plan Insurance Group Employer/Plan Group    ANTHEM BLUE CROSS ANTHEM BLUE CROSS BLUE SHIELD PPO [id number]     Payor Plan Address Payor Plan Phone Number Payor Plan Fax Number Effective Dates    [address] [phone]  1/1/2020 - None Entered    [facility] [address]       Subscriber Name Subscriber Birth Date Member ID       LEXABLAIR [birth year]6/1960 [id number]                 Emergency Contacts      (Rel.) Home Phone Work Phone Mobile Phone    LexaThierno robles (Spouse) [phone] -- [phone]            [place]: NPI [id number] Tax ID [id number]  Insurance Information                ANTHEM BLUE CROSS/ANTHEM BLUE CROSS BLUE SHIELD PPO Phone: [phone]    Subscriber: Blair Lindquist Subscriber#: " [id number]    Group#: [id number] Precert#: [id number]             History & Physical      Mira Aranda MD at [address] [place] [address] Medicine Services  HISTORY AND PHYSICAL    Patient Name: Jocelyne Lindquist  : 1960  MRN: [id number]  Primary Care Physician: Uzma Duffy PA-C    Subjective   Subjective     Chief Complaint:unable to urinate    HPI:  Jocelyne Lindquist is a 62 y.o. 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 who presented with acute onset of inability to urinate. She reports chronic urinary frequency, denies abdominal pain, fever, chills, night sweats or weight loss.  She was found to have a large cystic mass in her pelvis causing mass effect on her bladder and is being admitted for evaluation and probable surgical treatment.  She reports knowing she had an ovarian cyst in  but it was deemed benign at that time.  A padron was placed in the ED and she has already made 2 liters of urine.    Review of Systems   Patient denies weight loss, headaches, changes in vision, fever, chills, sore throat, shortness of breath, cough, nausea or vomiting, diarrhea, abdominal pain or distension,  joint pain, rash, itching or bleeding.    Otherwise complete ROS is negative except as mentioned in the HPI.    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    Past Surgical History:   Procedure Laterality Date   • BREAST BIOPSY Left 2010    stereo bx   • CYST  REMOVAL Right     wrist   • SQUAMOUS CELL CARCINOMA EXCISION Right 12/13/2017    outer corner of right eye       Family History: family history includes Cancer in her mother; Heart failure in her father.     Social History:  reports that she quit smoking about 6 years ago. Her smoking use included cigarettes. She started smoking about 23 years ago. She has a 30.00 pack-year smoking history. She has never used smokeless tobacco. She reports that she does not drink alcohol and does not use drugs.  She is retired from [employer], she is   Medications:  Magnesium, Vitamin B-12, albuterol sulfate HFA, calcium carb-cholecalciferol, metoprolol succinate XL, mirtazapine, omeprazole, pravastatin, promethazine-dextromethorphan, thiamine, and triamcinolone    Allergies   Allergen Reactions   • Amoxicillin Swelling   • Benazepril Swelling   • Doxycycline Diarrhea   • Eggs Or Egg-Derived Products      Causes cramps   • Fish-Derived Products      Causes severe stomach cramps       Objective   Objective     Vital Signs:   Temp:  [98.3 °F (36.8 °C)-99.2 °F (37.3 °C)] 99.2 °F (37.3 °C)  Heart Rate:  [67-88] 88  Resp:  [18] 18  BP: (120-181)/(48-95) 132/68    Constitutional: Awake, alert, interactive and pleasant  Eyes: clear sclerae, no conjunctival injection, scarring lateral right eye  HENT: NCAT, mucous membranes dry  Neck: no masses or lymphadenopathy, trachea midline  Respiratory: good breath sounds bilaterally, respirations unlabored  Cardiovascular: RRR, no murmurs appreciated, palpable peripheral pulses  Abdomen:  soft, no HSM, palpable fullness in her lower abdomen, not tender  Musculoskeletal: No peripheral edema, clubbing or cyanosis  Neurologic: Oriented x 3,                       Strength symmetric in all extremities                     Cranial Nerves grossly intact, speech clear  Skin: No rashes or jaundice, face is red and irritate, covered in freckles  Psychiatric: Appropriate mood, insight      Result Review:  I  have personally reviewed the results from the time of this admission   to 4/19/2023 21:20 EDT and agree with these findings:  [x]  Laboratory  [x]  Microbiology  [x]  Radiology  [x]  EKG/Telemetry   []  Cardiology/Vascular   []  Pathology  []  Old records  []  Other:  Most notable findings include:normal labs, abnormal CT reviewed    LAB RESULTS:      Lab 04/19/23  0826   WBC 11.29*   HEMOGLOBIN 13.4   HEMATOCRIT 41.0   PLATELETS 305   NEUTROS ABS 8.91*   IMMATURE GRANS (ABS) 0.06*   LYMPHS ABS 1.53   MONOS ABS 0.36   EOS ABS 0.36   MCV 85.1         Lab 04/19/23  0826   SODIUM 138   POTASSIUM 5.0   CHLORIDE 98   CO2 29.0   ANION GAP 11.0   BUN 7*   CREATININE 0.69   EGFR 97.1   GLUCOSE 116*   CALCIUM 9.4         Lab 04/19/23  0826   TOTAL PROTEIN 6.8   ALBUMIN 4.1   GLOBULIN 2.7   ALT (SGPT) 17   AST (SGOT) 19   BILIRUBIN 0.7   ALK PHOS 97                     Brief Urine Lab Results  (Last result in the past 365 days)      Color   Clarity   Blood   Leuk Est   Nitrite   Protein   CREAT   Urine HCG        04/19/23 0737 Yellow   Clear   Negative   Negative   Negative   Negative               COVID19   Date Value Ref Range Status   01/16/2021 Not Detected Not Detected - Ref. Range Final       CT Abdomen Pelvis With Contrast    Result Date: 4/19/2023  CT ABDOMEN PELVIS W CONTRAST Date of Exam: 4/19/2023 9:47 AM EDT Indication: urinary retention. Comparison: 2/17/2017 Technique: Axial CT images were obtained of the abdomen and pelvis following the uneventful intravenous administration of 80 mL Isovue-300. Reconstructed coronal and sagittal images were also obtained. Automated exposure control and iterative construction methods were used. Findings: Lower thorax:Lung bases are clear. No coronary artery calcifications seen in the visualized heart. No pericardial effusion.  No evidence of pulmonary embolus in the visualized pulmonary arteries. Liver: No focal hepatic lesions seen.  Normal hepatic size. Normal density of the  liver. Gallbladder and bile ducts: Cholelithiasis without evidence of cholecystitis. No intra- or extra- hepatic biliary ductal dilatation. Spleen: Calcified granulomas. Pancreas: Normal appearance of the pancreas. Main pancreatic duct is nondilated. Adrenals: Normal appearance of the adrenal glands. Kidneys: Normal appearance of the kidneys. Symmetric enhancement and excretion of contrast. No nephrolithiasis.  Normal caliber of the ureters. Bowel: Normal caliber of the bowels. Normal appearance of the appendix. Diverticulosis without diverticulitis. Pelvis: Decompressed bladder with Nuñez catheter. Small amount of gas in the bladder is likely due to catheterization. Large cystic lesion seen within the pelvis measuring approximately 10.4 x 14.0 x 9.4 cm. There is an internal rounded septation/internal cyst component. This lesion exerts mass effect on the uterus and bladder pushing them anteriorly and to the right. The left gonadal vein appears to terminate at the cystic lesion suggesting that this is a left ovarian cystic neoplasm. On prior CT there was a 4.2 x 3.7 cm left ovarian cyst which may have been the precursor for this lesion. Peritoneum: No free air. No free fluid. No peritoneal nodularity. Vessels: Normal aortic caliber. Atherosclerotic calcification of the aorta. Celiac artery, splenic artery, superior mesenteric artery, inferior mesenteric artery, renal arteries and iliac arteries appear patent. Iliac veins, inferior vena cava, superior mesenteric vein, renal veins, portal vein and splenic vein are patent. Lymph nodes: No enlarged or suspicious adenopathy. Bones: No acute osseous abnormality. Degenerative changes of the spine. Soft tissues: Unremarkable appearance of the soft tissues.     Impression: Impression: Large cystic mass within the pelvis with internal rounded septation/internal cyst. This appears to be left ovarian in origin as the left gonadal vein appears to terminate here. Additionally  there was a left ovarian cyst seen on prior CT which may've been  the precursor for this lesion. This most likely reflects a left ovarian cystic neoplasm such as serous cystadenoma but incompletely characterized on this examination. This mass exerts mass effect on the bladder and uterus anteriorly and to the right. Electronically Signed: Jamie BrownClaire  4/19/2023 10:42 AM EDT  Workstation ID: [id number]          The patient qualifies to receive the vaccine, but they have not yet received it.    Assessment & Plan   Assessment / Plan       Acute urinary retention    Cyst of left ovary    Mixed hyperlipidemia    Pelvic mass      Assessment & Plan:    Jocelyne Lindquist is a 62 y.o. 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 who presented with acute onset of inability to urinate and  found to have a large cystic mass    Pelvic Cystic Mass  -Appreciate GynOnc seeing her and making plans for removal 4/20    Urinary retention s/p padron with postop obstructive diuresis  -monitor output closely and replace electrolytes as needed    Prior HO Tobacco and alcohol use        DVT prophylaxis:SCDS      CODE STATUS:FULL CODE     Expected Discharge  Expected Discharge Date and Time     Expected Discharge Date Expected Discharge Time    Apr 24, 2023           Electronically signed by Mira Aranda MD 04/19/23 21:20 EDT            Electronically signed by Mira Aranda MD at 04/19/23 2120          Emergency Department Notes      Nav Quiroz PA at 04/19/23 0924     Attestation signed by Alex Foreman MD at 04/19/23 1523        NON FACE TO FACE: This visit was performed by BOTH a physician and an APC. I performed all aspects of the MDM as documented.  Alex Foreman MD 4/19/2023 15:23 EDT                         Subjective   History of Present Illness  Ms. Lindquist is a  "62-year-old female with a history of hypertension, hyperlipidemia and GERD who presents to the emergency department with complaints of urinary retention.  The patient states that she typically has urinary frequency but over the past 12 hours, she has been able to urinate.  She denies any constipation.  No new medications.  She is not on any pain meds or decongestants.  She denies any associated burning with urination.  No fever or chills.  No abdominal pain, nausea, vomiting or diarrhea.  She notes a \"pressure sensation\" in the vaginal and suprapubic region.        Review of Systems   Constitutional: Negative for chills and fever.   HENT: Negative for sore throat.    Respiratory: Negative for cough and shortness of breath.    Cardiovascular: Negative for chest pain.   Gastrointestinal: Positive for abdominal pain (Suprapubic pressure). Negative for diarrhea, nausea and vomiting.   Genitourinary: Positive for difficulty urinating. Negative for flank pain and hematuria.   Musculoskeletal: Negative for back pain.   Skin: Negative for pallor and rash.   Allergic/Immunologic: Negative for immunocompromised state.   Neurological: Negative for light-headedness and headaches.   Hematological: Negative.    Psychiatric/Behavioral: Negative.        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, right     outer corner of right eye   • Tobacco abuse 2/18/2017    Quit 12/25/17       Allergies   Allergen Reactions   • Amoxicillin Swelling   • Benazepril Swelling   • Doxycycline Diarrhea   • Eggs Or Egg-Derived Products      Causes cramps   • Fish-Derived Products      Causes severe stomach cramps       Past Surgical History:   Procedure Laterality Date   • BREAST BIOPSY Left 06/18/2010    stereo bx   • CYST REMOVAL Right     wrist   • SQUAMOUS " CELL CARCINOMA EXCISION Right 2017    outer corner of right eye       Family History   Problem Relation Age of Onset   • Cancer Mother         esophageal and liver   • Heart failure Father    • Breast cancer Neg Hx    • Ovarian cancer Neg Hx        Social History     Socioeconomic History   • Marital status:    Tobacco Use   • Smoking status: Former     Packs/day: 1.00     Years: 30.00     Pack years: 30.00     Types: Cigarettes     Start date: 2000     Quit date: 2017     Years since quittin.2   • Smokeless tobacco: Never   • Tobacco comments:     17   Vaping Use   • Vaping Use: Never used   Substance and Sexual Activity   • Alcohol use: No     Comment: not since 17   • Drug use: No   • Sexual activity: Defer           Objective   Physical Exam  Constitutional:       General: She is not in acute distress.     Appearance: Normal appearance. She is not ill-appearing.   HENT:      Head: Normocephalic.      Nose: Nose normal.      Mouth/Throat:      Mouth: Mucous membranes are moist.   Eyes:      General: No scleral icterus.     Conjunctiva/sclera: Conjunctivae normal.      Pupils: Pupils are equal, round, and reactive to light.   Cardiovascular:      Rate and Rhythm: Normal rate and regular rhythm.      Pulses: Normal pulses.      Heart sounds: Normal heart sounds.   Pulmonary:      Effort: Pulmonary effort is normal.      Breath sounds: Normal breath sounds.   Abdominal:      General: Bowel sounds are normal.      Tenderness: There is no abdominal tenderness.   Musculoskeletal:         General: No tenderness. Normal range of motion.      Cervical back: Normal range of motion and neck supple.   Skin:     General: Skin is warm and dry.   Neurological:      General: No focal deficit present.      Mental Status: She is alert and oriented to person, place, and time.   Psychiatric:         Mood and Affect: Mood normal.         Procedures          ED Course      In summary, 62-year-old  "female presents with urinary retention over the past 12 hours.  No new medications.  No constipation.  No over-the-counter meds.  The patient notes that she typically has urinary frequency but has never had problems with retention before.  She notes a \"pressure sensation\" in the pelvic and suprapubic region.  Past medical history of hypertension, hyperlipidemia and GERD.    MDM: Differential includes urinary retention secondary to medication or over-the-counter meds, bladder outlet obstruction, malignancy, renal failure, bladder prolapse, uterine prolapse, etc.    Labs and UA collected.  Bladder scan showed 250 cc postvoid residual, however, on placement of Nuñez catheter, the patient had nearly 1500 cc of urine out.  Nuñez catheter was anchored.    Labs are bland.  Creatinine is normal at 0.69.  White count is 11.29.  No anemia.  No concerning electrolytes.  Glucose is 116.  Urinalysis shows no evidence of UTI.    Patient sent for CT abdomen pelvis to look for cause of obstructive uropathy.    CT abdomen pelvis with IV contrast:  Large cystic mass within the pelvis with internal rounded septation/internal cyst. This appears to be left ovarian in origin as the left gonadal vein appears to terminate here. Additionally there was a left ovarian cyst seen on prior CT which may've been   the precursor for this lesion. This most likely reflects a left ovarian cystic neoplasm such as serous cystadenoma but incompletely characterized on this examination. This mass exerts mass effect on the bladder and uterus anteriorly and to the right.    I spoke with the patient about the work-up.  I feel that her pelvic mass is causing compression against the bladder outlet, resulting in urinary retention.    I spoke with Dr. Kristin Penn about the patient.  She reviewed the CT scan and felt that admission with plans for surgical intervention tomorrow was appropriate.  The patient is agreeable with plans for admission and surgery.  The " hospitalist has been notified.                                           MDM    Final diagnoses:   Acute urinary retention   Ovarian mass, left       ED Disposition  ED Disposition     ED Disposition   Decision to Admit    Condition   --    Comment   Level of Care: Telemetry [5]   Diagnosis: Acute urinary retention [452089]   Admitting Physician: VERA GOLD [[id number]]   Attending Physician: VERA GOLD [[id number]]               Kristin Dimas MD  [address] [place] [address]  [phone]      call tomorrow for follow up in office to discuss left ovarian mass         Medication List      No changes were made to your prescriptions during this visit.          Nav Quiroz PA  04/19/23 1320      Electronically signed by Alex Foreman MD at 04/19/23 1523          Operative/Procedure Notes (last 7 days)      Kristin Dimas MD at 04/20/23 1449            Subjective      Date of Service:  04/20/23  Time of Service:  16:28 EDT    Surgical Staff: Surgeon(s) and Role:     * Kristin Dimas MD - Primary   Additional Staff:   Assistant: Ankit Curry PA-C  was responsible for performing the following activities: Retraction, Suction, Irrigation, Closing and Placing Dressing and their skilled assistance was necessary for the success of this case.     Pre-operative diagnosis(es): Pre-Op Diagnosis Codes:     * Acute urinary retention [R33.8]     * Pelvic mass [R19.00]     Post-operative diagnosis(es): Post-Op Diagnosis Codes:     * Acute urinary retention [R33.8]     * Pelvic mass [R19.00]   Procedure(s): Procedure(s):  EXPLORATORY LAPAROTOMY, TOTAL ABDOMINAL HYSTERECTOMY BILATERAL SALPINGO-OOPHORECTOMY  CYSTOSCOPY TEMPORARY PLACEMENT BILATERAL URETERAL CATHETER     Antibiotics: cefazolin (Ancef) ordered on call to OR     Anesthesia: Type: General with Block  ASA:  II     Objective       Operative findings:  The time of cystoscopy, distended bladder was noted.  Ureteral orifices were  unremarkable bilaterally.  At the time of laparotomy, there is a large cystic mass arising on the left adnexa.  On frozen section, this is noncancerous.  Uterus and right adnexa were atrophic.   Specimens removed: ID Type Source Tests Collected by Time   A : left tube and ovary for frozen  Tissue Ovary, Left with Fallopian Tube TISSUE PATHOLOGY EXAM Kristin Dimas MD 4/20/2023 1518   B : uterus, cervix, right tube and ovary for permanent Tissue Uterus, Cervix, R Fallopian Tube, R Ovary TISSUE PATHOLOGY EXAM Kristin Dimas MD 4/20/2023 1543      Fluid Intake and Output: I/O this shift:  In: 1000 [I.V.:1000]  Out: 900 [Urine:800; Blood:100]   Blood products used: No   Drains: Urethral Catheter Silicone 16 Fr. (Active)       [REMOVED] Urethral Catheter Straight-tip 16 Fr. (Removed)   Daily Indications Acute Urinary Retention 04/20/23 1000   Site Assessment Clean;Skin intact 04/20/23 1224   Collection Container Standard drainage bag 04/20/23 1224   Securement Method Securing device 04/20/23 1224   Catheter care complete Yes 04/20/23 0800   Output (mL) 1250 mL 04/20/23 0500      Implant Information: Nothing was implanted during the procedure   Complications:  No immediate   Intraoperative consult(s):    Condition: stable   Disposition: to PACU and then admit to  medical - surgical floor       Indications:  Patient is a pleasant 62-year-old woman who presented to the emergency room with urinary retention.  She was found to have a large cystic pelvic mass.  This had been previously noted on imaging and had grown in size.  Risks and benefits of surgery were discussed.  Consent was signed and on chart.    Procedure: After obtaining informed consent, patient was taken to the operating room and underwent general endotracheal anesthesia after patient and site verification.  Regional block was performed by anesthesia team.  Feet were placed in Chester stirrups.  Abdomen, perineum, and vagina were prepped and draped in  usual sterile fashion.  Cystoscopy was performed with the above findings.  Bilateral 5 [place] catheters were placed without difficulty under direct visualization.  Nuñez catheter was anchored and the ureteral catheters were attached to a sterile glove.  At the completion of the surgery, the intact temporary bilateral ureteral catheters were removed.  Attention was turned to the abdomen.  Vertical midline incision was made and carried from the pubic symphysis to the umbilicus.  Abdomen was entered without difficulty.  Bladder was noted to be significantly enlarged and was manually decompressed to allow all further drainage of the cystoscopy fluid.  Pelvic washings were obtained and discarded after frozen section results.  Edyta clamp was used to grasp the left uterine cornua.  The ligament was divided with Bovie electrocautery.  Utero-ovarian ligament was divided using Enseal.  The uterine side of the pedicle was secured with 0 Vicryl suture.  Peritoneum was divided and infundibulopelvic ligament was isolated.  Greater curved clamp was placed across the pedicle which was divided using Enseal.  0 Vicryl suture was used to further secure the pedicle.  Specimen was sent for frozen section with the above diagnosis.  Bookwalter self-retaining retractor was placed.  Moist laparotomy sponges were used to pack the viscera away from the pelvis.  Edyta clamps were placed at the cornua bilaterally.  Bladder flap was developed with Bovie electrocautery.  Round ligament on the right was divided Bovie electrocautery.  Peritoneum overlying the pelvic sidewall was divided in a similar fashion.  Infundibulopelvic ligament on the right was isolated from surrounding structures and pedicle was created with right angle, Enseal, and 0 Vicryl tie.  Uterine vessels and cardinal ligament complexes were divided using Enseal.  When the level distal to the cervix was reached, greater curved clamps were used to secure the angles and pedicle was  created using Bovie electrocautery and 0 Vicryl suture.  Suture was clamped and retained.  Cervix was divided from the vagina and specimen was sent off the field for permanent pathology.  Vaginal cuff was closed with 0 Vicryl suture in a running locking fashion.  The surgical field was copiously irrigated and aspirated.  Good hemostasis was noted.  All laparotomy sponges, instruments, retractors were removed from the abdominal cavity.  Fascia was reapproximated with #1 looped PDS in a bulk closure.  All suprafascial tissue was irrigated aspirated and made hemostatic.  Deep dermis was reapproximated with 2-0 Vicryl in interrupted fashion.  Skin was closed with 3-0 Monocryl subcuticular stitch.  Exofin/pernio dressing was placed at the skin.  Patient was taken to the recovery room in good condition.  There were no immediate complications.  All counts were correct.      Kristin Dimas MD  23  16:27 EDT      Electronically signed by Kristin Dimas MD at 23 1639          Physician Progress Notes (last 7 days)      Catrachito Lockhart MD at 23 0852              [facility] Medicine Services  PROGRESS NOTE    Patient Name: Jocelyne Lindquist  : 1960  MRN: [id number]    Date of Admission: 2023  Primary Care Physician: Uzma Duffy PA-C    Subjective   Subjective     CC: Pelvic mass    HPI: Up in chair. Walking well. Pain controlled. Nuñez out and voiding. No f/c. No n/v. Tolerating PO, +flatus, and appetite increasing.     Review of Systems   Constitutional: Positive for activity change and fatigue.   HENT: Negative.    Respiratory: Negative.    Cardiovascular: Negative.    Gastrointestinal: Positive for abdominal pain.   Genitourinary: Positive for hematuria.         Objective   Objective     Vital Signs:   Temp:  [97.2 °F (36.2 °C)-98.1 °F (36.7 °C)] 97.7 °F (36.5 °C)  Heart Rate:  [59-77] 75  Resp:  [12-18] 16  BP: (118-189)/(63-96) 147/90  Flow (L/min):   [2-3] 2  FiO2 (%):  [95 %-100 %] 95 %     Physical Exam:  NAD, alert and oriented  OP clear, MMM  Neck supple  No LAD  RRR  CTAB  +BS, soft, TTP appropriately post-op  No c/c/e  No rashes  CORBETT  Normal affect    Results Reviewed:  LAB RESULTS:      Lab 04/21/23 0411 04/20/23 0321 04/19/23  0826   WBC 11.17* 7.18 11.29*   HEMOGLOBIN 12.5 12.6 13.4   HEMATOCRIT 38.9 38.3 41.0   PLATELETS 311 275 305   NEUTROS ABS 9.47* 4.24 8.91*   IMMATURE GRANS (ABS) 0.04 0.02 0.06*   LYMPHS ABS 1.29 2.14 1.53   MONOS ABS 0.36 0.46 0.36   EOS ABS 0.00 0.29 0.36   MCV 87.2 85.7 85.1         Lab 04/21/23 0411 04/20/23 2057 04/20/23 0321 04/19/23  0826   SODIUM 139  --  145 138   POTASSIUM 4.5 4.2 3.6 5.0   CHLORIDE 104  --  110* 98   CO2 25.0  --  26.0 29.0   ANION GAP 10.0  --  9.0 11.0   BUN 7*  --  8 7*   CREATININE 0.64  --  0.63 0.69   EGFR 98.8  --  99.2 97.1   GLUCOSE 149*  --  121* 116*   CALCIUM 9.5  --  8.9 9.4   MAGNESIUM 1.8  --  1.4*  --    HEMOGLOBIN A1C  --   --   --  5.90*         Lab 04/19/23  0826   TOTAL PROTEIN 6.8   ALBUMIN 4.1   GLOBULIN 2.7   ALT (SGPT) 17   AST (SGOT) 19   BILIRUBIN 0.7   ALK PHOS 97                 Lab 04/20/23  0321   ABO TYPING O   RH TYPING Positive   ANTIBODY SCREEN Negative         Brief Urine Lab Results  (Last result in the past 365 days)      Color   Clarity   Blood   Leuk Est   Nitrite   Protein   CREAT   Urine HCG        04/19/23 0737 Yellow   Clear   Negative   Negative   Negative   Negative                 Microbiology Results Abnormal     None          CT Abdomen Pelvis With Contrast    Result Date: 4/19/2023  CT ABDOMEN PELVIS W CONTRAST Date of Exam: 4/19/2023 9:47 AM EDT Indication: urinary retention. Comparison: 2/17/2017 Technique: Axial CT images were obtained of the abdomen and pelvis following the uneventful intravenous administration of 80 mL Isovue-300. Reconstructed coronal and sagittal images were also obtained. Automated exposure control and iterative construction  methods were used. Findings: Lower thorax:Lung bases are clear. No coronary artery calcifications seen in the visualized heart. No pericardial effusion.  No evidence of pulmonary embolus in the visualized pulmonary arteries. Liver: No focal hepatic lesions seen.  Normal hepatic size. Normal density of the liver. Gallbladder and bile ducts: Cholelithiasis without evidence of cholecystitis. No intra- or extra- hepatic biliary ductal dilatation. Spleen: Calcified granulomas. Pancreas: Normal appearance of the pancreas. Main pancreatic duct is nondilated. Adrenals: Normal appearance of the adrenal glands. Kidneys: Normal appearance of the kidneys. Symmetric enhancement and excretion of contrast. No nephrolithiasis.  Normal caliber of the ureters. Bowel: Normal caliber of the bowels. Normal appearance of the appendix. Diverticulosis without diverticulitis. Pelvis: Decompressed bladder with Nuñez catheter. Small amount of gas in the bladder is likely due to catheterization. Large cystic lesion seen within the pelvis measuring approximately 10.4 x 14.0 x 9.4 cm. There is an internal rounded septation/internal cyst component. This lesion exerts mass effect on the uterus and bladder pushing them anteriorly and to the right. The left gonadal vein appears to terminate at the cystic lesion suggesting that this is a left ovarian cystic neoplasm. On prior CT there was a 4.2 x 3.7 cm left ovarian cyst which may have been the precursor for this lesion. Peritoneum: No free air. No free fluid. No peritoneal nodularity. Vessels: Normal aortic caliber. Atherosclerotic calcification of the aorta. Celiac artery, splenic artery, superior mesenteric artery, inferior mesenteric artery, renal arteries and iliac arteries appear patent. Iliac veins, inferior vena cava, superior mesenteric vein, renal veins, portal vein and splenic vein are patent. Lymph nodes: No enlarged or suspicious adenopathy. Bones: No acute osseous abnormality.  Degenerative changes of the spine. Soft tissues: Unremarkable appearance of the soft tissues.     Impression: Impression: Large cystic mass within the pelvis with internal rounded septation/internal cyst. This appears to be left ovarian in origin as the left gonadal vein appears to terminate here. Additionally there was a left ovarian cyst seen on prior CT which may've been  the precursor for this lesion. This most likely reflects a left ovarian cystic neoplasm such as serous cystadenoma but incompletely characterized on this examination. This mass exerts mass effect on the bladder and uterus anteriorly and to the right. Electronically Signed: Jamie Rangel  4/19/2023 10:42 AM EDT  Workstation ID: [id number]    Peripheral Block    Result Date: 4/20/2023  Kristen Vo CRNA     4/20/2023  3:09 PM Peripheral Block Patient reassessed immediately prior to procedure Patient location during procedure: OR Start time: 4/20/2023 2:30 PM Reason for block: at surgeon's request and post-op pain management Performed by Anesthesiologist: Yogi Zhu MD Preanesthetic Checklist Completed: patient identified, IV checked, site marked, risks and benefits discussed, surgical consent, monitors and equipment checked, pre-op evaluation and timeout performed Prep: Pt Position: supine Sterile barriers:cap, gloves, mask and washed/disinfected hands Prep: ChloraPrep Patient monitoring: blood pressure monitoring, continuous pulse oximetry and EKG Procedure Sedation: yes Performed under: general Guidance:ultrasound guided Images:still images obtained, printed/placed on chart Laterality:Bilateral Block Type:TAP Injection Technique:single-shot Needle Type:short-bevel and echogenic Needle Gauge:20 G Resistance on Injection: none Medications Used: bupivacaine PF (MARCAINE) 0.25 % injection - Injection  60 mL - 4/20/2023 2:30:00 PM dexamethasone sodium phosphate injection - Injection  4 mg - 4/20/2023 2:30:00 PM Medications  "Comment:Block Injection:  LA dose divided between Right and Left block Post Assessment Injection Assessment: negative aspiration for heme, incremental injection and no paresthesia on injection Patient Tolerance:comfortable throughout block Complications:no Additional Notes Subcostal TAPs A high-frequency linear transducer, with sterile cover, was placed sub-xiphoid to identify [place], right and left Rectus Abdominus Muscles (NIMESH). The transducer was moved either right or left subcostally to identify the NIMESH and the Transverse Abdominus Muscle (SOLANO). The insertion site was prepped in sterile fashion and then localized with 2-5 ml of 1% Lidocaine. Using ultrasound-guidance, a 20-gauge B-Pleitez 4\" Ultraplex 360 non-stimulating echogenic needle was advanced in plane, from medial to lateral, until the tip of the needle was in the fascial plane between the NIMESH and SOLANO. 1-3ml of preservative free normal saline was used to hydro-dissect the fascial planes. After the fascial plane was verified, the local anesthetic (LA) was injected. The procedure was repeated on the opposite side for bilateral coverage. Aspiration every 5 ml to prevent intravascular injection. Injection was completed with negative aspiration of blood and negative intravascular injection. Injection pressures were normal with minimal resistance. The subcostal approach to the TAP nerve block ideally anesthetizes the intercostal nerves T6-T9. Mid-Axillary/Lateral TAPs A high-frequency linear transducer, with sterile cover, was placed in the midaxillary line between the ASIS and costal margin. The External Oblique Muscle (EOM), Internal Oblique Muscle (IOM), Transverse Abdominus Muscle (SOLANO), and Peritoneum were identified. The insertion site was prepped in sterile fashion and then localized with 2-5 ml of 1% Lidocaine. Using ultrasound-guidance, a 20-gauge B-Pleitez 4\" Ultraplex 360 non-stimulating echogenic needle was advanced in plane, from medial to " lateral, until the tip of the needle was in the fascial plane between the IOM and SOLANO. 1-3ml of preservative free normal saline was used to hydro-dissect the fascial planes. After the fascial plane was verified, the local anesthetic (LA) was injected. The procedure was repeated on the opposite side for bilateral coverage. Aspiration every 5 ml to prevent intravascular injection. Injection was completed with negative aspiration of blood and negative intravascular injection. Injection pressures were normal with minimal resistance. Midaxillary TAPs should reach intercostal nerves T10- T11 and the subcostal nerve T12.            Current medications:  Scheduled Meds:acetaminophen, 650 mg, Oral, Q6H  cetirizine, 10 mg, Oral, Daily  heparin (porcine), 5,000 Units, Subcutaneous, Q8H  metoprolol succinate XL, 100 mg, Oral, Daily  pravastatin, 40 mg, Oral, Nightly  sennosides-docusate, 2 tablet, Oral, BID      Continuous Infusions:lactated ringers, 75 mL/hr, Last Rate: 75 mL/hr (04/21/23 0621)      PRN Meds:.•  HYDROmorphone **AND** naloxone  •  HYDROmorphone **AND** naloxone  •  ibuprofen  •  Magnesium Standard Dose Replacement - Follow Nurse / BPA Driven Protocol  •  ondansetron **OR** ondansetron  •  oxyCODONE  •  oxyCODONE  •  polyethylene glycol  •  Potassium Replacement - Follow Nurse / BPA Driven Protocol  •  promethazine **OR** promethazine    Assessment & Plan   Assessment & Plan     Active Hospital Problems    Diagnosis  POA   • **Acute urinary retention [R33.8]  Yes   • S/P ESMER (total abdominal hysterectomy) [Z90.710]  Yes   • Pelvic mass [R19.00]  Unknown   • Mixed hyperlipidemia [E78.2]  Yes   • Cyst of left ovary [N83.202]  Yes      Resolved Hospital Problems   No resolved problems to display.        Brief Hospital Course to date:  Jocelyne Lindquist is a 62 y.o. female with acute urinary retention and pelvic mass found on admission.    Pelvic mass  -s/p exploratory lap, ESMER/BSO, cystoscopy with ureteral  stents-removed, with unremarkable intra-operative pathology, awaiting final path  -padron out and voiding already  -mobilize  -advance po as tolerated    Urinary retention  -resolved    Continue to mobilize, doing well.    Expected Discharge Location and Transportation: Home  Expected Discharge   Expected Discharge Date and Time     Expected Discharge Date Expected Discharge Time    Apr 22, 2023            DVT prophylaxis:  Medical and mechanical DVT prophylaxis orders are present.          CODE STATUS:   Code Status and Medical Interventions:   Ordered at: 04/19/23 7572     Code Status (Patient has no pulse and is not breathing):    CPR (Attempt to Resuscitate)     Medical Interventions (Patient has pulse or is breathing):    Full       Catrachito Lockhart MD  04/21/23        Electronically signed by Catrachito Lockhart MD at 04/21/23 3146     Kristen Hernandez MD at 04/21/23 2446          Gynecologic Oncology   Daily Progress Note    Chief Complaint: postoperative follow-up    Subjective   Status post exploratory laparotomy, ESMER/BSO, cystoscopy with temporary ureteral stents (removed intraop) for pelvic mass. Intraoperative findings were not concerning for malignancy.    She is doing well this morning. Her pain is controlled. Tolerating clear liquid diet. She is not having nausea or emesis. Padron anchored. Passing flatus. Denies chest pain, dyspnea, fever/chills.     Updated ROS is negative except as reviewed in HPI.    Inpatient Medications:     Current Facility-Administered Medications:   •  acetaminophen (TYLENOL) tablet 650 mg, 650 mg, Oral, Q6H, Kristin Dimas MD, 650 mg at 04/21/23 0621  •  cetirizine (zyrTEC) tablet 10 mg, 10 mg, Oral, Daily, Kristin Dimas MD, 10 mg at 04/20/23 1008  •  heparin (porcine) 5000 UNIT/ML injection 5,000 Units, 5,000 Units, Subcutaneous, Q8H, Kristin Dimas MD, 5,000 Units at 04/21/23 0621  •  HYDROmorphone (DILAUDID) injection 0.5 mg, 0.5 mg, Intravenous, Q2H PRN  **AND** naloxone (NARCAN) injection 0.4 mg, 0.4 mg, Intravenous, Q5 Min PRN, Kristin Dimas MD  •  HYDROmorphone (DILAUDID) injection 0.5 mg, 0.5 mg, Intravenous, Q2H PRN **AND** naloxone (NARCAN) injection 0.1 mg, 0.1 mg, Intravenous, Q5 Min PRN, Kristin Dimas MD  •  ibuprofen (ADVIL,MOTRIN) tablet 600 mg, 600 mg, Oral, Q6H PRN, Kristin Dimas MD  •  lactated ringers infusion, 75 mL/hr, Intravenous, Continuous, Kristin Dimas MD, Last Rate: 75 mL/hr at 04/21/23 0621, 75 mL/hr at 04/21/23 0621  •  Magnesium Standard Dose Replacement - Follow Nurse / BPA Driven Protocol, , Does not apply, PRN, Kristin Dimas MD  •  metoprolol succinate XL (TOPROL-XL) 24 hr tablet 100 mg, 100 mg, Oral, Daily, Kristin Dimas MD, 100 mg at 04/20/23 0804  •  ondansetron (ZOFRAN) tablet 4 mg, 4 mg, Oral, Q6H PRN **OR** ondansetron (ZOFRAN) injection 4 mg, 4 mg, Intravenous, Q6H PRN, Kristin Dimas MD  •  oxyCODONE (ROXICODONE) immediate release tablet 10 mg, 10 mg, Oral, Q4H PRN, Kristin Dimas MD  •  oxyCODONE (ROXICODONE) immediate release tablet 5 mg, 5 mg, Oral, Q4H PRN, Kristin Dimas MD  •  polyethylene glycol (MIRALAX) packet 17 g, 17 g, Oral, Daily PRN, Kristin Dimas MD  •  Potassium Replacement - Follow Nurse / BPA Driven Protocol, , Does not apply, PRN, Kristin Dimas MD  •  pravastatin (PRAVACHOL) tablet 40 mg, 40 mg, Oral, Nightly, Kristin Dimas MD, 40 mg at 04/20/23 2041  •  promethazine (PHENERGAN) suppository 12.5 mg, 12.5 mg, Rectal, Q6H PRN **OR** promethazine (PHENERGAN) tablet 12.5 mg, 12.5 mg, Oral, Q6H PRN, Kristin Dimas MD  •  sennosides-docusate (PERICOLACE) 8.6-50 MG per tablet 2 tablet, 2 tablet, Oral, BID, Kristin Dimas MD, 2 tablet at 04/20/23 2041     Objective   Temp:  [97.2 °F (36.2 °C)-98.1 °F (36.7 °C)] 97.8 °F (36.6 °C)  Heart Rate:  [59-81] 67  Resp:  [12-19] 16  BP: (118-189)/(63-96) 145/75  FiO2 (%):  [95 %-100 %] 95 %  Vitals:    04/21/23  0339   BP: 145/75   Pulse: 67   Resp: 16   Temp: 97.8 °F (36.6 °C)   SpO2: 94%     I/O last 3 completed shifts:  In: 1500 [I.V.:1500]  Out: 3550 [Urine:3450; Blood:100]                 GENERAL: Alert, well-appearing female in no apparent distress.    CARDIOVASCULAR: Normal rate, regular rhythm, no murmurs, rubs, or gallops.    RESPIRATORY: Clear to auscultation bilaterally, normal respiratory effort. Nasal cannula in place.  GASTROINTESTINAL:  Soft, appropriately tender, non-distended, no rebound or guarding. Incision clean/dry/intact. Bowel sounds present.  GENITOURINARY: Padron in place with clear urine.  SKIN:  Warm, dry, well-perfused.    PSYCHIATRIC: AO x3, with appropriate affect, normal thought processes  EXREMITIES: Symmetric. No peripheral edema. Venous stasis in bilateral LE.     Lab Results   Component Value Date    WBC 11.17 (H) 04/21/2023    HGB 12.5 04/21/2023    HCT 38.9 04/21/2023    MCV 87.2 04/21/2023     04/21/2023    NEUTROABS 9.47 (H) 04/21/2023    GLUCOSE 149 (H) 04/21/2023    BUN 7 (L) 04/21/2023    CREATININE 0.64 04/21/2023    EGFRIFNONA 76 03/22/2021     04/21/2023    K 4.5 04/21/2023     04/21/2023    CO2 25.0 04/21/2023    MG 1.8 04/21/2023    CALCIUM 9.5 04/21/2023     Lab Results   Component Value Date     11.2 04/19/2023    CEA <0.60 04/19/2023     7.3 04/19/2023         Assessment & Plan   This is a 62 y.o. G0 woman presenting with urinary retention found to have large pelvic mass. Now s/p exploratory laparotomy, ESMER/BSO, cystoscopy with temporary ureteral stents (removed intraop) on 4/20/23. POD#1.    1. Postop care  - PO pain control  - Advance diet as tolerated, HLIV when tolerating PO intake  - SCDs + prophylactic anticoagulation  - Remove padron this morning with voiding trial (ordered) as below  - Encourage ambulation     2. Pelvic mass  - CT AP w/ contrast 4/19: 14cm cystic pelvic mass with internal septation causing mass effect on bladder and uterus.  No peritoneal nodules, no lymphadenopathy. There is diverticulosis without diverticulitis. Normal kidneys and ureters.  - History of 4cm L ovarian cyst on previous imaging which may be precursor lesion  - Tumor markers within normal limits.  - s/p exploratory laparotomy, ESMER/BSO, cystoscopy with temporary ureteral stents (removed intraop) on 23  - Not concerning for malignancy intraoperatively, awaiting final pathology     3. Urinary retention  - 1500mL urine drained with padron upon anchoring; continues to have normal UOP.  - Denies hematuria; urinalysis with small amount of blood, protein.  - Cr normal at 0.63  - Suspect related to mass effect from mass  - Adequate urine output postoperatively  - Voiding trial this morning: Insert 200 mL of sterile solution into bladder via Padron catheter.  Discontinue Padron catheter.  Patient has 1 hour to void 100 mL or more.  If patient is unable to void as noted, re-anchor Padron catheter and teach Padron care.  Notify M.D.  - May need to be discharged with padron in place if unable to pass voiding trial    Rest of management per primary team.    Kristen Hernandez MD  Gynecologic Oncology  Resident Physician, PGY2    Electronically signed by Kristen Hernandez MD at 23 0735     Catrachito Lockhart MD at 23 0935              [facility] Medicine Services  PROGRESS NOTE    Patient Name: Jocelyne Lindquist  : 1960  MRN: [id number]    Date of Admission: 2023  Primary Care Physician: Uzma Duffy PA-C    Subjective   Subjective     CC: Urinary retention    HPI: No issues beyond burning from IV. Wants zyrtec for allergies/congestion. Ready for surgery    ROS:  No new issues     Objective   Objective     Vital Signs:   Temp:  [97.6 °F (36.4 °C)-99.2 °F (37.3 °C)] 97.6 °F (36.4 °C)  Heart Rate:  [71-88] 81  Resp:  [18-19] 19  BP: (124-163)/(48-88) 162/65  Flow (L/min):  [2] 2     Physical Exam:  NAD, alert and oriented  OP clear,  MMM  Neck supple  No LAD  RRR  CTAB  +BS, ND, palpable fullness in LLQ  CORBETT  Normal affect    Results Reviewed:  LAB RESULTS:      Lab 04/20/23  0321 04/19/23  0826   WBC 7.18 11.29*   HEMOGLOBIN 12.6 13.4   HEMATOCRIT 38.3 41.0   PLATELETS 275 305   NEUTROS ABS 4.24 8.91*   IMMATURE GRANS (ABS) 0.02 0.06*   LYMPHS ABS 2.14 1.53   MONOS ABS 0.46 0.36   EOS ABS 0.29 0.36   MCV 85.7 85.1         Lab 04/20/23  0321 04/19/23  0826   SODIUM 145 138   POTASSIUM 3.6 5.0   CHLORIDE 110* 98   CO2 26.0 29.0   ANION GAP 9.0 11.0   BUN 8 7*   CREATININE 0.63 0.69   EGFR 99.2 97.1   GLUCOSE 121* 116*   CALCIUM 8.9 9.4   MAGNESIUM 1.4*  --    HEMOGLOBIN A1C  --  5.90*         Lab 04/19/23  0826   TOTAL PROTEIN 6.8   ALBUMIN 4.1   GLOBULIN 2.7   ALT (SGPT) 17   AST (SGOT) 19   BILIRUBIN 0.7   ALK PHOS 97                 Lab 04/20/23  0321   ABO TYPING O   RH TYPING Positive   ANTIBODY SCREEN Negative         Brief Urine Lab Results  (Last result in the past 365 days)      Color   Clarity   Blood   Leuk Est   Nitrite   Protein   CREAT   Urine HCG        04/19/23 0737 Yellow   Clear   Negative   Negative   Negative   Negative                 Microbiology Results Abnormal     None          CT Abdomen Pelvis With Contrast    Result Date: 4/19/2023  CT ABDOMEN PELVIS W CONTRAST Date of Exam: 4/19/2023 9:47 AM EDT Indication: urinary retention. Comparison: 2/17/2017 Technique: Axial CT images were obtained of the abdomen and pelvis following the uneventful intravenous administration of 80 mL Isovue-300. Reconstructed coronal and sagittal images were also obtained. Automated exposure control and iterative construction methods were used. Findings: Lower thorax:Lung bases are clear. No coronary artery calcifications seen in the visualized heart. No pericardial effusion.  No evidence of pulmonary embolus in the visualized pulmonary arteries. Liver: No focal hepatic lesions seen.  Normal hepatic size. Normal density of the liver. Gallbladder  and bile ducts: Cholelithiasis without evidence of cholecystitis. No intra- or extra- hepatic biliary ductal dilatation. Spleen: Calcified granulomas. Pancreas: Normal appearance of the pancreas. Main pancreatic duct is nondilated. Adrenals: Normal appearance of the adrenal glands. Kidneys: Normal appearance of the kidneys. Symmetric enhancement and excretion of contrast. No nephrolithiasis.  Normal caliber of the ureters. Bowel: Normal caliber of the bowels. Normal appearance of the appendix. Diverticulosis without diverticulitis. Pelvis: Decompressed bladder with Nuñez catheter. Small amount of gas in the bladder is likely due to catheterization. Large cystic lesion seen within the pelvis measuring approximately 10.4 x 14.0 x 9.4 cm. There is an internal rounded septation/internal cyst component. This lesion exerts mass effect on the uterus and bladder pushing them anteriorly and to the right. The left gonadal vein appears to terminate at the cystic lesion suggesting that this is a left ovarian cystic neoplasm. On prior CT there was a 4.2 x 3.7 cm left ovarian cyst which may have been the precursor for this lesion. Peritoneum: No free air. No free fluid. No peritoneal nodularity. Vessels: Normal aortic caliber. Atherosclerotic calcification of the aorta. Celiac artery, splenic artery, superior mesenteric artery, inferior mesenteric artery, renal arteries and iliac arteries appear patent. Iliac veins, inferior vena cava, superior mesenteric vein, renal veins, portal vein and splenic vein are patent. Lymph nodes: No enlarged or suspicious adenopathy. Bones: No acute osseous abnormality. Degenerative changes of the spine. Soft tissues: Unremarkable appearance of the soft tissues.     Impression: Impression: Large cystic mass within the pelvis with internal rounded septation/internal cyst. This appears to be left ovarian in origin as the left gonadal vein appears to terminate here. Additionally there was a left  ovarian cyst seen on prior CT which may've been  the precursor for this lesion. This most likely reflects a left ovarian cystic neoplasm such as serous cystadenoma but incompletely characterized on this examination. This mass exerts mass effect on the bladder and uterus anteriorly and to the right. Electronically Signed: Jamie Rangel  4/19/2023 10:42 AM EDT  Workstation ID: [id number]          Current medications:  Scheduled Meds:cetirizine, 10 mg, Oral, Daily  magnesium sulfate, 2 g, Intravenous, Q2H  metoprolol succinate XL, 100 mg, Oral, Daily  potassium chloride, 10 mEq, Intravenous, Q1H  pravastatin, 40 mg, Oral, Nightly      Continuous Infusions:sodium chloride, 50 mL/hr, Last Rate: 50 mL/hr (04/19/23 1812)      PRN Meds:.•  Magnesium Standard Dose Replacement - Follow Nurse / BPA Driven Protocol  •  Potassium Replacement - Follow Nurse / BPA Driven Protocol  •  [COMPLETED] Insert Peripheral IV **AND** sodium chloride    Assessment & Plan   Assessment & Plan     Active Hospital Problems    Diagnosis  POA   • **Acute urinary retention [R33.8]  Yes   • Pelvic mass [R19.00]  Unknown   • Mixed hyperlipidemia [E78.2]  Yes   • Cyst of left ovary [N83.202]  Yes      Resolved Hospital Problems   No resolved problems to display.        Brief Hospital Course to date:  Jocelyne Lindquist is a 62 y.o. 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 who presented with acute onset of inability to urinate and  found to have a large cystic mass     Pelvic Cystic Mass  -Plans for surgery today 4/20     Urinary retention    Seasonal allergies/congestion  -zyrtec     Prior history of tobacco and alcohol use    Expected Discharge Location and Transportation: Home  Expected Discharge   Expected Discharge Date and Time     Expected Discharge Date Expected Discharge Time    Apr 24, 2023             DVT prophylaxis:  Mechanical DVT prophylaxis orders are present.          CODE STATUS:   Code Status and Medical Interventions:   Ordered at: 04/19/23 2204     Code Status (Patient has no pulse and is not breathing):    CPR (Attempt to Resuscitate)     Medical Interventions (Patient has pulse or is breathing):    Full       Catrachito Lockhart MD  04/20/23        Electronically signed by Catrachito Lockhart MD at 04/20/23 0957     Kristin Dimas MD at 04/20/23 0606          Gynecologic Oncology   Daily Progress Note    Chief Complaint: hospital follow-up, pelvic mass    Subjective   Patient did well overnight.  Her pain is controlled.  She is not having nausea or emesis.  She has been NPO since midnight. Nuñez anchored. Denies chest pain, dyspnea, fever/chills.     Updated ROS is negative except as reviewed in HPI.    Inpatient Medications:     Current Facility-Administered Medications:   •  Magnesium Standard Dose Replacement - Follow Nurse / BPA Driven Protocol, , Does not apply, PRKELLEY, Mira Aranda MD  •  magnesium sulfate 2g/50 mL (PREMIX) infusion, 2 g, Intravenous, Q2H, Mira Aranda MD, 2 g at 04/20/23 0541  •  metoprolol succinate XL (TOPROL-XL) 24 hr tablet 100 mg, 100 mg, Oral, Daily, Mira Aranda MD  •  potassium chloride 10 mEq in 100 mL IVPB, 10 mEq, Intravenous, Q1H, Mira Aranda MD, Last Rate: 100 mL/hr at 04/20/23 0540, 10 mEq at 04/20/23 0540  •  Potassium Replacement - Follow Nurse / BPA Driven Protocol, , Does not apply, Manoj BLANK Kathryn E, MD  •  pravastatin (PRAVACHOL) tablet 40 mg, 40 mg, Oral, Nightly, Mira Aranda MD  •  [COMPLETED] Insert Peripheral IV, , , Once **AND** sodium chloride 0.9 % flush 10 mL, 10 mL, Intravenous, PRN, Nav Quiroz PA  •  sodium chloride 0.9 % infusion, 50 mL/hr, Intravenous, Continuous, Kristin Dimas MD, Last Rate: 50 mL/hr at 04/19/23 2332, 50 mL/hr at 04/19/23 2332     Objective   Temp:  [98.2 °F (36.8 °C)-99.2 °F  (37.3 °C)] 98.2 °F (36.8 °C)  Heart Rate:  [67-88] 74  Resp:  [18] 18  BP: (120-181)/(48-95) 163/62  Vitals:    04/20/23 0442   BP: 163/62   Pulse: 74   Resp: 18   Temp: 98.2 °F (36.8 °C)   SpO2: 93%     I/O last 3 completed shifts:  In: 1000 [IV Piggyback:1000]  Out: 3000 [Urine:3000]                 GENERAL: Alert, well-appearing female in no apparent distress.    CARDIOVASCULAR: Normal rate, regular rhythm, no murmurs, rubs, or gallops.    RESPIRATORY: Clear to auscultation bilaterally, normal respiratory effort. Nasal cannula in place.  GASTROINTESTINAL:  Soft, non tender, non-distended, no rebound or guarding.   GENITOURINARY: Nuñez in place with clear urine.  SKIN:  Warm, dry, well-perfused.    PSYCHIATRIC: AO x3, with appropriate affect, normal thought processes  EXREMITIES: Symmetric. No peripheral edema. Venous stasis in bilateral LE.     Lab Results   Component Value Date    WBC 7.18 04/20/2023    HGB 12.6 04/20/2023    HCT 38.3 04/20/2023    MCV 85.7 04/20/2023     04/20/2023    NEUTROABS 4.24 04/20/2023    GLUCOSE 121 (H) 04/20/2023    BUN 8 04/20/2023    CREATININE 0.63 04/20/2023    EGFRIFNONA 76 03/22/2021     04/20/2023    K 3.6 04/20/2023     (H) 04/20/2023    CO2 26.0 04/20/2023    MG 1.4 (L) 04/20/2023    CALCIUM 8.9 04/20/2023     Lab Results   Component Value Date     11.2 04/19/2023    CEA <0.60 04/19/2023     7.3 04/19/2023         Assessment & Plan   This is a 62 y.o. G0 woman presenting with urinary retention found to have large pelvic mass.      1. Pelvic mass  - CT AP w/ contrast 4/19: 14cm cystic pelvic mass with internal septation causing mass effect on bladder and uterus. No peritoneal nodules, no lymphadenopathy. There is diverticulosis without diverticulitis. Normal kidneys and ureters.  - History of 4cm L ovarian cyst on previous imaging which may be precursor lesion  - Tumor markers within normal limits.  - Discussed that without tissue diagnosis cannot  say whether or not this is cancer, but that removal is recommended due to the size of the mass. This is less likely to be malignancy given slow growing nature, negative tumor markers, and lack of other systemic signs of cancer, but final pathology will be the determining factor.  - Does have peripheral vascular disease and hypertension, but no history of MI and does not see cardiologist or pulmonologist outpatient so should not require additional pre-operative clearance. EKG and T&S completed.  - Planning for exploratory laparotomy, total abdominal hysterectomy, bilateral salpingo-oophorectomy, cystoscopy with temporary bilateral ureteral catheters, possible cancer staging. Currently on OR board for 1pm. Keep NPO with gentle mIVF, hold anticoagulation.     2. Urinary retention  - 1500mL urine drained with padron upon anchoring; continues to have normal UOP.  - Denies hematuria; urinalysis with small amount of blood, protein.  - Cr normal at 0.63  - Suspect related to mass effect from mass  - Will plan for cystoscopy during surgery for further evaluation; keep padron anchored until then.  - May need catheter at time of discharge home pending resolution of urinary retention postoperatively    3.  Disposition  -To OR today for exploratory laparotomy, total abdominal hysterectomy, bilateral salpingo-oophorectomy, cystoscopy with temporary bilateral ureteral catheters, possible cancer staging. Previously consented on 23.        I saw and evaluated the patient. I agree with the findings and the plan of care as documented in the note.    Kristin Dimas MD  23  13:38 EDT      Electronically signed by Kristin Dimas MD at 23 1338          Consult Notes (last 7 days)      Kristin Dimas MD at 23 1329               Gynecologic Oncology Admission H&P     Patient Name: Jocelyne Lindquist  : 1960   MRN: [id number]     Reason for Consultation: Pelvic mass, urinary retention    History of  Present Illness: Jocelyne Lindquist is a 62 y.o. female who presents with urinary retention. Past medical history notable for SCC of eyelid, peripheral vascular disease, GERD, asthma, HTN. Reports that she has a history of chronic urinary frequency which worsened over the last month and was accompanied by sensation of incomplete emptying, double voiding, and straining to urinate. She suddenly had acute urinary retention at 1am on 4/19 and she ultimately presented to the ED later that morning after abdominal pain worsened with her inability to urinate. Upon presentation to the ED a padron catheter was anchored with 1500mL of urine drained. She had immediate relief of symptoms. To further evaluate etiology, a CT abdomen/pelvis was performed which revealed a 14cm cystic pelvic mass with internal septation and mass effect on uterus and bladder. Gynecologic oncology was consulted for further evaluation.     Patient reports besides aforementioned urinary symptoms, she has not had any health issues recently. No changes in energy level, appetite, bowel function, unexpected weight loss, chest pain, dyspnea, abdominal bloating. She does report she was informed of a small ovarian cyst incidentally discovered on abdominal imaging during an ED visit in 2016 which she was told not to worry about. She is otherwise up to date on cancer screenings including pap smear, colonoscopy, and mammograms. No significant family history of cancer, specifically denies family history of breast, ovarian, uterine, pancreatic, lung, colorectal.     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    Past Surgical History:   Past Surgical History:   Procedure Laterality Date   • BREAST BIOPSY Left 2010    stereo bx   • CYST REMOVAL Right     wrist   • SQUAMOUS CELL CARCINOMA EXCISION Right 2017    outer corner of right eye       Family History:   Family History   Problem Relation Age of Onset   • Cancer Mother         esophageal and liver   • Heart failure Father    • Breast cancer Neg Hx    • Ovarian cancer Neg Hx        Social History:   Social History     Socioeconomic History   • Marital status:    Tobacco Use   • Smoking status: Former     Packs/day: 1.00     Years: 30.00     Pack years: 30.00     Types: Cigarettes     Start date: 2000     Quit date: 2017     Years since quittin.2   • Smokeless tobacco: Never   • Tobacco comments:     17   Vaping Use   • Vaping Use: Never used   Substance and Sexual Activity   • Alcohol use: No     Comment: not since 17   • Drug use: No   • Sexual activity: Defer     Medications:     Current Facility-Administered Medications:   •  sodium chloride 0.9 % bolus 1,000 mL, 1,000 mL, Intravenous, Once, Mira Aranda MD, Last Rate: 250 mL/hr at 23 1226, 1,000 mL at 23 1226  •  [COMPLETED] Insert Peripheral IV, , , Once **AND** sodium chloride 0.9 % flush 10 mL, 10 mL, Intravenous, PRN, Nav Quiroz PA  •  sodium chloride 0.9 % infusion, 100 mL/hr, Intravenous, Continuous, Mira Aranda MD    Current Outpatient Medications:   •  albuterol sulfate  (90 Base) MCG/ACT inhaler, Inhale 2 puffs Every 6 (Six) Hours As Needed for Wheezing., Disp: 18 g, Rfl: 5  •  calcium carb-cholecalciferol 600-800 MG-UNIT tablet, Take 1 tablet by mouth 2 (Two) Times a Day With Meals., Disp: 60 tablet, Rfl: 0  •  cefdinir (OMNICEF) 300 MG capsule, Take 1 capsule by mouth 2 (Two) Times a Day., Disp: 14 capsule, Rfl: 0  •  Cyanocobalamin (Vitamin B-12) 1000 MCG  sublingual tablet, Place 1 tablet under the tongue Daily., Disp: 90 each, Rfl: 3  •  Magnesium 250 MG tablet, Take 1 tablet twice daily for 30 days, then 1 tab once daily, Disp: 120 tablet, Rfl: 0  •  metoprolol succinate XL (TOPROL-XL) 100 MG 24 hr tablet, Take 1 tablet by mouth Daily., Disp: 90 tablet, Rfl: 3  •  mirtazapine (Remeron) 15 MG tablet, Take 1 tablet by mouth Every Night., Disp: 30 tablet, Rfl: 1  •  omeprazole (priLOSEC) 20 MG capsule, Take 1 capsule by mouth Daily., Disp: 90 capsule, Rfl: 3  •  pravastatin (Pravachol) 40 MG tablet, Take 1 tablet by mouth Every Night., Disp: 90 tablet, Rfl: 3  •  promethazine-dextromethorphan (PROMETHAZINE-DM) 6.25-15 MG/5ML syrup, Take 5 mL by mouth 4 (Four) Times a Day As Needed for Cough., Disp: 240 mL, Rfl: 0  •  thiamine (VITAMIN B-1) 100 MG tablet, Take 1 tablet by mouth Daily., Disp: 30 tablet, Rfl: 5  •  triamcinolone (KENALOG) 0.1 % ointment, Apply  topically to the appropriate area as directed 3 (Three) Times a Day., Disp: 453.6 g, Rfl: 0    Allergies:   Allergies   Allergen Reactions   • Amoxicillin Swelling   • Benazepril Swelling   • Doxycycline Diarrhea   • Eggs Or Egg-Derived Products      Causes cramps   • Fish-Derived Products      Causes severe stomach cramps       Subjective   Review of Systems:   As per HPI. Otherwise, negative.    Objective   Physical Exam:  Vital Signs:   Vitals:    04/19/23 0930 04/19/23 1030 04/19/23 1130 04/19/23 1200   BP: 120/58 135/82 125/48 124/77   BP Location:       Patient Position:       Pulse: 67 71 74 74   Resp:       Temp:       TempSrc:       SpO2: 93% 93% 93% 92%   Weight:       Height:         BMI: Body mass index is 24.8 kg/m².   Weight:   Wt Readings from Last 3 Encounters:   04/19/23 63.5 kg (140 lb)   03/10/23 64 kg (141 lb 3.2 oz)   05/16/22 63.7 kg (140 lb 6.4 oz)       ECOG PS: 0    Physical Examination:   General appearance - alert, well appearing, and in no distress and oriented to person, place, and  time  Mental status - normal mood, behavior, speech, dress, motor activity, and thought processes  Eyes - sclera anicteric, left eye normal, right eye normal  Ears - right ear normal, left ear normal  Lymphatics - no palpable lymphadenopathy, no hepatosplenomegaly  Lungs - normal respiratory effort, clear to auscultation bilaterally  Heart - normal rate, regular rhythm, normal S1, S2, no murmurs, rubs, clicks or gallops  Abdomen - soft, nontender, mildly distended, mass palpated below umbilicus, no surgical scars  Neurological - alert, oriented, normal speech, no focal findings or movement disorder noted  Musculoskeletal - no joint tenderness, deformity or swelling  Extremities - Varicose veins noted on lower extremities. Skin warm and dry. No swelling.  Skin - normal coloration and turgor, no rashes, no suspicious skin lesions noted     Results:   Lab Results   Component Value Date    WBC 11.29 (H) 04/19/2023    HGB 13.4 04/19/2023    HCT 41.0 04/19/2023    MCV 85.1 04/19/2023     04/19/2023       Lab Results   Component Value Date    GLUCOSE 116 (H) 04/19/2023    BUN 7 (L) 04/19/2023    CREATININE 0.69 04/19/2023    EGFR 97.1 04/19/2023    BCR 10.1 04/19/2023    K 5.0 04/19/2023    CO2 29.0 04/19/2023    CALCIUM 9.4 04/19/2023    ALBUMIN 4.1 04/19/2023    BILITOT 0.7 04/19/2023    AST 19 04/19/2023    ALT 17 04/19/2023      Imaging:  CT Abdomen Pelvis With Contrast 04/19/2023    Narrative  CT ABDOMEN PELVIS W CONTRAST    Date of Exam: 4/19/2023 9:47 AM EDT    Indication: urinary retention.    Comparison: 2/17/2017    Technique: Axial CT images were obtained of the abdomen and pelvis following the uneventful intravenous administration of 80 mL Isovue-300. Reconstructed coronal and sagittal images were also obtained. Automated exposure control and iterative  construction methods were used.      Findings:  Lower thorax:Lung bases are clear. No coronary artery calcifications seen in the visualized heart. No  pericardial effusion.  No evidence of pulmonary embolus in the visualized pulmonary arteries.    Liver: No focal hepatic lesions seen.  Normal hepatic size. Normal density of the liver.    Gallbladder and bile ducts: Cholelithiasis without evidence of cholecystitis. No intra- or extra- hepatic biliary ductal dilatation.    Spleen: Calcified granulomas.    Pancreas: Normal appearance of the pancreas. Main pancreatic duct is nondilated.    Adrenals: Normal appearance of the adrenal glands.    Kidneys: Normal appearance of the kidneys. Symmetric enhancement and excretion of contrast. No nephrolithiasis.  Normal caliber of the ureters.    Bowel: Normal caliber of the bowels. Normal appearance of the appendix. Diverticulosis without diverticulitis.    Pelvis: Decompressed bladder with Nuñez catheter. Small amount of gas in the bladder is likely due to catheterization. Large cystic lesion seen within the pelvis measuring approximately 10.4 x 14.0 x 9.4 cm. There is an internal rounded  septation/internal cyst component. This lesion exerts mass effect on the uterus and bladder pushing them anteriorly and to the right. The left gonadal vein appears to terminate at the cystic lesion suggesting that this is a left ovarian cystic neoplasm.  On prior CT there was a 4.2 x 3.7 cm left ovarian cyst which may have been the precursor for this lesion.    Peritoneum: No free air. No free fluid. No peritoneal nodularity.    Vessels: Normal aortic caliber. Atherosclerotic calcification of the aorta. Celiac artery, splenic artery, superior mesenteric artery, inferior mesenteric artery, renal arteries and iliac arteries appear patent. Iliac veins, inferior vena cava, superior  mesenteric vein, renal veins, portal vein and splenic vein are patent.    Lymph nodes: No enlarged or suspicious adenopathy.    Bones: No acute osseous abnormality. Degenerative changes of the spine.    Soft tissues: Unremarkable appearance of the soft  tissues.    Impression  Impression:  Large cystic mass within the pelvis with internal rounded septation/internal cyst. This appears to be left ovarian in origin as the left gonadal vein appears to terminate here. Additionally there was a left ovarian cyst seen on prior CT which may've been  the precursor for this lesion. This most likely reflects a left ovarian cystic neoplasm such as serous cystadenoma but incompletely characterized on this examination. This mass exerts mass effect on the bladder and uterus anteriorly and to the right.    Electronically Signed: Jamie Rangel  4/19/2023 10:42 AM EDT  Workstation ID: [id number]      Assessment / Plan    This is a 62 y.o. G0 woman presenting with urinary retention found to have large pelvic mass.     1. Pelvic mass  - CT AP w/ contrast 4/19: 14cm cystic pelvic mass with internal septation causing mass effect on bladder and uterus. No peritoneal nodules, no lymphadenopathy. There is diverticulosis without diverticulitis. Normal kidneys and ureters.  - History of 4cm L ovarian cyst on previous imaging which may be precursor lesion  - Tumor markers pending  - Discussed that without tissue diagnosis cannot say whether or not this is cancer, but that removal is recommended due to the size of the mass.  - Does have peripheral vascular disease and hypertension, but no history of MI and does not see cardiologist or pulmonologist outpatient so should not require additional pre-operative clearance.  - Planning for exploratory laparotomy with mass excision, possible ESMER/BSO and debulking. Plan for cystoscopy during case to evaluate bladder. Will try to schedule for OR tomorrow, 4/20. Please make NPO at midnight with gentle maintenance fluids, and hold anticoagulation.    2. Urinary retention  - 1500mL urine drained with padron   - Denies hematuria; urinalysis with small amount of blood, protein.  - Cr normal at  - Suspect related to mass effect from mass  - Will plan for  cystoscopy during surgery for further evaluation; keep padron anchored until then.      Rest of management per primary team.    I saw and evaluated the patient at about pm today. I agree with the findings and the plan of care as documented in the note.    Patient was consented for exploratory laparotomy, total abdominal hysterectomy, bilateral salpingo-oophorectomy, cystoscopy with temporary bilateral ureteral catheters, possible cancer staging.      Risks and benefits of surgery were discussed.  This included, but was not limited to, infection and bleeding like when the skin is cut; damage to surrounding structures; and incisional complications.  Risk of DVT was addressed for major surgeries.  Standard of care efforts to minimize these risks were reviewed.  Typical hospital stay and recovery were discussed as well as post-procedure precautions.  Surgical implications of chronic illnesses on recovery and surgical outcome were reviewed.     Pain medication regimen for postoperative care was discussed.  Typical regimen and avoidance of narcotics was discussed.  Patient was educated that other factors, such as existing narcotic use, can impact postoperative pain management.      Risks and benefits of lymph node dissection were further discussed.  This included lymphedema, vascular injury, and nerve injury.  Patient verbalized understanding of the plan including the risks and benefits.  Appropriate perioperative testing including laboratory evaluation, EKG as clinically indicated, chest x-ray as clinically indicated, and preadmission evaluation were all ordered as a part of this patient's care.    Patient understands the possibility that she may need a catheter at the time of discharge home depending on if her urinary retention has resolved or not.  She has a history of normal Pap smears and no suspicious family history regarding malignancy although her mother did die from esophageal cancer.  We discussed cancer staging  in the context of biopsies, lymph node dissection, omentectomy, appendectomy, and other indicated procedures.  She was reassured that her mass is not likely to be malignancy as it has slowly grown over a prolonged period of time.    EKG, type and screen, tumor markers, ERAS medications all ordered as a part of patient's perioperative care.  Kristin Dimas MD  04/19/23  21:21 EDT      Electronically signed by Kristin Dimas MD at 04/19/23 2131     Bobby Hilliard MD at 04/20/23 1009          Urology Consult Note    Jocelyne Lindquist  LOS: 0      ASSESSMENT:    62 y.o. female with a very large pelvic mass consistent with ovarian origin and development of urinary retention over the past week     DISCUSSION/RECOMMENDATIONS/PLAN:  She is planned for exploration and excision of ovarian mass today.  Hopefully she will be able to void once this has been removed and once she recovers.    HPI:  Jocelyne Lindquist is a 62 y.o. female who was admitted 4/19/2023  for Acute urinary retention [R33.8].  She had abdominal pain and difficulty urinating and was seen in the ER.  A very large pelvic mass consistent with ovarian origin was noted.  Bladder was found to be massively distended.  She had developed urinary difficulties just in the last week.  Previously she has had no urologic symptoms or history of significance.    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 90 tablet 3 2023   • pravastatin (Pravachol) 40 MG tablet Take 1 tablet by mouth Every Night. 90 tablet 3 2023   • albuterol sulfate  (90 Base) MCG/ACT inhaler Inhale 2 puffs Every 6 (Six) Hours As Needed for Wheezing. 18 g 5    • calcium carb-cholecalciferol 600-800 MG-UNIT tablet Take 1 tablet by mouth 2 (Two) Times a Day With Meals. 60 tablet 0    • Cyanocobalamin (Vitamin B-12) 1000 MCG sublingual tablet Place 1 tablet under the tongue Daily. 90 each 3    • Magnesium 250 MG tablet Take 1 tablet twice daily for 30 days, then 1 tab once daily 120 tablet 0    • mirtazapine (Remeron) 15 MG tablet Take 1 tablet by mouth Every Night. 30 tablet 1    • omeprazole (priLOSEC) 20 MG capsule Take 1 capsule by mouth Daily. 90 capsule 3    • promethazine-dextromethorphan (PROMETHAZINE-DM) 6.25-15 MG/5ML syrup Take 5 mL by mouth 4 (Four) Times a Day As Needed for Cough. 240 mL 0    • thiamine (VITAMIN B-1) 100 MG tablet Take 1 tablet by mouth Daily. 30 tablet 5    • triamcinolone (KENALOG) 0.1 % ointment Apply  topically to the appropriate area as directed 3 (Three) Times a Day. 453.6 g 0      Allergies   Allergen Reactions   • Amoxicillin Swelling   • Benazepril Swelling   • Doxycycline Diarrhea   • Eggs Or Egg-Derived Products      Causes cramps   • Fish-Derived Products      Causes severe stomach cramps     Family History   Problem Relation Age of Onset   • Cancer Mother         esophageal and liver   • Heart failure Father    • Breast cancer Neg Hx    • Ovarian cancer Neg Hx      Social History     Socioeconomic History   • Marital status:    Tobacco Use   • Smoking status: Former     Packs/day: 1.00     Years: 30.00     Pack years: 30.00     Types: Cigarettes     Start date: 2000     Quit date: 2017     Years since quittin.3   • Smokeless tobacco: Never   • Tobacco comments:     17   Vaping Use  "  • Vaping Use: Never used   Substance and Sexual Activity   • Alcohol use: No     Comment: not since 12/25/17   • Drug use: No   • Sexual activity: Defer         Review of Systems  Constitutional: negative  Cardiovascular: negative  Gastrointestinal: negative  Genitourinary:negative, As above    Objective     Blood pressure 162/65, pulse 81, temperature 97.6 °F (36.4 °C), temperature source Oral, resp. rate 19, height 160 cm (63\"), weight 64.5 kg (142 lb 3.2 oz), SpO2 94 %.  /65 (BP Location: Right arm, Patient Position: Lying)   Pulse 81   Temp 97.6 °F (36.4 °C) (Oral)   Resp 19   Ht 160 cm (63\")   Wt 64.5 kg (142 lb 3.2 oz)   SpO2 94%   BMI 25.19 kg/m²     Physicial Exam    General: WDWN female in NAD  Neck: Supple with no masses or adenopathy  Back: No CVAT, spine linear and non-tender   Abdomen: Soft and non-tender with no masses, organomegaly or guarding.  : Normal female  Extremities: FROM with no edema, pulses full  Neuro: Nonfocal        Imaging  CT Abdomen: As stated above    Labs  Urine Microscopy:       Invalid input(s): LABCAST, Urinalysis:   Results from last 7 days   Lab Units 04/19/23  0737   PH, URINE  6.5   PROTEIN UA  Negative   GLUCOSE UA  Negative   KETONES UA  Negative   , BMP:   Results from last 7 days   Lab Units 04/20/23  0321 04/19/23  0826   SODIUM mmol/L 145 138   POTASSIUM mmol/L 3.6 5.0   CALCIUM mg/dL 8.9 9.4   CHLORIDE mmol/L 110* 98   CO2 mmol/L 26.0 29.0   BUN mg/dL 8 7*   CREATININE mg/dL 0.63 0.69   ALBUMIN g/dL  --  4.1   BILIRUBIN mg/dL  --  0.7   ALK PHOS U/L  --  97    and CBC:   Results from last 7 days   Lab Units 04/20/23  0321   WBC 10*3/mm3 7.18   RBC 10*6/mm3 4.47   HEMOGLOBIN g/dL 12.6   HEMATOCRIT % 38.3   PLATELETS 10*3/mm3 275       Bobby Hilliard MD - 4/20/2023, 10:09 EDT    Electronically signed by Bobby Hilliard MD at 04/20/23 1011       "

## 2023-04-22 ENCOUNTER — READMISSION MANAGEMENT (OUTPATIENT)
Dept: CALL CENTER | Facility: HOSPITAL | Age: 63
End: 2023-04-22
Payer: COMMERCIAL

## 2023-04-22 VITALS
RESPIRATION RATE: 18 BRPM | BODY MASS INDEX: 25.2 KG/M2 | WEIGHT: 142.2 LBS | DIASTOLIC BLOOD PRESSURE: 66 MMHG | HEART RATE: 60 BPM | OXYGEN SATURATION: 95 % | SYSTOLIC BLOOD PRESSURE: 124 MMHG | HEIGHT: 63 IN | TEMPERATURE: 97.7 F

## 2023-04-22 PROCEDURE — 25010000002 HEPARIN (PORCINE) PER 1000 UNITS: Performed by: OBSTETRICS & GYNECOLOGY

## 2023-04-22 PROCEDURE — 99239 HOSP IP/OBS DSCHRG MGMT >30: CPT | Performed by: HOSPITALIST

## 2023-04-22 RX ORDER — AMOXICILLIN 250 MG
2 CAPSULE ORAL 2 TIMES DAILY
Qty: 28 TABLET | Refills: 0 | Status: SHIPPED | OUTPATIENT
Start: 2023-04-22 | End: 2023-04-29

## 2023-04-22 RX ORDER — ACETAMINOPHEN 325 MG/1
650 TABLET ORAL EVERY 6 HOURS SCHEDULED
Start: 2023-04-22

## 2023-04-22 RX ORDER — POLYETHYLENE GLYCOL 3350 17 G/17G
17 POWDER, FOR SOLUTION ORAL DAILY PRN
Qty: 7 PACKET | Refills: 0 | Status: SHIPPED | OUTPATIENT
Start: 2023-04-22

## 2023-04-22 RX ADMIN — HEPARIN SODIUM 5000 UNITS: 5000 INJECTION, SOLUTION INTRAVENOUS; SUBCUTANEOUS at 05:58

## 2023-04-22 RX ADMIN — METOPROLOL SUCCINATE 100 MG: 100 TABLET, EXTENDED RELEASE ORAL at 09:10

## 2023-04-22 RX ADMIN — ACETAMINOPHEN 325MG 650 MG: 325 TABLET ORAL at 05:58

## 2023-04-22 RX ADMIN — CETIRIZINE HYDROCHLORIDE 10 MG: 10 TABLET, FILM COATED ORAL at 09:10

## 2023-04-22 RX ADMIN — SENNOSIDES AND DOCUSATE SODIUM 2 TABLET: 8.6; 5 TABLET ORAL at 09:10

## 2023-04-22 NOTE — NURSING NOTE
Dr Dimas Here at bedside and verbalized to nurse and to patient that she is cleared for d/c and to follow up with her in her office.

## 2023-04-22 NOTE — PROGRESS NOTES
Gynecologic Oncology   Daily Progress Note    Chief Complaint: postoperative follow-up    Subjective   Status post exploratory laparotomy, ESMER/BSO, cystoscopy with temporary ureteral stents (removed intraop) for pelvic mass. Intraoperative findings were not concerning for malignancy.    Doing well this am, velvet PO.  Voiding well, + BM.    Inpatient Medications:     Current Facility-Administered Medications:   •  acetaminophen (TYLENOL) tablet 650 mg, 650 mg, Oral, Q6H, Kristin Dimas MD, 650 mg at 04/22/23 0558  •  cetirizine (zyrTEC) tablet 10 mg, 10 mg, Oral, Daily, Kristin Dimas MD, 10 mg at 04/22/23 0910  •  heparin (porcine) 5000 UNIT/ML injection 5,000 Units, 5,000 Units, Subcutaneous, Q8H, Kristin Dimas MD, 5,000 Units at 04/22/23 0558  •  HYDROmorphone (DILAUDID) injection 0.5 mg, 0.5 mg, Intravenous, Q2H PRN **AND** naloxone (NARCAN) injection 0.4 mg, 0.4 mg, Intravenous, Q5 Min PRN, Kristin Dimas MD  •  HYDROmorphone (DILAUDID) injection 0.5 mg, 0.5 mg, Intravenous, Q2H PRN **AND** naloxone (NARCAN) injection 0.1 mg, 0.1 mg, Intravenous, Q5 Min PRN, Kristin Dimas MD  •  ibuprofen (ADVIL,MOTRIN) tablet 600 mg, 600 mg, Oral, Q6H PRN, Kristin Dimas MD  •  Magnesium Standard Dose Replacement - Follow Nurse / BPA Driven Protocol, , Does not apply, PRN, Kristin Dimas MD  •  metoprolol succinate XL (TOPROL-XL) 24 hr tablet 100 mg, 100 mg, Oral, Daily, Kristin Dimas MD, 100 mg at 04/22/23 0910  •  ondansetron (ZOFRAN) tablet 4 mg, 4 mg, Oral, Q6H PRN **OR** ondansetron (ZOFRAN) injection 4 mg, 4 mg, Intravenous, Q6H PRN, Kristin Dimas MD  •  oxyCODONE (ROXICODONE) immediate release tablet 10 mg, 10 mg, Oral, Q4H PRN, Kristin Dimas MD  •  oxyCODONE (ROXICODONE) immediate release tablet 5 mg, 5 mg, Oral, Q4H PRN, Kristin Dimas MD  •  polyethylene glycol (MIRALAX) packet 17 g, 17 g, Oral, Daily PRN, Kristin Dimas MD  •  Potassium Replacement - Follow Nurse /  BPA Driven Protocol, , Does not apply, PRN, Kristin Dimas MD  •  pravastatin (PRAVACHOL) tablet 40 mg, 40 mg, Oral, Nightly, Kristin Dimas MD, 40 mg at 04/21/23 2127  •  promethazine (PHENERGAN) suppository 12.5 mg, 12.5 mg, Rectal, Q6H PRN **OR** promethazine (PHENERGAN) tablet 12.5 mg, 12.5 mg, Oral, Q6H PRN, Kristin Dimas MD  •  sennosides-docusate (PERICOLACE) 8.6-50 MG per tablet 2 tablet, 2 tablet, Oral, BID, Kristin Dimas MD, 2 tablet at 04/22/23 0910     Objective   Temp:  [97.4 °F (36.3 °C)-98.7 °F (37.1 °C)] 97.7 °F (36.5 °C)  Heart Rate:  [60-80] 60  Resp:  [18] 18  BP: (124-146)/(62-78) 124/66  Vitals:    04/22/23 0736   BP: 124/66   Pulse: 60   Resp: 18   Temp: 97.7 °F (36.5 °C)   SpO2: 95%     I/O last 3 completed shifts:  In: 2110 [P.O.:1360; I.V.:750]  Out: 2000 [Urine:2000]     GENERAL: Alert, well-appearing female in no apparent distress.    CARDIOVASCULAR: Normal rate, regular rhythm, no murmurs, rubs, or gallops.    RESPIRATORY: exp wheezes left lung base, normal respiratory effort..  GASTROINTESTINAL:  Soft, appropriately tender, non-distended, no rebound or guarding. Incision clean/dry/intact. Bowel sounds present.  GENITOURINARY: padron removed, deferred  SKIN:  Warm, dry, well-perfused.    PSYCHIATRIC: AO x3, with appropriate affect, normal thought processes  EXREMITIES: Symmetric. No peripheral edema. Venous stasis in bilateral LE.     Lab Results   Component Value Date    WBC 11.17 (H) 04/21/2023    HGB 12.5 04/21/2023    HCT 38.9 04/21/2023    MCV 87.2 04/21/2023     04/21/2023    NEUTROABS 9.47 (H) 04/21/2023    GLUCOSE 149 (H) 04/21/2023    BUN 7 (L) 04/21/2023    CREATININE 0.64 04/21/2023    EGFRIFNONA 76 03/22/2021     04/21/2023    K 4.5 04/21/2023     04/21/2023    CO2 25.0 04/21/2023    MG 1.8 04/21/2023    CALCIUM 9.5 04/21/2023     Lab Results   Component Value Date     11.2 04/19/2023    CEA <0.60 04/19/2023     7.3 04/19/2023          Assessment & Plan   This is a 62 y.o. G0 woman presenting with urinary retention found to have large pelvic mass. Now s/p exploratory laparotomy, ESMER/BSO, cystoscopy with temporary ureteral stents (removed intraop) on 4/20/23. POD#1.    1. Postop care  - D/C home  -exp wheezes, has inhaler at BS, notes this is a chronic issue d/t allergies     2. Pelvic mass  -  awaiting final pathology     3. Urinary retention  - resolved    4.  Meeting goals from post op standpoint  Precautions reviewed  D/C instructions put in by me, F/U 3 weeks.  To call office for F/U.  Will notify re: pathology    Kristin Dimas MD  04/22/23  16:19 EDT

## 2023-04-22 NOTE — DISCHARGE SUMMARY
[facility] Medicine Services  DISCHARGE SUMMARY    Patient Name: Jocelyne Lindquist  : 1960  MRN: [id number]    Date of Admission: 2023  6:53 AM  Date of Discharge: 2023  Primary Care Physician: Uzma Duffy PA-C    Consults     No orders found from 3/21/2023 to 2023.          Hospital Course     Presenting Problem:   Acute urinary retention [R33.8]  S/P ESMER (total abdominal hysterectomy) [Z90.710]    Active Hospital Problems    Diagnosis  POA   • **Acute urinary retention [R33.8]  Yes   • S/P ESMER (total abdominal hysterectomy) [Z90.710]  Yes   • Pelvic mass [R19.00]  Unknown   • Mixed hyperlipidemia [E78.2]  Yes   • Cyst of left ovary [N83.202]  Yes      Resolved Hospital Problems   No resolved problems to display.          Hospital Course:  Jocelyne Lindquist is a 62 y.o. female that presented with acute urinary retention, with the discovery of a large pelvic mass noted on admission.     Pelvic mass  -She is s/p exploratory lap, ESMER/BSO, cystoscopy with ureteral stents-removed, with unremarkable intra-operative pathology, awaiting final pathology. She is doing remarkably well. Her pain has been controlled with tylenol only. She denies f/c. She is tolerating PO. She is voiding well and had a BM. She is ready for discharge. She will follow up with Dr. Dimas      Discharge Follow Up Recommendations for outpatient labs/diagnostics:  As written    Day of Discharge     HPI: Notes abdominal soreness. No f/c. No n/v. Tolerating PO. Voiding okay. No constipation. Pain well controlled with tylenol only.    Review of Systems  As above    Vital Signs:   Temp:  [97.4 °F (36.3 °C)-98.7 °F (37.1 °C)] 97.7 °F (36.5 °C)  Heart Rate:  [60-80] 60  Resp:  [18] 18  BP: (124-146)/(62-78) 124/66      Physical Exam:  NAD, alert and oriented  OP clear, MMM  Neck supple  No LAD  RRR  CTAB  +BS, soft, TTP appropriately post-op  No c/c/e  No obvious rashes  CORBETT  Normal  affect    Pertinent  and/or Most Recent Results     LAB RESULTS:      Lab 04/21/23 0411 04/20/23 0321 04/19/23  0826   WBC 11.17* 7.18 11.29*   HEMOGLOBIN 12.5 12.6 13.4   HEMATOCRIT 38.9 38.3 41.0   PLATELETS 311 275 305   NEUTROS ABS 9.47* 4.24 8.91*   IMMATURE GRANS (ABS) 0.04 0.02 0.06*   LYMPHS ABS 1.29 2.14 1.53   MONOS ABS 0.36 0.46 0.36   EOS ABS 0.00 0.29 0.36   MCV 87.2 85.7 85.1         Lab 04/21/23 0411 04/20/23 2057 04/20/23 0321 04/19/23  0826   SODIUM 139  --  145 138   POTASSIUM 4.5 4.2 3.6 5.0   CHLORIDE 104  --  110* 98   CO2 25.0  --  26.0 29.0   ANION GAP 10.0  --  9.0 11.0   BUN 7*  --  8 7*   CREATININE 0.64  --  0.63 0.69   EGFR 98.8  --  99.2 97.1   GLUCOSE 149*  --  121* 116*   CALCIUM 9.5  --  8.9 9.4   MAGNESIUM 1.8  --  1.4*  --    HEMOGLOBIN A1C  --   --   --  5.90*         Lab 04/19/23  0826   TOTAL PROTEIN 6.8   ALBUMIN 4.1   GLOBULIN 2.7   ALT (SGPT) 17   AST (SGOT) 19   BILIRUBIN 0.7   ALK PHOS 97                 Lab 04/20/23 0321   ABO TYPING O   RH TYPING Positive   ANTIBODY SCREEN Negative         Brief Urine Lab Results  (Last result in the past 365 days)      Color   Clarity   Blood   Leuk Est   Nitrite   Protein   CREAT   Urine HCG        04/19/23 0737 Yellow   Clear   Negative   Negative   Negative   Negative               Microbiology Results (last 10 days)     ** No results found for the last 240 hours. **          CT Abdomen Pelvis With Contrast    Result Date: 4/19/2023  CT ABDOMEN PELVIS W CONTRAST Date of Exam: 4/19/2023 9:47 AM EDT Indication: urinary retention. Comparison: 2/17/2017 Technique: Axial CT images were obtained of the abdomen and pelvis following the uneventful intravenous administration of 80 mL Isovue-300. Reconstructed coronal and sagittal images were also obtained. Automated exposure control and iterative construction methods were used. Findings: Lower thorax:Lung bases are clear. No coronary artery calcifications seen in the visualized heart. No  pericardial effusion.  No evidence of pulmonary embolus in the visualized pulmonary arteries. Liver: No focal hepatic lesions seen.  Normal hepatic size. Normal density of the liver. Gallbladder and bile ducts: Cholelithiasis without evidence of cholecystitis. No intra- or extra- hepatic biliary ductal dilatation. Spleen: Calcified granulomas. Pancreas: Normal appearance of the pancreas. Main pancreatic duct is nondilated. Adrenals: Normal appearance of the adrenal glands. Kidneys: Normal appearance of the kidneys. Symmetric enhancement and excretion of contrast. No nephrolithiasis.  Normal caliber of the ureters. Bowel: Normal caliber of the bowels. Normal appearance of the appendix. Diverticulosis without diverticulitis. Pelvis: Decompressed bladder with Nuñez catheter. Small amount of gas in the bladder is likely due to catheterization. Large cystic lesion seen within the pelvis measuring approximately 10.4 x 14.0 x 9.4 cm. There is an internal rounded septation/internal cyst component. This lesion exerts mass effect on the uterus and bladder pushing them anteriorly and to the right. The left gonadal vein appears to terminate at the cystic lesion suggesting that this is a left ovarian cystic neoplasm. On prior CT there was a 4.2 x 3.7 cm left ovarian cyst which may have been the precursor for this lesion. Peritoneum: No free air. No free fluid. No peritoneal nodularity. Vessels: Normal aortic caliber. Atherosclerotic calcification of the aorta. Celiac artery, splenic artery, superior mesenteric artery, inferior mesenteric artery, renal arteries and iliac arteries appear patent. Iliac veins, inferior vena cava, superior mesenteric vein, renal veins, portal vein and splenic vein are patent. Lymph nodes: No enlarged or suspicious adenopathy. Bones: No acute osseous abnormality. Degenerative changes of the spine. Soft tissues: Unremarkable appearance of the soft tissues.     Impression: Large cystic mass within the  pelvis with internal rounded septation/internal cyst. This appears to be left ovarian in origin as the left gonadal vein appears to terminate here. Additionally there was a left ovarian cyst seen on prior CT which may've been  the precursor for this lesion. This most likely reflects a left ovarian cystic neoplasm such as serous cystadenoma but incompletely characterized on this examination. This mass exerts mass effect on the bladder and uterus anteriorly and to the right. Electronically Signed: Jamie BrownClaire  4/19/2023 10:42 AM EDT  Workstation ID: [id number]    Peripheral Block    Result Date: 4/20/2023  Kristen Vo CRNA     4/20/2023  3:09 PM Peripheral Block Patient reassessed immediately prior to procedure Patient location during procedure: OR Start time: 4/20/2023 2:30 PM Reason for block: at surgeon's request and post-op pain management Performed by Anesthesiologist: Yogi Zhu MD Preanesthetic Checklist Completed: patient identified, IV checked, site marked, risks and benefits discussed, surgical consent, monitors and equipment checked, pre-op evaluation and timeout performed Prep: Pt Position: supine Sterile barriers:cap, gloves, mask and washed/disinfected hands Prep: ChloraPrep Patient monitoring: blood pressure monitoring, continuous pulse oximetry and EKG Procedure Sedation: yes Performed under: general Guidance:ultrasound guided Images:still images obtained, printed/placed on chart Laterality:Bilateral Block Type:TAP Injection Technique:single-shot Needle Type:short-bevel and echogenic Needle Gauge:20 G Resistance on Injection: none Medications Used: bupivacaine PF (MARCAINE) 0.25 % injection - Injection  60 mL - 4/20/2023 2:30:00 PM dexamethasone sodium phosphate injection - Injection  4 mg - 4/20/2023 2:30:00 PM Medications Comment:Block Injection:  LA dose divided between Right and Left block Post Assessment Injection Assessment: negative aspiration for heme, incremental injection  "and no paresthesia on injection Patient Tolerance:comfortable throughout block Complications:no Additional Notes Subcostal TAPs A high-frequency linear transducer, with sterile cover, was placed sub-xiphoid to identify [place], right and left Rectus Abdominus Muscles (NIMESH). The transducer was moved either right or left subcostally to identify the NIMESH and the Transverse Abdominus Muscle (SOLANO). The insertion site was prepped in sterile fashion and then localized with 2-5 ml of 1% Lidocaine. Using ultrasound-guidance, a 20-gauge B-Pleitez 4\" Ultraplex 360 non-stimulating echogenic needle was advanced in plane, from medial to lateral, until the tip of the needle was in the fascial plane between the NIMESH and SOLANO. 1-3ml of preservative free normal saline was used to hydro-dissect the fascial planes. After the fascial plane was verified, the local anesthetic (LA) was injected. The procedure was repeated on the opposite side for bilateral coverage. Aspiration every 5 ml to prevent intravascular injection. Injection was completed with negative aspiration of blood and negative intravascular injection. Injection pressures were normal with minimal resistance. The subcostal approach to the TAP nerve block ideally anesthetizes the intercostal nerves T6-T9. Mid-Axillary/Lateral TAPs A high-frequency linear transducer, with sterile cover, was placed in the midaxillary line between the ASIS and costal margin. The External Oblique Muscle (EOM), Internal Oblique Muscle (IOM), Transverse Abdominus Muscle (SOLANO), and Peritoneum were identified. The insertion site was prepped in sterile fashion and then localized with 2-5 ml of 1% Lidocaine. Using ultrasound-guidance, a 20-gauge B-Pleitez 4\" Ultraplex 360 non-stimulating echogenic needle was advanced in plane, from medial to lateral, until the tip of the needle was in the fascial plane between the IOM and SOLANO. 1-3ml of preservative free normal saline was used to hydro-dissect the fascial " planes. After the fascial plane was verified, the local anesthetic (LA) was injected. The procedure was repeated on the opposite side for bilateral coverage. Aspiration every 5 ml to prevent intravascular injection. Injection was completed with negative aspiration of blood and negative intravascular injection. Injection pressures were normal with minimal resistance. Midaxillary TAPs should reach intercostal nerves T10- T11 and the subcostal nerve T12.                    Plan for Follow-up of Pending Labs/Results:   Pending Labs     Order Current Status    Tissue Pathology Exam In process        Discharge Details        Discharge Medications      New Medications      Instructions Start Date   acetaminophen 325 MG tablet  Commonly known as: TYLENOL   650 mg, Oral, Every 6 Hours Scheduled      polyethylene glycol 17 g packet  Commonly known as: MIRALAX   17 g, Oral, Daily PRN      sennosides-docusate 8.6-50 MG per tablet  Commonly known as: PERICOLACE   2 tablets, Oral, 2 Times Daily         Continue These Medications      Instructions Start Date   albuterol sulfate  (90 Base) MCG/ACT inhaler  Commonly known as: PROVENTIL HFA;VENTOLIN HFA;PROAIR HFA   2 puffs, Inhalation, Every 6 Hours PRN      calcium carb-cholecalciferol 600-800 MG-UNIT tablet   1 tablet, Oral, 2 Times Daily With Meals      Magnesium 250 MG tablet   Take 1 tablet twice daily for 30 days, then 1 tab once daily      metoprolol succinate  MG 24 hr tablet  Commonly known as: TOPROL-XL   100 mg, Oral, Daily      omeprazole 20 MG capsule  Commonly known as: priLOSEC   20 mg, Oral, Daily      pravastatin 40 MG tablet  Commonly known as: Pravachol   40 mg, Oral, Nightly      promethazine-dextromethorphan 6.25-15 MG/5ML syrup  Commonly known as: PROMETHAZINE-DM   5 mL, Oral, 4 Times Daily PRN      thiamine 100 MG tablet  Commonly known as: VITAMIN B-1   100 mg, Oral, Daily      triamcinolone 0.1 % ointment  Commonly known as: KENALOG   Topical, 3  Times Daily      Vitamin B-12 1000 MCG sublingual tablet   1 tablet, Sublingual, Daily             Allergies   Allergen Reactions   • Benazepril Other (See Comments)     No allergy.    • Doxycycline Diarrhea   • Eggs Or Egg-Derived Products      Causes cramps   • Fish-Derived Products      Causes severe stomach cramps         Discharge Disposition:  Home or Self Care    Diet:  Hospital:  Diet Order   Procedures   • Diet: Regular/House Diet; Texture: Regular Texture (IDDSI 7); Fluid Consistency: Thin (IDDSI 0)       Activity:      Restrictions or Other Recommendations:         CODE STATUS:    Code Status and Medical Interventions:   Ordered at: 04/19/23 2204     Code Status (Patient has no pulse and is not breathing):    CPR (Attempt to Resuscitate)     Medical Interventions (Patient has pulse or is breathing):    Full       No future appointments.    Additional Instructions for the Follow-ups that You Need to Schedule     Discharge Follow-up with PCP   As directed       Currently Documented PCP:    Uzma Duffy PA-C    PCP Phone Number:    [phone]     Follow Up Details: 1 week         Discharge Follow-up with Specified Provider: Judie per her office, for post-op care/wound check/pathology review   As directed      To: Judie per her office, for post-op care/wound check/pathology review                     Catrachito Lockhart MD  04/22/23      Time Spent on Discharge:  I spent  33  minutes on this discharge activity which included: face-to-face encounter with the patient, reviewing the data in the system, coordination of the care with the nursing staff as well as consultants, documentation, and entering orders.

## 2023-04-22 NOTE — OUTREACH NOTE
Prep Survey    Flowsheet Row Responses   [facility] patient discharged from? [place]   Is LACE score < 7 ? No   Eligibility [facility]   Date of Admission 04/19/23   Date of Discharge 04/22/23   Discharge Disposition Home or Self Care   Discharge diagnosis EXPLORATORY LAPAROTOMY, TOTAL ABDOMINAL HYSTERECTOMY BILATERAL SALPINGO-OOPHORECTOMYN/AGeneral with Block   Does the patient have one of the following disease processes/diagnoses(primary or secondary)? General Surgery   Does the patient have Home health ordered? No   Is there a DME ordered? No   Prep survey completed? Yes          Lauren BRADLEY - Registered Nurse

## 2023-04-24 ENCOUNTER — TELEPHONE (OUTPATIENT)
Dept: FAMILY MEDICINE CLINIC | Facility: CLINIC | Age: 63
End: 2023-04-24
Payer: COMMERCIAL

## 2023-04-24 ENCOUNTER — TRANSITIONAL CARE MANAGEMENT TELEPHONE ENCOUNTER (OUTPATIENT)
Dept: CALL CENTER | Facility: HOSPITAL | Age: 63
End: 2023-04-24
Payer: COMMERCIAL

## 2023-04-24 RX ORDER — OXYCODONE HYDROCHLORIDE 5 MG/1
5 TABLET ORAL EVERY 6 HOURS PRN
Qty: 10 TABLET | Refills: 0 | Status: SHIPPED | OUTPATIENT
Start: 2023-04-24

## 2023-04-24 RX ORDER — IBUPROFEN 600 MG/1
600 TABLET ORAL EVERY 6 HOURS PRN
Qty: 30 TABLET | Refills: 3 | Status: SHIPPED | OUTPATIENT
Start: 2023-04-24

## 2023-04-24 NOTE — OUTREACH NOTE
Call Center TCM Note    Flowsheet Row Responses   [facility] patient discharged from? [place]   Does the patient have one of the following disease processes/diagnoses(primary or secondary)? General Surgery   TCM attempt successful? Yes   Call start time 1348   Call end time 1352   Discharge diagnosis EXPLORATORY LAPAROTOMY, TOTAL ABDOMINAL HYSTERECTOMY BILATERAL SALPINGO-OOPHORECTOMYN/AGeneral with Block   Is patient permission given to speak with other caregiver? Yes   Person spoke with today (if not patient) and relationship Patient/ Spouse- Thierno Salamanca reviewed with patient/caregiver? Yes   Does the patient have all medications related to this admission filled (includes all antibiotics, pain medications, etc.) Yes   Prescription comments Dr. Persaud calling in stronger pain medication   Is the patient taking all medications as directed (includes completed medication regime)? Yes   Comments At this time patient wants to fu with her GYN only   Does the patient have an appointment with their PCP within 7 days of discharge? Other   Nursing Interventions Patient desires to follow up with specialty only   Has home health visited the patient within 72 hours of discharge? N/A   Psychosocial issues? No   Did the patient receive a copy of their discharge instructions? Yes   Nursing interventions Reviewed instructions with patient   What is the patient's perception of their health status since discharge? Improving   Nursing interventions Nurse provided patient education   Is the patient /caregiver able to teach back basic post-op care? Keep incision areas clean,dry and protected, Drive as instructed by MD in discharge instructions   Is the patient/caregiver able to teach back signs and symptoms of incisional infection? Increased redness, swelling or pain at the incisonal site, Increased drainage or bleeding, Incisional warmth, Pus or odor from incision, Fever   Is the patient/caregiver able to teach back steps to  recovery at home? Set small, achievable goals for return to baseline health, Rest and rebuild strength, gradually increase activity   Is the patient/caregiver able to teach back the hierarchy of who to call/visit for symptoms/problems? PCP, Specialist, Home health nurse, Urgent Care, ED, 911 Yes   TCM call completed? Yes   Wrap up additional comments Patient states she is doing okay- however in alot of pain. surgeon is calling in stronger pain medication no concerns or questions noted.   Call end time 1352   Would this patient benefit from a Referral to [facility] Social Work? No   Is the patient interested in additional calls from an ambulatory ?  NOTE:  applies to high risk patients requiring additional follow-up. No          Edyta Cheek RN    4/24/2023, 13:53 EDT

## 2023-04-24 NOTE — TELEPHONE ENCOUNTER
Spoke with patient.  She is doing well overall except for pain.  She has called surgeon to obtain prescription.  Was discharged without medication.

## 2023-04-25 NOTE — PAYOR COMM NOTE
"[facility]# [id number]  Discharge Summary    Utilization Review  Phone [phone]  Fax [phone]    [place] [id number]        Jocelyne Lindquist (62 y.o. Female)     Date of Birth   1960    Social Security Number       Address   [address] [place] [id number]    Home Phone   [phone]    MRN   [id number]       Religious   Taoist    Marital Status                               Admission Date   23    Admission Type   Emergency    Admitting Provider   Catrachito Lockhart MD    Attending Provider       Department, Room/Bed   [facility] 5G, S549/1       Discharge Date   2023    Discharge Disposition   Home or Self Care    Discharge Destination                               Attending Provider: (none)   Allergies: Benazepril, Doxycycline, Eggs Or Egg-derived Products, Fish-derived Products    Isolation: None   Infection: None   Code Status: Prior    Ht: 160 cm (63\")   Wt: 64.5 kg (142 lb 3.2 oz)    Admission Cmt: None   Principal Problem: Acute urinary retention [R33.8]                 Active Insurance as of 2023     Primary Coverage     Payor Plan Insurance Group Employer/Plan Group    [employer]HEM BLUE CROSS ANTHEM BLUE CROSS BLUE SHIELD PPO [id number]     Payor Plan Address Payor Plan Phone Number Payor Plan Fax Number Effective Dates    [address] [phone]  2020 - None Entered    [place] [address]       Subscriber Name Subscriber Birth Date Member ID       CHOCO LINDQUIST 1962 [id number]                 Emergency Contacts      (Rel.) Home Phone Work Phone Mobile Phone    Thierno Lindquist (Spouse) [phone] -- [phone]               Discharge Summary      Catrachito Lockhart MD at 23 0839              [facility] Medicine Services  DISCHARGE SUMMARY    Patient Name: Jocelyne Lindquist  : 1960  MRN: [id number]    Date of Admission: 2023  6:53 AM  Date of " Discharge: 4/22/2023  Primary Care Physician: Uzma Duffy PA-C    Consults     No orders found from 3/21/2023 to 4/20/2023.          Hospital Course     Presenting Problem:   Acute urinary retention [R33.8]  S/P ESMER (total abdominal hysterectomy) [Z90.710]    Active Hospital Problems    Diagnosis  POA   • **Acute urinary retention [R33.8]  Yes   • S/P ESMER (total abdominal hysterectomy) [Z90.710]  Yes   • Pelvic mass [R19.00]  Unknown   • Mixed hyperlipidemia [E78.2]  Yes   • Cyst of left ovary [N83.202]  Yes      Resolved Hospital Problems   No resolved problems to display.          Hospital Course:  Jocelyne Lindquist is a 62 y.o. female that presented with acute urinary retention, with the discovery of a large pelvic mass noted on admission.     Pelvic mass  -She is s/p exploratory lap, ESMER/BSO, cystoscopy with ureteral stents-removed, with unremarkable intra-operative pathology, awaiting final pathology. She is doing remarkably well. Her pain has been controlled with tylenol only. She denies f/c. She is tolerating PO. She is voiding well and had a BM. She is ready for discharge. She will follow up with Dr. Dimas      Discharge Follow Up Recommendations for outpatient labs/diagnostics:  As written    Day of Discharge     HPI: Notes abdominal soreness. No f/c. No n/v. Tolerating PO. Voiding okay. No constipation. Pain well controlled with tylenol only.    Review of Systems  As above    Vital Signs:   Temp:  [97.4 °F (36.3 °C)-98.7 °F (37.1 °C)] 97.7 °F (36.5 °C)  Heart Rate:  [60-80] 60  Resp:  [18] 18  BP: (124-146)/(62-78) 124/66      Physical Exam:  NAD, alert and oriented  OP clear, MMM  Neck supple  No LAD  RRR  CTAB  +BS, soft, TTP appropriately post-op  No c/c/e  No obvious rashes  CORBETT  Normal affect    Pertinent  and/or Most Recent Results     LAB RESULTS:      Lab 04/21/23  0411 04/20/23  0321 04/19/23  0826   WBC 11.17* 7.18 11.29*   HEMOGLOBIN 12.5 12.6 13.4   HEMATOCRIT 38.9 38.3 41.0    PLATELETS 311 275 305   NEUTROS ABS 9.47* 4.24 8.91*   IMMATURE GRANS (ABS) 0.04 0.02 0.06*   LYMPHS ABS 1.29 2.14 1.53   MONOS ABS 0.36 0.46 0.36   EOS ABS 0.00 0.29 0.36   MCV 87.2 85.7 85.1         Lab 04/21/23  0411 04/20/23 2057 04/20/23  0321 04/19/23  0826   SODIUM 139  --  145 138   POTASSIUM 4.5 4.2 3.6 5.0   CHLORIDE 104  --  110* 98   CO2 25.0  --  26.0 29.0   ANION GAP 10.0  --  9.0 11.0   BUN 7*  --  8 7*   CREATININE 0.64  --  0.63 0.69   EGFR 98.8  --  99.2 97.1   GLUCOSE 149*  --  121* 116*   CALCIUM 9.5  --  8.9 9.4   MAGNESIUM 1.8  --  1.4*  --    HEMOGLOBIN A1C  --   --   --  5.90*         Lab 04/19/23  0826   TOTAL PROTEIN 6.8   ALBUMIN 4.1   GLOBULIN 2.7   ALT (SGPT) 17   AST (SGOT) 19   BILIRUBIN 0.7   ALK PHOS 97                 Lab 04/20/23  0321   ABO TYPING O   RH TYPING Positive   ANTIBODY SCREEN Negative         Brief Urine Lab Results  (Last result in the past 365 days)      Color   Clarity   Blood   Leuk Est   Nitrite   Protein   CREAT   Urine HCG        04/19/23 0737 Yellow   Clear   Negative   Negative   Negative   Negative               Microbiology Results (last 10 days)     ** No results found for the last 240 hours. **          CT Abdomen Pelvis With Contrast    Result Date: 4/19/2023  CT ABDOMEN PELVIS W CONTRAST Date of Exam: 4/19/2023 9:47 AM EDT Indication: urinary retention. Comparison: 2/17/2017 Technique: Axial CT images were obtained of the abdomen and pelvis following the uneventful intravenous administration of 80 mL Isovue-300. Reconstructed coronal and sagittal images were also obtained. Automated exposure control and iterative construction methods were used. Findings: Lower thorax:Lung bases are clear. No coronary artery calcifications seen in the visualized heart. No pericardial effusion.  No evidence of pulmonary embolus in the visualized pulmonary arteries. Liver: No focal hepatic lesions seen.  Normal hepatic size. Normal density of the liver. Gallbladder and  bile ducts: Cholelithiasis without evidence of cholecystitis. No intra- or extra- hepatic biliary ductal dilatation. Spleen: Calcified granulomas. Pancreas: Normal appearance of the pancreas. Main pancreatic duct is nondilated. Adrenals: Normal appearance of the adrenal glands. Kidneys: Normal appearance of the kidneys. Symmetric enhancement and excretion of contrast. No nephrolithiasis.  Normal caliber of the ureters. Bowel: Normal caliber of the bowels. Normal appearance of the appendix. Diverticulosis without diverticulitis. Pelvis: Decompressed bladder with Nuñez catheter. Small amount of gas in the bladder is likely due to catheterization. Large cystic lesion seen within the pelvis measuring approximately 10.4 x 14.0 x 9.4 cm. There is an internal rounded septation/internal cyst component. This lesion exerts mass effect on the uterus and bladder pushing them anteriorly and to the right. The left gonadal vein appears to terminate at the cystic lesion suggesting that this is a left ovarian cystic neoplasm. On prior CT there was a 4.2 x 3.7 cm left ovarian cyst which may have been the precursor for this lesion. Peritoneum: No free air. No free fluid. No peritoneal nodularity. Vessels: Normal aortic caliber. Atherosclerotic calcification of the aorta. Celiac artery, splenic artery, superior mesenteric artery, inferior mesenteric artery, renal arteries and iliac arteries appear patent. Iliac veins, inferior vena cava, superior mesenteric vein, renal veins, portal vein and splenic vein are patent. Lymph nodes: No enlarged or suspicious adenopathy. Bones: No acute osseous abnormality. Degenerative changes of the spine. Soft tissues: Unremarkable appearance of the soft tissues.     Impression: Large cystic mass within the pelvis with internal rounded septation/internal cyst. This appears to be left ovarian in origin as the left gonadal vein appears to terminate here. Additionally there was a left ovarian cyst seen on  prior CT which may've been  the precursor for this lesion. This most likely reflects a left ovarian cystic neoplasm such as serous cystadenoma but incompletely characterized on this examination. This mass exerts mass effect on the bladder and uterus anteriorly and to the right. Electronically Signed: Jamie BrownClaire  4/19/2023 10:42 AM EDT  Workstation ID: [id number]    Peripheral Block    Result Date: 4/20/2023  Kristen Vo CRNA     4/20/2023  3:09 PM Peripheral Block Patient reassessed immediately prior to procedure Patient location during procedure: OR Start time: 4/20/2023 2:30 PM Reason for block: at surgeon's request and post-op pain management Performed by Anesthesiologist: Yogi Zhu MD Preanesthetic Checklist Completed: patient identified, IV checked, site marked, risks and benefits discussed, surgical consent, monitors and equipment checked, pre-op evaluation and timeout performed Prep: Pt Position: supine Sterile barriers:cap, gloves, mask and washed/disinfected hands Prep: ChloraPrep Patient monitoring: blood pressure monitoring, continuous pulse oximetry and EKG Procedure Sedation: yes Performed under: general Guidance:ultrasound guided Images:still images obtained, printed/placed on chart Laterality:Bilateral Block Type:TAP Injection Technique:single-shot Needle Type:short-bevel and echogenic Needle Gauge:20 G Resistance on Injection: none Medications Used: bupivacaine PF (MARCAINE) 0.25 % injection - Injection  60 mL - 4/20/2023 2:30:00 PM dexamethasone sodium phosphate injection - Injection  4 mg - 4/20/2023 2:30:00 PM Medications Comment:Block Injection:  LA dose divided between Right and Left block Post Assessment Injection Assessment: negative aspiration for heme, incremental injection and no paresthesia on injection Patient Tolerance:comfortable throughout block Complications:no Additional Notes Subcostal TAPs A high-frequency linear transducer, with sterile cover, was placed  "sub-xiphoid to identify [place], right and left Rectus Abdominus Muscles (NIMESH). The transducer was moved either right or left subcostally to identify the NIMESH and the Transverse Abdominus Muscle (SOLANO). The insertion site was prepped in sterile fashion and then localized with 2-5 ml of 1% Lidocaine. Using ultrasound-guidance, a 20-gauge B-Pleitez 4\" Ultraplex 360 non-stimulating echogenic needle was advanced in plane, from medial to lateral, until the tip of the needle was in the fascial plane between the NIMESH and SOLANO. 1-3ml of preservative free normal saline was used to hydro-dissect the fascial planes. After the fascial plane was verified, the local anesthetic (LA) was injected. The procedure was repeated on the opposite side for bilateral coverage. Aspiration every 5 ml to prevent intravascular injection. Injection was completed with negative aspiration of blood and negative intravascular injection. Injection pressures were normal with minimal resistance. The subcostal approach to the TAP nerve block ideally anesthetizes the intercostal nerves T6-T9. Mid-Axillary/Lateral TAPs A high-frequency linear transducer, with sterile cover, was placed in the midaxillary line between the ASIS and costal margin. The External Oblique Muscle (EOM), Internal Oblique Muscle (IOM), Transverse Abdominus Muscle (SOLANO), and Peritoneum were identified. The insertion site was prepped in sterile fashion and then localized with 2-5 ml of 1% Lidocaine. Using ultrasound-guidance, a 20-gauge B-Pleitez 4\" Ultraplex 360 non-stimulating echogenic needle was advanced in plane, from medial to lateral, until the tip of the needle was in the fascial plane between the IOM and SOLANO. 1-3ml of preservative free normal saline was used to hydro-dissect the fascial planes. After the fascial plane was verified, the local anesthetic (LA) was injected. The procedure was repeated on the opposite side for bilateral coverage. Aspiration every 5 ml to prevent " intravascular injection. Injection was completed with negative aspiration of blood and negative intravascular injection. Injection pressures were normal with minimal resistance. Midaxillary TAPs should reach intercostal nerves T10- T11 and the subcostal nerve T12.                    Plan for Follow-up of Pending Labs/Results:   Pending Labs     Order Current Status    Tissue Pathology Exam In process        Discharge Details        Discharge Medications      New Medications      Instructions Start Date   acetaminophen 325 MG tablet  Commonly known as: TYLENOL   650 mg, Oral, Every 6 Hours Scheduled      polyethylene glycol 17 g packet  Commonly known as: MIRALAX   17 g, Oral, Daily PRN      sennosides-docusate 8.6-50 MG per tablet  Commonly known as: PERICOLACE   2 tablets, Oral, 2 Times Daily         Continue These Medications      Instructions Start Date   albuterol sulfate  (90 Base) MCG/ACT inhaler  Commonly known as: PROVENTIL HFA;VENTOLIN HFA;PROAIR HFA   2 puffs, Inhalation, Every 6 Hours PRN      calcium carb-cholecalciferol 600-800 MG-UNIT tablet   1 tablet, Oral, 2 Times Daily With Meals      Magnesium 250 MG tablet   Take 1 tablet twice daily for 30 days, then 1 tab once daily      metoprolol succinate  MG 24 hr tablet  Commonly known as: TOPROL-XL   100 mg, Oral, Daily      omeprazole 20 MG capsule  Commonly known as: priLOSEC   20 mg, Oral, Daily      pravastatin 40 MG tablet  Commonly known as: Pravachol   40 mg, Oral, Nightly      promethazine-dextromethorphan 6.25-15 MG/5ML syrup  Commonly known as: PROMETHAZINE-DM   5 mL, Oral, 4 Times Daily PRN      thiamine 100 MG tablet  Commonly known as: VITAMIN B-1   100 mg, Oral, Daily      triamcinolone 0.1 % ointment  Commonly known as: KENALOG   Topical, 3 Times Daily      Vitamin B-12 1000 MCG sublingual tablet   1 tablet, Sublingual, Daily             Allergies   Allergen Reactions   • Benazepril Other (See Comments)     No allergy.    •  Doxycycline Diarrhea   • Eggs Or Egg-Derived Products      Causes cramps   • Fish-Derived Products      Causes severe stomach cramps         Discharge Disposition:  Home or Self Care    Diet:  Hospital:  Diet Order   Procedures   • Diet: Regular/House Diet; Texture: Regular Texture (IDDSI 7); Fluid Consistency: Thin (IDDSI 0)       Activity:      Restrictions or Other Recommendations:         CODE STATUS:    Code Status and Medical Interventions:   Ordered at: 04/19/23 2204     Code Status (Patient has no pulse and is not breathing):    CPR (Attempt to Resuscitate)     Medical Interventions (Patient has pulse or is breathing):    Full       No future appointments.    Additional Instructions for the Follow-ups that You Need to Schedule     Discharge Follow-up with PCP   As directed       Currently Documented PCP:    Uzma Duffy PA-C    PCP Phone Number:    [phone]     Follow Up Details: 1 week         Discharge Follow-up with Specified Provider: Judie, per her office, for post-op care/wound check/pathology review   As directed      To: Judie per her office, for post-op care/wound check/pathology review                     Catrachito Akins MD  04/22/23      Time Spent on Discharge:  I spent  33  minutes on this discharge activity which included: face-to-face encounter with the patient, reviewing the data in the system, coordination of the care with the nursing staff as well as consultants, documentation, and entering orders.            Electronically signed by Catrachito Akins MD at 04/22/23 0842       Discharge Order (From admission, onward)     Start     Ordered    04/22/23 0838  Discharge patient  Once        Expected Discharge Date: 04/22/23    Discharge Disposition: Home or Self Care    Physician of Record for Attribution - Please select from Treatment Team: CATRACHITO AKINS [[id number]]    Review needed by CMO to determine Physician of Record: No       Question Answer Comment   Physician of  Record for Attribution - Please select from Treatment Team BENJAMIN AKINS    Review needed by CMO to determine Physician of Record No        04/22/23 0869

## 2023-04-26 ENCOUNTER — TELEPHONE (OUTPATIENT)
Dept: GYNECOLOGIC ONCOLOGY | Facility: CLINIC | Age: 63
End: 2023-04-26
Payer: COMMERCIAL

## 2023-04-26 NOTE — TELEPHONE ENCOUNTER
Caller: Jocelyne Lindquist    Relationship: Self    Best call back number: [phone]    What is the best time to reach you: AFTERNOON    Who are you requesting to speak with (clinical staff, provider,  specific staff member): SCHEDULING         What was the call regarding:     NEEDING TO MAKE HOSPITAL FOLLOW UP   SAID IN THREE WEEKS BUT IF CAN WOULD LIKE TO GET IN NEXT WEEK.     Do you require a callback: YES    ADDITIONAL NOTE: HUB UNABLE TO SCHEDULE ACTIVE TREATMENT PATIENT.

## 2023-05-01 ENCOUNTER — OFFICE VISIT (OUTPATIENT)
Dept: FAMILY MEDICINE CLINIC | Facility: CLINIC | Age: 63
End: 2023-05-01
Payer: COMMERCIAL

## 2023-05-01 VITALS
BODY MASS INDEX: 25.34 KG/M2 | HEIGHT: 63 IN | SYSTOLIC BLOOD PRESSURE: 142 MMHG | OXYGEN SATURATION: 92 % | WEIGHT: 143 LBS | DIASTOLIC BLOOD PRESSURE: 82 MMHG | HEART RATE: 80 BPM

## 2023-05-01 DIAGNOSIS — R33.9 URINARY RETENTION: ICD-10-CM

## 2023-05-01 DIAGNOSIS — I10 ESSENTIAL HYPERTENSION: ICD-10-CM

## 2023-05-01 DIAGNOSIS — R05.3 CHRONIC COUGH: ICD-10-CM

## 2023-05-01 DIAGNOSIS — E83.42 HYPOMAGNESEMIA: ICD-10-CM

## 2023-05-01 DIAGNOSIS — Z90.710 S/P TAH (TOTAL ABDOMINAL HYSTERECTOMY): ICD-10-CM

## 2023-05-01 DIAGNOSIS — N83.202 CYST OF LEFT OVARY: ICD-10-CM

## 2023-05-01 DIAGNOSIS — Z09 HOSPITAL DISCHARGE FOLLOW-UP: Primary | ICD-10-CM

## 2023-05-01 NOTE — PROGRESS NOTES
Transitional Care Follow Up Visit  Subjective     Jocelyne Lindquist is a 62 y.o. female who presents for a transitional care management visit.    Within 48 business hours after discharge our office contacted her via telephone to coordinate her care and needs.      I reviewed and discussed the details of that call along with the discharge summary, hospital problems, inpatient lab results, inpatient diagnostic studies, and consultation reports with Jocelyne.     Current outpatient and discharge medications have been reconciled for the patient.        1/4/2021     5:47 PM 1/17/2021     2:00 PM 4/22/2023    10:48 AM   Date of TCM Phone Call   [facility]   Date of Admission 1/4/2021 1/16/2021 4/19/2023   Date of Discharge 1/4/2021 1/17/2021 4/22/2023   Discharge Disposition Home or Self Care Home or Self Care Home or Self Care     Risk for Readmission (LACE) Score: 6 (4/22/2023  6:01 AM)      History of Present Illness  Patient was recently seen at [facility] ED on 4/19 for urinary retention.  She was found to have a pelvic mass thought to be related to her left ovary.  She underwent a complete hysterectomy by Dr. Dimas on 4/20.  The pelvic mass was found to be a benign serous cystadenoma.  She has surgical follow-up with Dr. Dimas on 05/05.  She reports that her urinary symptoms have all resolved.  She is only taking ibuprofen for pain.  Abdominal incision is healing well.  She denies any issues with constipation.  She reports that she has had ongoing upper respiratory infection since March.  She completed a course of antibiotics but does not feel this was helpful.  She has recently started Mucinex and finally feels like the cough is resolving.  She denies any shortness of breath or productive cough.  No fever or chills.  Her  is present at appointment today.     Duration of Hospital Stay:  04/19 to 04/22     The following portions of the patient's history were  reviewed and updated as appropriate: allergies, current medications, past family history, past medical history, past social history, past surgical history and problem list.     Current outpatient and discharge medications have been reconciled for the patient.  Reviewed by: Uzma Duffy PA-C      Review of Systems   Constitutional: Positive for fatigue. Negative for chills and fever.   Respiratory: Positive for wheezing. Negative for cough and shortness of breath.    Cardiovascular: Negative for chest pain, palpitations and leg swelling.   Gastrointestinal: Negative for abdominal pain, constipation, diarrhea, nausea and vomiting.   Genitourinary: Negative for decreased urine volume, dysuria, flank pain, frequency, hematuria and urgency.   Neurological: Negative for dizziness and headache.       Current Outpatient Medications on File Prior to Visit   Medication Sig Dispense Refill   • acetaminophen (TYLENOL) 325 MG tablet Take 2 tablets by mouth Every 6 (Six) Hours.     • albuterol sulfate  (90 Base) MCG/ACT inhaler Inhale 2 puffs Every 6 (Six) Hours As Needed for Wheezing. 18 g 5   • calcium carb-cholecalciferol 600-800 MG-UNIT tablet Take 1 tablet by mouth 2 (Two) Times a Day With Meals. 60 tablet 0   • Cyanocobalamin (Vitamin B-12) 1000 MCG sublingual tablet Place 1 tablet under the tongue Daily. 90 each 3   • ibuprofen (ADVIL,MOTRIN) 600 MG tablet Take 1 tablet by mouth Every 6 (Six) Hours As Needed for Mild Pain. 30 tablet 3   • Magnesium 250 MG tablet Take 1 tablet twice daily for 30 days, then 1 tab once daily 120 tablet 0   • metoprolol succinate XL (TOPROL-XL) 100 MG 24 hr tablet Take 1 tablet by mouth Daily. 90 tablet 3   • omeprazole (priLOSEC) 20 MG capsule Take 1 capsule by mouth Daily. 90 capsule 3   • oxyCODONE (ROXICODONE) 5 MG immediate release tablet Take 1 tablet by mouth Every 6 (Six) Hours As Needed for Severe Pain. 10 tablet 0   • polyethylene glycol (MIRALAX) 17 g packet Take 17  g by mouth Daily As Needed (constipation) for up to 7 doses. 7 packet 0   • pravastatin (Pravachol) 40 MG tablet Take 1 tablet by mouth Every Night. 90 tablet 3   • thiamine (VITAMIN B-1) 100 MG tablet Take 1 tablet by mouth Daily. 30 tablet 5   • triamcinolone (KENALOG) 0.1 % ointment Apply  topically to the appropriate area as directed 3 (Three) Times a Day. 453.6 g 0   • [DISCONTINUED] promethazine-dextromethorphan (PROMETHAZINE-DM) 6.25-15 MG/5ML syrup Take 5 mL by mouth 4 (Four) Times a Day As Needed for Cough. 240 mL 0     No current facility-administered medications on file prior to visit.       Results for orders placed or performed during the hospital encounter of 04/19/23   Urinalysis With Microscopic If Indicated (No Culture) - Urine, Clean Catch    Specimen: Urine, Clean Catch   Result Value Ref Range    Color, UA Yellow Yellow, Straw    Appearance, UA Clear Clear    pH, UA 6.5 5.0 - 8.0    Specific Gravity, UA <=1.005 1.001 - 1.030    Glucose, UA Negative Negative    Ketones, UA Negative Negative    Bilirubin, UA Negative Negative    Blood, UA Negative Negative    Protein, UA Negative Negative    Leuk Esterase, UA Negative Negative    Nitrite, UA Negative Negative    Urobilinogen, UA 0.2 E.U./dL 0.2 - 1.0 E.U./dL   Comprehensive Metabolic Panel    Specimen: Blood   Result Value Ref Range    Glucose 116 (H) 65 - 99 mg/dL    BUN 7 (L) 8 - 23 mg/dL    Creatinine 0.69 0.57 - 1.00 mg/dL    Sodium 138 136 - 145 mmol/L    Potassium 5.0 3.5 - 5.2 mmol/L    Chloride 98 98 - 107 mmol/L    CO2 29.0 22.0 - 29.0 mmol/L    Calcium 9.4 8.6 - 10.5 mg/dL    Total Protein 6.8 6.0 - 8.5 g/dL    Albumin 4.1 3.5 - 5.2 g/dL    ALT (SGPT) 17 1 - 33 U/L    AST (SGOT) 19 1 - 32 U/L    Alkaline Phosphatase 97 39 - 117 U/L    Total Bilirubin 0.7 0.0 - 1.2 mg/dL    Globulin 2.7 gm/dL    A/G Ratio 1.5 g/dL    BUN/Creatinine Ratio 10.1 7.0 - 25.0    Anion Gap 11.0 5.0 - 15.0 mmol/L    eGFR 97.1 >60.0 mL/min/1.73   CBC Auto  Differential    Specimen: Blood   Result Value Ref Range    WBC 11.29 (H) 3.40 - 10.80 10*3/mm3    RBC 4.82 3.77 - 5.28 10*6/mm3    Hemoglobin 13.4 12.0 - 15.9 g/dL    Hematocrit 41.0 34.0 - 46.6 %    MCV 85.1 79.0 - 97.0 fL    MCH 27.8 26.6 - 33.0 pg    MCHC 32.7 31.5 - 35.7 g/dL    RDW 12.2 (L) 12.3 - 15.4 %    RDW-SD 37.6 37.0 - 54.0 fl    MPV 9.3 6.0 - 12.0 fL    Platelets 305 140 - 450 10*3/mm3    Neutrophil % 78.9 (H) 42.7 - 76.0 %    Lymphocyte % 13.6 (L) 19.6 - 45.3 %    Monocyte % 3.2 (L) 5.0 - 12.0 %    Eosinophil % 3.2 0.3 - 6.2 %    Basophil % 0.6 0.0 - 1.5 %    Immature Grans % 0.5 0.0 - 0.5 %    Neutrophils, Absolute 8.91 (H) 1.70 - 7.00 10*3/mm3    Lymphocytes, Absolute 1.53 0.70 - 3.10 10*3/mm3    Monocytes, Absolute 0.36 0.10 - 0.90 10*3/mm3    Eosinophils, Absolute 0.36 0.00 - 0.40 10*3/mm3    Basophils, Absolute 0.07 0.00 - 0.20 10*3/mm3    Immature Grans, Absolute 0.06 (H) 0.00 - 0.05 10*3/mm3    nRBC 0.0 0.0 - 0.2 /100 WBC       Specimen: Blood   Result Value Ref Range     11.2 0.0 - 38.1 U/mL   CEA    Specimen: Blood   Result Value Ref Range    CEA <0.60 ng/mL   Cancer Antigen 19-9    Specimen: Blood   Result Value Ref Range    CA 19-9 7.3 <=35.0 U/mL   Hemoglobin A1c    Specimen: Blood   Result Value Ref Range    Hemoglobin A1C 5.90 (H) 4.80 - 5.60 %   Basic Metabolic Panel    Specimen: Blood   Result Value Ref Range    Glucose 121 (H) 65 - 99 mg/dL    BUN 8 8 - 23 mg/dL    Creatinine 0.63 0.57 - 1.00 mg/dL    Sodium 145 136 - 145 mmol/L    Potassium 3.6 3.5 - 5.2 mmol/L    Chloride 110 (H) 98 - 107 mmol/L    CO2 26.0 22.0 - 29.0 mmol/L    Calcium 8.9 8.6 - 10.5 mg/dL    BUN/Creatinine Ratio 12.7 7.0 - 25.0    Anion Gap 9.0 5.0 - 15.0 mmol/L    eGFR 99.2 >60.0 mL/min/1.73   Magnesium    Specimen: Blood   Result Value Ref Range    Magnesium 1.4 (L) 1.6 - 2.4 mg/dL   CBC Auto Differential    Specimen: Blood   Result Value Ref Range    WBC 7.18 3.40 - 10.80 10*3/mm3    RBC 4.47 3.77 -  5.28 10*6/mm3    Hemoglobin 12.6 12.0 - 15.9 g/dL    Hematocrit 38.3 34.0 - 46.6 %    MCV 85.7 79.0 - 97.0 fL    MCH 28.2 26.6 - 33.0 pg    MCHC 32.9 31.5 - 35.7 g/dL    RDW 12.6 12.3 - 15.4 %    RDW-SD 38.9 37.0 - 54.0 fl    MPV 9.7 6.0 - 12.0 fL    Platelets 275 140 - 450 10*3/mm3    Neutrophil % 59.1 42.7 - 76.0 %    Lymphocyte % 29.8 19.6 - 45.3 %    Monocyte % 6.4 5.0 - 12.0 %    Eosinophil % 4.0 0.3 - 6.2 %    Basophil % 0.4 0.0 - 1.5 %    Immature Grans % 0.3 0.0 - 0.5 %    Neutrophils, Absolute 4.24 1.70 - 7.00 10*3/mm3    Lymphocytes, Absolute 2.14 0.70 - 3.10 10*3/mm3    Monocytes, Absolute 0.46 0.10 - 0.90 10*3/mm3    Eosinophils, Absolute 0.29 0.00 - 0.40 10*3/mm3    Basophils, Absolute 0.03 0.00 - 0.20 10*3/mm3    Immature Grans, Absolute 0.02 0.00 - 0.05 10*3/mm3    nRBC 0.0 0.0 - 0.2 /100 WBC   Potassium    Specimen: Blood   Result Value Ref Range    Potassium 4.2 3.5 - 5.2 mmol/L   Magnesium    Specimen: Blood   Result Value Ref Range    Magnesium 1.8 1.6 - 2.4 mg/dL   Basic Metabolic Panel    Specimen: Blood   Result Value Ref Range    Glucose 149 (H) 65 - 99 mg/dL    BUN 7 (L) 8 - 23 mg/dL    Creatinine 0.64 0.57 - 1.00 mg/dL    Sodium 139 136 - 145 mmol/L    Potassium 4.5 3.5 - 5.2 mmol/L    Chloride 104 98 - 107 mmol/L    CO2 25.0 22.0 - 29.0 mmol/L    Calcium 9.5 8.6 - 10.5 mg/dL    BUN/Creatinine Ratio 10.9 7.0 - 25.0    Anion Gap 10.0 5.0 - 15.0 mmol/L    eGFR 98.8 >60.0 mL/min/1.73   CBC Auto Differential    Specimen: Blood   Result Value Ref Range    WBC 11.17 (H) 3.40 - 10.80 10*3/mm3    RBC 4.46 3.77 - 5.28 10*6/mm3    Hemoglobin 12.5 12.0 - 15.9 g/dL    Hematocrit 38.9 34.0 - 46.6 %    MCV 87.2 79.0 - 97.0 fL    MCH 28.0 26.6 - 33.0 pg    MCHC 32.1 31.5 - 35.7 g/dL    RDW 12.7 12.3 - 15.4 %    RDW-SD 40.2 37.0 - 54.0 fl    MPV 10.1 6.0 - 12.0 fL    Platelets 311 140 - 450 10*3/mm3    Neutrophil % 84.8 (H) 42.7 - 76.0 %    Lymphocyte % 11.5 (L) 19.6 - 45.3 %    Monocyte % 3.2 (L) 5.0 -  12.0 %    Eosinophil % 0.0 (L) 0.3 - 6.2 %    Basophil % 0.1 0.0 - 1.5 %    Immature Grans % 0.4 0.0 - 0.5 %    Neutrophils, Absolute 9.47 (H) 1.70 - 7.00 10*3/mm3    Lymphocytes, Absolute 1.29 0.70 - 3.10 10*3/mm3    Monocytes, Absolute 0.36 0.10 - 0.90 10*3/mm3    Eosinophils, Absolute 0.00 0.00 - 0.40 10*3/mm3    Basophils, Absolute 0.01 0.00 - 0.20 10*3/mm3    Immature Grans, Absolute 0.04 0.00 - 0.05 10*3/mm3    nRBC 0.0 0.0 - 0.2 /100 WBC   POC Glucose Once    Specimen: Blood   Result Value Ref Range    Glucose 169 (H) 70 - 130 mg/dL   ECG 12 Lead Pre-Op / Pre-Procedure   Result Value Ref Range    QT Interval 396 ms    QTC Interval 456 ms   Type & Screen    Specimen: Blood   Result Value Ref Range    ABO Type O     RH type Positive     Antibody Screen Negative     T&S Expiration Date 4/23/2023 11:59:59 PM    ABO RH Specimen Verification    Specimen: Blood   Result Value Ref Range    ABO Type O     RH type Positive    Tissue Pathology Exam    Specimen: A: Ovary, Left with Fallopian Tube; Tissue    B: Uterus, Cervix, R Fallopian Tube, R Ovary; Tissue   Result Value Ref Range    Case Report       Surgical Pathology Report                         Case: [id number]                                  Authorizing Provider:  Kristin Dimas MD      Collected:           04/20/2023 03:18 PM          Ordering Location:     [facility]   Received:            04/20/2023 03:42 PM                                 OR                                                                           Pathologist:           Houston Sanchez MD                                                        Specimens:   1) - Ovary, Left with Fallopian Tube, left tube and ovary for frozen                                2) - Uterus, Cervix, R Fallopian Tube, R Ovary, uterus, cervix, right tube and ovary                for permanent                                                                              Clinical Information        Acute urinary retention  Pelvic mass      Final Diagnosis       1.  LEFT OVARY AND FALLOPIAN TUBE, SALPINGO-OOPHORECTOMY:  Benign serous cystadenoma.  No borderline or malignant features identified.  Fallopian tube with no significant histopathologic change.    2.  UTERUS, CERVIX, RIGHT OVARY AND FALLOPIAN TUBE, HYSTERECTOMY AND RIGHT SALPINGO-OOPHORECTOMY:  Cervix: No significant histopathologic change.  Endometrium: Basalis pattern endometrium.  Myometrium: No significant histopathologic change.  Right ovary: Benign physiologic ovary.  Right Fallopian tube: No significant histopathologic change.      Intraoperative Consultation       Frozen section: Verbal report given to Dr. Dimas via speakerphone on 4/20/2023 at 15:42 EDT.    FROZEN SECTION DIAGNOSIS: Favor papillary serous cystadenoma; no overt borderline features or evidence of malignancy identified. [facility]  Stains used for Immediate Evaluation are acceptable.              Gross Description       1. Ovary, Left with Fallopian Tube.  Received fresh for frozen section labeled left tube and ovary is a 9 x 7.5 x 4.5 cm collapsed, unilocular cystic ovary with a wrinkled, focally roughened outer surface (inked blue).  The attached fallopian tube is 7 cm long by 0.8 cm in diameter and sectioning through the tube reveals an unremarkable 0.1 cm).  No communication between the fallopian tube lumen and cyst is identified.  The wall averages 0.2 cm and the inner lining is smooth without excrescences.  No normal ovarian parenchyma is identified.  Representative sections are submitted for frozen section now resubmitted in block 1A.  Additional sections are submitted in blocks 1B-1G with the fallopian tube in blocks 1B-1C.  2. Uterus, Cervix, R Fallopian Tube, R Ovary.  Received in formalin labeled uterus, cervix, right tube and ovary is an intact simple hysterectomy specimen with attached right fallopian tube and ovary.  The uterine body and cervix is 30.7 g.  The uterine  "body is 5.5 x 3.2 x 1.8 cm and is surfaced by tan-purple serosa.  The smooth ectocervix is 2 x 1.5 cm and the slitlike os is patent, 1 cm in diameter.  The elongated, tan, corrugated endocervical canal is 4 cm long.  No polyps are present.  4 x 1 cm triangular endometrial cavity is surfaced by pale-tan endometrium averaging 0.2 cm thick.  No polyps, exophytic or invasive lesions are present.  The unremarkable myometrium is tan and trabecular, averages 1 cm thick no intramural, subserosal or submucosal nodules are present.  Montes, cerebriform right ovary is 2 g and 1.5 x 1 x 1 cm with unremarkable tan-brown ovarian stroma.  The mildly tortuous, fimbriated fallopian tube is 7.5 cm long by 0.8 cm in diameter and sectioning reveals an unremarkable lumen.  Representative sections are submitted as follows:    2A: Anterior and posterior cervix  2B: Full-thickness anterior endomyometrium  2C: Full-thickness posterior endomyometrium  2D: Right ovary  2E: Right fallopian tube with fimbria   HDM      Microscopic Description       The slides are reviewed and demonstrate histopathologic features supporting the above rendered diagnosis.           Visit Vitals  /82   Pulse 80   Ht 160 cm (63\")   Wt 64.9 kg (143 lb)   SpO2 92%   BMI 25.33 kg/m²     Body mass index is 25.33 kg/m².    Objective   Physical Exam  Vitals reviewed.   Constitutional:       General: She is not in acute distress.     Appearance: Normal appearance. She is well-developed and normal weight. She is not ill-appearing or diaphoretic.   HENT:      Head: Normocephalic and atraumatic.   Eyes:      Extraocular Movements: Extraocular movements intact.      Conjunctiva/sclera: Conjunctivae normal.   Cardiovascular:      Rate and Rhythm: Normal rate and regular rhythm.      Heart sounds: Normal heart sounds.   Pulmonary:      Effort: Pulmonary effort is normal. No respiratory distress.      Breath sounds: Decreased breath sounds present.   Abdominal: "       Musculoskeletal:         General: Normal range of motion.      Cervical back: Normal range of motion.      Right lower leg: No edema.      Left lower leg: No edema.   Skin:     General: Skin is warm.      Findings: No erythema or rash.   Neurological:      General: No focal deficit present.      Mental Status: She is alert.   Psychiatric:         Attention and Perception: She is attentive.         Mood and Affect: Mood normal.         Speech: Speech normal.         Behavior: Behavior normal. Behavior is cooperative.         Thought Content: Thought content normal.         Judgment: Judgment normal.         Assessment & Plan   Diagnoses and all orders for this visit:    1. Hospital discharge follow-up (Primary)    2. S/P ESMER (total abdominal hysterectomy)  3. Cyst of left ovary  4. Urinary retention  Pathology shows benign serous cystadenoma.  Patient has follow-up with Dr. Dimas on 05/05.  She is recovering well from surgery.  Only taking ibuprofen for pain.  Urinary retention has resolved.  Incision is healing well.    5. Chronic cough  Started in March.  Treated with cefdinir then.  Improving overall with mucinex.  Lungs have diminished breath sounds but no rales, crackles or wheezes.  Discussed obtaining chest x-ray but patient declines.  She is feeling better overall.  She will call if symptoms persist or worsen.  Pulse ox is 92% today on room air.  She denies shortness of breath.  She will obtain pulse ox at home and monitor.    6. Essential hypertension  Slightly borderline today.  Asymptomatic.  Stable, well-controlled.  Compliant on medication.    7. Hypomagnesemia  -     Magnesium; Future  Increase magnesium to 500 mg daily.  Repeat labs around 5/20-6/1.             Dictated Utilizing Dragon Dictation     Please note that portions of this note were completed with a voice recognition program.     Part of this note may be an electronic transcription/translation of spoken language to printed text  using the Dragon Dictation System.

## 2023-05-02 DIAGNOSIS — E83.42 HYPOMAGNESEMIA: ICD-10-CM

## 2023-05-02 RX ORDER — MULTIVITAMIN WITH IRON
TABLET ORAL
Qty: 120 TABLET | Refills: 0 | Status: SHIPPED | OUTPATIENT
Start: 2023-05-02

## 2023-05-02 NOTE — TELEPHONE ENCOUNTER
Rx Refill Note  Requested Prescriptions     Pending Prescriptions Disp Refills   • Magnesium 250 MG tablet [Pharmacy Med Name: MAGNESIUM OXIDE 250MG TAB] 120 tablet 0     Sig: TAKE 1 TABLET BY MOUTH TWICE DAILY FOR  30  DAYS,  THEN  TAKE  1  TABLET  ONCE  DAILY  THEREAFTER      Last office visit with prescribing clinician: 5/1/2023     Next office visit with prescribing clinician:    Johnna Rosales MA  05/02/23, 12:32 EDT

## 2023-05-04 NOTE — PROGRESS NOTES
"Jocelyne RODRIGUEZ [facility]  [id number]  1960      Reason for Visit:   Postoperative evaluation    History of Present Illness:  Patient is a very pleasant 62 y.o. woman who presents for a post operative evaluation status post EXPLORATORY LAPAROTOMY, TOTAL ABDOMINAL HYSTERECTOMY BILATERAL SALPINGO-OOPHORECTOMY  CYSTOSCOPY TEMPORARY PLACEMENT BILATERAL URETERAL CATHETER performed on 4/20/2023 for symptomatic large ovarian cystic mass.    Surgery and hospital course were uncomplicated.  Today, patient notes normal bowel and bladder function.  Her pain is well controlled. She has questions about resuming normal activities.     Past Medical History, Past Surgical History, Social History, Family History have been reviewed and are without significant changes except as mentioned.    Review of Systems   All other systems were reviewed and are negative except as mentioned above.    Medications:  The current medication list was reviewed in the EMR    ALLERGIES:    Allergies   Allergen Reactions   • Benazepril Other (See Comments)     No allergy.    • Doxycycline Diarrhea   • Eggs Or Egg-Derived Products      Causes cramps   • Fish-Derived Products      Causes severe stomach cramps         /58   Pulse 78   Temp 97.3 °F (36.3 °C) (Temporal)   Resp 18   Ht 160 cm (63\")   Wt 64.2 kg (141 lb 8 oz)   SpO2 97%   BMI 25.07 kg/m²   ECOG score: 0       Physical Exam  Constitutional:  Patient is a pleasant woman in no acute distress.  Gastrointestinal: Abdomen is soft and appropriately tender.  There is no mass palpated.  There is no rebound or guarding.  Incision(s) is clean, dry and intact.  Extremities:  Bilateral lower extremities are non-tender.  Gynecologic:External genitalia are free from lesion. On speculum examination, the vaginal cuff was intact and no lesions were appreciated.  On bimanual examination, no fullness was appreciated.  Uterus, cervix and adnexa were absent.  There was no significant tenderness.  " Rectovaginal exam was deferred.      PATHOLOGY:  Final Diagnosis   1.  LEFT OVARY AND FALLOPIAN TUBE, SALPINGO-OOPHORECTOMY:  Benign serous cystadenoma.  No borderline or malignant features identified.  Fallopian tube with no significant histopathologic change.    2.  UTERUS, CERVIX, RIGHT OVARY AND FALLOPIAN TUBE, HYSTERECTOMY AND RIGHT SALPINGO-OOPHORECTOMY:  Cervix: No significant histopathologic change.  Endometrium: Basalis pattern endometrium.  Myometrium: No significant histopathologic change.  Right ovary: Benign physiologic ovary.  Right Fallopian tube: No significant histopathologic change.         ASSESSMENT/PLAN:  Jocelyne Lindquist returns for a post-operative evaluation today.  All pathology reports were discussed with the patient.      Overall, the patient is very pleased with her care.  I recommended continuation of post operative precautions as discussed.     She is to follow-up on an as-needed basis.    Kristin Dimas MD  05/05/23  13:04 EDT

## 2023-05-05 ENCOUNTER — OFFICE VISIT (OUTPATIENT)
Dept: GYNECOLOGIC ONCOLOGY | Facility: CLINIC | Age: 63
End: 2023-05-05
Payer: COMMERCIAL

## 2023-05-05 VITALS
BODY MASS INDEX: 25.07 KG/M2 | OXYGEN SATURATION: 97 % | TEMPERATURE: 97.3 F | SYSTOLIC BLOOD PRESSURE: 124 MMHG | HEIGHT: 63 IN | DIASTOLIC BLOOD PRESSURE: 58 MMHG | RESPIRATION RATE: 18 BRPM | HEART RATE: 78 BPM | WEIGHT: 141.5 LBS

## 2023-05-05 DIAGNOSIS — Z98.890 POST-OPERATIVE STATE: Primary | ICD-10-CM

## 2023-05-08 LAB
QT INTERVAL: 396 MS
QTC INTERVAL: 456 MS

## 2023-05-21 DIAGNOSIS — I10 ESSENTIAL HYPERTENSION: ICD-10-CM

## 2023-05-22 RX ORDER — METOPROLOL SUCCINATE 100 MG/1
TABLET, EXTENDED RELEASE ORAL
Qty: 90 TABLET | Refills: 0 | Status: SHIPPED | OUTPATIENT
Start: 2023-05-22

## 2023-05-22 NOTE — TELEPHONE ENCOUNTER
Rx Refill Note  Requested Prescriptions     Pending Prescriptions Disp Refills   • metoprolol succinate XL (TOPROL-XL) 100 MG 24 hr tablet [Pharmacy Med Name: Metoprolol Succinate  MG Oral Tablet Extended Release 24 Hour] 90 tablet 0     Sig: Take 1 tablet by mouth once daily      Last office visit with prescribing clinician: 5/1/2023   Last telemedicine visit with prescribing clinician: 5/2/2023   Next office visit with prescribing clinician: Visit date not found                         Would you like a call back once the refill request has been completed: [] Yes [] No    If the office needs to give you a call back, can they leave a voicemail: [] Yes [] No    Christine Loza MA  05/22/23, 09:10 EDT

## 2023-06-09 ENCOUNTER — TELEPHONE (OUTPATIENT)
Dept: FAMILY MEDICINE CLINIC | Facility: CLINIC | Age: 63
End: 2023-06-09
Payer: COMMERCIAL

## 2023-06-09 DIAGNOSIS — J45.20 MILD INTERMITTENT ASTHMA WITHOUT COMPLICATION: ICD-10-CM

## 2023-06-09 NOTE — TELEPHONE ENCOUNTER
Patient reports she has been experiencing asthma flares and was concerned this may be due to metoprolol.     Offered to schedule appt, patient declined

## 2023-06-09 NOTE — TELEPHONE ENCOUNTER
Caller: Jocelyne Lindquist    Relationship: Self    Best call back number:     What medications are you currently taking:   Current Outpatient Medications on File Prior to Visit   Medication Sig Dispense Refill    acetaminophen (TYLENOL) 325 MG tablet Take 2 tablets by mouth Every 6 (Six) Hours.      albuterol sulfate  (90 Base) MCG/ACT inhaler Inhale 2 puffs Every 6 (Six) Hours As Needed for Wheezing. 18 g 5    calcium carb-cholecalciferol 600-800 MG-UNIT tablet Take 1 tablet by mouth 2 (Two) Times a Day With Meals. 60 tablet 0    Cyanocobalamin (Vitamin B-12) 1000 MCG sublingual tablet Place 1 tablet under the tongue Daily. 90 each 3    ibuprofen (ADVIL,MOTRIN) 600 MG tablet Take 1 tablet by mouth Every 6 (Six) Hours As Needed for Mild Pain. 30 tablet 3    Magnesium 250 MG tablet TAKE 1 TABLET BY MOUTH TWICE DAILY FOR  30  DAYS,  THEN  TAKE  1  TABLET  ONCE  DAILY  THEREAFTER 120 tablet 0    metoprolol succinate XL (TOPROL-XL) 100 MG 24 hr tablet Take 1 tablet by mouth once daily 90 tablet 0    omeprazole (priLOSEC) 20 MG capsule Take 1 capsule by mouth Daily. 90 capsule 3    oxyCODONE (ROXICODONE) 5 MG immediate release tablet Take 1 tablet by mouth Every 6 (Six) Hours As Needed for Severe Pain. 10 tablet 0    polyethylene glycol (MIRALAX) 17 g packet Take 17 g by mouth Daily As Needed (constipation) for up to 7 doses. 7 packet 0    pravastatin (Pravachol) 40 MG tablet Take 1 tablet by mouth Every Night. 90 tablet 3    thiamine (VITAMIN B-1) 100 MG tablet Take 1 tablet by mouth Daily. 30 tablet 5    triamcinolone (KENALOG) 0.1 % ointment Apply  topically to the appropriate area as directed 3 (Three) Times a Day. 453.6 g 0     No current facility-administered medications on file prior to visit.          Which medication are you concerned about: metoprolol succinate XL (TOPROL-XL) 100 MG 24 hr tablet     Who prescribed you this medication: OLIVER LLOYD    What are your concerns: PATIENT  WAS ASKING IF SHE SHOULD BE TAKING THIS MEDICATION AS SHE HAS ASTHMA; HER SPOUSE HEARD ANOTHER PHYSICIAN MENTION THAT THIS MEDICATION SHOULD NOT BE TAKEN IF YOU HAVE THIS CONDITION; PLEASE CALL TO ADVISE

## 2023-06-12 RX ORDER — ALBUTEROL SULFATE 90 UG/1
AEROSOL, METERED RESPIRATORY (INHALATION)
Qty: 18 G | Refills: 0 | Status: SHIPPED | OUTPATIENT
Start: 2023-06-12

## 2023-06-12 NOTE — TELEPHONE ENCOUNTER
Beta blockers usually make asthma harder to treat.  I would be happy to discuss this with her during an appointment.  We can take her off of metoprolol but will need to switch her to something else.  Her metoprolol dose will also need to be tapered. She will have to be seen to do all of this.

## 2023-06-12 NOTE — TELEPHONE ENCOUNTER
Contacted patient to notify. Verbalized understanding   She will schedule follow up appt to discuss further at a later time.

## 2023-07-20 PROBLEM — E61.2 MAGNESIUM DEFICIENCY: Status: ACTIVE | Noted: 2023-07-20

## 2023-08-08 DIAGNOSIS — E78.2 MIXED HYPERLIPIDEMIA: ICD-10-CM

## 2023-08-09 RX ORDER — PRAVASTATIN SODIUM 40 MG
40 TABLET ORAL NIGHTLY
Qty: 90 TABLET | Refills: 3 | Status: SHIPPED | OUTPATIENT
Start: 2023-08-09

## 2023-08-15 DIAGNOSIS — I10 ESSENTIAL HYPERTENSION: ICD-10-CM

## 2023-08-15 RX ORDER — METOPROLOL SUCCINATE 100 MG/1
TABLET, EXTENDED RELEASE ORAL
Qty: 90 TABLET | Refills: 0 | Status: SHIPPED | OUTPATIENT
Start: 2023-08-15

## 2023-08-15 NOTE — TELEPHONE ENCOUNTER
Rx Refill Note  Requested Prescriptions     Pending Prescriptions Disp Refills    metoprolol succinate XL (TOPROL-XL) 100 MG 24 hr tablet [Pharmacy Med Name: Metoprolol Succinate  MG Oral Tablet Extended Release 24 Hour] 90 tablet 0     Sig: Take 1 tablet by mouth once daily      Last office visit with prescribing clinician: 5/1/2023   Last telemedicine visit with prescribing clinician: Visit date not found   Next office visit with prescribing clinician:                          Would you like a call back once the refill request has been completed: [] Yes [] No    If the office needs to give you a call back, can they leave a voicemail: [] Yes [] No    Edgardo Cortez MA  08/15/23, 10:53 EDT

## 2023-08-21 ENCOUNTER — OFFICE VISIT (OUTPATIENT)
Dept: FAMILY MEDICINE CLINIC | Facility: CLINIC | Age: 63
End: 2023-08-21
Payer: COMMERCIAL

## 2023-08-21 VITALS
WEIGHT: 146 LBS | SYSTOLIC BLOOD PRESSURE: 120 MMHG | DIASTOLIC BLOOD PRESSURE: 74 MMHG | HEIGHT: 63 IN | OXYGEN SATURATION: 92 % | BODY MASS INDEX: 25.87 KG/M2 | HEART RATE: 74 BPM

## 2023-08-21 DIAGNOSIS — J45.20 MILD INTERMITTENT ASTHMA WITHOUT COMPLICATION: ICD-10-CM

## 2023-08-21 DIAGNOSIS — J40 BRONCHITIS: Primary | ICD-10-CM

## 2023-08-21 PROCEDURE — 99213 OFFICE O/P EST LOW 20 MIN: CPT | Performed by: PHYSICIAN ASSISTANT

## 2023-08-21 RX ORDER — ALBUTEROL SULFATE 90 UG/1
2 AEROSOL, METERED RESPIRATORY (INHALATION) EVERY 6 HOURS PRN
Qty: 18 G | Refills: 0 | Status: SHIPPED | OUTPATIENT
Start: 2023-08-21

## 2023-08-21 RX ORDER — METHYLPREDNISOLONE 4 MG/1
TABLET ORAL
Qty: 21 EACH | Refills: 0 | Status: SHIPPED | OUTPATIENT
Start: 2023-08-21

## 2023-08-21 RX ORDER — DEXTROMETHORPHAN HYDROBROMIDE AND PROMETHAZINE HYDROCHLORIDE 15; 6.25 MG/5ML; MG/5ML
5 SYRUP ORAL 4 TIMES DAILY PRN
Qty: 240 ML | Refills: 0 | Status: SHIPPED | OUTPATIENT
Start: 2023-08-21

## 2023-08-21 RX ORDER — AMOXICILLIN AND CLAVULANATE POTASSIUM 875; 125 MG/1; MG/1
1 TABLET, FILM COATED ORAL 2 TIMES DAILY
Qty: 20 TABLET | Refills: 0 | Status: SHIPPED | OUTPATIENT
Start: 2023-08-21 | End: 2023-08-31

## 2023-08-22 NOTE — PROGRESS NOTES
Chief Complaint   Patient presents with    URI     Chest congestion. She states it started when we had that smokey air     Shortness of Breath    Cough     At night. Coughing up yellow mucus.         Jocelyne Lindquist is a 62 y.o. female who presents for URI (Chest congestion. She states it started when we had that smokey air ), Shortness of Breath, and Cough (At night. Coughing up yellow mucus.)    Patient presents for chest congestion, productive cough with yellow sputum, wheezing and shortness of breath.  Patient has history of smoking, not currently smoking.  Allergic to doxycycline.    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, right     outer corner of right eye    Tobacco abuse 2/18/2017    Quit 12/25/17       Past Surgical History:   Procedure Laterality Date    BREAST BIOPSY Left 06/18/2010    stereo bx    CYST REMOVAL Right     wrist    CYSTOSCOPY N/A 4/20/2023    Procedure: CYSTOSCOPY TEMPORARY PLACEMENT BILATERAL URETERAL CATHETER;  Surgeon: Kristin Dimas MD;  Location:  [facility];  Service: Gynecology Oncology;  Laterality: N/A;    EXPLORATORY LAPAROTOMY, TOTAL ABDOMINAL HYSTERECTOMY SALPINGO OOPHORECTOMY N/A 4/20/2023    Procedure: EXPLORATORY LAPAROTOMY, TOTAL ABDOMINAL HYSTERECTOMY BILATERAL SALPINGO-OOPHORECTOMY;  Surgeon: Kristin Dimas MD;  Location:  [facility];  Service: Gynecology Oncology;  Laterality: N/A;    SQUAMOUS CELL CARCINOMA EXCISION Right 12/13/2017    outer corner of right eye       Family History   Problem Relation Age of Onset    Cancer Mother         esophageal and liver    Heart failure Father     Breast cancer Neg Hx     Ovarian cancer Neg Hx        Social History     Socioeconomic History    Marital status:    Tobacco Use    Smoking status: Former     Packs/day: 1.00     Years:  "30.00     Pack years: 30.00     Types: Cigarettes     Start date: 2000     Quit date: 2017     Years since quittin.6    Smokeless tobacco: Never    Tobacco comments:     15   Vaping Use    Vaping Use: Never used   Substance and Sexual Activity    Alcohol use: No     Comment: not since 15    Drug use: No    Sexual activity: Defer       Allergies   Allergen Reactions    Benazepril Other (See Comments)     No allergy.     Doxycycline Diarrhea    Eggs Or Egg-Derived Products      Causes cramps    Fish-Derived Products      Causes severe stomach cramps       ROS    Review of Systems   Constitutional:  Positive for fatigue. Negative for chills and fever.   HENT:  Positive for congestion, postnasal drip and rhinorrhea. Negative for ear pain, sinus pressure and sore throat.    Respiratory:  Positive for cough, shortness of breath and wheezing.    Neurological:  Negative for dizziness and headache.     Vitals:    23 1526   BP: 120/74   Pulse: 74   SpO2: 92%   Weight: 66.2 kg (146 lb)   Height: 160 cm (62.99\")     Body mass index is 25.87 kg/mý.    Current Outpatient Medications on File Prior to Visit   Medication Sig Dispense Refill    acetaminophen (TYLENOL) 325 MG tablet Take 2 tablets by mouth Every 6 (Six) Hours.      calcium carb-cholecalciferol 600-800 MG-UNIT tablet Take 1 tablet by mouth 2 (Two) Times a Day With Meals. 60 tablet 0    Cyanocobalamin (Vitamin B-12) 1000 MCG sublingual tablet Place 1 tablet under the tongue Daily. 90 each 3    ibuprofen (ADVIL,MOTRIN) 600 MG tablet Take 1 tablet by mouth Every 6 (Six) Hours As Needed for Mild Pain. 30 tablet 3    magnesium oxide (MAG-OX) 400 MG tablet Take 1 tablet by mouth 2 (Two) Times a Day. 180 tablet 0    metoprolol succinate XL (TOPROL-XL) 100 MG 24 hr tablet Take 1 tablet by mouth once daily 90 tablet 0    omeprazole (priLOSEC) 20 MG capsule Take 1 capsule by mouth Daily. 90 capsule 0    oxyCODONE (ROXICODONE) 5 MG immediate release " tablet Take 1 tablet by mouth Every 6 (Six) Hours As Needed for Severe Pain. 10 tablet 0    polyethylene glycol (MIRALAX) 17 g packet Take 17 g by mouth Daily As Needed (constipation) for up to 7 doses. 7 packet 0    pravastatin (Pravachol) 40 MG tablet Take 1 tablet by mouth Every Night. 90 tablet 3    thiamine (VITAMIN B-1) 100 MG tablet Take 1 tablet by mouth Daily. 30 tablet 5    triamcinolone (KENALOG) 0.1 % ointment Apply  topically to the appropriate area as directed 3 (Three) Times a Day. 453.6 g 0     No current facility-administered medications on file prior to visit.       Results for orders placed or performed in visit on 07/19/23   CBC (No Diff)    Specimen: Blood   Result Value Ref Range    WBC 7.17 3.40 - 10.80 10*3/mm3    RBC 5.22 3.77 - 5.28 10*6/mm3    Hemoglobin 13.7 12.0 - 15.9 g/dL    Hematocrit 42.0 34.0 - 46.6 %    MCV 80.5 79.0 - 97.0 fL    MCH 26.2 (L) 26.6 - 33.0 pg    MCHC 32.6 31.5 - 35.7 g/dL    RDW 12.9 12.3 - 15.4 %    RDW-SD 36.3 (L) 37.0 - 54.0 fl    MPV 9.8 6.0 - 12.0 fL    Platelets 329 140 - 450 10*3/mm3   Comprehensive Metabolic Panel    Specimen: Blood   Result Value Ref Range    Glucose 105 (H) 65 - 99 mg/dL    BUN 6 (L) 8 - 23 mg/dL    Creatinine 0.80 0.57 - 1.00 mg/dL    Sodium 142 136 - 145 mmol/L    Potassium 4.3 3.5 - 5.2 mmol/L    Chloride 102 98 - 107 mmol/L    CO2 28.3 22.0 - 29.0 mmol/L    Calcium 10.2 8.6 - 10.5 mg/dL    Total Protein 7.5 6.0 - 8.5 g/dL    Albumin 4.3 3.5 - 5.2 g/dL    ALT (SGPT) 10 1 - 33 U/L    AST (SGOT) 17 1 - 32 U/L    Alkaline Phosphatase 88 39 - 117 U/L    Total Bilirubin 0.4 0.0 - 1.2 mg/dL    Globulin 3.2 gm/dL    A/G Ratio 1.3 g/dL    BUN/Creatinine Ratio 7.5 7.0 - 25.0    Anion Gap 11.7 5.0 - 15.0 mmol/L    eGFR 82.4 >60.0 mL/min/1.73   TSH Rfx On Abnormal To Free T4    Specimen: Blood   Result Value Ref Range    TSH 1.980 0.270 - 4.200 uIU/mL   Lipid Panel    Specimen: Blood   Result Value Ref Range    Total Cholesterol 129 0 - 200 mg/dL     Triglycerides 95 0 - 150 mg/dL    HDL Cholesterol 49 40 - 60 mg/dL    LDL Cholesterol  62 0 - 100 mg/dL    VLDL Cholesterol 18 5 - 40 mg/dL    LDL/HDL Ratio 1.24    Magnesium    Specimen: Blood   Result Value Ref Range    Magnesium 1.1 (L) 1.6 - 2.4 mg/dL       PE    Physical Exam  Vitals reviewed.   Constitutional:       General: She is not in acute distress.     Appearance: She is well-developed and normal weight. She is ill-appearing. She is not diaphoretic.   HENT:      Head: Normocephalic and atraumatic.      Right Ear: Hearing, tympanic membrane, ear canal and external ear normal.      Left Ear: Hearing, tympanic membrane, ear canal and external ear normal.      Nose: Nose normal.      Right Sinus: No maxillary sinus tenderness or frontal sinus tenderness.      Left Sinus: No maxillary sinus tenderness or frontal sinus tenderness.      Mouth/Throat:      Pharynx: Uvula midline. No posterior oropharyngeal erythema.   Eyes:      Conjunctiva/sclera: Conjunctivae normal.   Cardiovascular:      Rate and Rhythm: Normal rate and regular rhythm.      Heart sounds: Normal heart sounds. No murmur heard.    No friction rub. No gallop.   Pulmonary:      Effort: Pulmonary effort is normal. No respiratory distress.      Breath sounds: Wheezing and rhonchi present.   Musculoskeletal:         General: Normal range of motion.      Cervical back: Normal range of motion.   Skin:     General: Skin is warm.      Findings: No erythema.   Neurological:      Mental Status: She is alert and oriented to person, place, and time.   Psychiatric:         Behavior: Behavior normal.         Thought Content: Thought content normal.         Judgment: Judgment normal.        A/P    Diagnoses and all orders for this visit:    1. Bronchitis (Primary)  -     methylPREDNISolone (MEDROL) 4 MG dose pack; Take as directed on package instructions.  Dispense: 21 each; Refill: 0  -     promethazine-dextromethorphan (PROMETHAZINE-DM) 6.25-15 MG/5ML  syrup; Take 5 mL by mouth 4 (Four) Times a Day As Needed for Cough.  Dispense: 240 mL; Refill: 0  -     amoxicillin-clavulanate (AUGMENTIN) 875-125 MG per tablet; Take 1 tablet by mouth 2 (Two) Times a Day for 10 days.  Dispense: 20 tablet; Refill: 0    2. Mild intermittent asthma without complication  -     albuterol sulfate  (90 Base) MCG/ACT inhaler; Inhale 2 puffs Every 6 (Six) Hours As Needed for Wheezing.  Dispense: 18 g; Refill: 0         Plan of care reviewed with patient at the conclusion of today's visit. Education was provided regarding diagnosis, management and any prescribed or recommended OTC medications.  Patient verbalizes understanding of and agreement with management plan.    Dictated Utilizing Dragon Dictation     Please note that portions of this note were completed with a voice recognition program.     Part of this note may be an electronic transcription/translation of spoken language to printed text using the Dragon Dictation System.    Return in about 3 months (around 12/5/2023) for Annual physical.     Uzma Duffy PA-C

## 2023-09-10 DIAGNOSIS — K21.9 GASTROESOPHAGEAL REFLUX DISEASE WITHOUT ESOPHAGITIS: ICD-10-CM

## 2023-09-11 RX ORDER — OMEPRAZOLE 20 MG/1
20 CAPSULE, DELAYED RELEASE ORAL DAILY
Qty: 90 CAPSULE | Refills: 1 | Status: SHIPPED | OUTPATIENT
Start: 2023-09-11

## 2023-09-11 NOTE — TELEPHONE ENCOUNTER
Rx Refill Note  Requested Prescriptions     Pending Prescriptions Disp Refills    omeprazole (priLOSEC) 20 MG capsule 90 capsule 0     Sig: Take 1 capsule by mouth Daily.      Last office visit with prescribing clinician: 8/21/2023     Next office visit with prescribing clinician: Return in about 3 months (around 12/5/2023) for Annual physical.     Johnna Rosales MA  09/11/23, 08:44 EDT

## 2023-09-14 ENCOUNTER — TRANSCRIBE ORDERS (OUTPATIENT)
Dept: ADMINISTRATIVE | Facility: HOSPITAL | Age: 63
End: 2023-09-14
Payer: MEDICARE

## 2023-09-14 DIAGNOSIS — Z12.31 VISIT FOR SCREENING MAMMOGRAM: Primary | ICD-10-CM

## 2023-09-27 DIAGNOSIS — I10 ESSENTIAL HYPERTENSION: ICD-10-CM

## 2023-09-27 DIAGNOSIS — K21.9 GASTROESOPHAGEAL REFLUX DISEASE WITHOUT ESOPHAGITIS: ICD-10-CM

## 2023-09-27 DIAGNOSIS — E78.2 MIXED HYPERLIPIDEMIA: ICD-10-CM

## 2023-09-27 RX ORDER — METOPROLOL SUCCINATE 100 MG/1
100 TABLET, EXTENDED RELEASE ORAL DAILY
Qty: 90 TABLET | Refills: 1 | Status: SHIPPED | OUTPATIENT
Start: 2023-09-27

## 2023-09-27 RX ORDER — PRAVASTATIN SODIUM 40 MG
40 TABLET ORAL NIGHTLY
Qty: 90 TABLET | Refills: 1 | Status: SHIPPED | OUTPATIENT
Start: 2023-09-27

## 2023-09-27 RX ORDER — OMEPRAZOLE 20 MG/1
20 CAPSULE, DELAYED RELEASE ORAL DAILY
Qty: 90 CAPSULE | Refills: 1 | Status: SHIPPED | OUTPATIENT
Start: 2023-09-27

## 2023-10-12 DIAGNOSIS — J45.20 MILD INTERMITTENT ASTHMA WITHOUT COMPLICATION: ICD-10-CM

## 2023-10-12 RX ORDER — ALBUTEROL SULFATE 90 UG/1
2 AEROSOL, METERED RESPIRATORY (INHALATION) EVERY 6 HOURS PRN
Qty: 18 G | Refills: 0 | Status: SHIPPED | OUTPATIENT
Start: 2023-10-12

## 2023-11-24 DIAGNOSIS — J45.20 MILD INTERMITTENT ASTHMA WITHOUT COMPLICATION: ICD-10-CM

## 2023-11-27 RX ORDER — ALBUTEROL SULFATE 90 UG/1
2 AEROSOL, METERED RESPIRATORY (INHALATION) EVERY 6 HOURS PRN
Qty: 18 G | Refills: 0 | Status: SHIPPED | OUTPATIENT
Start: 2023-11-27

## 2023-11-27 NOTE — TELEPHONE ENCOUNTER
Rx Refill Note  Requested Prescriptions     Pending Prescriptions Disp Refills    albuterol sulfate  (90 Base) MCG/ACT inhaler [Pharmacy Med Name: Albuterol Sulfate  (90 Base) MCG/ACT Inhalation Aerosol Solution] 18 g 0     Sig: INHALE 2 PUFFS BY MOUTH EVERY 6 HOURS AS NEEDED FOR WHEEZING      Last office visit with prescribing clinician: 8/21/2023     Next office visit with prescribing clinician: Return in about 3 months (around 12/5/2023) for Annual physical.       Johnna Rosales MA  11/27/23, 07:57 EST

## 2023-12-04 ENCOUNTER — HOSPITAL ENCOUNTER (OUTPATIENT)
Dept: MAMMOGRAPHY | Facility: HOSPITAL | Age: 63
Discharge: HOME | End: 2023-12-04
Admitting: PHYSICIAN ASSISTANT
Payer: MEDICARE

## 2023-12-04 DIAGNOSIS — Z12.31 VISIT FOR SCREENING MAMMOGRAM: ICD-10-CM

## 2023-12-04 PROCEDURE — 77067 SCR MAMMO BI INCL CAD: CPT

## 2023-12-04 PROCEDURE — 77063 BREAST TOMOSYNTHESIS BI: CPT

## 2024-01-18 DIAGNOSIS — J45.20 MILD INTERMITTENT ASTHMA WITHOUT COMPLICATION: ICD-10-CM

## 2024-01-18 RX ORDER — ALBUTEROL SULFATE 90 UG/1
2 AEROSOL, METERED RESPIRATORY (INHALATION) EVERY 6 HOURS PRN
Qty: 18 G | Refills: 1 | Status: SHIPPED | OUTPATIENT
Start: 2024-01-18

## 2024-01-18 NOTE — TELEPHONE ENCOUNTER
PATIENT CALLED REQUESTING A REFILL ON HER ALBUTEROL, SHE'S ASKED THAT SHE HAVE REFILLS SO THAT WHEN SHE NEED ONE SHE DOESN'T HAVE TO CALL THE OFFICE ALL THE TIME.        PLEASE ADVISE      SPOKE WITH CLINICAL LEAD WHO ADVISED THAT PATIENT WAS SUPPOSED TO HAVE A 3 MONTH FOLLOW UP. SHE STATED THAT SHE WOULD PUT IN THE PRESCRIPTION BUT NOT WITH ANY REFILLS, TO CALL THE PATIENT BACK AND SET UP AN APPOINTMENT AND SHE COULD DISCUSS REFILLS WITH HER PCP. CALLED PATIENT BACK AND RELAYED INFORMATION TO WHICH SHE STATED THAT SHE WOULD HAVE TO CALL BACK AND RESCHEDULE AT A LATER TIME.

## 2024-01-18 NOTE — TELEPHONE ENCOUNTER
Rx Refill Note  Requested Prescriptions     Pending Prescriptions Disp Refills    albuterol sulfate  (90 Base) MCG/ACT inhaler [Pharmacy Med Name: Albuterol Sulfate  (90 Base) MCG/ACT Inhalation Aerosol Solution] 18 g 1     Sig: INHALE 2 PUFFS BY MOUTH EVERY 6 HOURS AS NEEDED FOR WHEEZING      Last office visit with prescribing clinician: 8/21/2023   Next office visit with prescribing clinician: need to schedule annual    Johnna Rosales MA  01/18/24, 14:21 EST

## 2024-01-24 ENCOUNTER — TELEPHONE (OUTPATIENT)
Dept: FAMILY MEDICINE CLINIC | Facility: CLINIC | Age: 64
End: 2024-01-24
Payer: MEDICARE

## 2024-01-24 NOTE — TELEPHONE ENCOUNTER
I don't see where we have ever prescribed this.  Patient would need to contact prescriber for a refill.  If it is a new prescription, she would need an appointment with our office.

## 2024-01-24 NOTE — TELEPHONE ENCOUNTER
Caller: Jocelyne Lindquist    Relationship: Self    Best call back number: [phone]     What medication are you requesting: FLUOCINOLONE .01% TOPICAL SOLUTION  60ML BOTTLE    What are your current symptoms: RASH ON NECK    How long have you been experiencing symptoms:     Have you had these symptoms before:    [x] Yes  [] No    Have you been treated for these symptoms before:   [x] Yes  [] No    If a prescription is needed, what is your preferred pharmacy and phone number:      Additional notes: PATIENT WOULD LIKE TO SEE IF SHE CAN GET A REFILL OF THIS MEDICATION FOR RASH ON HER NECK.

## 2024-01-31 ENCOUNTER — OFFICE VISIT (OUTPATIENT)
Dept: FAMILY MEDICINE CLINIC | Facility: CLINIC | Age: 64
End: 2024-01-31
Payer: MEDICARE

## 2024-01-31 ENCOUNTER — TELEPHONE (OUTPATIENT)
Dept: FAMILY MEDICINE CLINIC | Facility: CLINIC | Age: 64
End: 2024-01-31

## 2024-01-31 ENCOUNTER — LAB (OUTPATIENT)
Dept: LAB | Facility: HOSPITAL | Age: 64
End: 2024-01-31
Payer: MEDICARE

## 2024-01-31 VITALS
BODY MASS INDEX: 27.68 KG/M2 | HEIGHT: 63 IN | DIASTOLIC BLOOD PRESSURE: 72 MMHG | RESPIRATION RATE: 14 BRPM | WEIGHT: 156.2 LBS | OXYGEN SATURATION: 98 % | HEART RATE: 74 BPM | SYSTOLIC BLOOD PRESSURE: 120 MMHG

## 2024-01-31 DIAGNOSIS — E78.2 MIXED HYPERLIPIDEMIA: ICD-10-CM

## 2024-01-31 DIAGNOSIS — I10 ESSENTIAL HYPERTENSION: ICD-10-CM

## 2024-01-31 DIAGNOSIS — F17.210 CIGARETTE SMOKER: ICD-10-CM

## 2024-01-31 DIAGNOSIS — Z12.2 ENCOUNTER FOR SCREENING FOR LUNG CANCER: ICD-10-CM

## 2024-01-31 DIAGNOSIS — R73.03 PREDIABETES: ICD-10-CM

## 2024-01-31 DIAGNOSIS — R21 RASH OF NECK: Primary | ICD-10-CM

## 2024-01-31 LAB
ALBUMIN SERPL-MCNC: 4.7 G/DL (ref 3.5–5.2)
ALBUMIN/GLOB SERPL: 1.6 G/DL
ALP SERPL-CCNC: 116 U/L (ref 39–117)
ALT SERPL W P-5'-P-CCNC: 15 U/L (ref 1–33)
ANION GAP SERPL CALCULATED.3IONS-SCNC: 11.8 MMOL/L (ref 5–15)
AST SERPL-CCNC: 18 U/L (ref 1–32)
BILIRUB SERPL-MCNC: 0.4 MG/DL (ref 0–1.2)
BUN SERPL-MCNC: 9 MG/DL (ref 8–23)
BUN/CREAT SERPL: 12.7 (ref 7–25)
CALCIUM SPEC-SCNC: 10.3 MG/DL (ref 8.6–10.5)
CHLORIDE SERPL-SCNC: 99 MMOL/L (ref 98–107)
CHOLEST SERPL-MCNC: 146 MG/DL (ref 0–200)
CO2 SERPL-SCNC: 28.2 MMOL/L (ref 22–29)
CREAT SERPL-MCNC: 0.71 MG/DL (ref 0.57–1)
EGFRCR SERPLBLD CKD-EPI 2021: 94.5 ML/MIN/1.73
GLOBULIN UR ELPH-MCNC: 3 GM/DL
GLUCOSE SERPL-MCNC: 98 MG/DL (ref 65–99)
HBA1C MFR BLD: 6.5 % (ref 4.8–5.6)
HDLC SERPL-MCNC: 46 MG/DL (ref 40–60)
LDLC SERPL CALC-MCNC: 80 MG/DL (ref 0–100)
LDLC/HDLC SERPL: 1.7 {RATIO}
POTASSIUM SERPL-SCNC: 4.8 MMOL/L (ref 3.5–5.2)
PROT SERPL-MCNC: 7.7 G/DL (ref 6–8.5)
SODIUM SERPL-SCNC: 139 MMOL/L (ref 136–145)
TRIGL SERPL-MCNC: 108 MG/DL (ref 0–150)
VLDLC SERPL-MCNC: 20 MG/DL (ref 5–40)

## 2024-01-31 PROCEDURE — 83036 HEMOGLOBIN GLYCOSYLATED A1C: CPT

## 2024-01-31 PROCEDURE — 1160F RVW MEDS BY RX/DR IN RCRD: CPT | Performed by: PHYSICIAN ASSISTANT

## 2024-01-31 PROCEDURE — 3074F SYST BP LT 130 MM HG: CPT | Performed by: PHYSICIAN ASSISTANT

## 2024-01-31 PROCEDURE — 80061 LIPID PANEL: CPT

## 2024-01-31 PROCEDURE — 1159F MED LIST DOCD IN RCRD: CPT | Performed by: PHYSICIAN ASSISTANT

## 2024-01-31 PROCEDURE — 99214 OFFICE O/P EST MOD 30 MIN: CPT | Performed by: PHYSICIAN ASSISTANT

## 2024-01-31 PROCEDURE — 80053 COMPREHEN METABOLIC PANEL: CPT

## 2024-01-31 PROCEDURE — 3078F DIAST BP <80 MM HG: CPT | Performed by: PHYSICIAN ASSISTANT

## 2024-01-31 RX ORDER — FLUOCINOLONE ACETONIDE 0.1 MG/ML
SOLUTION TOPICAL 2 TIMES DAILY
Qty: 90 ML | Refills: 0 | Status: SHIPPED | OUTPATIENT
Start: 2024-01-31

## 2024-01-31 RX ORDER — METOPROLOL SUCCINATE 100 MG/1
100 TABLET, EXTENDED RELEASE ORAL DAILY
Qty: 90 TABLET | Refills: 3 | Status: SHIPPED | OUTPATIENT
Start: 2024-01-31

## 2024-01-31 NOTE — PROGRESS NOTES
Chief Complaint   Patient presents with    Follow-up     Health, Pt sated she has already had her physical, If she didn't she don't want to do physical today.       Jocelyne Lindquist is a pleasant 63 y.o. female who is here for hypertension, hyperlipidemia, prediabetes, cigarette smoker and rash of neck.  Patient is compliant on all medications.  BP is stable and well-controlled.  She is overdue for CT chest low dose and would like to obtain this.  She has rash on neck that was successfully treated with fluocinolone solution in the past, needs refill.  Established with dermatology.    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, right     outer corner of right eye    Tobacco abuse 2/18/2017    Quit 12/25/17       Past Surgical History:   Procedure Laterality Date    BREAST BIOPSY Left 06/18/2010    stereo bx    CYST REMOVAL Right     wrist    CYSTOSCOPY N/A 4/20/2023    Procedure: CYSTOSCOPY TEMPORARY PLACEMENT BILATERAL URETERAL CATHETER;  Surgeon: Kristin Dimas MD;  Location:  [facility];  Service: Gynecology Oncology;  Laterality: N/A;    EXPLORATORY LAPAROTOMY, TOTAL ABDOMINAL HYSTERECTOMY SALPINGO OOPHORECTOMY N/A 4/20/2023    Procedure: EXPLORATORY LAPAROTOMY, TOTAL ABDOMINAL HYSTERECTOMY BILATERAL SALPINGO-OOPHORECTOMY;  Surgeon: Kristin Dimas MD;  Location:  [facility];  Service: Gynecology Oncology;  Laterality: N/A;    SQUAMOUS CELL CARCINOMA EXCISION Right 12/13/2017    outer corner of right eye       Family History   Problem Relation Age of Onset    Cancer Mother         esophageal and liver    Heart failure Father     Breast cancer Neg Hx     Ovarian cancer Neg Hx        Social History     Socioeconomic History    Marital status:    Tobacco Use    Smoking status: Former     Packs/day: 1.00     Years: 30.00      "Additional pack years: 0.00     Total pack years: 30.00     Types: Cigarettes     Start date: 2000     Quit date: 2017     Years since quittin.0    Smokeless tobacco: Never    Tobacco comments:     17   Vaping Use    Vaping Use: Never used   Substance and Sexual Activity    Alcohol use: No     Comment: not since 17    Drug use: No    Sexual activity: Defer       Allergies   Allergen Reactions    Benazepril Other (See Comments)     No allergy.     Doxycycline Diarrhea    Eggs Or Egg-Derived Products      Causes cramps    Fish-Derived Products      Causes severe stomach cramps       ROS  Review of Systems   Constitutional:  Negative for chills and fever.   Respiratory:  Negative for cough, shortness of breath and wheezing.    Cardiovascular:  Negative for chest pain, palpitations and leg swelling.   Skin:  Positive for rash.   Neurological:  Negative for dizziness and headache.       Vitals:    24 1302   BP: 120/72   BP Location: Left arm   Patient Position: Sitting   Cuff Size: Adult   Pulse: 74   Resp: 14   SpO2: 98%   Weight: 70.9 kg (156 lb 3.2 oz)   Height: 160 cm (62.99\")   PainSc: 0-No pain     Body mass index is 27.68 kg/m².           Current Outpatient Medications on File Prior to Visit   Medication Sig Dispense Refill    acetaminophen (TYLENOL) 325 MG tablet Take 2 tablets by mouth Every 6 (Six) Hours.      albuterol sulfate  (90 Base) MCG/ACT inhaler INHALE 2 PUFFS BY MOUTH EVERY 6 HOURS AS NEEDED FOR WHEEZING 18 g 1    calcium carb-cholecalciferol 600-800 MG-UNIT tablet Take 1 tablet by mouth 2 (Two) Times a Day With Meals. 60 tablet 0    Cyanocobalamin (Vitamin B-12) 1000 MCG sublingual tablet Place 1 tablet under the tongue Daily. 90 each 3    ibuprofen (ADVIL,MOTRIN) 600 MG tablet Take 1 tablet by mouth Every 6 (Six) Hours As Needed for Mild Pain. 30 tablet 3    magnesium oxide (MAG-OX) 400 MG tablet Take 1 tablet by mouth 2 (Two) Times a Day. 180 tablet 0    " omeprazole (priLOSEC) 20 MG capsule Take 1 capsule by mouth Daily. 90 capsule 1    oxyCODONE (ROXICODONE) 5 MG immediate release tablet Take 1 tablet by mouth Every 6 (Six) Hours As Needed for Severe Pain. 10 tablet 0    pravastatin (Pravachol) 40 MG tablet Take 1 tablet by mouth Every Night. 90 tablet 1    promethazine-dextromethorphan (PROMETHAZINE-DM) 6.25-15 MG/5ML syrup Take 5 mL by mouth 4 (Four) Times a Day As Needed for Cough. 240 mL 0    thiamine (VITAMIN B-1) 100 MG tablet Take 1 tablet by mouth Daily. 30 tablet 5    triamcinolone (KENALOG) 0.1 % ointment Apply  topically to the appropriate area as directed 3 (Three) Times a Day. 453.6 g 0    [DISCONTINUED] methylPREDNISolone (MEDROL) 4 MG dose pack Take as directed on package instructions. 21 each 0    [DISCONTINUED] metoprolol succinate XL (TOPROL-XL) 100 MG 24 hr tablet Take 1 tablet by mouth Daily. 90 tablet 1    [DISCONTINUED] polyethylene glycol (MIRALAX) 17 g packet Take 17 g by mouth Daily As Needed (constipation) for up to 7 doses. 7 packet 0     No current facility-administered medications on file prior to visit.       Results for orders placed or performed in visit on 07/19/23   CBC (No Diff)    Specimen: Blood   Result Value Ref Range    WBC 7.17 3.40 - 10.80 10*3/mm3    RBC 5.22 3.77 - 5.28 10*6/mm3    Hemoglobin 13.7 12.0 - 15.9 g/dL    Hematocrit 42.0 34.0 - 46.6 %    MCV 80.5 79.0 - 97.0 fL    MCH 26.2 (L) 26.6 - 33.0 pg    MCHC 32.6 31.5 - 35.7 g/dL    RDW 12.9 12.3 - 15.4 %    RDW-SD 36.3 (L) 37.0 - 54.0 fl    MPV 9.8 6.0 - 12.0 fL    Platelets 329 140 - 450 10*3/mm3   Comprehensive Metabolic Panel    Specimen: Blood   Result Value Ref Range    Glucose 105 (H) 65 - 99 mg/dL    BUN 6 (L) 8 - 23 mg/dL    Creatinine 0.80 0.57 - 1.00 mg/dL    Sodium 142 136 - 145 mmol/L    Potassium 4.3 3.5 - 5.2 mmol/L    Chloride 102 98 - 107 mmol/L    CO2 28.3 22.0 - 29.0 mmol/L    Calcium 10.2 8.6 - 10.5 mg/dL    Total Protein 7.5 6.0 - 8.5 g/dL     Albumin 4.3 3.5 - 5.2 g/dL    ALT (SGPT) 10 1 - 33 U/L    AST (SGOT) 17 1 - 32 U/L    Alkaline Phosphatase 88 39 - 117 U/L    Total Bilirubin 0.4 0.0 - 1.2 mg/dL    Globulin 3.2 gm/dL    A/G Ratio 1.3 g/dL    BUN/Creatinine Ratio 7.5 7.0 - 25.0    Anion Gap 11.7 5.0 - 15.0 mmol/L    eGFR 82.4 >60.0 mL/min/1.73   TSH Rfx On Abnormal To Free T4    Specimen: Blood   Result Value Ref Range    TSH 1.980 0.270 - 4.200 uIU/mL   Lipid Panel    Specimen: Blood   Result Value Ref Range    Total Cholesterol 129 0 - 200 mg/dL    Triglycerides 95 0 - 150 mg/dL    HDL Cholesterol 49 40 - 60 mg/dL    LDL Cholesterol  62 0 - 100 mg/dL    VLDL Cholesterol 18 5 - 40 mg/dL    LDL/HDL Ratio 1.24    Magnesium    Specimen: Blood   Result Value Ref Range    Magnesium 1.1 (L) 1.6 - 2.4 mg/dL       PE    Physical Exam  Vitals reviewed.   Constitutional:       General: She is not in acute distress.     Appearance: Normal appearance. She is well-developed and normal weight. She is not ill-appearing or diaphoretic.   HENT:      Head: Normocephalic and atraumatic.   Eyes:      Extraocular Movements: Extraocular movements intact.      Conjunctiva/sclera: Conjunctivae normal.   Cardiovascular:      Rate and Rhythm: Normal rate and regular rhythm.      Heart sounds: Normal heart sounds.   Pulmonary:      Effort: Pulmonary effort is normal.      Breath sounds: Normal breath sounds.   Musculoskeletal:         General: Normal range of motion.      Cervical back: Normal range of motion.      Right lower leg: No edema.      Left lower leg: No edema.   Skin:     General: Skin is warm.      Findings: Erythema and rash present.          Neurological:      General: No focal deficit present.      Mental Status: She is alert.   Psychiatric:         Attention and Perception: She is attentive.         Mood and Affect: Mood normal.         Speech: Speech normal.         Behavior: Behavior normal. Behavior is cooperative.         Thought Content: Thought content  normal.         Judgment: Judgment normal.           A/P    Diagnoses and all orders for this visit:    1. Rash of neck (Primary)  -     fluocinolone (SYNALAR) 0.01 % external solution; Apply  topically to the appropriate area as directed 2 (Two) Times a Day.  Dispense: 90 mL; Refill: 0  If rash does not improve, follow-up with dermatology.    2. Essential hypertension  -     Comprehensive Metabolic Panel; Future  -     metoprolol succinate XL (TOPROL-XL) 100 MG 24 hr tablet; Take 1 tablet by mouth Daily.  Dispense: 90 tablet; Refill: 3  Stable, well-controlled.  Compliant on medication.    3. Mixed hyperlipidemia  -     Lipid Panel; Future    4. Prediabetes  -     Hemoglobin A1c; Future    5. Cigarette smoker  -      CT Chest Low Dose Cancer Screening WO; Future    6. Encounter for screening for lung cancer  -      CT Chest Low Dose Cancer Screening WO; Future         Plan of care reviewed with patient at the conclusion of today's visit. Education was provided regarding diagnosis, management and any prescribed or recommended OTC medications.  Patient verbalizes understanding of and agreement with management plan.    Dictated Utilizing Dragon Dictation     Please note that portions of this note were completed with a voice recognition program.     Part of this note may be an electronic transcription/translation of spoken language to printed text using the Dragon Dictation System.    Return in about 5 months (around 6/30/2024) for Welcome to Medicare.     Uzma Duffy PA-C  Answers submitted by the patient for this visit:  Other (Submitted on 1/30/2024)  Please describe your symptoms.: No symtoms  Have you had these symptoms before?: No  How long have you been having these symptoms?: 1-4 days  Please list any medications you are currently taking for this condition.: Dont have anything.  Just checking in and want prescriition  for Fluocinolone Acetonide Topical USP .01%  Please describe any probable cause for  these symptoms. : N/A  Primary Reason for Visit (Submitted on 1/30/2024)  What is the primary reason for your visit?: Other

## 2024-02-01 ENCOUNTER — TELEPHONE (OUTPATIENT)
Dept: FAMILY MEDICINE CLINIC | Facility: CLINIC | Age: 64
End: 2024-02-01
Payer: MEDICARE

## 2024-02-01 NOTE — TELEPHONE ENCOUNTER
Caller: Jocelyne Lindquist    Relationship: Self    Best call back number:  [phone]     Who are you requesting to speak with (clinical staff, provider,  specific staff member):  CLINICAL     What was the call regarding:  PATIENT IS CALLING TO GIVE THE INFORMATION BELOW  FOR HER PRESCRIPTIONS     [employer]  [phone]   PHONE [phone]  [phone]    OR CUSTOMER SERVICE NUMBER [phone]

## 2024-02-04 DIAGNOSIS — E11.65 TYPE 2 DIABETES MELLITUS WITH HYPERGLYCEMIA, WITHOUT LONG-TERM CURRENT USE OF INSULIN: Primary | ICD-10-CM

## 2024-02-04 RX ORDER — METFORMIN HYDROCHLORIDE 500 MG/1
1000 TABLET, EXTENDED RELEASE ORAL
Qty: 60 TABLET | Refills: 5 | Status: SHIPPED | OUTPATIENT
Start: 2024-02-04

## 2024-02-05 ENCOUNTER — TELEPHONE (OUTPATIENT)
Dept: FAMILY MEDICINE CLINIC | Facility: CLINIC | Age: 64
End: 2024-02-05
Payer: MEDICARE

## 2024-02-05 NOTE — TELEPHONE ENCOUNTER
PATIENT HAS CALLED REQUESTING A CALL BACK TO DISCUSS A MEDICATION THE PHARMACY HAD FOR HER CALLED   metFORMIN ER (GLUCOPHAGE-XR) 500 MG 24 hr tablet. PATIENT STATES SHE HAS NEVER TAKEN THIS MEDICATION AND WANTS TO MAKE SURE THIS PRESCRIPTION WAS MEANT FOR HER.    CALL BACK NUMBER [phone]

## 2024-02-05 NOTE — TELEPHONE ENCOUNTER
Patient read her [employer] message. But does not want to start the metformin at this time. She would rather diet and exercise and monitor.

## 2024-02-05 NOTE — TELEPHONE ENCOUNTER
Okay she can tell her pharmacy to cancel it.  She needs 3 month follow-up to repeat hemoglobin AIC.  Would she like to go to diabetes education to learn more about diabetes, monitoring and diet?  I can place referral if she likes.

## 2024-02-12 ENCOUNTER — TELEPHONE (OUTPATIENT)
Dept: FAMILY MEDICINE CLINIC | Facility: CLINIC | Age: 64
End: 2024-02-12
Payer: MEDICARE

## 2024-02-12 DIAGNOSIS — E78.2 MIXED HYPERLIPIDEMIA: ICD-10-CM

## 2024-02-12 RX ORDER — PRAVASTATIN SODIUM 40 MG
40 TABLET ORAL NIGHTLY
Qty: 10 TABLET | Refills: 0 | Status: SHIPPED | OUTPATIENT
Start: 2024-02-12

## 2024-02-16 ENCOUNTER — TELEPHONE (OUTPATIENT)
Dept: FAMILY MEDICINE CLINIC | Facility: CLINIC | Age: 64
End: 2024-02-16
Payer: MEDICARE

## 2024-02-16 NOTE — TELEPHONE ENCOUNTER
Patient's hemoglobin AIC was completed on 1/31.  She has to wait 3 months before we can repeat this.  I recommend she cancel appointment on 3/8 and reschedule for end of April/early May.  This needs to be rescheduled as a Medicare Wellness appointment.

## 2024-02-16 NOTE — TELEPHONE ENCOUNTER
Caller: Jocelyne Lindquist    Relationship: Self    Best call back number: [phone]     What orders are you requesting (i.e. lab or imaging): BLOOD WORK ORDERS    In what timeframe would the patient need to come in: ASAP    Where will you receive your lab/imaging services: IN OFFICE    Additional notes: PATIENT WOULD LIKE LAB ORDERS PLACE BEFORE HER APPOINTMENT ON 03/08/2024.

## 2024-04-02 DIAGNOSIS — J45.20 MILD INTERMITTENT ASTHMA WITHOUT COMPLICATION: ICD-10-CM

## 2024-04-02 RX ORDER — ALBUTEROL SULFATE 90 UG/1
2 AEROSOL, METERED RESPIRATORY (INHALATION) EVERY 6 HOURS PRN
Qty: 18 G | Refills: 1 | Status: SHIPPED | OUTPATIENT
Start: 2024-04-02

## 2024-04-02 NOTE — TELEPHONE ENCOUNTER
Rx Refill Note  Requested Prescriptions     Pending Prescriptions Disp Refills    albuterol sulfate  (90 Base) MCG/ACT inhaler 18 g 1     Sig: Inhale 2 puffs Every 6 (Six) Hours As Needed for Wheezing.      Last office visit with prescribing clinician: 1/31/2024   Next office visit with prescribing clinician:  05/10/2024  Lakshmi Boone MA  04/02/24, 09:53 EDT    Last fill: 01/18/2024

## 2024-04-23 DIAGNOSIS — E78.2 MIXED HYPERLIPIDEMIA: ICD-10-CM

## 2024-04-23 RX ORDER — PRAVASTATIN SODIUM 40 MG
40 TABLET ORAL NIGHTLY
Qty: 90 TABLET | Refills: 3 | Status: SHIPPED | OUTPATIENT
Start: 2024-04-23

## 2024-04-23 RX ORDER — NAPROXEN 500 MG/1
500 TABLET ORAL 2 TIMES DAILY WITH MEALS
COMMUNITY
Start: 2024-04-17

## 2024-04-23 NOTE — TELEPHONE ENCOUNTER
Rx Refill Note  Requested Prescriptions     Pending Prescriptions Disp Refills    pravastatin (PRAVACHOL) 40 MG tablet [Pharmacy Med Name: PRAVASTATIN TABS 40MG] 90 tablet 3     Sig: TAKE 1 TABLET EVERY NIGHT      Last office visit with prescribing clinician: 1/31/2024   Last telemedicine visit with prescribing clinician: Visit date not found   Next office visit with prescribing clinician: 5/10/2024                         Would you like a call back once the refill request has been completed: [] Yes [] No    If the office needs to give you a call back, can they leave a voicemail: [] Yes [] No    Eusebio Angel MA  04/23/24, 09:47 EDT

## 2024-05-10 ENCOUNTER — LAB (OUTPATIENT)
Dept: LAB | Facility: HOSPITAL | Age: 64
End: 2024-05-10
Payer: MEDICARE

## 2024-05-10 ENCOUNTER — TELEPHONE (OUTPATIENT)
Dept: FAMILY MEDICINE CLINIC | Facility: CLINIC | Age: 64
End: 2024-05-10

## 2024-05-10 ENCOUNTER — OFFICE VISIT (OUTPATIENT)
Dept: FAMILY MEDICINE CLINIC | Facility: CLINIC | Age: 64
End: 2024-05-10
Payer: MEDICARE

## 2024-05-10 VITALS
DIASTOLIC BLOOD PRESSURE: 70 MMHG | SYSTOLIC BLOOD PRESSURE: 132 MMHG | WEIGHT: 155 LBS | OXYGEN SATURATION: 97 % | BODY MASS INDEX: 28.52 KG/M2 | HEART RATE: 88 BPM | HEIGHT: 62 IN

## 2024-05-10 DIAGNOSIS — I10 ESSENTIAL HYPERTENSION: ICD-10-CM

## 2024-05-10 DIAGNOSIS — E55.9 VITAMIN D DEFICIENCY: ICD-10-CM

## 2024-05-10 DIAGNOSIS — E11.65 TYPE 2 DIABETES MELLITUS WITH HYPERGLYCEMIA, WITHOUT LONG-TERM CURRENT USE OF INSULIN: ICD-10-CM

## 2024-05-10 DIAGNOSIS — Z00.00 WELCOME TO MEDICARE PREVENTIVE VISIT: Primary | ICD-10-CM

## 2024-05-10 DIAGNOSIS — E61.2 MAGNESIUM DEFICIENCY: ICD-10-CM

## 2024-05-10 DIAGNOSIS — D50.9 IRON DEFICIENCY ANEMIA, UNSPECIFIED IRON DEFICIENCY ANEMIA TYPE: ICD-10-CM

## 2024-05-10 DIAGNOSIS — E78.2 MIXED HYPERLIPIDEMIA: ICD-10-CM

## 2024-05-10 DIAGNOSIS — I10 ESSENTIAL HYPERTENSION: Primary | ICD-10-CM

## 2024-05-10 DIAGNOSIS — K21.9 GASTROESOPHAGEAL REFLUX DISEASE WITHOUT ESOPHAGITIS: ICD-10-CM

## 2024-05-10 LAB
ALBUMIN SERPL-MCNC: 4.6 G/DL (ref 3.5–5.2)
ALBUMIN/GLOB SERPL: 1.5 G/DL
ALP SERPL-CCNC: 111 U/L (ref 39–117)
ALT SERPL W P-5'-P-CCNC: 13 U/L (ref 1–33)
ANION GAP SERPL CALCULATED.3IONS-SCNC: 11.3 MMOL/L (ref 5–15)
AST SERPL-CCNC: 20 U/L (ref 1–32)
BASOPHILS # BLD AUTO: 0.05 10*3/MM3 (ref 0–0.2)
BASOPHILS NFR BLD AUTO: 0.8 % (ref 0–1.5)
BILIRUB SERPL-MCNC: 0.4 MG/DL (ref 0–1.2)
BUN SERPL-MCNC: 13 MG/DL (ref 8–23)
BUN/CREAT SERPL: 24.5 (ref 7–25)
CALCIUM SPEC-SCNC: 10 MG/DL (ref 8.6–10.5)
CHLORIDE SERPL-SCNC: 104 MMOL/L (ref 98–107)
CO2 SERPL-SCNC: 26.7 MMOL/L (ref 22–29)
CREAT SERPL-MCNC: 0.53 MG/DL (ref 0.57–1)
DEPRECATED RDW RBC AUTO: 39.7 FL (ref 37–54)
EGFRCR SERPLBLD CKD-EPI 2021: 102.8 ML/MIN/1.73
EOSINOPHIL # BLD AUTO: 0.52 10*3/MM3 (ref 0–0.4)
EOSINOPHIL NFR BLD AUTO: 8.5 % (ref 0.3–6.2)
ERYTHROCYTE [DISTWIDTH] IN BLOOD BY AUTOMATED COUNT: 14 % (ref 12.3–15.4)
GLOBULIN UR ELPH-MCNC: 3 GM/DL
GLUCOSE SERPL-MCNC: 109 MG/DL (ref 65–99)
HBA1C MFR BLD: 6.8 % (ref 4.8–5.6)
HCT VFR BLD AUTO: 43.6 % (ref 34–46.6)
HGB BLD-MCNC: 14 G/DL (ref 12–15.9)
IMM GRANULOCYTES # BLD AUTO: 0.06 10*3/MM3 (ref 0–0.05)
IMM GRANULOCYTES NFR BLD AUTO: 1 % (ref 0–0.5)
LYMPHOCYTES # BLD AUTO: 1.93 10*3/MM3 (ref 0.7–3.1)
LYMPHOCYTES NFR BLD AUTO: 31.4 % (ref 19.6–45.3)
MAGNESIUM SERPL-MCNC: 1.9 MG/DL (ref 1.6–2.4)
MCH RBC QN AUTO: 25.8 PG (ref 26.6–33)
MCHC RBC AUTO-ENTMCNC: 32.1 G/DL (ref 31.5–35.7)
MCV RBC AUTO: 80.4 FL (ref 79–97)
MONOCYTES # BLD AUTO: 0.32 10*3/MM3 (ref 0.1–0.9)
MONOCYTES NFR BLD AUTO: 5.2 % (ref 5–12)
NEUTROPHILS NFR BLD AUTO: 3.26 10*3/MM3 (ref 1.7–7)
NEUTROPHILS NFR BLD AUTO: 53.1 % (ref 42.7–76)
NRBC BLD AUTO-RTO: 0.3 /100 WBC (ref 0–0.2)
PLATELET # BLD AUTO: 240 10*3/MM3 (ref 140–450)
PMV BLD AUTO: 10.3 FL (ref 6–12)
POTASSIUM SERPL-SCNC: 4.3 MMOL/L (ref 3.5–5.2)
PROT SERPL-MCNC: 7.6 G/DL (ref 6–8.5)
RBC # BLD AUTO: 5.42 10*6/MM3 (ref 3.77–5.28)
SODIUM SERPL-SCNC: 142 MMOL/L (ref 136–145)
TSH SERPL DL<=0.05 MIU/L-ACNC: 1.74 UIU/ML (ref 0.27–4.2)
WBC NRBC COR # BLD AUTO: 6.14 10*3/MM3 (ref 3.4–10.8)

## 2024-05-10 PROCEDURE — 3044F HG A1C LEVEL LT 7.0%: CPT | Performed by: PHYSICIAN ASSISTANT

## 2024-05-10 PROCEDURE — G0402 INITIAL PREVENTIVE EXAM: HCPCS | Performed by: PHYSICIAN ASSISTANT

## 2024-05-10 PROCEDURE — 83735 ASSAY OF MAGNESIUM: CPT

## 2024-05-10 PROCEDURE — 84443 ASSAY THYROID STIM HORMONE: CPT

## 2024-05-10 PROCEDURE — 80053 COMPREHEN METABOLIC PANEL: CPT

## 2024-05-10 PROCEDURE — 85025 COMPLETE CBC W/AUTO DIFF WBC: CPT

## 2024-05-10 PROCEDURE — 3078F DIAST BP <80 MM HG: CPT | Performed by: PHYSICIAN ASSISTANT

## 2024-05-10 PROCEDURE — 1159F MED LIST DOCD IN RCRD: CPT | Performed by: PHYSICIAN ASSISTANT

## 2024-05-10 PROCEDURE — 82607 VITAMIN B-12: CPT

## 2024-05-10 PROCEDURE — 3075F SYST BP GE 130 - 139MM HG: CPT | Performed by: PHYSICIAN ASSISTANT

## 2024-05-10 PROCEDURE — 1126F AMNT PAIN NOTED NONE PRSNT: CPT | Performed by: PHYSICIAN ASSISTANT

## 2024-05-10 PROCEDURE — 1160F RVW MEDS BY RX/DR IN RCRD: CPT | Performed by: PHYSICIAN ASSISTANT

## 2024-05-10 PROCEDURE — 1170F FXNL STATUS ASSESSED: CPT | Performed by: PHYSICIAN ASSISTANT

## 2024-05-10 PROCEDURE — 82306 VITAMIN D 25 HYDROXY: CPT

## 2024-05-10 PROCEDURE — 83036 HEMOGLOBIN GLYCOSYLATED A1C: CPT

## 2024-05-10 RX ORDER — OMEPRAZOLE 20 MG/1
20 CAPSULE, DELAYED RELEASE ORAL DAILY
Qty: 90 CAPSULE | Refills: 3 | Status: SHIPPED | OUTPATIENT
Start: 2024-05-10

## 2024-05-10 RX ORDER — METOPROLOL SUCCINATE 100 MG/1
100 TABLET, EXTENDED RELEASE ORAL DAILY
Qty: 90 TABLET | Refills: 3 | Status: SHIPPED | OUTPATIENT
Start: 2024-05-10

## 2024-05-11 LAB
25(OH)D3 SERPL-MCNC: 54.4 NG/ML (ref 30–100)
VIT B12 BLD-MCNC: 418 PG/ML (ref 211–946)

## 2024-05-13 ENCOUNTER — LAB (OUTPATIENT)
Dept: LAB | Facility: HOSPITAL | Age: 64
End: 2024-05-13
Payer: MEDICARE

## 2024-05-13 DIAGNOSIS — E11.65 TYPE 2 DIABETES MELLITUS WITH HYPERGLYCEMIA, WITHOUT LONG-TERM CURRENT USE OF INSULIN: ICD-10-CM

## 2024-05-13 PROCEDURE — 82043 UR ALBUMIN QUANTITATIVE: CPT

## 2024-05-13 PROCEDURE — 82570 ASSAY OF URINE CREATININE: CPT

## 2024-05-14 LAB
ALBUMIN UR-MCNC: <1.2 MG/DL
CREAT UR-MCNC: 36.4 MG/DL
MICROALBUMIN/CREAT UR: NORMAL MG/G{CREAT}

## 2024-07-29 DIAGNOSIS — J45.20 MILD INTERMITTENT ASTHMA WITHOUT COMPLICATION: ICD-10-CM

## 2024-07-29 RX ORDER — ALBUTEROL SULFATE 90 UG/1
2 AEROSOL, METERED RESPIRATORY (INHALATION) EVERY 6 HOURS PRN
Qty: 18 G | Refills: 0 | Status: SHIPPED | OUTPATIENT
Start: 2024-07-29

## 2024-07-29 NOTE — TELEPHONE ENCOUNTER
Rx Refill Note  Requested Prescriptions     Pending Prescriptions Disp Refills    albuterol sulfate  (90 Base) MCG/ACT inhaler [Pharmacy Med Name: Albuterol Sulfate  (90 Base) MCG/ACT Inhalation Aerosol Solution] 18 g 0     Sig: INHALE 2 PUFFS BY MOUTH EVERY 6 HOURS AS NEEDED FOR WHEEZING      Last office visit with prescribing clinician: 5/10/2024   Last telemedicine visit with prescribing clinician: Visit date not found   Next office visit with prescribing clinician: Visit date not found                         Would you like a call back once the refill request has been completed: [] Yes [] No    If the office needs to give you a call back, can they leave a voicemail: [] Yes [] No    Maggy Garcia MA  07/29/24, 12:54 EDT

## 2024-09-05 DIAGNOSIS — J45.20 MILD INTERMITTENT ASTHMA WITHOUT COMPLICATION: ICD-10-CM

## 2024-09-06 RX ORDER — ALBUTEROL SULFATE 90 UG/1
2 AEROSOL, METERED RESPIRATORY (INHALATION) EVERY 6 HOURS PRN
Qty: 18 G | Refills: 0 | Status: SHIPPED | OUTPATIENT
Start: 2024-09-06

## 2024-09-06 RX ORDER — ALBUTEROL SULFATE 90 UG/1
2 AEROSOL, METERED RESPIRATORY (INHALATION) EVERY 6 HOURS PRN
Qty: 18 G | Refills: 0 | OUTPATIENT
Start: 2024-09-06

## 2024-09-06 NOTE — TELEPHONE ENCOUNTER
Rx Refill Note  Requested Prescriptions     Pending Prescriptions Disp Refills    albuterol sulfate  (90 Base) MCG/ACT inhaler 18 g 0     Sig: Inhale 2 puffs Every 6 (Six) Hours As Needed for Wheezing.      Last office visit with prescribing clinician: 5/10/2024   Last telemedicine visit with prescribing clinician: Visit date not found   Next office visit with prescribing clinician: Visit date not found     Refill sent to pharmacy per protocol.    Marcela Jimenez MA  09/06/24, 13:51 EDT

## 2024-10-12 DIAGNOSIS — J45.20 MILD INTERMITTENT ASTHMA WITHOUT COMPLICATION: ICD-10-CM

## 2024-10-14 RX ORDER — ALBUTEROL SULFATE 90 UG/1
2 INHALANT RESPIRATORY (INHALATION) EVERY 6 HOURS PRN
Qty: 18 G | Refills: 0 | Status: SHIPPED | OUTPATIENT
Start: 2024-10-14

## 2024-10-14 NOTE — TELEPHONE ENCOUNTER
Rx Refill Note  Requested Prescriptions     Pending Prescriptions Disp Refills    albuterol sulfate  (90 Base) MCG/ACT inhaler [Pharmacy Med Name: Albuterol Sulfate  (90 Base) MCG/ACT Inhalation Aerosol Solution] 18 g 0     Sig: INHALE 2 PUFFS BY MOUTH EVERY 6 HOURS AS NEEDED FOR WHEEZING      Last office visit with prescribing clinician: 5/10/2024   Last telemedicine visit with prescribing clinician: Visit date not found   Next office visit with prescribing clinician: 10/23/2024   -    Alanis Dixon MA  10/14/24, 15:46 EDT

## 2024-10-23 ENCOUNTER — TELEPHONE (OUTPATIENT)
Dept: FAMILY MEDICINE CLINIC | Facility: CLINIC | Age: 64
End: 2024-10-23

## 2024-10-23 ENCOUNTER — OFFICE VISIT (OUTPATIENT)
Dept: FAMILY MEDICINE CLINIC | Facility: CLINIC | Age: 64
End: 2024-10-23
Payer: MEDICARE

## 2024-10-23 VITALS
WEIGHT: 154.2 LBS | SYSTOLIC BLOOD PRESSURE: 126 MMHG | DIASTOLIC BLOOD PRESSURE: 86 MMHG | OXYGEN SATURATION: 96 % | HEIGHT: 62 IN | BODY MASS INDEX: 28.37 KG/M2 | HEART RATE: 60 BPM

## 2024-10-23 DIAGNOSIS — Z23 IMMUNIZATION DUE: ICD-10-CM

## 2024-10-23 DIAGNOSIS — E11.65 TYPE 2 DIABETES MELLITUS WITH HYPERGLYCEMIA, WITHOUT LONG-TERM CURRENT USE OF INSULIN: Primary | ICD-10-CM

## 2024-10-23 DIAGNOSIS — I10 ESSENTIAL HYPERTENSION: ICD-10-CM

## 2024-10-23 DIAGNOSIS — J45.20 MILD INTERMITTENT ASTHMA WITHOUT COMPLICATION: ICD-10-CM

## 2024-10-23 LAB
EXPIRATION DATE: ABNORMAL
HBA1C MFR BLD: 6.2 % (ref 4.5–5.7)
Lab: ABNORMAL

## 2024-10-23 PROCEDURE — G0009 ADMIN PNEUMOCOCCAL VACCINE: HCPCS | Performed by: PHYSICIAN ASSISTANT

## 2024-10-23 PROCEDURE — 83036 HEMOGLOBIN GLYCOSYLATED A1C: CPT | Performed by: PHYSICIAN ASSISTANT

## 2024-10-23 PROCEDURE — 90677 PCV20 VACCINE IM: CPT | Performed by: PHYSICIAN ASSISTANT

## 2024-10-23 PROCEDURE — 1126F AMNT PAIN NOTED NONE PRSNT: CPT | Performed by: PHYSICIAN ASSISTANT

## 2024-10-23 PROCEDURE — 99214 OFFICE O/P EST MOD 30 MIN: CPT | Performed by: PHYSICIAN ASSISTANT

## 2024-10-23 PROCEDURE — 1159F MED LIST DOCD IN RCRD: CPT | Performed by: PHYSICIAN ASSISTANT

## 2024-10-23 PROCEDURE — 1160F RVW MEDS BY RX/DR IN RCRD: CPT | Performed by: PHYSICIAN ASSISTANT

## 2024-10-23 PROCEDURE — 3044F HG A1C LEVEL LT 7.0%: CPT | Performed by: PHYSICIAN ASSISTANT

## 2024-10-23 PROCEDURE — 3079F DIAST BP 80-89 MM HG: CPT | Performed by: PHYSICIAN ASSISTANT

## 2024-10-23 PROCEDURE — 3074F SYST BP LT 130 MM HG: CPT | Performed by: PHYSICIAN ASSISTANT

## 2024-10-23 RX ORDER — ALBUTEROL SULFATE 90 UG/1
2 INHALANT RESPIRATORY (INHALATION) EVERY 6 HOURS PRN
Qty: 18 G | Refills: 5 | Status: SHIPPED | OUTPATIENT
Start: 2024-10-23

## 2024-10-23 NOTE — TELEPHONE ENCOUNTER
RELAY:  [facility] FOR PATIENT TO SCHEDULE A Return in about 7 months (around 5/23/2025) for Medicare Wellness.

## 2024-10-23 NOTE — PROGRESS NOTES
Chief Complaint   Patient presents with    Med Refill       Jocelyne Lindquist is a pleasant 64 y.o. female who is here for teen follow-up of hypertension, hyperlipidemia, diabetes type 2, reflux.  Patient is compliant on all medications.  She is not currently on any medications for diabetes.  She is managing this with diet and exercise.  She is walking regularly.  She is not interested in trying metformin at any time.  Her blood pressure is stable and well-controlled today.    Past Medical History:   Diagnosis Date    Allergic Always    Anemia     Asthma     GERD (gastroesophageal reflux disease)     Hypertension     Hypotension 02/18/2017    PVD (peripheral vascular disease)     Seizures     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, right     outer corner of right eye    Tobacco abuse 02/18/2017    Quit 12/25/17       Past Surgical History:   Procedure Laterality Date    APPENDECTOMY  4/20/23    BREAST BIOPSY Left 06/18/2010    stereo bx    CYST REMOVAL Right     wrist    CYSTOSCOPY N/A 04/20/2023    Procedure: CYSTOSCOPY TEMPORARY PLACEMENT BILATERAL URETERAL CATHETER;  Surgeon: Kristin Dimas MD;  Location:  [facility];  Service: Gynecology Oncology;  Laterality: N/A;    EXPLORATORY LAPAROTOMY, TOTAL ABDOMINAL HYSTERECTOMY SALPINGO OOPHORECTOMY N/A 04/20/2023    Procedure: EXPLORATORY LAPAROTOMY, TOTAL ABDOMINAL HYSTERECTOMY BILATERAL SALPINGO-OOPHORECTOMY;  Surgeon: Kristin Dimas MD;  Location:  [facility];  Service: Gynecology Oncology;  Laterality: N/A;    HYSTERECTOMY  4/20/23    Removal of cyst etc    SQUAMOUS CELL CARCINOMA EXCISION Right 12/13/2017    outer corner of right eye    WRIST SURGERY Left        Family History   Problem Relation Age of Onset    Cancer Mother         esophageal and liver    Arthritis Mother     Heart failure Father     Breast cancer Neg Hx     Ovarian cancer Neg Hx        Social History     Socioeconomic History    Marital status:    Tobacco Use    Smoking status: Former  "    Current packs/day: 0.00     Average packs/day: 1 pack/day for 30.0 years (30.0 ttl pk-yrs)     Types: Cigarettes     Start date: 2000     Quit date: 2017     Years since quittin.8    Smokeless tobacco: Never    Tobacco comments:     17   Vaping Use    Vaping status: Never Used   Substance and Sexual Activity    Alcohol use: Not Currently     Comment: not since 17    Drug use: No    Sexual activity: Yes     Partners: Male     Birth control/protection: Vasectomy       Allergies   Allergen Reactions    Benazepril Other (See Comments)     No allergy.     Doxycycline Diarrhea    Egg-Derived Products      Causes cramps    Fish-Derived Products      Causes severe stomach cramps       ROS  Review of Systems   Constitutional:  Negative for chills and fever.   Eyes:  Negative for visual disturbance.   Respiratory:  Negative for cough, shortness of breath and wheezing.    Cardiovascular:  Negative for chest pain, palpitations and leg swelling.   Endocrine: Negative for polyuria.       Vitals:    10/23/24 1338   BP: 126/86   BP Location: Left arm   Patient Position: Sitting   Cuff Size: Adult   Pulse: 60   SpO2: 96%   Weight: 69.9 kg (154 lb 3.2 oz)   Height: 157.5 cm (62.01\")     Body mass index is 28.2 kg/m².           Current Outpatient Medications on File Prior to Visit   Medication Sig Dispense Refill    metoprolol succinate XL (TOPROL-XL) 100 MG 24 hr tablet Take 1 tablet by mouth Daily. 90 tablet 3    omeprazole (priLOSEC) 20 MG capsule Take 1 capsule by mouth Daily. 90 capsule 3    pravastatin (PRAVACHOL) 40 MG tablet TAKE 1 TABLET EVERY NIGHT 90 tablet 3    [DISCONTINUED] albuterol sulfate  (90 Base) MCG/ACT inhaler INHALE 2 PUFFS BY MOUTH EVERY 6 HOURS AS NEEDED FOR WHEEZING 18 g 0     No current facility-administered medications on file prior to visit.       Results for orders placed or performed in visit on 24   Microalbumin / Creatinine Urine Ratio - Urine, Clean Catch    " Collection Time: 05/13/24  1:25 PM    Specimen: Urine, Clean Catch   Result Value Ref Range    Microalbumin/Creatinine Ratio      Creatinine, Urine 36.4 mg/dL    Microalbumin, Urine <1.2 mg/dL       PE    Physical Exam  Vitals reviewed.   Constitutional:       General: She is not in acute distress.     Appearance: Normal appearance. She is well-developed and normal weight. She is not ill-appearing or diaphoretic.   HENT:      Head: Normocephalic and atraumatic.   Eyes:      Extraocular Movements: Extraocular movements intact.      Conjunctiva/sclera: Conjunctivae normal.   Pulmonary:      Effort: No respiratory distress.   Musculoskeletal:         General: Normal range of motion.      Cervical back: Normal range of motion.      Right lower leg: No edema.      Left lower leg: No edema.   Skin:     General: Skin is warm.      Findings: No erythema or rash.   Neurological:      General: No focal deficit present.      Mental Status: She is alert.   Psychiatric:         Attention and Perception: She is attentive.         Mood and Affect: Mood normal.         Speech: Speech normal.         Behavior: Behavior normal. Behavior is cooperative.         Thought Content: Thought content normal.         Judgment: Judgment normal.           A/P    Diagnoses and all orders for this visit:    1. Type 2 diabetes mellitus with hyperglycemia, without long-term current use of insulin (Primary)  -     POC Glycosylated Hemoglobin (Hb A1C)  Previous hemoglobin A1c was 6.8%, hemoglobin A1c is 6.2% today.  Managing with diet and exercise.  Not interested in taking metformin.    2. Essential hypertension  Stable, well-controlled.  Compliant on medication.    3. Mild intermittent asthma without complication  -     albuterol sulfate  (90 Base) MCG/ACT inhaler; Inhale 2 puffs Every 6 (Six) Hours As Needed for Wheezing.  Dispense: 18 g; Refill: 5    4. Immunization due  -     Pneumococcal Conjugate Vaccine 20-[facility] All         Plan of  care reviewed with patient at the conclusion of today's visit. Education was provided regarding diagnosis, management and any prescribed or recommended OTC medications.  Patient verbalizes understanding of and agreement with management plan.    Dictated Utilizing Dragon Dictation     Please note that portions of this note were completed with a voice recognition program.     Part of this note may be an electronic transcription/translation of spoken language to printed text using the Dragon Dictation System.    Return in about 7 months (around 5/23/2025) for Medicare Wellness.     Uzma Duffy PA-C  Answers submitted by the patient for this visit:  Other (Submitted on 10/21/2024)  Please describe your symptoms.: Weight and  rash, renew Rxs  Have you had these symptoms before?: Yes  How long have you been having these symptoms?: Greater than 2 weeks  Please list any medications you are currently taking for this condition.: Fluocinolone Acetonide Topical Solution USP, 0.01%  Please describe any probable cause for these symptoms. : Rash, redness itchiness  Primary Reason for Visit (Submitted on 10/21/2024)  What is the primary reason for your visit?: Problem Not Listed

## 2024-10-23 NOTE — LETTER
[facility]  Vaccine Consent Form    Patient Name:  Jocelyne Lindquist  Patient :  1960     Vaccine(s) Ordered    Pneumococcal Conjugate Vaccine 20-[facility] All        Screening Checklist  The following questions should be completed prior to vaccination. If you answer “yes” to any question, it does not necessarily mean you should not be vaccinated. It just means we may need to clarify or ask more questions. If a question is unclear, please ask your healthcare provider to explain it.    Yes No   Any fever or moderate to severe illness today (mild illness and/or antibiotic treatment are not contraindications)?     Do you have a history of a serious reaction to any previous vaccinations, such as anaphylaxis, encephalopathy within 7 days, Guillain-Marlin syndrome within 6 weeks, seizure?     Have you received any live vaccine(s) (e.g MMR, EMMA) or any other vaccines in the last month (to ensure duplicate doses aren't given)?     Do you have an anaphylactic allergy to latex (DTaP, DTaP-IPV, Hep A, Hep B, MenB, RV, Td, Tdap), baker’s yeast (Hep B, HPV), polysorbates (RSV, nirsevimab, PCV 20, Rotavirrus, Tdap, Shingrix), or gelatin (EMMA, MMR)?     Do you have an anaphylactic allergy to neomycin (Rabies, EMMA, MMR, IPV, Hep A), polymyxin B (IPV), or streptomycin (IPV)?      Any cancer, leukemia, AIDS, or other immune system disorder? (EMMA, MMR, RV)     Do you have a parent, brother, or sister with an immune system problem (if immune competence of vaccine recipient clinically verified, can proceed)? (MMR, EMMA)     Any recent steroid treatments for >2 weeks, chemotherapy, or radiation treatment? (EMMA, MMR)     Have you received antibody-containing blood transfusions or IVIG in the past 11 months (recommended interval is dependent on product)? (MMR, EMMA)     Have you taken antiviral drugs (acyclovir, famciclovir, valacyclovir for EMMA) in the last 24 or 48 hours, respectively?      Are you pregnant or planning to become  "pregnant within 1 month? (EMMA, MMR, HPV, IPV, MenB, Abrexvy; For Hep B- refer to Engerix-B; For RSV - Abrysvo is indicated for 32-36 weeks of pregnancy from September to January)     For infants, have you ever been told your child has had intussusception or a medical emergency involving obstruction of the intestine (Rotavirus)? If not for an infant, can skip this question.         *Ordering Physicians/APC should be consulted if \"yes\" is checked by the patient or guardian above.  I have received, read, and understand the Vaccine Information Statement (VIS) for each vaccine ordered.  I have considered my or my child's health status as well as the health status of my close contacts.  I have taken the opportunity to discuss my vaccine questions with my or my child's health care provider.   I have requested that the ordered vaccine(s) be given to me or my child.  I understand the benefits and risks of the vaccines.  I understand that I should remain in the clinic for 15 minutes after receiving the vaccine(s).  _________________________________________________________  Signature of Patient or Parent/Legal Guardian ____________________  Date     "

## 2024-10-24 ENCOUNTER — TELEPHONE (OUTPATIENT)
Dept: FAMILY MEDICINE CLINIC | Facility: CLINIC | Age: 64
End: 2024-10-24
Payer: MEDICARE

## 2024-10-24 NOTE — TELEPHONE ENCOUNTER
Caller: Jocelyne Lindquist    Relationship: Self    Best call back number: [phone]    Which medication are you concerned about: PRAVASTATIN    Who prescribed you this medication: PRE[facility]    When did you start taking this medication:     What are your concerns: WANTS TO SEE IF THERE IS ANOTHER MED THAT SHE CAN TAKE INSTEAD. SHE WANTS TO DRINK JUICES AND SHE CAN'T WITH THIS ONE    How long have you had these concerns: TODAY

## 2024-10-25 ENCOUNTER — TELEPHONE (OUTPATIENT)
Dept: FAMILY MEDICINE CLINIC | Facility: CLINIC | Age: 64
End: 2024-10-25
Payer: MEDICARE

## 2024-10-25 NOTE — TELEPHONE ENCOUNTER
Left message for Jocelyne Lindquist  to return my call.    Hub may relay message and document    Uzma Duffy [id number] minutes ago (12:01 PM)       I am not aware of any interactions with pravastatin and juices.  She should be fine to take this medicine and drink juice.

## 2024-10-25 NOTE — TELEPHONE ENCOUNTER
Name: Jocelyne Lindquist      Relationship: Self      Best Callback Number: [phone]       HUB PROVIDED THE RELAY MESSAGE FROM THE OFFICE      PATIENT: VOICED UNDERSTANDING AND HAS NO FURTHER QUESTIONS AT THIS TIME    ADDITIONAL INFORMATION:

## 2024-10-25 NOTE — TELEPHONE ENCOUNTER
I am not aware of any interactions with pravastatin and juices.  She should be fine to take this medicine and drink juice.

## 2024-11-21 ENCOUNTER — TRANSCRIBE ORDERS (OUTPATIENT)
Dept: ADMINISTRATIVE | Facility: HOSPITAL | Age: 64
End: 2024-11-21
Payer: MEDICARE

## 2024-11-21 DIAGNOSIS — Z12.31 OTHER SCREENING MAMMOGRAM: Primary | ICD-10-CM

## 2025-01-02 ENCOUNTER — TELEMEDICINE (OUTPATIENT)
Dept: FAMILY MEDICINE CLINIC | Facility: CLINIC | Age: 65
End: 2025-01-02
Payer: MEDICARE

## 2025-01-02 DIAGNOSIS — J40 BRONCHITIS: Primary | ICD-10-CM

## 2025-01-02 PROCEDURE — 99213 OFFICE O/P EST LOW 20 MIN: CPT | Performed by: PHYSICIAN ASSISTANT

## 2025-01-02 PROCEDURE — 1126F AMNT PAIN NOTED NONE PRSNT: CPT | Performed by: PHYSICIAN ASSISTANT

## 2025-01-02 PROCEDURE — 1159F MED LIST DOCD IN RCRD: CPT | Performed by: PHYSICIAN ASSISTANT

## 2025-01-02 PROCEDURE — 1160F RVW MEDS BY RX/DR IN RCRD: CPT | Performed by: PHYSICIAN ASSISTANT

## 2025-01-02 RX ORDER — DEXTROMETHORPHAN HYDROBROMIDE AND PROMETHAZINE HYDROCHLORIDE 15; 6.25 MG/5ML; MG/5ML
5 SYRUP ORAL 4 TIMES DAILY PRN
Qty: 240 ML | Refills: 0 | Status: SHIPPED | OUTPATIENT
Start: 2025-01-02

## 2025-01-02 RX ORDER — AZITHROMYCIN 250 MG/1
TABLET, FILM COATED ORAL
Qty: 6 TABLET | Refills: 0 | Status: SHIPPED | OUTPATIENT
Start: 2025-01-02

## 2025-01-02 RX ORDER — METHYLPREDNISOLONE 4 MG/1
TABLET ORAL
Qty: 21 EACH | Refills: 0 | Status: SHIPPED | OUTPATIENT
Start: 2025-01-02

## 2025-01-02 NOTE — PROGRESS NOTES
Chief Complaint   Patient presents with    Cough    Nasal Congestion         Jocelyne Lindquist is a 64 y.o. female who presents for Cough and Nasal Congestion    Reports productive cough with yellow sputum, head congestion, headache and wheezing that started 3 days ago.   has been sick.    No fever, chills, shortness of breath.      Past Medical History:   Diagnosis Date    Allergic Always    Anemia     Asthma     GERD (gastroesophageal reflux disease)     Hypertension     Hypotension 02/18/2017    PVD (peripheral vascular disease)     Seizures     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, right     outer corner of right eye    Tobacco abuse 02/18/2017    Quit 12/25/17       Past Surgical History:   Procedure Laterality Date    APPENDECTOMY  4/20/23    BREAST BIOPSY Left 06/18/2010    stereo bx    CYST REMOVAL Right     wrist    CYSTOSCOPY N/A 04/20/2023    Procedure: CYSTOSCOPY TEMPORARY PLACEMENT BILATERAL URETERAL CATHETER;  Surgeon: Kristin Dimas MD;  Location:  [facility];  Service: Gynecology Oncology;  Laterality: N/A;    EXPLORATORY LAPAROTOMY, TOTAL ABDOMINAL HYSTERECTOMY SALPINGO OOPHORECTOMY N/A 04/20/2023    Procedure: EXPLORATORY LAPAROTOMY, TOTAL ABDOMINAL HYSTERECTOMY BILATERAL SALPINGO-OOPHORECTOMY;  Surgeon: Kristin Dimas MD;  Location:  [facility];  Service: Gynecology Oncology;  Laterality: N/A;    HYSTERECTOMY  4/20/23    Removal of cyst etc    SQUAMOUS CELL CARCINOMA EXCISION Right 12/13/2017    outer corner of right eye    WRIST SURGERY Left        Family History   Problem Relation Age of Onset    Cancer Mother         esophageal and liver    Arthritis Mother     Heart failure Father     Breast cancer Neg Hx     Ovarian cancer Neg Hx        Social History     Socioeconomic History    Marital status:    Tobacco Use    Smoking status: Former     Current packs/day: 0.00     Average packs/day: 1 pack/day for 30.0 years (30.0 ttl pk-yrs)     Types: Cigarettes     Start date:  2000     Quit date: 2017     Years since quittin.0    Smokeless tobacco: Never    Tobacco comments:     15   Vaping Use    Vaping status: Never Used   Substance and Sexual Activity    Alcohol use: Not Currently     Comment: not since 15    Drug use: No    Sexual activity: Yes     Partners: Male     Birth control/protection: Vasectomy       Allergies   Allergen Reactions    Benazepril Other (See Comments)     No allergy.     Doxycycline Diarrhea    Egg-Derived Products      Causes cramps    Fish-Derived Products      Causes severe stomach cramps       ROS    Review of Systems   Constitutional:  Positive for fatigue. Negative for chills and fever.   HENT:  Positive for congestion, rhinorrhea and sore throat. Negative for ear pain and sinus pressure.    Respiratory:  Positive for cough and wheezing. Negative for shortness of breath.    Neurological:  Negative for dizziness and headache.       There were no vitals filed for this visit.  There is no height or weight on file to calculate BMI.    Current Outpatient Medications on File Prior to Visit   Medication Sig Dispense Refill    albuterol sulfate  (90 Base) MCG/ACT inhaler Inhale 2 puffs Every 6 (Six) Hours As Needed for Wheezing. 18 g 5    metoprolol succinate XL (TOPROL-XL) 100 MG 24 hr tablet Take 1 tablet by mouth Daily. 90 tablet 3    omeprazole (priLOSEC) 20 MG capsule Take 1 capsule by mouth Daily. 90 capsule 3    pravastatin (PRAVACHOL) 40 MG tablet TAKE 1 TABLET EVERY NIGHT 90 tablet 3     No current facility-administered medications on file prior to visit.       Results for orders placed or performed in visit on 10/23/24   POC Glycosylated Hemoglobin (Hb A1C)    Collection Time: 10/23/24  2:27 PM    Specimen: Blood   Result Value Ref Range    Hemoglobin A1C 6.2 (A) 4.5 - 5.7 %    Lot Number [id number]     Expiration Date         PE    Physical Exam  Vitals reviewed.   Constitutional:       General: She is not in acute  distress.     Appearance: Normal appearance. She is well-developed and normal weight. She is not ill-appearing or diaphoretic.   HENT:      Head: Normocephalic and atraumatic.   Eyes:      Extraocular Movements: Extraocular movements intact.      Conjunctiva/sclera: Conjunctivae normal.   Pulmonary:      Effort: No respiratory distress.   Musculoskeletal:         General: Normal range of motion.      Cervical back: Normal range of motion.   Neurological:      General: No focal deficit present.      Mental Status: She is alert.   Psychiatric:         Attention and Perception: She is attentive.         Mood and Affect: Mood normal.         Speech: Speech normal.         Behavior: Behavior normal. Behavior is cooperative.         Thought Content: Thought content normal.         Judgment: Judgment normal.          A/P    Diagnoses and all orders for this visit:    1. Bronchitis (Primary)  -     azithromycin (Zithromax Z-Sincere) 250 MG tablet; Take 2 tablets the first day, then 1 tablet daily for 4 days.  Dispense: 6 tablet; Refill: 0  -     promethazine-dextromethorphan (PROMETHAZINE-DM) 6.25-15 MG/5ML syrup; Take 5 mL by mouth 4 (Four) Times a Day As Needed for Cough.  Dispense: 240 mL; Refill: 0  -     methylPREDNISolone (Medrol) 4 MG dose pack; Take as directed on package instructions. Dispense: 21 Count dose sincere with 0 refills.  Dispense: 21 each; Refill: 0  New.  Started 3+ days ago.  Reports productive cough with yellow sputum and wheezing.  Will send in steroids and z-pack.  Call if symptoms worsen or change.    Mode of Visit: Video  Location of patient: -HOME-  Location of provider: +[facility]+  You have chosen to receive care through a telehealth visit.  The patient has signed the video visit consent form.  The visit included audio and video interaction. No technical issues occurred during this visit.         Plan of care reviewed with patient at the conclusion of today's visit. Education was provided regarding  diagnosis, management and any prescribed or recommended OTC medications.  Patient verbalizes understanding of and agreement with management plan.    Dictated Utilizing Dragon Dictation     Please note that portions of this note were completed with a voice recognition program.     Part of this note may be an electronic transcription/translation of spoken language to printed text using the Dragon Dictation System.    No follow-ups on file.     Uzma Duffy PA-C

## 2025-02-04 LAB
NCCN CRITERIA FLAG: NORMAL
TYRER CUZICK SCORE: 6.1

## 2025-02-13 DIAGNOSIS — E78.2 MIXED HYPERLIPIDEMIA: ICD-10-CM

## 2025-02-13 DIAGNOSIS — K21.9 GASTROESOPHAGEAL REFLUX DISEASE WITHOUT ESOPHAGITIS: ICD-10-CM

## 2025-02-13 DIAGNOSIS — I10 ESSENTIAL HYPERTENSION: ICD-10-CM

## 2025-02-13 RX ORDER — PRAVASTATIN SODIUM 40 MG
40 TABLET ORAL NIGHTLY
Qty: 90 TABLET | Refills: 3 | Status: SHIPPED | OUTPATIENT
Start: 2025-02-13

## 2025-02-13 RX ORDER — METOPROLOL SUCCINATE 100 MG/1
100 TABLET, EXTENDED RELEASE ORAL DAILY
Qty: 90 TABLET | Refills: 3 | Status: SHIPPED | OUTPATIENT
Start: 2025-02-13

## 2025-02-13 NOTE — TELEPHONE ENCOUNTER
Caller: Lexa Jocelyne H    Relationship: Self    Best call back number: [phone]     Requested Prescriptions:   Requested Prescriptions     Pending Prescriptions Disp Refills    metoprolol succinate XL (TOPROL-XL) 100 MG 24 hr tablet 90 tablet 3     Sig: Take 1 tablet by mouth Daily.    omeprazole (priLOSEC) 20 MG capsule 90 capsule 3     Sig: Take 1 capsule by mouth Daily.    pravastatin (PRAVACHOL) 40 MG tablet 90 tablet 3     Si tablet Every Night.        Pharmacy where request should be sent: [facility] [address] [phone] [facility] [phone]      Last office visit with prescribing clinician: 10/23/2024   Last telemedicine visit with prescribing clinician: 2025   Next office visit with prescribing clinician: Visit date not found     Does the patient have less than a 3 day supply:  [x] Yes  [] No    Would you like a call back once the refill request has been completed: [] Yes [x] No    If the office needs to give you a call back, can they leave a voicemail: [] Yes [x] No    Ritu Rogers Rep   25 15:36 EST

## 2025-02-18 ENCOUNTER — HOSPITAL ENCOUNTER (OUTPATIENT)
Dept: MAMMOGRAPHY | Facility: HOSPITAL | Age: 65
Discharge: HOME | End: 2025-02-18
Admitting: PHYSICIAN ASSISTANT
Payer: MEDICARE

## 2025-02-18 DIAGNOSIS — Z12.31 OTHER SCREENING MAMMOGRAM: ICD-10-CM

## 2025-02-18 PROCEDURE — 77063 BREAST TOMOSYNTHESIS BI: CPT

## 2025-02-18 PROCEDURE — 77067 SCR MAMMO BI INCL CAD: CPT

## 2025-07-07 ENCOUNTER — APPOINTMENT (OUTPATIENT)
Dept: MRI IMAGING | Facility: HOSPITAL | Age: 65
End: 2025-07-07
Payer: MEDICARE

## 2025-07-07 ENCOUNTER — HOSPITAL ENCOUNTER (INPATIENT)
Facility: HOSPITAL | Age: 65
LOS: 1 days | Discharge: HOME | End: 2025-07-08
Attending: EMERGENCY MEDICINE | Admitting: STUDENT IN AN ORGANIZED HEALTH CARE EDUCATION/TRAINING PROGRAM
Payer: MEDICARE

## 2025-07-07 ENCOUNTER — APPOINTMENT (OUTPATIENT)
Dept: GENERAL RADIOLOGY | Facility: HOSPITAL | Age: 65
End: 2025-07-07
Payer: MEDICARE

## 2025-07-07 DIAGNOSIS — E88.89 KETOSIS: ICD-10-CM

## 2025-07-07 DIAGNOSIS — R80.9 PROTEINURIA, UNSPECIFIED TYPE: ICD-10-CM

## 2025-07-07 DIAGNOSIS — E83.51 HYPOCALCEMIA: ICD-10-CM

## 2025-07-07 DIAGNOSIS — E83.42 HYPOMAGNESEMIA: ICD-10-CM

## 2025-07-07 DIAGNOSIS — E87.6 HYPOKALEMIA: ICD-10-CM

## 2025-07-07 DIAGNOSIS — D72.829 LEUKOCYTOSIS, UNSPECIFIED TYPE: ICD-10-CM

## 2025-07-07 DIAGNOSIS — R42 DIZZINESS: ICD-10-CM

## 2025-07-07 DIAGNOSIS — R27.0 ATAXIA: ICD-10-CM

## 2025-07-07 DIAGNOSIS — R53.1 WEAKNESS: Primary | ICD-10-CM

## 2025-07-07 LAB
ALBUMIN SERPL-MCNC: 4.1 G/DL (ref 3.5–5.2)
ALBUMIN/GLOB SERPL: 1.4 G/DL
ALP SERPL-CCNC: 97 U/L (ref 39–117)
ALT SERPL W P-5'-P-CCNC: 13 U/L (ref 1–33)
ANION GAP SERPL CALCULATED.3IONS-SCNC: 23 MMOL/L (ref 5–15)
AST SERPL-CCNC: 29 U/L (ref 1–32)
BACTERIA UR QL AUTO: ABNORMAL /HPF
BASOPHILS # BLD AUTO: 0.05 10*3/MM3 (ref 0–0.2)
BASOPHILS NFR BLD AUTO: 0.4 % (ref 0–1.5)
BILIRUB SERPL-MCNC: 0.5 MG/DL (ref 0–1.2)
BILIRUB UR QL STRIP: NEGATIVE
BUN SERPL-MCNC: 10.8 MG/DL (ref 8–23)
BUN/CREAT SERPL: 12.3 (ref 7–25)
CA-I SERPL ISE-MCNC: 0.76 MMOL/L (ref 1.15–1.3)
CALCIUM SPEC-SCNC: 6.5 MG/DL (ref 8.6–10.5)
CHLORIDE SERPL-SCNC: 98 MMOL/L (ref 98–107)
CLARITY UR: CLEAR
CO2 SERPL-SCNC: 23 MMOL/L (ref 22–29)
COLOR UR: YELLOW
CORTIS SERPL-MCNC: 54.27 MCG/DL
CREAT SERPL-MCNC: 0.88 MG/DL (ref 0.57–1)
DEPRECATED RDW RBC AUTO: 41.8 FL (ref 37–54)
EGFRCR SERPLBLD CKD-EPI 2021: 72.6 ML/MIN/1.73
EOSINOPHIL # BLD AUTO: 0.03 10*3/MM3 (ref 0–0.4)
EOSINOPHIL NFR BLD AUTO: 0.2 % (ref 0.3–6.2)
ERYTHROCYTE [DISTWIDTH] IN BLOOD BY AUTOMATED COUNT: 14.3 % (ref 12.3–15.4)
ERYTHROCYTE [SEDIMENTATION RATE] IN BLOOD: 35 MM/HR (ref 0–30)
GEN 5 1HR TROPONIN T REFLEX: 13 NG/L
GLOBULIN UR ELPH-MCNC: 3 GM/DL
GLUCOSE SERPL-MCNC: 111 MG/DL (ref 65–99)
GLUCOSE UR STRIP-MCNC: NEGATIVE MG/DL
HBA1C MFR BLD: 6.58 % (ref 4.8–5.6)
HCT VFR BLD AUTO: 46.2 % (ref 34–46.6)
HGB BLD-MCNC: 15.1 G/DL (ref 12–15.9)
HGB UR QL STRIP.AUTO: ABNORMAL
HOLD SPECIMEN: NORMAL
HYALINE CASTS UR QL AUTO: ABNORMAL /LPF
IMM GRANULOCYTES # BLD AUTO: 0.09 10*3/MM3 (ref 0–0.05)
IMM GRANULOCYTES NFR BLD AUTO: 0.6 % (ref 0–0.5)
KETONES UR QL STRIP: ABNORMAL
LEUKOCYTE ESTERASE UR QL STRIP.AUTO: NEGATIVE
LYMPHOCYTES # BLD AUTO: 1.83 10*3/MM3 (ref 0.7–3.1)
LYMPHOCYTES NFR BLD AUTO: 12.9 % (ref 19.6–45.3)
MAGNESIUM SERPL-MCNC: 0.3 MG/DL (ref 1.6–2.4)
MCH RBC QN AUTO: 26.7 PG (ref 26.6–33)
MCHC RBC AUTO-ENTMCNC: 32.7 G/DL (ref 31.5–35.7)
MCV RBC AUTO: 81.6 FL (ref 79–97)
MONOCYTES # BLD AUTO: 0.29 10*3/MM3 (ref 0.1–0.9)
MONOCYTES NFR BLD AUTO: 2 % (ref 5–12)
NEUTROPHILS NFR BLD AUTO: 11.95 10*3/MM3 (ref 1.7–7)
NEUTROPHILS NFR BLD AUTO: 83.9 % (ref 42.7–76)
NITRITE UR QL STRIP: NEGATIVE
NRBC BLD AUTO-RTO: 0 /100 WBC (ref 0–0.2)
PH UR STRIP.AUTO: 6 [PH] (ref 5–8)
PLATELET # BLD AUTO: 436 10*3/MM3 (ref 140–450)
PMV BLD AUTO: 9.4 FL (ref 6–12)
POTASSIUM SERPL-SCNC: 3.4 MMOL/L (ref 3.5–5.2)
PROCALCITONIN SERPL-MCNC: 0.04 NG/ML (ref 0–0.25)
PROT SERPL-MCNC: 7.1 G/DL (ref 6–8.5)
PROT UR QL STRIP: ABNORMAL
QT INTERVAL: 438 MS
QTC INTERVAL: 479 MS
RBC # BLD AUTO: 5.66 10*6/MM3 (ref 3.77–5.28)
RBC # UR STRIP: ABNORMAL /HPF
REF LAB TEST METHOD: ABNORMAL
SODIUM SERPL-SCNC: 144 MMOL/L (ref 136–145)
SP GR UR STRIP: 1.02 (ref 1–1.03)
SQUAMOUS #/AREA URNS HPF: ABNORMAL /HPF
TROPONIN T NUMERIC DELTA: 1 NG/L
TROPONIN T SERPL HS-MCNC: 12 NG/L
TSH SERPL DL<=0.05 MIU/L-ACNC: 0.76 UIU/ML (ref 0.27–4.2)
UROBILINOGEN UR QL STRIP: ABNORMAL
WBC # UR STRIP: ABNORMAL /HPF
WBC NRBC COR # BLD AUTO: 14.24 10*3/MM3 (ref 3.4–10.8)
WHOLE BLOOD HOLD COAG: NORMAL
WHOLE BLOOD HOLD SPECIMEN: NORMAL

## 2025-07-07 PROCEDURE — 25810000003 LACTATED RINGERS PER 1000 ML: Performed by: HOSPITALIST

## 2025-07-07 PROCEDURE — 83735 ASSAY OF MAGNESIUM: CPT | Performed by: EMERGENCY MEDICINE

## 2025-07-07 PROCEDURE — 84145 PROCALCITONIN (PCT): CPT | Performed by: EMERGENCY MEDICINE

## 2025-07-07 PROCEDURE — 83036 HEMOGLOBIN GLYCOSYLATED A1C: CPT | Performed by: HOSPITALIST

## 2025-07-07 PROCEDURE — 93005 ELECTROCARDIOGRAM TRACING: CPT | Performed by: EMERGENCY MEDICINE

## 2025-07-07 PROCEDURE — 81003 URINALYSIS AUTO W/O SCOPE: CPT | Performed by: STUDENT IN AN ORGANIZED HEALTH CARE EDUCATION/TRAINING PROGRAM

## 2025-07-07 PROCEDURE — 25810000003 LACTATED RINGERS SOLUTION: Performed by: EMERGENCY MEDICINE

## 2025-07-07 PROCEDURE — 25010000002 MAGNESIUM SULFATE 2 GM/50ML SOLUTION: Performed by: HOSPITALIST

## 2025-07-07 PROCEDURE — 25010000002 MAGNESIUM SULFATE 4 GM/100ML SOLUTION: Performed by: HOSPITALIST

## 2025-07-07 PROCEDURE — 85652 RBC SED RATE AUTOMATED: CPT | Performed by: EMERGENCY MEDICINE

## 2025-07-07 PROCEDURE — 71045 X-RAY EXAM CHEST 1 VIEW: CPT

## 2025-07-07 PROCEDURE — 82330 ASSAY OF CALCIUM: CPT | Performed by: EMERGENCY MEDICINE

## 2025-07-07 PROCEDURE — 81001 URINALYSIS AUTO W/SCOPE: CPT | Performed by: EMERGENCY MEDICINE

## 2025-07-07 PROCEDURE — 82533 TOTAL CORTISOL: CPT | Performed by: EMERGENCY MEDICINE

## 2025-07-07 PROCEDURE — 25010000002 CALCIUM GLUCONATE 2-0.675 GM/100ML-% SOLUTION: Performed by: EMERGENCY MEDICINE

## 2025-07-07 PROCEDURE — 85025 COMPLETE CBC W/AUTO DIFF WBC: CPT | Performed by: EMERGENCY MEDICINE

## 2025-07-07 PROCEDURE — 70551 MRI BRAIN STEM W/O DYE: CPT

## 2025-07-07 PROCEDURE — 25010000002 MAGNESIUM SULFATE 2 GM/50ML SOLUTION: Performed by: EMERGENCY MEDICINE

## 2025-07-07 PROCEDURE — 84443 ASSAY THYROID STIM HORMONE: CPT | Performed by: EMERGENCY MEDICINE

## 2025-07-07 PROCEDURE — 99285 EMERGENCY DEPT VISIT HI MDM: CPT

## 2025-07-07 PROCEDURE — 36415 COLL VENOUS BLD VENIPUNCTURE: CPT

## 2025-07-07 PROCEDURE — 84156 ASSAY OF PROTEIN URINE: CPT | Performed by: STUDENT IN AN ORGANIZED HEALTH CARE EDUCATION/TRAINING PROGRAM

## 2025-07-07 PROCEDURE — 99223 1ST HOSP IP/OBS HIGH 75: CPT | Performed by: HOSPITALIST

## 2025-07-07 PROCEDURE — 84484 ASSAY OF TROPONIN QUANT: CPT | Performed by: EMERGENCY MEDICINE

## 2025-07-07 PROCEDURE — 80053 COMPREHEN METABOLIC PANEL: CPT | Performed by: EMERGENCY MEDICINE

## 2025-07-07 RX ORDER — SODIUM CHLORIDE 0.9 % (FLUSH) 0.9 %
10 SYRINGE (ML) INJECTION AS NEEDED
Status: DISCONTINUED | OUTPATIENT
Start: 2025-07-07 | End: 2025-07-08 | Stop reason: HOSPADM

## 2025-07-07 RX ORDER — POTASSIUM CHLORIDE 1500 MG/1
40 TABLET, EXTENDED RELEASE ORAL EVERY 4 HOURS
Status: COMPLETED | OUTPATIENT
Start: 2025-07-07 | End: 2025-07-07

## 2025-07-07 RX ORDER — POTASSIUM CHLORIDE 1.5 G/1.58G
20 POWDER, FOR SOLUTION ORAL ONCE
Status: COMPLETED | OUTPATIENT
Start: 2025-07-07 | End: 2025-07-07

## 2025-07-07 RX ORDER — SODIUM CHLORIDE 9 MG/ML
40 INJECTION, SOLUTION INTRAVENOUS AS NEEDED
Status: DISCONTINUED | OUTPATIENT
Start: 2025-07-07 | End: 2025-07-08 | Stop reason: HOSPADM

## 2025-07-07 RX ORDER — SODIUM CHLORIDE, SODIUM LACTATE, POTASSIUM CHLORIDE, CALCIUM CHLORIDE 600; 310; 30; 20 MG/100ML; MG/100ML; MG/100ML; MG/100ML
50 INJECTION, SOLUTION INTRAVENOUS CONTINUOUS
Status: DISCONTINUED | OUTPATIENT
Start: 2025-07-07 | End: 2025-07-08 | Stop reason: HOSPADM

## 2025-07-07 RX ORDER — ASPIRIN 81 MG/1
81 TABLET ORAL DAILY
Status: DISCONTINUED | OUTPATIENT
Start: 2025-07-07 | End: 2025-07-08 | Stop reason: HOSPADM

## 2025-07-07 RX ORDER — CALCIUM GLUCONATE 20 MG/ML
2000 INJECTION, SOLUTION INTRAVENOUS
Status: DISPENSED | OUTPATIENT
Start: 2025-07-07 | End: 2025-07-07

## 2025-07-07 RX ORDER — BUDESONIDE AND FORMOTEROL FUMARATE DIHYDRATE 160; 4.5 UG/1; UG/1
2 AEROSOL RESPIRATORY (INHALATION)
Status: DISCONTINUED | OUTPATIENT
Start: 2025-07-07 | End: 2025-07-08 | Stop reason: HOSPADM

## 2025-07-07 RX ORDER — ONDANSETRON 2 MG/ML
4 INJECTION INTRAMUSCULAR; INTRAVENOUS EVERY 6 HOURS PRN
Status: DISCONTINUED | OUTPATIENT
Start: 2025-07-07 | End: 2025-07-08 | Stop reason: HOSPADM

## 2025-07-07 RX ORDER — BISACODYL 5 MG/1
5 TABLET, DELAYED RELEASE ORAL DAILY PRN
Status: DISCONTINUED | OUTPATIENT
Start: 2025-07-07 | End: 2025-07-08 | Stop reason: HOSPADM

## 2025-07-07 RX ORDER — BISACODYL 10 MG
10 SUPPOSITORY, RECTAL RECTAL DAILY PRN
Status: DISCONTINUED | OUTPATIENT
Start: 2025-07-07 | End: 2025-07-08 | Stop reason: HOSPADM

## 2025-07-07 RX ORDER — AMOXICILLIN 250 MG
2 CAPSULE ORAL 2 TIMES DAILY PRN
Status: DISCONTINUED | OUTPATIENT
Start: 2025-07-07 | End: 2025-07-08 | Stop reason: HOSPADM

## 2025-07-07 RX ORDER — CALCIUM GLUCONATE 20 MG/ML
1000 INJECTION, SOLUTION INTRAVENOUS
Status: ACTIVE | OUTPATIENT
Start: 2025-07-07 | End: 2025-07-07

## 2025-07-07 RX ORDER — FAMOTIDINE 20 MG/1
20 TABLET, FILM COATED ORAL
Status: DISCONTINUED | OUTPATIENT
Start: 2025-07-08 | End: 2025-07-08 | Stop reason: HOSPADM

## 2025-07-07 RX ORDER — PROCHLORPERAZINE 25 MG
25 SUPPOSITORY, RECTAL RECTAL EVERY 12 HOURS PRN
Status: DISCONTINUED | OUTPATIENT
Start: 2025-07-07 | End: 2025-07-08 | Stop reason: HOSPADM

## 2025-07-07 RX ORDER — PANTOPRAZOLE SODIUM 40 MG/1
40 TABLET, DELAYED RELEASE ORAL
Status: DISCONTINUED | OUTPATIENT
Start: 2025-07-08 | End: 2025-07-07

## 2025-07-07 RX ORDER — MAGNESIUM SULFATE HEPTAHYDRATE 40 MG/ML
2 INJECTION, SOLUTION INTRAVENOUS
Status: COMPLETED | OUTPATIENT
Start: 2025-07-07 | End: 2025-07-07

## 2025-07-07 RX ORDER — POLYETHYLENE GLYCOL 3350 17 G/17G
17 POWDER, FOR SOLUTION ORAL DAILY PRN
Status: DISCONTINUED | OUTPATIENT
Start: 2025-07-07 | End: 2025-07-08 | Stop reason: HOSPADM

## 2025-07-07 RX ORDER — ALBUTEROL SULFATE 0.83 MG/ML
2.5 SOLUTION RESPIRATORY (INHALATION) EVERY 6 HOURS PRN
Status: DISCONTINUED | OUTPATIENT
Start: 2025-07-07 | End: 2025-07-08 | Stop reason: HOSPADM

## 2025-07-07 RX ORDER — SODIUM CHLORIDE 0.9 % (FLUSH) 0.9 %
10 SYRINGE (ML) INJECTION EVERY 12 HOURS SCHEDULED
Status: DISCONTINUED | OUTPATIENT
Start: 2025-07-07 | End: 2025-07-08 | Stop reason: HOSPADM

## 2025-07-07 RX ORDER — MAGNESIUM SULFATE HEPTAHYDRATE 40 MG/ML
4 INJECTION, SOLUTION INTRAVENOUS EVERY 4 HOURS
Status: COMPLETED | OUTPATIENT
Start: 2025-07-07 | End: 2025-07-08

## 2025-07-07 RX ORDER — NITROGLYCERIN 0.4 MG/1
0.4 TABLET SUBLINGUAL
Status: DISCONTINUED | OUTPATIENT
Start: 2025-07-07 | End: 2025-07-08 | Stop reason: HOSPADM

## 2025-07-07 RX ORDER — ASPIRIN 81 MG/1
81 TABLET ORAL DAILY
COMMUNITY

## 2025-07-07 RX ORDER — BUDESONIDE AND FORMOTEROL FUMARATE DIHYDRATE 160; 4.5 UG/1; UG/1
2 AEROSOL RESPIRATORY (INHALATION) EVERY 4 HOURS PRN
COMMUNITY

## 2025-07-07 RX ORDER — PROCHLORPERAZINE MALEATE 5 MG/1
5 TABLET ORAL EVERY 6 HOURS PRN
Status: DISCONTINUED | OUTPATIENT
Start: 2025-07-07 | End: 2025-07-08 | Stop reason: HOSPADM

## 2025-07-07 RX ORDER — METOPROLOL SUCCINATE 50 MG/1
100 TABLET, EXTENDED RELEASE ORAL DAILY
Status: DISCONTINUED | OUTPATIENT
Start: 2025-07-07 | End: 2025-07-08 | Stop reason: HOSPADM

## 2025-07-07 RX ORDER — MAGNESIUM SULFATE HEPTAHYDRATE 40 MG/ML
2 INJECTION, SOLUTION INTRAVENOUS ONCE
Status: COMPLETED | OUTPATIENT
Start: 2025-07-07 | End: 2025-07-07

## 2025-07-07 RX ORDER — PROCHLORPERAZINE EDISYLATE 5 MG/ML
5 INJECTION INTRAMUSCULAR; INTRAVENOUS EVERY 6 HOURS PRN
Status: DISCONTINUED | OUTPATIENT
Start: 2025-07-07 | End: 2025-07-08 | Stop reason: HOSPADM

## 2025-07-07 RX ADMIN — Medication 10 ML: at 21:19

## 2025-07-07 RX ADMIN — SODIUM CHLORIDE, POTASSIUM CHLORIDE, SODIUM LACTATE AND CALCIUM CHLORIDE 1000 ML: 600; 310; 30; 20 INJECTION, SOLUTION INTRAVENOUS at 08:23

## 2025-07-07 RX ADMIN — CALCIUM GLUCONATE 2000 MG: 20 INJECTION, SOLUTION INTRAVENOUS at 12:43

## 2025-07-07 RX ADMIN — MAGNESIUM SULFATE HEPTAHYDRATE 2 G: 40 INJECTION, SOLUTION INTRAVENOUS at 16:21

## 2025-07-07 RX ADMIN — POTASSIUM CHLORIDE 40 MEQ: 1500 TABLET, EXTENDED RELEASE ORAL at 21:19

## 2025-07-07 RX ADMIN — MAGNESIUM SULFATE HEPTAHYDRATE 2 G: 40 INJECTION, SOLUTION INTRAVENOUS at 09:35

## 2025-07-07 RX ADMIN — SODIUM CHLORIDE, POTASSIUM CHLORIDE, SODIUM LACTATE AND CALCIUM CHLORIDE 50 ML/HR: 600; 310; 30; 20 INJECTION, SOLUTION INTRAVENOUS at 12:35

## 2025-07-07 RX ADMIN — Medication 10 ML: at 12:41

## 2025-07-07 RX ADMIN — CALCIUM GLUCONATE 2000 MG: 20 INJECTION, SOLUTION INTRAVENOUS at 15:15

## 2025-07-07 RX ADMIN — METOPROLOL SUCCINATE 100 MG: 50 TABLET, EXTENDED RELEASE ORAL at 12:49

## 2025-07-07 RX ADMIN — POTASSIUM CHLORIDE 40 MEQ: 1500 TABLET, EXTENDED RELEASE ORAL at 16:21

## 2025-07-07 RX ADMIN — ASPIRIN 81 MG: 81 TABLET, COATED ORAL at 12:49

## 2025-07-07 RX ADMIN — MAGNESIUM SULFATE IN WATER FOR 4 G: 40 INJECTION INTRAVENOUS at 22:11

## 2025-07-07 RX ADMIN — MAGNESIUM SULFATE IN WATER FOR 4 G: 40 INJECTION INTRAVENOUS at 17:20

## 2025-07-07 RX ADMIN — POTASSIUM CHLORIDE 20 MEQ: 1.5 POWDER, FOR SOLUTION ORAL at 09:35

## 2025-07-07 NOTE — CASE MANAGEMENT/SOCIAL WORK
Discharge Planning Assessment  [facility]     Patient Name: Jocelyne Lindquist  MRN: [id number]  Today's Date: 7/7/2025    Admit Date: 7/7/2025    Plan: Home   Discharge Needs Assessment       Row Name 07/07/25 0955       Living Environment    People in Home spouse    Name(s) of People in Home dizziness and some ataxia    Current Living Arrangements home    Potentially Unsafe Housing Conditions none    In the past 12 months has the electric, gas, oil, or water company threatened to shut off services in your home? No    Primary Care Provided by self    Provides Primary Care For no one    Family Caregiver if Needed spouse    Family Caregiver Names dizziness and some ataxia    Quality of Family Relationships helpful;involved;supportive    Able to Return to Prior Arrangements yes       Resource/Environmental Concerns    Resource/Environmental Concerns none    Transportation Concerns none       Transportation Needs    In the past 12 months, has lack of transportation kept you from medical appointments or from getting medications? no    In the past 12 months, has lack of transportation kept you from meetings, work, or from getting things needed for daily living? No       Food Insecurity    Within the past 12 months, you worried that your food would run out before you got the money to buy more. Never true    Within the past 12 months, the food you bought just didn't last and you didn't have money to get more. Never true       Transition Planning    Patient/Family Anticipates Transition to home with family    Patient/Family Anticipated Services at Transition none    Transportation Anticipated family or friend will provide       Discharge Needs Assessment    Readmission Within the Last 30 Days no previous admission in last 30 days    Equipment Currently Used at Home none    Concerns to be Addressed denies needs/concerns at this time    Do you want help finding or keeping work or a job? I do not need or want help    Do you  want help with school or training? For example, starting or completing job training or getting a high school diploma, GED or equivalent No    Anticipated Changes Related to Illness none    Equipment Needed After Discharge none                   Discharge Plan       Row Name 07/07/25 0956       Plan    Plan Home    Patient/Family in Agreement with Plan yes    Plan Comments CM spoke with patient and spouse at bedside regarding Dc planning. Patient resides in [place] [facility] with her spouse. Patient is independent with ADL's, denies any DME. Patient denies any current home health or outpatient services. Patient has medical insurance, prescription coverage and is able to afford/obtain medications without difficulty. Patient has no advanced directives. Patient denies any discharge planning needs at this time. Goal is home. CM will continue to follow    Final Discharge Disposition Code 01 - home or self-care                  Continued Care and Services - Admitted Since 7/7/2025    No active coordination exists.          Demographic Summary       Row Name 07/07/25 0953       General Information    Arrived From home    Referral Source emergency department    Reason for Consult discharge planning    Preferred Language English       Contact Information    Contact Information Comments LexaThierno robles Spouse [phone]                   Functional Status       Row Name 07/07/25 0953       Functional Status    Usual Activity Tolerance good    Current Activity Tolerance fair  dizziness and some ataxia when walking       Physical Activity    On average, how many days per week do you engage in moderate to strenuous exercise (like a brisk walk)? 5 days    On average, how many minutes do you engage in exercise at this level? 20 min    Number of minutes of exercise per week 100       Assessment of Health Literacy    How often do you have someone help you read hospital materials? Never    How often do you have problems learning about your  medical condition because of difficulty understanding written information? Never    How often do you have a problem understanding what is told to you about your medical condition? Never    How confident are you filling out medical forms by yourself? Quite a bit    Health Literacy Good       Functional Status, IADL    Medications independent    Meal Preparation independent    Housekeeping independent    Laundry independent    Shopping independent    If for any reason you need help with day-to-day activities such as bathing, preparing meals, shopping, managing finances, etc., do you get the help you need? I get all the help I need       Mental Status    General Appearance WDL WDL       Mental Status Summary    Recent Changes in Mental Status/Cognitive Functioning no changes       Employment/    Employment Status retired                   Psychosocial    No documentation.                  Abuse/Neglect    No documentation.                  Legal    No documentation.                  Substance Abuse    No documentation.                  Patient Forms    No documentation.                     Glory Avila RN

## 2025-07-07 NOTE — Clinical Note
Level of Care: Telemetry [5]   Diagnosis: Weakness [731383]   Admitting Physician: BENJAMIN AKINS [[id number]]   Certification: I Certify That Inpatient Hospital Services Are Medically Necessary For Greater Than 2 Midnights

## 2025-07-07 NOTE — H&P
[facility]  HISTORY AND PHYSICAL    Patient Name: Jocelyne Lindquist  : 1960  MRN: [id number]  Primary Care Physician: Steve Trujillo MD  Date of admission: 2025      Subjective   Subjective     Chief Complaint: Nausea/anorexia/weakness    HPI:  Jocelyne Lindquist is a 64 y.o. female here with progressive weakness, x 2-3 weeks. Notes weakness/dizziness/ataxia x 2 weeks. Didn't feel safe to walk. Severe nausea/anorexia. Denies dysphagia, noting only severe nausea with eating and inability to eat. Some loose stool. Denies weight loss. No blood in stool/urine. Denies f/c. No night sweats. No hematemesis. No dyspnea. No palpitations or chest pain. No edema.     History of acute urinary retention  with ESMER/BSO with resection of benign cystadenoma, with subsequent GYN follow up.         Review of Systems   Constitutional:  Positive for activity change, appetite change and fatigue.   HENT: Negative.     Respiratory: Negative.     Gastrointestinal:  Positive for diarrhea, nausea and vomiting. Negative for abdominal distention, abdominal pain, anal bleeding, blood in stool, constipation and rectal pain.   Genitourinary:  Negative for difficulty urinating.   Neurological:  Positive for dizziness, weakness and light-headedness.           Personal History     Past Medical History:   Diagnosis Date    Allergic Always    Anemia     Asthma     GERD (gastroesophageal reflux disease)     Hypertension     Hypotension 2017    PVD (peripheral vascular disease)     Seizures     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, right     outer corner of right eye    Tobacco abuse 2017    Quit 15           Past Surgical History:   Procedure Laterality Date    APPENDECTOMY  23    BREAST BIOPSY Left 2010    stereo bx    CYST REMOVAL Right     wrist    CYSTOSCOPY N/A 2023    Procedure: CYSTOSCOPY TEMPORARY PLACEMENT BILATERAL URETERAL CATHETER;  Surgeon:  Kristin Dimas MD;  Location:  [facility];  Service: Gynecology Oncology;  Laterality: N/A;    EXPLORATORY LAPAROTOMY, TOTAL ABDOMINAL HYSTERECTOMY SALPINGO OOPHORECTOMY N/A 04/20/2023    Procedure: EXPLORATORY LAPAROTOMY, TOTAL ABDOMINAL HYSTERECTOMY BILATERAL SALPINGO-OOPHORECTOMY;  Surgeon: Kristin Dimas MD;  Location:  [facility];  Service: Gynecology Oncology;  Laterality: N/A;    HYSTERECTOMY  4/20/23    Removal of cyst etc    OOPHORECTOMY      SQUAMOUS CELL CARCINOMA EXCISION Right 12/13/2017    outer corner of right eye    WRIST SURGERY Left        Family History: family history includes Arthritis in her mother; Cancer in her mother; Heart failure in her father.     Social History:  reports that she quit smoking about 8 years ago. Her smoking use included cigarettes. She started smoking about 25 years ago. She has a 30 pack-year smoking history. She has never used smokeless tobacco. She reports that she does not currently use alcohol. She reports that she does not use drugs.  Social History     Social History Narrative    Not on file       Medications:  Available home medication information reviewed.  albuterol sulfate HFA, aspirin, budesonide-formoterol, metoprolol succinate XL, omeprazole, pravastatin, and promethazine-dextromethorphan    Allergies   Allergen Reactions    Benazepril Other (See Comments)     No allergy.     Doxycycline Diarrhea    Egg-Derived Products      Causes cramps    Fish-Derived Products      Causes severe stomach cramps       Objective   Objective     Vital Signs:   Temp:  [97.9 °F (36.6 °C)] 97.9 °F (36.6 °C)  Heart Rate:  [] 83  Resp:  [14] 14  BP: (119-149)/() 121/61       Physical Exam   NAD, alert and oriented  Family at bedside  OP clear, dry MM  Neck supple  RRR  CTAB  +BS, soft, NT  CORBETT  No LE edema    Result Review:  I have personally reviewed the results from the time of this admission to 7/7/2025 10:09 EDT and agree with these findings:  []  Laboratory  list / accordion  []  Microbiology  []  Radiology  []  EKG/Telemetry   []  Cardiology/Vascular   []  Pathology  []  Old records  []  Other:  Most notable findings include: WBC 14, K 3.4, ionized calcium 0.76, Mag 0.3      LAB RESULTS:      Lab 07/07/25  0818   WBC 14.24*   HEMOGLOBIN 15.1   HEMATOCRIT 46.2   PLATELETS 436   NEUTROS ABS 11.95*   IMMATURE GRANS (ABS) 0.09*   LYMPHS ABS 1.83   MONOS ABS 0.29   EOS ABS 0.03   MCV 81.6   SED RATE 35*   PROCALCITONIN 0.04         Lab 07/07/25  0945 07/07/25  0818   SODIUM  --  144   POTASSIUM  --  3.4*   CHLORIDE  --  98   CO2  --  23.0   ANION GAP  --  23.0*   BUN  --  10.8   CREATININE  --  0.88   EGFR  --  72.6   GLUCOSE  --  111*   CALCIUM  --  6.5*   IONIZED CALCIUM 0.76*  --    MAGNESIUM  --  0.3*   TSH  --  0.759         Lab 07/07/25  0818   TOTAL PROTEIN 7.1   ALBUMIN 4.1   GLOBULIN 3.0   ALT (SGPT) 13   AST (SGOT) 29   BILIRUBIN 0.5   ALK PHOS 97         Lab 07/07/25  0818   HSTROP T 12                 UA          7/7/2025    08:25   Urinalysis   Squamous Epithelial Cells, UA 13-20    Specific Gravity, UA 1.025    Ketones, UA >=160 mg/dL (4+)    Blood, UA Trace    Leukocytes, UA Negative    Nitrite, UA Negative    RBC, UA 0-2    WBC, UA 3-5    Bacteria, UA None Seen        Microbiology Results (last 10 days)       ** No results found for the last 240 hours. **            XR Chest 1 View  Result Date: 7/7/2025  XR CHEST 1 VW Date of Exam: 7/7/2025 8:11 AM EDT Indication: Weak/Dizzy/AMS triage protocol Comparison: 1/16/2021 Findings: Heart size is within normal limits. The pulmonary vascular pattern of the chest is normal and the lungs are clear. There are calcified lymph nodes in the right hilum and right lung base reflecting old healed granulomatous disease.     Impression: Impression: No active disease. Electronically Signed: Steve Solano MD  7/7/2025 8:24 AM EDT  Workstation ID: [id number]          Assessment & Plan   Assessment & Plan       Weakness    GERD  (gastroesophageal reflux disease)    Weakness  Anorexia/nausea  Severe electrolyte abnormalities  Proteinuria  -consult nutrition  -consult GI for possible upper endoscopy  -PPI  -consult nephrology given severe proteinuria/electrolyte abnormalities  -replace K/Mg/Calcium per protocol  -IVF  -PT/OT    Weakness/falls  -MRI brain pending    History of urinary retention s/p ESMER/BSO, benign cystadenoma resection, 4/23    History of asthma    History of HTN    History of HL    VTE Prophylaxis:  Mechanical VTE prophylaxis orders are signed & held.            CODE STATUS:    Code Status and Medical Interventions: CPR (Attempt to Resuscitate); Full Support   Ordered at: 07/07/25 1003     Code Status (Patient has no pulse and is not breathing):    CPR (Attempt to Resuscitate)     Medical Interventions (Patient has pulse or is breathing):    Full Support       Expected Discharge   Expected discharge date/ time has not been documented.     Catrachito Lockhart MD  07/07/25

## 2025-07-07 NOTE — ED PROVIDER NOTES
Subjective   History of Present Illness  Is a very pleasant 64-year-old female  accompanied by her .  Steve Trujillo is her primary care.  She generally is active and able to walk her dog about 1/2 mile every day.  She is not a smoker or drinker.    She comes the emergency room today with a subacute decline in her health over the past 3 weeks.  She reports insidious onset of dizziness and some ataxia where she felt like she was not safe to go outside and walk and would have to hold onto her .  Has been pretty much on a daily basis as she felt this way.  She also has had nausea and then over the past day or 2 has had developed vomiting.  She has had some loose stools.  She has been drinking water but really has not had an appetite and she thinks she is losing weight.  She has not passed blood per any orifice.  She can think of nothing that precipitated this event.  She has no true vertigo and no headaches and no focal weakness but just feels weak all over.    She has had 2 similar episodes in the past that she reports lasted about a month and defied medical explanation.  She has no history of coronary artery disease but does have asthma but has not experienced recent increase shortness of breath she uses inhaler regularly.  She has had no recent medication changes.  Previously on a statin but does not take it.  She saw Dr. Trujillo in the office in June that time she was feeling better as she empirically took some leftover prednisone.  Was after that she stopped taking this issue started to feel worse.  She has no history of adrenal insufficiency does not take prednisone or steroids on a regular basis.    She has had no rash.  She has had no night sweats fevers or chills no runny nose or sore throat or cough.    All other systems reviewed and are negative except as noted above.        Review of Systems   All other systems reviewed and are negative.      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     Doxycycline Diarrhea    Egg-Derived Products      Causes cramps    Fish-Derived Products      Causes severe stomach cramps       Past Surgical History:   Procedure Laterality Date    APPENDECTOMY  23    BREAST BIOPSY Left 2010    stereo bx    CYST REMOVAL Right     wrist    CYSTOSCOPY N/A 2023    Procedure: CYSTOSCOPY TEMPORARY PLACEMENT BILATERAL URETERAL CATHETER;  Surgeon: Kristin Dimas MD;  Location:  [facility];  Service: Gynecology Oncology;  Laterality: N/A;    EXPLORATORY LAPAROTOMY, TOTAL ABDOMINAL HYSTERECTOMY SALPINGO OOPHORECTOMY N/A 2023    Procedure: EXPLORATORY LAPAROTOMY, TOTAL ABDOMINAL HYSTERECTOMY BILATERAL SALPINGO-OOPHORECTOMY;  Surgeon: Kristin Dimas MD;  Location:  [facility];  Service: Gynecology Oncology;  Laterality: N/A;    HYSTERECTOMY  23    Removal of cyst etc    OOPHORECTOMY      SQUAMOUS CELL CARCINOMA EXCISION Right 2017    outer corner of right eye    WRIST SURGERY Left        Family History   Problem Relation Age of Onset    Cancer Mother         esophageal and liver    Arthritis Mother     Heart failure Father     Breast cancer Neg Hx     Ovarian cancer Neg Hx        Social History     Socioeconomic History    Marital status:    Tobacco Use    Smoking status: Former     Current packs/day: 0.00     Average packs/day: 1 pack/day for 30.0 years (30.0 ttl pk-yrs)     Types: Cigarettes     Start date: 2000     Quit date: 2017     Years since quittin.5    Smokeless tobacco: Never    Tobacco comments:     17   Vaping Use    Vaping status:  Never Used   Substance and Sexual Activity    Alcohol use: Not Currently     Comment: not since 12/25/17    Drug use: No    Sexual activity: Yes     Partners: Male     Birth control/protection: Vasectomy           Objective   Physical Exam  Vitals and nursing note reviewed.   Constitutional:       Appearance: She is normal weight.      Comments: This is a pleasant 64-year-old alert and oriented GCS is 15.  She is in no distress.   HENT:      Head: Normocephalic and atraumatic.      Right Ear: Tympanic membrane, ear canal and external ear normal.      Left Ear: Tympanic membrane, ear canal and external ear normal.      Nose: Nose normal.      Mouth/Throat:      Mouth: Mucous membranes are moist.      Pharynx: Oropharynx is clear.   Eyes:      Extraocular Movements: Extraocular movements intact.      Conjunctiva/sclera: Conjunctivae normal.      Pupils: Pupils are equal, round, and reactive to light.      Comments: Her right eyelid has unusual.  She has had multiple surgeries there but no infection.   Neck:      Vascular: No carotid bruit.   Cardiovascular:      Rate and Rhythm: Normal rate and regular rhythm.      Pulses: Normal pulses.      Heart sounds: Normal heart sounds.   Pulmonary:      Effort: Pulmonary effort is normal.      Breath sounds: Normal breath sounds.      Comments: Axilla and supraclavicular area without masses.  Abdominal:      Comments: BMI 26 positive bowel sounds soft and nontender no organomegaly, masses, or guarding.   Musculoskeletal:      Cervical back: Normal range of motion and neck supple. No rigidity or tenderness.      Comments: Equal full pulses without edema, synovitis,  She does have some chronic venous stasis changes in her lower extremities but no edema or venous cords.   Lymphadenopathy:      Cervical: No cervical adenopathy.   Skin:     General: Skin is warm and dry.      Capillary Refill: Capillary refill takes less than 2 seconds.   Neurological:      Mental Status: She is  alert.      Comments: Slightly asymmetric due to her right eyelid surgeries and vision hearing and speech are preserved.  Mild generalized weakness without focality.  Knee jerks are 1+ bilaterally.         Procedures           ED Course                                                  Recent Results (from the past 24 hours)   Comprehensive Metabolic Panel    Collection Time: 07/07/25  8:18 AM    Specimen: Blood   Result Value Ref Range    Glucose 111 (H) 65 - 99 mg/dL    BUN 10.8 8.0 - 23.0 mg/dL    Creatinine 0.88 0.57 - 1.00 mg/dL    Sodium 144 136 - 145 mmol/L    Potassium 3.4 (L) 3.5 - 5.2 mmol/L    Chloride 98 98 - 107 mmol/L    CO2 23.0 22.0 - 29.0 mmol/L    Calcium 6.5 (L) 8.6 - 10.5 mg/dL    Total Protein 7.1 6.0 - 8.5 g/dL    Albumin 4.1 3.5 - 5.2 g/dL    ALT (SGPT) 13 1 - 33 U/L    AST (SGOT) 29 1 - 32 U/L    Alkaline Phosphatase 97 39 - 117 U/L    Total Bilirubin 0.5 0.0 - 1.2 mg/dL    Globulin 3.0 gm/dL    A/G Ratio 1.4 g/dL    BUN/Creatinine Ratio 12.3 7.0 - 25.0    Anion Gap 23.0 (H) 5.0 - 15.0 mmol/L    eGFR 72.6 >60.0 mL/min/1.73   High Sensitivity Troponin T    Collection Time: 07/07/25  8:18 AM    Specimen: Blood   Result Value Ref Range    HS Troponin T 12 <14 ng/L   Magnesium    Collection Time: 07/07/25  8:18 AM    Specimen: Blood   Result Value Ref Range    Magnesium 0.3 (C) 1.6 - 2.4 mg/dL   Green Top (Gel)    Collection Time: 07/07/25  8:18 AM   Result Value Ref Range    Extra Tube Hold for add-ons.    Lavender Top    Collection Time: 07/07/25  8:18 AM   Result Value Ref Range    Extra Tube hold for add-on    Gold Top - SST    Collection Time: 07/07/25  8:18 AM   Result Value Ref Range    Extra Tube Hold for add-ons.    Gray Top    Collection Time: 07/07/25  8:18 AM   Result Value Ref Range    Extra Tube Hold for add-ons.    Light Blue Top    Collection Time: 07/07/25  8:18 AM   Result Value Ref Range    Extra Tube Hold for add-ons.    CBC Auto Differential    Collection Time: 07/07/25  8:18  AM    Specimen: Blood   Result Value Ref Range    WBC 14.24 (H) 3.40 - 10.80 10*3/mm3    RBC 5.66 (H) 3.77 - 5.28 10*6/mm3    Hemoglobin 15.1 12.0 - 15.9 g/dL    Hematocrit 46.2 34.0 - 46.6 %    MCV 81.6 79.0 - 97.0 fL    MCH 26.7 26.6 - 33.0 pg    MCHC 32.7 31.5 - 35.7 g/dL    RDW 14.3 12.3 - 15.4 %    RDW-SD 41.8 37.0 - 54.0 fl    MPV 9.4 6.0 - 12.0 fL    Platelets 436 140 - 450 10*3/mm3    Neutrophil % 83.9 (H) 42.7 - 76.0 %    Lymphocyte % 12.9 (L) 19.6 - 45.3 %    Monocyte % 2.0 (L) 5.0 - 12.0 %    Eosinophil % 0.2 (L) 0.3 - 6.2 %    Basophil % 0.4 0.0 - 1.5 %    Immature Grans % 0.6 (H) 0.0 - 0.5 %    Neutrophils, Absolute 11.95 (H) 1.70 - 7.00 10*3/mm3    Lymphocytes, Absolute 1.83 0.70 - 3.10 10*3/mm3    Monocytes, Absolute 0.29 0.10 - 0.90 10*3/mm3    Eosinophils, Absolute 0.03 0.00 - 0.40 10*3/mm3    Basophils, Absolute 0.05 0.00 - 0.20 10*3/mm3    Immature Grans, Absolute 0.09 (H) 0.00 - 0.05 10*3/mm3    nRBC 0.0 0.0 - 0.2 /100 WBC   Procalcitonin    Collection Time: 07/07/25  8:18 AM    Specimen: Blood   Result Value Ref Range    Procalcitonin 0.04 0.00 - 0.25 ng/mL   Sedimentation Rate    Collection Time: 07/07/25  8:18 AM    Specimen: Blood   Result Value Ref Range    Sed Rate 35 (H) 0 - 30 mm/hr   TSH Rfx On Abnormal To Free T4    Collection Time: 07/07/25  8:18 AM    Specimen: Blood   Result Value Ref Range    TSH 0.759 0.270 - 4.200 uIU/mL   Cortisol    Collection Time: 07/07/25  8:18 AM    Specimen: Blood   Result Value Ref Range    Cortisol 54.27   mcg/dL   Hemoglobin A1c    Collection Time: 07/07/25  8:18 AM    Specimen: Blood   Result Value Ref Range    Hemoglobin A1C 6.58 (H) 4.80 - 5.60 %   Urinalysis With Microscopic If Indicated (No Culture) - Urine, Clean Catch    Collection Time: 07/07/25  8:25 AM    Specimen: Urine, Clean Catch   Result Value Ref Range    Color, UA Yellow Yellow, Straw    Appearance, UA Clear Clear    pH, UA 6.0 5.0 - 8.0    Specific Gravity, UA 1.025 1.001 - 1.030     Glucose, UA Negative Negative    Ketones, UA >=160 mg/dL (4+) (A) Negative    Bilirubin, UA Negative Negative    Blood, UA Trace (A) Negative    Protein, UA >=300 mg/dL (3+) (A) Negative    Leuk Esterase, UA Negative Negative    Nitrite, UA Negative Negative    Urobilinogen, UA 1.0 E.U./dL 0.2 - 1.0 E.U./dL   Urinalysis, Microscopic Only - Urine, Clean Catch    Collection Time: 07/07/25  8:25 AM    Specimen: Urine, Clean Catch   Result Value Ref Range    RBC, UA 0-2 None Seen, 0-2 /HPF    WBC, UA 3-5 (A) None Seen, 0-2 /HPF    Bacteria, UA None Seen None Seen, Trace /HPF    Squamous Epithelial Cells, UA 13-20 (A) None Seen, 0-2 /HPF    Hyaline Casts, UA None Seen 0 - 6 /LPF    Methodology Manual Light Microscopy    ECG 12 Lead Other; weakness    Collection Time: 07/07/25  8:28 AM   Result Value Ref Range    QT Interval 438 ms    QTC Interval 479 ms   High Sensitivity Troponin T 1Hr    Collection Time: 07/07/25  9:45 AM    Specimen: Blood   Result Value Ref Range    HS Troponin T 13 <14 ng/L    Troponin T Numeric Delta 1 Abnormal if >/=3 ng/L   Calcium, Ionized    Collection Time: 07/07/25  9:45 AM    Specimen: Blood   Result Value Ref Range    Ionized Calcium 0.76 (L) 1.15 - 1.30 mmol/L     Note: In addition to lab results from this visit, the labs listed above may include labs taken at another facility or during a different encounter within the last 24 hours. Please correlate lab times with ED admission and discharge times for further clarification of the services performed during this visit.    MRI Brain Without Contrast   Final Result   Impression:   1.No acute intracranial process identified.   2.Findings suggestive of mild chronic small vessel ischemic disease.            Electronically Signed: Parish Lake MD     7/7/2025 11:37 AM EDT     Workstation ID: [id number]      XR Chest 1 View   Final Result   Impression:   No active disease.            Electronically Signed: Steve Solano MD     7/7/2025 8:24 AM  EDT     Workstation ID: [id number]        Vitals:    07/07/25 1000 07/07/25 1030 07/07/25 1159 07/07/25 1656   BP:   132/65 138/81   BP Location:   Left arm Left arm   Patient Position:   Lying Sitting   Pulse: 83 73 73 84   Resp:   16 18   Temp:   97.6 °F (36.4 °C)    TempSrc:   Oral    SpO2: 96% 98%     Weight:       Height:         Medications   sodium chloride 0.9 % flush 10 mL (has no administration in time range)   albuterol (PROVENTIL) nebulizer solution 0.083% 2.5 mg/3mL (has no administration in time range)   aspirin EC tablet 81 mg (81 mg Oral Given 7/7/25 1249)   budesonide-formoterol (SYMBICORT) 160-4.5 MCG/ACT inhaler 2 puff (2 puffs Inhalation Not Given 7/7/25 1427)   metoprolol succinate XL (TOPROL-XL) 24 hr tablet 100 mg (100 mg Oral Given 7/7/25 1249)   pantoprazole (PROTONIX) EC tablet 40 mg (has no administration in time range)   sodium chloride 0.9 % flush 10 mL (10 mL Intravenous Given 7/7/25 1241)   sodium chloride 0.9 % flush 10 mL (has no administration in time range)   sodium chloride 0.9 % infusion 40 mL (has no administration in time range)   nitroglycerin (NITROSTAT) SL tablet 0.4 mg (has no administration in time range)   lactated ringers infusion (50 mL/hr Intravenous New Bag 7/7/25 1235)   Potassium Replacement - Follow Nurse / BPA Driven Protocol (has no administration in time range)   Magnesium Standard Dose Replacement - Follow Nurse / BPA Driven Protocol (has no administration in time range)   Phosphorus Replacement - Follow Nurse / BPA Driven Protocol (has no administration in time range)   Calcium Replacement - Follow Nurse / BPA Driven Protocol (has no administration in time range)   sennosides-docusate (PERICOLACE) 8.6-50 MG per tablet 2 tablet (has no administration in time range)     And   polyethylene glycol (MIRALAX) packet 17 g (has no administration in time range)     And   bisacodyl (DULCOLAX) EC tablet 5 mg (has no administration in time range)     And   bisacodyl  (DULCOLAX) suppository 10 mg (has no administration in time range)   prochlorperazine (COMPAZINE) injection 5 mg (has no administration in time range)     Or   prochlorperazine (COMPAZINE) tablet 5 mg (has no administration in time range)     Or   prochlorperazine (COMPAZINE) suppository 25 mg (has no administration in time range)   ondansetron (ZOFRAN) injection 4 mg (has no administration in time range)   calcium gluconate 1000 Mg/50ml 0.675% NaCl IV SOLN (1,000 mg Intravenous Not Given 7/7/25 1214)     Followed by   calcium gluconate 2000-675 MG/100ML NACL IVPB (2,000 mg Intravenous New Bag 7/7/25 1515)   magnesium sulfate 2g/50 mL (PREMIX) infusion (2 g Intravenous New Bag 7/7/25 1621)     Followed by   magnesium sulfate 4g/100mL (PREMIX) infusion (has no administration in time range)   potassium chloride (KLOR-CON M20) CR tablet 40 mEq (40 mEq Oral Given 7/7/25 1621)   lactated ringers bolus 1,000 mL (0 mL Intravenous Stopped 7/7/25 1054)   magnesium sulfate 2g/50 mL (PREMIX) infusion (0 g Intravenous Stopped 7/7/25 1054)   potassium chloride (KLOR-CON) packet 20 mEq (20 mEq Oral Given 7/7/25 0935)     ECG/EMG Results (last 24 hours)       Procedure Component Value Units Date/Time    Telemetry Scan [[id number]] Resulted: 07/07/25 0752     Updated: 07/07/25 0808          ECG 12 Lead Other; weakness   Final Result   Test Reason : weakness   Blood Pressure :   */*   mmHG   Vent. Rate :  72 BPM     Atrial Rate :  72 BPM      P-R Int : 128 ms          QRS Dur :  84 ms       QT Int : 438 ms       P-R-T Axes :  65  56  61 degrees     QTcB Int : 479 ms      Normal sinus rhythm   Nonspecific ST abnormality   Abnormal ECG   When compared with ECG of 20-Apr-2023 05:13,   No significant change was found   Confirmed by REJI BENNETT MD ([id number]) on 7/7/2025 4:24:16 PM      Referred By: oswald craig           Confirmed By: REJI BENNETT MD      Telemetry Scan   Final Result      ECG 12 Lead Other; weakness    (Results Pending)             Medical Decision Making      I personally reviewed  all available studies at the bedside with the patient and her .  My personal viewing of her MRI showed chronic changes nothing acute.  Chest x-ray my personal interpretation is no active disease.    Labs remarkable for hypomagnesemia and hypokalemia calcium Glo and mild hypokalemia.    She also has active urine with proteinuria and ketosis likely secondary to starvation.  Her dizziness and ataxia are likely due to muscle weakness from her electrolyte disturbances.  I suspect her electrolyte disturbances from some unusual nephropathy causing loss of magnesium and calcium.    I have started replete her electrolytes here I think she needs to be admitted for serial exams careful EKG monitoring and perhaps to try to better elucidate the etiology of these disturbances.    In regards to her leukocytosis it may be reactive as her procalcitonin is normal urine and chest are clear I do not think this represents infection.    I spoke Dr. Lockhart on-call hospital medicine he and his colleagues admit the patient.    Are agreeable with the plan    Problems Addressed:  Ataxia: complicated acute illness or injury with systemic symptoms that poses a threat to life or bodily functions  Dizziness: complicated acute illness or injury with systemic symptoms that poses a threat to life or bodily functions  Hypocalcemia: complicated acute illness or injury with systemic symptoms that poses a threat to life or bodily functions  Hypokalemia: complicated acute illness or injury with systemic symptoms that poses a threat to life or bodily functions  Hypomagnesemia: complicated acute illness or injury with systemic symptoms that poses a threat to life or bodily functions  Ketosis: complicated acute illness or injury with systemic symptoms that poses a threat to life or bodily functions  Leukocytosis, unspecified type: complicated acute illness or injury with systemic symptoms that  poses a threat to life or bodily functions  Proteinuria, unspecified type: undiagnosed new problem with uncertain prognosis  Weakness: complicated acute illness or injury with systemic symptoms that poses a threat to life or bodily functions    Amount and/or Complexity of Data Reviewed  Independent Historian: spouse  External Data Reviewed: notes.  Labs: ordered. Decision-making details documented in ED Course.  Radiology: ordered and independent interpretation performed. Decision-making details documented in ED Course.  ECG/medicine tests: ordered and independent interpretation performed. Decision-making details documented in ED Course.    Risk  Prescription drug management.  Decision regarding hospitalization.        Final diagnoses:   Weakness   Hypomagnesemia   Hypokalemia   Hypocalcemia   Dizziness   Ataxia   Proteinuria, unspecified type   Leukocytosis, unspecified type   Ketosis       ED Disposition  ED Disposition       ED Disposition   Decision to Admit    Condition   --    Comment   Level of Care: Telemetry [5]   Diagnosis: Weakness [[id number]]   Admitting Physician: BENJAMIN AKINS [[id number]]                 No follow-up provider specified.       Medication List      No changes were made to your prescriptions during this visit.            Richi Hodges MD  07/07/25 5013

## 2025-07-07 NOTE — ED NOTES
Jocelyne Lindquist    Nursing Report ED to Floor:  Mental status: A&O x 4  Ambulatory status: Up with two  Oxygen Therapy:  RA  Cardiac Rhythm: NSR  Admitted from: ER  Safety Concerns:  High Fall Risk  Precautions: None  Social Issues: None  ED Room #:  15    ED Nurse Phone Extension - [phone] or may call [phone].      HPI:   Chief Complaint   Patient presents with    Weakness - Generalized    Vomiting       Past Medical History:  Past Medical History:   Diagnosis Date    Allergic Always    Anemia     Asthma     GERD (gastroesophageal reflux disease)     Hypertension     Hypotension 02/18/2017    PVD (peripheral vascular disease)     Seizures     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, right     outer corner of right eye    Tobacco abuse 02/18/2017    Quit 12/25/17        Past Surgical History:  Past Surgical History:   Procedure Laterality Date    APPENDECTOMY  4/20/23    BREAST BIOPSY Left 06/18/2010    stereo bx    CYST REMOVAL Right     wrist    CYSTOSCOPY N/A 04/20/2023    Procedure: CYSTOSCOPY TEMPORARY PLACEMENT BILATERAL URETERAL CATHETER;  Surgeon: Kristin Dimas MD;  Location: [facility] OR;  Service: Gynecology Oncology;  Laterality: N/A;    EXPLORATORY LAPAROTOMY, TOTAL ABDOMINAL HYSTERECTOMY SALPINGO OOPHORECTOMY N/A 04/20/2023    Procedure: EXPLORATORY LAPAROTOMY, TOTAL ABDOMINAL HYSTERECTOMY BILATERAL SALPINGO-OOPHORECTOMY;  Surgeon: Kristin Dimas MD;  Location:  [facility];  Service: Gynecology Oncology;  Laterality: N/A;    HYSTERECTOMY  4/20/23    Removal of cyst etc    OOPHORECTOMY      SQUAMOUS CELL CARCINOMA EXCISION Right 12/13/2017    outer corner of right eye    WRIST SURGERY Left         Admitting Doctor:   Catrachito Lockhart MD    Consulting Provider(s):  Consults       No orders found from 6/8/2025 to 7/8/2025.             Admitting Diagnosis:   The primary encounter diagnosis was Weakness. Diagnoses of Hypomagnesemia, Hypokalemia, Hypocalcemia, Dizziness, Ataxia, Proteinuria, unspecified type,  Leukocytosis, unspecified type, and Ketosis were also pertinent to this visit.    Most Recent Vitals:   Vitals:    07/07/25 0858 07/07/25 0900 07/07/25 0930 07/07/25 1000   BP: 124/59 124/59 121/61    BP Location:       Patient Position:       Pulse: 74 74 71 83   Resp:       Temp:       TempSrc:       SpO2: 96% 96% 92% 96%   Weight:       Height:           Active LDAs/IV Access:   Lines, Drains & Airways       Active LDAs       Name Placement date Placement time Site Days    Peripheral IV 07/07/25 0820 20 G Right Antecubital 07/07/25 0820  Antecubital  less than 1                    Labs (abnormal labs have a star):   Labs Reviewed   COMPREHENSIVE METABOLIC PANEL - Abnormal; Notable for the following components:       Result Value    Glucose 111 (*)     Potassium 3.4 (*)     Calcium 6.5 (*)     Anion Gap 23.0 (*)     All other components within normal limits    Narrative:     GFR Categories in Chronic Kidney Disease (CKD)              GFR Category          GFR (mL/min/1.73)    Interpretation  G1                    90 or greater        Normal or high (1)  G2                    60-89                Mild decrease (1)  G3a                   45-59                Mild to moderate decrease  G3b                   30-44                Moderate to severe decrease  G4                    15-29                Severe decrease  G5                    14 or less           Kidney failure    (1)In the absence of evidence of kidney disease, neither GFR category G1 or G2 fulfill the criteria for CKD.    eGFR calculation 2021 CKD-EPI creatinine equation, which does not include race as a factor   MAGNESIUM - Abnormal; Notable for the following components:    Magnesium 0.3 (*)     All other components within normal limits   URINALYSIS W/ MICROSCOPIC IF INDICATED (NO CULTURE) - Abnormal; Notable for the following components:    Ketones, UA >=160 mg/dL (4+) (*)     Blood, UA Trace (*)     Protein, UA >=300 mg/dL (3+) (*)     All other  components within normal limits   CBC WITH AUTO DIFFERENTIAL - Abnormal; Notable for the following components:    WBC 14.24 (*)     RBC 5.66 (*)     Neutrophil % 83.9 (*)     Lymphocyte % 12.9 (*)     Monocyte % 2.0 (*)     Eosinophil % 0.2 (*)     Immature Grans % 0.6 (*)     Neutrophils, Absolute 11.95 (*)     Immature Grans, Absolute 0.09 (*)     All other components within normal limits   SEDIMENTATION RATE - Abnormal; Notable for the following components:    Sed Rate 35 (*)     All other components within normal limits   URINALYSIS, MICROSCOPIC ONLY - Abnormal; Notable for the following components:    WBC, UA 3-5 (*)     Squamous Epithelial Cells, UA 13-20 (*)     All other components within normal limits   CALCIUM, IONIZED - Abnormal; Notable for the following components:    Ionized Calcium 0.76 (*)     All other components within normal limits   TROPONIN - Normal    Narrative:     High Sensitive Troponin T Reference Range:  <14.0 ng/L- Negative Female for AMI  <22.0 ng/L- Negative Male for AMI  >=14 - Abnormal Female indicating possible myocardial injury.  >=22 - Abnormal Male indicating possible myocardial injury.   Clinicians would have to utilize clinical acumen, EKG, Troponin, and serial changes to determine if it is an Acute Myocardial Infarction or myocardial injury due to an underlying chronic condition.        PROCALCITONIN - Normal    Narrative:     As a Marker for Sepsis (Non-Neonates):    1. <0.5 ng/mL represents a low risk of severe sepsis and/or septic shock.  2. >2 ng/mL represents a high risk of severe sepsis and/or septic shock.    As a Marker for Lower Respiratory Tract Infections that require antibiotic therapy:    PCT on Admission    Antibiotic Therapy       6-12 Hrs later    >0.5                Strongly Recommended  >0.25 - <0.5        Recommended   0.1 - 0.25          Discouraged              Remeasure/reassess PCT  <0.1                Strongly Discouraged     Remeasure/reassess PCT    As  "28 day mortality risk marker: \"Change in Procalcitonin Result\" (>80% or <=80%) if Day 0 (or Day 1) and Day 4 values are available. Refer to http://[email or web address]-pct-[email or web address]    Change in PCT <=80%  A decrease of PCT levels below or equal to 80% defines a positive change in PCT test result representing a higher risk for 28-day all-cause mortality of patients diagnosed with severe sepsis for septic shock.    Change in PCT >80%  A decrease of PCT levels of more than 80% defines a negative change in PCT result representing a lower risk for 28-day all-cause mortality of patients diagnosed with severe sepsis or septic shock.      TSH RFX ON ABNORMAL TO FREE T4 - Normal   RAINBOW DRAW    Narrative:     The following orders were created for panel order [place] Draw.  Procedure                               Abnormality         Status                     ---------                               -----------         ------                     Green Top (Gel)[[id number]]                                  Final result               Lavender Top[[id number]]                                     Final result               Gold Top - SST[[id number]]                                   Final result               Gray Top[[id number]]                                         Final result               Light Blue Top[[id number]]                                   Final result                 Please view results for these tests on the individual orders.   CORTISOL    Narrative:     Cortisol Reference Ranges:    Cortisol 6AM - 10AM Range: 6.02-18.40 mcg/dl  Cortisol 4PM - 8PM Range: 2.68-10.50 mcg/dl      Results may be falsely increased if patient taking Biotin.     HIGH SENSITIVITIY TROPONIN T 1HR   HEMOGLOBIN A1C   CBC AND DIFFERENTIAL    Narrative:     The following orders were created for panel order CBC & Differential.  Procedure                               Abnormality         Status                     ---------                             "   -----------         ------                     CBC Auto Differential[[id number]]        Abnormal            Final result                 Please view results for these tests on the individual orders.   GREEN TOP   LAVENDER TOP   GOLD TOP - SST   GRAY TOP   LIGHT BLUE TOP       Meds Given in ED:   Medications   sodium chloride 0.9 % flush 10 mL (has no administration in time range)   magnesium sulfate 2g/50 mL (PREMIX) infusion (2 g Intravenous New Bag 7/7/25 0935)   calcium gluconate 1000 Mg/50ml 0.675% NaCl IV SOLN (has no administration in time range)     Followed by   calcium gluconate 2000-675 MG/100ML NACL IVPB (has no administration in time range)   lactated ringers bolus 1,000 mL (1,000 mL Intravenous New Bag 7/7/25 0823)   potassium chloride (KLOR-CON) packet 20 mEq (20 mEq Oral Given 7/7/25 0935)           Last NIH score:                                                          Dysphagia screening results:  Patient Factors Component (Dysphagia:Stroke or Rule-out)  Best Eye Response: 4-->(E4) spontaneous (07/07/25 0754)  Best Motor Response: 6-->(M6) obeys commands (07/07/25 0754)  Best Verbal Response: 5-->(V5) oriented (07/07/25 0754)  [place] Coma Scale Score: 15 (07/07/25 0754)     [place] Coma Scale:  No data recorded     CIWA:        Restraint Type:            Isolation Status:  No active isolations

## 2025-07-07 NOTE — CONSULTS
[facility]  Renal Consult Note    Jocelyne Lindquist  1960  [id number]    Date of Admit:  7/7/2025    Date of Consult: 7/7/2025    Evaluating Physician: Vijay Patrick MD    Chief Complaint: Nausea /anorexia / weakness    Reason for Consultation:  Severe electrolyte abnormalities    History of present illness:    Patient is a 64 y.o.  Yr old female with past medical history of hypertension, GERD, history of hypokalemia and hypomagnesemia presented to hospital after having progressive worsening and poor appetite for last 2 weeks.  Labs on admission notable for magnesium 0.3, potassium 3.4 mg/dL, calcium 6.5.  Nephrology is consulted for severe electrolyte abnormalities.     On evaluation at bedside, patient reported that she has been seeing a nurse practitioner and was on chronic magnesium and potassium supplementation; recently she has decided to see an MD who ordered blood work; she stopped all supplements since last week of June.  Since then she started feeling more weak; to the point that she was going to the bathroom with support and very worried that she is going fall.  For last 2 weeks she was not able to eat much; she did not have appetite and do not taste good.  patient is on PPIs for over 15 years and she was started on mag and potassium supplementation for almost 5 years ago; she has been admitted twice in the past for low potassium and magnesium.  Never seen nephrologist in the past.     Past Medical History  Past Medical History:   Diagnosis Date    Allergic Always    Anemia     Asthma     GERD (gastroesophageal reflux disease)     Hypertension     Hypotension 02/18/2017    PVD (peripheral vascular disease)     Seizures     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, right     outer corner of right eye    Tobacco abuse 02/18/2017    Quit 12/25/17       Past Surgical History:   Procedure Laterality Date    APPENDECTOMY  4/20/23    BREAST BIOPSY Left 06/18/2010    stereo bx  "   CYST REMOVAL Right     wrist    CYSTOSCOPY N/A 2023    Procedure: CYSTOSCOPY TEMPORARY PLACEMENT BILATERAL URETERAL CATHETER;  Surgeon: Kristin Dimas MD;  Location:  [facility] OR;  Service: Gynecology Oncology;  Laterality: N/A;    EXPLORATORY LAPAROTOMY, TOTAL ABDOMINAL HYSTERECTOMY SALPINGO OOPHORECTOMY N/A 2023    Procedure: EXPLORATORY LAPAROTOMY, TOTAL ABDOMINAL HYSTERECTOMY BILATERAL SALPINGO-OOPHORECTOMY;  Surgeon: Kristin Dimas MD;  Location:  [facility];  Service: Gynecology Oncology;  Laterality: N/A;    HYSTERECTOMY  23    Removal of cyst etc    OOPHORECTOMY      SQUAMOUS CELL CARCINOMA EXCISION Right 2017    outer corner of right eye    WRIST SURGERY Left        Allergies:  Allergies   Allergen Reactions    Benazepril Other (See Comments)     No allergy.     Doxycycline Diarrhea    Egg-Derived Products      Causes cramps    Fish-Derived Products      Causes severe stomach cramps       Medication:   See electronic record    Soc Hx:   Social History     Socioeconomic History    Marital status:    Tobacco Use    Smoking status: Former     Current packs/day: 0.00     Average packs/day: 1 pack/day for 30.0 years (30.0 ttl pk-yrs)     Types: Cigarettes     Start date: 2000     Quit date: 2017     Years since quittin.5    Smokeless tobacco: Never    Tobacco comments:     15   Vaping Use    Vaping status: Never Used   Substance and Sexual Activity    Alcohol use: Not Currently     Comment: not since 17    Drug use: No    Sexual activity: Yes     Partners: Male     Birth control/protection: Vasectomy       Review of Systems:  Full review of systems reviewed and are as above  In HPI or per admitting H&P,otherwise negative for acute complaints    Physical Exam:   Vital Signs   Blood pressure 132/65, pulse 73, temperature 97.6 °F (36.4 °C), temperature source Oral, resp. rate 16, height 157.5 cm (62\"), weight 65.3 kg (144 lb), SpO2 98%.  No intake/output " data recorded.    GENERAL:  NAD  NEURO: Awake and alert, oriented. No focal deficit  PSYCHIATRIC: NMA. Cooperative with PE  EYE: PE, no icterus, no conjunctivitis  ENT: ommm, dentition intact,  Hearing intact  NECK:  No JVD discernable,  Trachea midline  CV: No edema, RRR  LUNGS:  Quiet,  Nonlabored resp.  Symmetrical expansion  ABDOMEN: Nondistended, soft nontender.  : No Nuñez, no palp bladder  SKIN: Warm and dry without rash    Laboratory Data  Results from last 7 days   Lab Units 07/07/25  0818   HEMOGLOBIN g/dL 15.1   HEMATOCRIT % 46.2     Results from last 7 days   Lab Units 07/07/25  0818   SODIUM mmol/L 144   POTASSIUM mmol/L 3.4*   CHLORIDE mmol/L 98   CO2 mmol/L 23.0   BUN mg/dL 10.8   CREATININE mg/dL 0.88   CALCIUM mg/dL 6.5*   MAGNESIUM mg/dL 0.3*   ALBUMIN g/dL 4.1     Results from last 7 days   Lab Units 07/07/25  0825   COLOR UA  Yellow   CLARITY UA  Clear   PH, URINE  6.0   SPECIFIC GRAVITY, URINE  1.025   GLUCOSE UA  Negative   KETONES UA  >=160 mg/dL (4+)*   BILIRUBIN UA  Negative   PROTEIN UA  >=300 mg/dL (3+)*   BLOOD UA  Trace*   LEUKOCYTES UA  Negative   NITRITE UA  Negative     Results from last 7 days   Lab Units 07/07/25  0818   ALK PHOS U/L 97   BILIRUBIN mg/dL 0.5   ALT (SGPT) U/L 13   AST (SGOT) U/L 29         Estimated Creatinine Clearance: 55.8 mL/min (by C-G formula based on SCr of 0.88 mg/dL).  Lab Results   Component Value Date    CREATININEUR 36.4 05/13/2024       A/P:      Generalized weakness/anorexia  Severe hypomagnesemia  Hypokalemia  Hypocalcemia  Hypertension      Plan:     Hypomagnesemia:  - S/p aggressive supplementation.  - Patient was on 400 mg magnesium oxide daily for many years; until she stopped recently.   - Will check urine magnesium and urine creatinine to check fractional excretion of magnesium.   - I suspect that hypomagnesemia could be likely due to PPIs; will stop PPIs and start famotidine.  - Recheck magnesium and supplement for now.     Hypokalemia:  -  Supplement as needed.    Proteinuria:  - Will recheck urine analysis and order UPCR and microalbumin creatinine ratio.   - Will decide about doing further workup based on repeat urine analysis.     Hypocalcemia:  -Already received IV magnesium supplementation.   - Will recheck in a.m..   - If low will start Tums daily.     Thank you consulting us on Jocelyne Lindquist who is of high risk and complexity.  We will follow along closely    Vijay Patrick MD  7/7/2025  16:45 EDT

## 2025-07-07 NOTE — PLAN OF CARE
Goal Outcome Evaluation:  Plan of Care Reviewed With: patient        Progress: improving  Outcome Evaluation: We will continue to monitors pts labs and treat accordingly.  Pt has currently had Magnesium, potassium and calcium replaced per MD orders.  We will continue to monitor pt for any s/s of nausea or vomiting and treat per MAR and consult GI for further evaluation.  Call light within reach and functioning.  No acute distress noted.

## 2025-07-08 VITALS
TEMPERATURE: 97.4 F | SYSTOLIC BLOOD PRESSURE: 108 MMHG | HEART RATE: 67 BPM | WEIGHT: 144 LBS | RESPIRATION RATE: 18 BRPM | BODY MASS INDEX: 26.5 KG/M2 | OXYGEN SATURATION: 91 % | HEIGHT: 62 IN | DIASTOLIC BLOOD PRESSURE: 61 MMHG

## 2025-07-08 LAB
ALBUMIN SERPL-MCNC: 3.6 G/DL (ref 3.5–5.2)
ALBUMIN UR-MCNC: <1.2 MG/DL
ALBUMIN/GLOB SERPL: 1.3 G/DL
ALP SERPL-CCNC: 87 U/L (ref 39–117)
ALT SERPL W P-5'-P-CCNC: 10 U/L (ref 1–33)
ANION GAP SERPL CALCULATED.3IONS-SCNC: 12 MMOL/L (ref 5–15)
AST SERPL-CCNC: 18 U/L (ref 1–32)
BASOPHILS # BLD AUTO: 0.03 10*3/MM3 (ref 0–0.2)
BASOPHILS NFR BLD AUTO: 0.4 % (ref 0–1.5)
BILIRUB SERPL-MCNC: 0.4 MG/DL (ref 0–1.2)
BILIRUB UR QL STRIP: NEGATIVE
BUN SERPL-MCNC: 5.1 MG/DL (ref 8–23)
BUN/CREAT SERPL: 5.9 (ref 7–25)
CA-I SERPL ISE-MCNC: 0.98 MMOL/L (ref 1.15–1.3)
CALCIUM SPEC-SCNC: 7.6 MG/DL (ref 8.6–10.5)
CHLORIDE SERPL-SCNC: 103 MMOL/L (ref 98–107)
CLARITY UR: ABNORMAL
CO2 SERPL-SCNC: 22 MMOL/L (ref 22–29)
COLOR UR: YELLOW
CREAT SERPL-MCNC: 0.86 MG/DL (ref 0.57–1)
CREAT UR-MCNC: 49.3 MG/DL
DEPRECATED RDW RBC AUTO: 43.9 FL (ref 37–54)
EGFRCR SERPLBLD CKD-EPI 2021: 74.6 ML/MIN/1.73
EOSINOPHIL # BLD AUTO: 0.08 10*3/MM3 (ref 0–0.4)
EOSINOPHIL NFR BLD AUTO: 1 % (ref 0.3–6.2)
ERYTHROCYTE [DISTWIDTH] IN BLOOD BY AUTOMATED COUNT: 14.5 % (ref 12.3–15.4)
GLOBULIN UR ELPH-MCNC: 2.7 GM/DL
GLUCOSE SERPL-MCNC: 176 MG/DL (ref 65–99)
GLUCOSE UR STRIP-MCNC: NEGATIVE MG/DL
HCT VFR BLD AUTO: 39.8 % (ref 34–46.6)
HGB BLD-MCNC: 12.6 G/DL (ref 12–15.9)
HGB UR QL STRIP.AUTO: ABNORMAL
IMM GRANULOCYTES # BLD AUTO: 0.02 10*3/MM3 (ref 0–0.05)
IMM GRANULOCYTES NFR BLD AUTO: 0.2 % (ref 0–0.5)
KETONES UR QL STRIP: NEGATIVE
LEUKOCYTE ESTERASE UR QL STRIP.AUTO: NEGATIVE
LYMPHOCYTES # BLD AUTO: 1.06 10*3/MM3 (ref 0.7–3.1)
LYMPHOCYTES NFR BLD AUTO: 12.9 % (ref 19.6–45.3)
MAGNESIUM SERPL-MCNC: 2.3 MG/DL (ref 1.6–2.4)
MCH RBC QN AUTO: 26.4 PG (ref 26.6–33)
MCHC RBC AUTO-ENTMCNC: 31.7 G/DL (ref 31.5–35.7)
MCV RBC AUTO: 83.3 FL (ref 79–97)
MONOCYTES # BLD AUTO: 0.51 10*3/MM3 (ref 0.1–0.9)
MONOCYTES NFR BLD AUTO: 6.2 % (ref 5–12)
NEUTROPHILS NFR BLD AUTO: 6.49 10*3/MM3 (ref 1.7–7)
NEUTROPHILS NFR BLD AUTO: 79.3 % (ref 42.7–76)
NITRITE UR QL STRIP: NEGATIVE
NRBC BLD AUTO-RTO: 0 /100 WBC (ref 0–0.2)
PH UR STRIP.AUTO: 5.5 [PH] (ref 5–8)
PLATELET # BLD AUTO: 348 10*3/MM3 (ref 140–450)
PMV BLD AUTO: 9.6 FL (ref 6–12)
POTASSIUM SERPL-SCNC: 3.6 MMOL/L (ref 3.5–5.2)
POTASSIUM SERPL-SCNC: 4.2 MMOL/L (ref 3.5–5.2)
PROT ?TM UR-MCNC: 66.4 MG/DL
PROT SERPL-MCNC: 6.3 G/DL (ref 6–8.5)
PROT UR QL STRIP: ABNORMAL
RBC # BLD AUTO: 4.78 10*6/MM3 (ref 3.77–5.28)
SODIUM SERPL-SCNC: 137 MMOL/L (ref 136–145)
SP GR UR STRIP: >=1.03 (ref 1–1.03)
UROBILINOGEN UR QL STRIP: ABNORMAL
WBC NRBC COR # BLD AUTO: 8.19 10*3/MM3 (ref 3.4–10.8)

## 2025-07-08 PROCEDURE — 97165 OT EVAL LOW COMPLEX 30 MIN: CPT

## 2025-07-08 PROCEDURE — 83735 ASSAY OF MAGNESIUM: CPT | Performed by: STUDENT IN AN ORGANIZED HEALTH CARE EDUCATION/TRAINING PROGRAM

## 2025-07-08 PROCEDURE — 97530 THERAPEUTIC ACTIVITIES: CPT

## 2025-07-08 PROCEDURE — 99239 HOSP IP/OBS DSCHRG MGMT >30: CPT | Performed by: STUDENT IN AN ORGANIZED HEALTH CARE EDUCATION/TRAINING PROGRAM

## 2025-07-08 PROCEDURE — 84132 ASSAY OF SERUM POTASSIUM: CPT | Performed by: HOSPITALIST

## 2025-07-08 PROCEDURE — 82570 ASSAY OF URINE CREATININE: CPT | Performed by: STUDENT IN AN ORGANIZED HEALTH CARE EDUCATION/TRAINING PROGRAM

## 2025-07-08 PROCEDURE — 83735 ASSAY OF MAGNESIUM: CPT | Performed by: HOSPITALIST

## 2025-07-08 PROCEDURE — 82043 UR ALBUMIN QUANTITATIVE: CPT | Performed by: STUDENT IN AN ORGANIZED HEALTH CARE EDUCATION/TRAINING PROGRAM

## 2025-07-08 PROCEDURE — 97161 PT EVAL LOW COMPLEX 20 MIN: CPT

## 2025-07-08 PROCEDURE — 82330 ASSAY OF CALCIUM: CPT | Performed by: EMERGENCY MEDICINE

## 2025-07-08 PROCEDURE — 85025 COMPLETE CBC W/AUTO DIFF WBC: CPT | Performed by: HOSPITALIST

## 2025-07-08 PROCEDURE — 97535 SELF CARE MNGMENT TRAINING: CPT

## 2025-07-08 PROCEDURE — 80053 COMPREHEN METABOLIC PANEL: CPT | Performed by: HOSPITALIST

## 2025-07-08 RX ORDER — POTASSIUM CHLORIDE 750 MG/1
40 CAPSULE, EXTENDED RELEASE ORAL EVERY 4 HOURS
Status: COMPLETED | OUTPATIENT
Start: 2025-07-08 | End: 2025-07-08

## 2025-07-08 RX ORDER — MAGNESIUM OXIDE 400 MG/1
400 TABLET ORAL DAILY
Qty: 30 TABLET | Refills: 0 | Status: SHIPPED | OUTPATIENT
Start: 2025-07-08

## 2025-07-08 RX ORDER — FAMOTIDINE 20 MG/1
20 TABLET, FILM COATED ORAL 2 TIMES DAILY PRN
Qty: 60 TABLET | Refills: 0 | Status: SHIPPED | OUTPATIENT
Start: 2025-07-08

## 2025-07-08 RX ORDER — POTASSIUM CHLORIDE 1500 MG/1
40 TABLET, EXTENDED RELEASE ORAL EVERY 4 HOURS
Status: DISCONTINUED | OUTPATIENT
Start: 2025-07-08 | End: 2025-07-08

## 2025-07-08 RX ADMIN — METOPROLOL SUCCINATE 100 MG: 50 TABLET, EXTENDED RELEASE ORAL at 08:50

## 2025-07-08 RX ADMIN — FAMOTIDINE 20 MG: 20 TABLET, FILM COATED ORAL at 08:50

## 2025-07-08 RX ADMIN — POTASSIUM CHLORIDE 40 MEQ: 750 CAPSULE, EXTENDED RELEASE ORAL at 15:09

## 2025-07-08 RX ADMIN — Medication 10 ML: at 08:51

## 2025-07-08 RX ADMIN — ASPIRIN 81 MG: 81 TABLET, COATED ORAL at 08:50

## 2025-07-08 RX ADMIN — POTASSIUM CHLORIDE 40 MEQ: 750 CAPSULE, EXTENDED RELEASE ORAL at 12:29

## 2025-07-08 NOTE — DISCHARGE SUMMARY
[facility]  DISCHARGE SUMMARY    Patient Name: Jocelyne Lindquist  : 1960  MRN: [id number]    Date of Admission: 2025  7:40 AM  Date of Discharge:  2025  Primary Care Physician: Steve Trujillo MD    Consults       Date and Time Order Name Status Description    2025 11:56 AM Inpatient Nephrology Consult Completed     2025 11:56 AM Inpatient Gastroenterology Consult Completed             Hospital Course     Presenting Problem: Electrolyte disturbances    Active Hospital Problems    Diagnosis  POA    **Weakness [R53.1]  Yes    GERD (gastroesophageal reflux disease) [K21.9]  Yes      Resolved Hospital Problems   No resolved problems to display.          Hospital Course:  Jocelyne Lindquist is a 64 y.o. female with a history of hypertension, hyperlipidemia, chronic weakness presenting with acute on chronic weakness, nonspecific GI complaints and found to have severe electrolyte abnormalities.  Nephrology follows     Acute on chronic weakness  Severe electrolyte abnormalities including hypomagnesia and hypokalemia and hypocalcemia  - Started on electrolyte replacement protocol, patient reports improvement of symptoms with correction of electrolytes  - BMP much improved  - PPI was stopped, in case contributing to hypo magnesium. Cont famotidine. Reflux sx currently controlled  - nephrology evaluated, recommends to resume mgoxide and take slowmag if needed for better GI tolerability. F/u outpatient in 2-4 wks, will f/u w urine Cr and mag as outpatient     Anorexia/nausea  - Now resolved, likely related to above     Weakness/falls  - MRI brain without acute findings, again likely related to above.  She was able to walk around in the jaeger without difficulty today     Hypertension   hyperlipidemia  --cont home meds         Discharge Follow Up Recommendations for outpatient labs/diagnostics:   PCP, nephrology    Day of Discharge     HPI:   Doing well, no  new issues. Improved from admission    Review of Systems  Gen- No fevers, chills  CV- No chest pain, palpitations  Resp- No cough, dyspnea  GI- No N/V/D, abd pain      Vital Signs:   Temp:  [97.4 °F (36.3 °C)-98 °F (36.7 °C)] 97.4 °F (36.3 °C)  Heart Rate:  [64-92] 67  Resp:  [18] 18  BP: (108-150)/(53-81) 108/61      Physical Exam:  Constitutional: No acute distress, awake, alert  HENT: NCAT, mucous membranes moist  Respiratory: Clear to auscultation bilaterally, respiratory effort normal   Cardiovascular: RRR, no murmurs, rubs, or gallops  Gastrointestinal: Positive bowel sounds, soft, nontender, nondistended  Musculoskeletal: No bilateral ankle edema  Psychiatric: Appropriate affect, cooperative  Neurologic: Oriented x 3, strength symmetric in all extremities, Cranial Nerves grossly intact to confrontation, speech clear  Skin: No rashes      Pertinent  and/or Most Recent Results     LAB RESULTS:      Lab 07/08/25  0517 07/07/25  0818   WBC 8.19 14.24*   HEMOGLOBIN 12.6 15.1   HEMATOCRIT 39.8 46.2   PLATELETS 348 436   NEUTROS ABS 6.49 11.95*   IMMATURE GRANS (ABS) 0.02 0.09*   LYMPHS ABS 1.06 1.83   MONOS ABS 0.51 0.29   EOS ABS 0.08 0.03   MCV 83.3 81.6   SED RATE  --  35*   PROCALCITONIN  --  0.04         Lab 07/08/25  1005 07/08/25  0517 07/08/25  0133 07/07/25  0945 07/07/25  0818   SODIUM 137  --   --   --  144   POTASSIUM 3.6  --  4.2  --  3.4*   CHLORIDE 103  --   --   --  98   CO2 22.0  --   --   --  23.0   ANION GAP 12.0  --   --   --  23.0*   BUN 5.1*  --   --   --  10.8   CREATININE 0.86  --   --   --  0.88   EGFR 74.6  --   --   --  72.6   GLUCOSE 176*  --   --   --  111*   CALCIUM 7.6*  --   --   --  6.5*   IONIZED CALCIUM  --  0.98*  --  0.76*  --    MAGNESIUM 2.3  --   --   --  0.3*   HEMOGLOBIN A1C  --   --   --   --  6.58*   TSH  --   --   --   --  0.759         Lab 07/08/25  1005 07/07/25  0818   TOTAL PROTEIN 6.3 7.1   ALBUMIN 3.6 4.1   GLOBULIN 2.7 3.0   ALT (SGPT) 10 13   AST (SGOT) 18 29    BILIRUBIN 0.4 0.5   ALK PHOS 87 97         Lab 07/07/25  0945 07/07/25  0818   HSTROP T 13 12                 Brief Urine Lab Results  (Last result in the past 365 days)        Color   Clarity   Blood   Leuk Est   Nitrite   Protein   CREAT   Urine HCG        07/08/25 1125             49.3               Microbiology Results (last 10 days)       ** No results found for the last 240 hours. **            MRI Brain Without Contrast  Result Date: 7/7/2025  MRI BRAIN WO CONTRAST Date of Exam: 7/7/2025 11:09 AM EDT Indication: dizziness and problems walking x 3 weeks, eval for cva/mass.  Comparison: January 16, 2021 Technique:  Routine multiplanar/multisequence sequence images of the brain were obtained without contrast administration. Findings: No acute infarction, intracranial hemorrhage, or extra-axial collection is identified. The ventricles appear normal in caliber, with no evidence of mass effect or midline shift. The basal cisterns appear patent. The midline structures appear intact. The globes and orbits appear intact. The intracranial vascular flow-voids appear patent. Scattered foci of periventricular and subcortical white matter FLAIR hyperintensities are nonspecific, but likely the sequela of mild chronic small vessel ischemic disease.     Impression: 1.No acute intracranial process identified. 2.Findings suggestive of mild chronic small vessel ischemic disease. Electronically Signed: Parish [place] MD  7/7/2025 11:37 AM EDT  Workstation ID: [id number]    XR Chest 1 View  Result Date: 7/7/2025  XR CHEST 1 VW Date of Exam: 7/7/2025 8:11 AM EDT Indication: Weak/Dizzy/AMS triage protocol Comparison: 1/16/2021 Findings: Heart size is within normal limits. The pulmonary vascular pattern of the chest is normal and the lungs are clear. There are calcified lymph nodes in the right hilum and right lung base reflecting old healed granulomatous disease.     Impression: No active disease. Electronically Signed: Steve PEDERSEN  MD [place]  7/7/2025 8:24 AM EDT  Workstation ID: [id number]                  Plan for Follow-up of Pending Labs/Results: f/u w nephrology  Pending Labs       Order Current Status    Magnesium, Urine - Urine, Clean Catch In process          Discharge Details        Discharge Medications        New Medications        Instructions Start Date   famotidine 20 MG tablet  Commonly known as: PEPCID   20 mg, Oral, 2 Times Daily PRN      magnesium oxide 400 MG tablet  Commonly known as: MAG-OX   400 mg, Oral, Daily             Continue These Medications        Instructions Start Date   albuterol sulfate  (90 Base) MCG/ACT inhaler  Commonly known as: PROVENTIL HFA;VENTOLIN HFA;PROAIR HFA   2 puffs, Inhalation, Every 6 Hours PRN      ASPIR 81 MG EC tablet  Generic drug: aspirin   81 mg, Daily      budesonide-formoterol 160-4.5 MCG/ACT inhaler  Commonly known as: SYMBICORT   2 puffs, Every 4 Hours PRN      metoprolol succinate  MG 24 hr tablet  Commonly known as: TOPROL-XL   100 mg, Oral, Daily      pravastatin 40 MG tablet  Commonly known as: PRAVACHOL   40 mg, Oral, Nightly             Stop These Medications      omeprazole 20 MG capsule  Commonly known as: priLOSEC     promethazine-dextromethorphan 6.25-15 MG/5ML syrup  Commonly known as: PROMETHAZINE-DM              Allergies   Allergen Reactions    Benazepril Other (See Comments)     No allergy.     Doxycycline Diarrhea    Egg-Derived Products      Causes cramps    Fish-Derived Products      Causes severe stomach cramps         Discharge Disposition:  Home or Self Carehome    Diet:  Hospital:  Diet Order   Procedures    Diet: Regular/House; Fluid Consistency: Thin (IDDSI 0)            Activity:  as tolerated    Restrictions or Other Recommendations:  none       CODE STATUS:    Code Status and Medical Interventions: CPR (Attempt to Resuscitate); Full Support   Ordered at: 07/07/25 1003     Code Status (Patient has no pulse and is not breathing):    CPR (Attempt to  Resuscitate)     Medical Interventions (Patient has pulse or is breathing):    Full Support       No future appointments.              Alyssa Olea MD  07/08/25      Time Spent on Discharge:  I spent  45  minutes on this discharge activity which included: face-to-face encounter with the patient, reviewing the data in the system, coordination of the care with the nursing staff as well as consultants, documentation, and entering orders.

## 2025-07-08 NOTE — PLAN OF CARE
Goal Outcome Evaluation:  Plan of Care Reviewed With: patient           Outcome Evaluation: Pt presents at or near baseline level of function for mobility and is independent with transfers and hallway ambulation. No further IPPT services warranted at this time. PT rec d/c home with assist as needed.    Anticipated Discharge Disposition (PT): home with assist

## 2025-07-08 NOTE — PLAN OF CARE
Goal Outcome Evaluation:  Plan of Care Reviewed With: patient        Progress: no change (OT IE)  Outcome Evaluation: OT evaluation complete. Pt presents near occupational performance baseline however mildly impacted by decreased activity tolerance, muscle weakness at BUEs and perceived exertion/effort with more dynamic demands and some perceived less steadiness yet did not demonstrate any overt LOB during fxl mobility. Pt demonstrated I in fxl t/fs and SBA for ADL related mobility w/o AD used. RPE with said mobility 4/10. Initiated education on ECWS related to ADLs with further education planned. Pt demonstrated I in d/d socks, clothing mgmt and hygiene related to toileting, min A for d/d gown and SUA for sinkside g/h. Pt is just below baseline and would benefit from IPOT POC to address these underlying impairments and return home w/ A at d/c when medically ready.    Anticipated Discharge Disposition (OT): home with assist

## 2025-07-08 NOTE — PROGRESS NOTES
" LOS: 1 day   Patient Care Team:  Steve Trujillo MD as PCP - General (Internal Medicine)  Flynn Diaz MD as Consulting Physician (Gastroenterology)  Kristin Dimas MD as Consulting Physician (Gynecology)    Chief Complaint: HypoMag    Subjective     Vomiting         Subjective:  Diet:  She reports  vomiting.        History taken from: patient    Objective     Vital Sign Min/Max for last 24 hours  Temp  Min: 97.4 °F (36.3 °C)  Max: 98 °F (36.7 °C)   BP  Min: 108/61  Max: 150/76   Pulse  Min: 64  Max: 92   Resp  Min: 18  Max: 18   SpO2  Min: 89 %  Max: 99 %   No data recorded   No data recorded     Flowsheet Rows      Flowsheet Row First Filed Value   Admission Height 157.5 cm (62\") Documented at 07/07/2025 0737   Admission Weight 65.3 kg (144 lb) Documented at 07/07/2025 0737            No intake/output data recorded.  No intake/output data recorded.    Objective:  General Appearance:  Comfortable.    Vital signs: (most recent): Blood pressure 108/61, pulse 67, temperature 97.4 °F (36.3 °C), temperature source Oral, resp. rate 18, height 157.5 cm (62\"), weight 65.3 kg (144 lb), SpO2 91%.  Vital signs are normal.    Output: Producing urine.    HEENT: Normal HEENT exam.    Lungs:  Normal effort and normal respiratory rate.  Breath sounds clear to auscultation.  She is not in respiratory distress.  No decreased breath sounds or wheezes.    Heart: Normal rate.  Regular rhythm.  S1 normal and S2 normal.  No murmur or gallop.   Abdomen: Abdomen is soft.  Bowel sounds are normal.     Extremities: Normal range of motion.  There is no dependent edema or local swelling.    Pulses: Distal pulses are intact.    Neurological: Patient is alert and oriented to person, place and time.  Normal strength.    Pupils:  Pupils are equal, round, and reactive to light.                Results Review:     I reviewed the patient's new clinical results.    WBC WBC   Date Value Ref Range Status   07/08/2025 8.19 3.40 - " "10.80 10*3/mm3 Final   07/07/2025 14.24 (H) 3.40 - 10.80 10*3/mm3 Final      HGB Hemoglobin   Date Value Ref Range Status   07/08/2025 12.6 12.0 - 15.9 g/dL Final   07/07/2025 15.1 12.0 - 15.9 g/dL Final      HCT Hematocrit   Date Value Ref Range Status   07/08/2025 39.8 34.0 - 46.6 % Final   07/07/2025 46.2 34.0 - 46.6 % Final      Platlets No results found for: \"LABPLAT\"   MCV MCV   Date Value Ref Range Status   07/08/2025 83.3 79.0 - 97.0 fL Final   07/07/2025 81.6 79.0 - 97.0 fL Final          Sodium Sodium   Date Value Ref Range Status   07/08/2025 137 136 - 145 mmol/L Final   07/07/2025 144 136 - 145 mmol/L Final      Potassium Potassium   Date Value Ref Range Status   07/08/2025 3.6 3.5 - 5.2 mmol/L Final     Comment:     Slight hemolysis detected by analyzer. Result may be falsely elevated.   07/08/2025 4.2 3.5 - 5.2 mmol/L Final   07/07/2025 3.4 (L) 3.5 - 5.2 mmol/L Final      Chloride Chloride   Date Value Ref Range Status   07/08/2025 103 98 - 107 mmol/L Final   07/07/2025 98 98 - 107 mmol/L Final      CO2 CO2   Date Value Ref Range Status   07/08/2025 22.0 22.0 - 29.0 mmol/L Final   07/07/2025 23.0 22.0 - 29.0 mmol/L Final      BUN BUN   Date Value Ref Range Status   07/08/2025 5.1 (L) 8.0 - 23.0 mg/dL Final   07/07/2025 10.8 8.0 - 23.0 mg/dL Final      Creatinine Creatinine   Date Value Ref Range Status   07/08/2025 0.86 0.57 - 1.00 mg/dL Final   07/07/2025 0.88 0.57 - 1.00 mg/dL Final      Calcium Calcium   Date Value Ref Range Status   07/08/2025 7.6 (L) 8.6 - 10.5 mg/dL Final   07/07/2025 6.5 (L) 8.6 - 10.5 mg/dL Final      PO4 No results found for: \"CAPO4\"   Albumin Albumin   Date Value Ref Range Status   07/08/2025 3.6 3.5 - 5.2 g/dL Final   07/07/2025 4.1 3.5 - 5.2 g/dL Final      Magnesium Magnesium   Date Value Ref Range Status   07/08/2025 2.3 1.6 - 2.4 mg/dL Final   07/07/2025 0.3 (C) 1.6 - 2.4 mg/dL Final      Uric Acid No results found for: \"URICACID\"     Medication Review: Yes    Assessment " & Plan       Weakness    GERD (gastroesophageal reflux disease)      Assessment & Plan:    Hypomagnesemia:  - S/p aggressive supplementation.  - Patient was on 400 mg magnesium oxide daily for many years; until she stopped recently.  - I suspect that hypomagnesemia could be likely due to PPIs; will stop PPIs and start famotidine.  - F/u with urine creatinine and Mag as outpatient  - Ok to resume MgOxide. Patient is also advised to take slowMag if needed for better GI tolerability      Hypokalemia:  - Supplement as needed.     Proteinuria:  -  On UA. No significant albuminuria upon quantification.   Hypocalcemia:  -Received IV supplementation .        Will follow up w patient as outpatient in 2-4 weeks    David Goldstein MD  07/08/25  15:30 EDT

## 2025-07-08 NOTE — THERAPY DISCHARGE NOTE
Patient Name: Jocelyne Lindquist  : 1960    MRN: [id number]                              Today's Date: 2025       Admit Date: 2025    Visit Dx:     ICD-10-CM ICD-9-CM   1. Weakness  R53.1 780.79   2. Hypomagnesemia  E83.42 275.2   3. Hypokalemia  E87.6 276.8   4. Hypocalcemia  E83.51 275.41   5. Dizziness  R42 780.4   6. Ataxia  R27.0 781.3   7. Proteinuria, unspecified type  R80.9 791.0   8. Leukocytosis, unspecified type  D72.829 288.60   9. Ketosis  E88.89 276.2     Patient Active Problem List   Diagnosis    Cyst of left ovary    History of smoking 30 or more pack years    Iron deficiency anemia    Essential hypertension    Mixed hyperlipidemia    Abnormal dreams    Thrombocytosis    Allergic reaction caused by a drug    Swollen tongue    Acute frontal sinusitis    Alcohol use    GERD (gastroesophageal reflux disease)    Hypocalcemia    Vitamin D deficiency    Seizure    Abnormal liver enzymes    Alcohol abuse    Acute urinary retention    Pelvic mass    S/P ESMER (total abdominal hysterectomy)    Magnesium deficiency    Type 2 diabetes mellitus with hyperglycemia, without long-term current use of insulin    Weakness     Past Medical History:   Diagnosis Date    Allergic Always    Anemia     Asthma     GERD (gastroesophageal reflux disease)     Hypertension     Hypotension 2017    PVD (peripheral vascular disease)     Seizures     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, right     outer corner of right eye    Tobacco abuse 2017    Quit 17     Past Surgical History:   Procedure Laterality Date    APPENDECTOMY  23    BREAST BIOPSY Left 2010    stereo bx    CYST REMOVAL Right     wrist    CYSTOSCOPY N/A 2023    Procedure: CYSTOSCOPY TEMPORARY PLACEMENT BILATERAL URETERAL CATHETER;  Surgeon: Kristin Dimas MD;  Location: [facility];  Service: Gynecology Oncology;  Laterality: N/A;    EXPLORATORY LAPAROTOMY, TOTAL ABDOMINAL HYSTERECTOMY SALPINGO OOPHORECTOMY N/A  04/20/2023    Procedure: EXPLORATORY LAPAROTOMY, TOTAL ABDOMINAL HYSTERECTOMY BILATERAL SALPINGO-OOPHORECTOMY;  Surgeon: Kristin Dimas MD;  Location: [facility];  Service: Gynecology Oncology;  Laterality: N/A;    HYSTERECTOMY  4/20/23    Removal of cyst etc    OOPHORECTOMY      SQUAMOUS CELL CARCINOMA EXCISION Right 12/13/2017    outer corner of right eye    WRIST SURGERY Left       General Information       Row Name 07/08/25 1401          Physical Therapy Time and Intention    Document Type discharge evaluation/summary  -SD     Mode of Treatment individual therapy;physical therapy  -SD       Row Name 07/08/25 1401          General Information    Patient Profile Reviewed yes  -SD     Prior Level of Function independent:;all household mobility;community mobility;gait;transfer;bed mobility;ADL's;driving;using stairs  -SD     Existing Precautions/Restrictions fall  -SD     Barriers to Rehab none identified  -SD       Row Name 07/08/25 1401          Living Environment    Current Living Arrangements home  -SD     People in Home spouse  -SD       Row Name 07/08/25 1401          Home Main Entrance    Number of Stairs, Main Entrance two  -SD       Row Name 07/08/25 1401          Stairs Within Home, Primary    Stairs, Within Home, Primary needs met on main level of home  -SD       Row Name 07/08/25 1401          Cognition    Orientation Status (Cognition) oriented x 4  -SD       Row Name 07/08/25 1401          Safety Issues/Impairments Affecting Functional Mobility    Impairments Affecting Function (Mobility) endurance/activity tolerance  -SD               User Key  (r) = Recorded By, (t) = Taken By, (c) = Cosigned By      Initials Name Provider Type    SD Lisa Hamilton, PT Physical Therapist                   Mobility       Row Name 07/08/25 1556          Bed Mobility    Comment, (Bed Mobility) UIC  -SD       Row Name 07/08/25 1553          Transfers    Comment, (Transfers) pt transferred without difficulty  -SD        Row Name 07/08/25 1558          Sit-Stand Transfer    Sit-Stand [place] (Transfers) independent  -SD       Row Name 07/08/25 1558          Gait/Stairs (Locomotion)    [place] Level (Gait) independent  -SD     Patient was able to Ambulate yes  -SD     Distance in Feet (Gait) 250  -SD     Comment, (Gait/Stairs) Pt amb in hallway without assistive device with no gait disturbance appreciated. Pt dem good safety awareness and had no LOB. Pt denied dizziness throughout.  -SD               User Key  (r) = Recorded By, (t) = Taken By, (c) = Cosigned By      Initials Name Provider Type    Lisa Appiah PT Physical Therapist                   Obj/Interventions       [facility] Name 07/08/25 1600          Range of Motion Comprehensive    General Range of Motion bilateral lower extremity ROM WFL  -SD       Row Name 07/08/25 1600          Strength Comprehensive (MMT)    General Manual Muscle Testing (MMT) Assessment no strength deficits identified  -SD       [facility] Name 07/08/25 1600          Balance    Comment, Balance No LOB  -SD               User Key  (r) = Recorded By, (t) = Taken By, (c) = Cosigned By      Initials Name Provider Type    Lisa Appiah PT Physical Therapist                   Goals/Plan    No documentation.                  Clinical Impression       [facility] Name 07/08/25 1600          Pain    Pretreatment Pain Rating 0/10 - no pain  -SD     Posttreatment Pain Rating 0/10 - no pain  -SD       Row Name 07/08/25 1600          Plan of Care Review    Plan of Care Reviewed With patient  -SD     Outcome Evaluation Pt presents at or near baseline level of function for mobility and is independent with transfers and hallway ambulation. No further IPPT services warranted at this time. PT rec d/c home with assist as needed.  -SD       Row Name 07/08/25 1600          Therapy Assessment/Plan (PT)    Patient/Family Therapy Goals Statement (PT) return home today  -SD     Criteria for Skilled  Interventions Met (PT) no problems identified which require skilled intervention  -SD     Therapy Frequency (PT) evaluation only  -SD       Row Name 07/08/25 1600          Vital Signs    Pre Systolic BP Rehab 108  -SD     Pre Treatment Diastolic BP 61  -SD     Intra Systolic BP Rehab 110  -SD     Intra Treatment Diastolic BP 67  -SD     Posttreatment Heart Rate (beats/min) 77  -SD     Post SpO2 (%) 97  -SD     O2 Delivery Post Treatment room air  -SD     Pre Patient Position Sitting  -SD     Post Patient Position Sitting  -SD       Row Name 07/08/25 1600          Positioning and Restraints    Pre-Treatment Position sitting in chair/recliner  -SD     Post Treatment Position chair  -SD     In Chair notified nsg;sitting;call light within reach;encouraged to call for assist  -SD               User Key  (r) = Recorded By, (t) = Taken By, (c) = Cosigned By      Initials Name Provider Type    Lisa Appiah, PT Physical Therapist                   Outcome Measures       Row Name 07/08/25 1602 07/08/25 0800       How much help from another person do you currently need...    Turning from your back to your side while in flat bed without using bedrails? 4  -SD 4  -OP    Moving from lying on back to sitting on the side of a flat bed without bedrails? 4  -SD 4  -OP    Moving to and from a bed to a chair (including a wheelchair)? 4  -SD 4  -OP    Standing up from a chair using your arms (e.g., wheelchair, bedside chair)? 4  -SD 4  -OP    Climbing 3-5 steps with a railing? 4  -SD 4  -OP    To walk in hospital room? 4  -SD 4  -OP    AM-PAC 6 Clicks Score (PT) 24  -SD 24  -OP    Highest Level of Mobility Goal Walk 250 Feet or More - 8  -SD Walk 250 Feet or More - 8  -OP      [facility] Name 07/08/25 1602 07/08/25 0907       Functional Assessment    Outcome Measure Options AM-PAC 6 Clicks Basic Mobility (PT)  -SD AM-PAC 6 Clicks Daily Activity (OT)  -JY              User Key  (r) = Recorded By, (t) = Taken By, (c) = Cosigned By       Initials Name Provider Type    SD Lisa Hamilton, ROSANGELA Physical Therapist    Kylee Vo, OT Occupational Therapist    Shani Patterson, RN Registered Nurse                  Physical Therapy Education       Title: PT OT SLP Therapies (In Progress)       Topic: Physical Therapy (Done)       Point: Mobility training (Done)       Learning Progress Summary            Patient Acceptance, E, VU,DU by SD at 7/8/2025 1603                      Point: Body mechanics (Done)       Learning Progress Summary            Patient Acceptance, E, VU,DU by SD at 7/8/2025 1603                                      User Key       Initials Effective Dates Name Provider Type Discipline    SD 03/13/23 -  Lisa Hamilton PT Physical Therapist PT                  PT Recommendation and Plan     Outcome Evaluation: Pt presents at or near baseline level of function for mobility and is independent with transfers and hallway ambulation. No further IPPT services warranted at this time. PT rec d/c home with assist as needed.     Time Calculation:   PT Evaluation Complexity  History, PT Evaluation Complexity: 3 or more personal factors and/or comorbidities  Examination of Body Systems (PT Eval Complexity): 1-2 elements  Clinical Presentation (PT Evaluation Complexity): stable  Clinical Decision Making (PT Evaluation Complexity): low complexity  Overall Complexity (PT Evaluation Complexity): low complexity     PT Charges       Row Name 07/08/25 1603             Time Calculation    Start Time 1401  -SD      PT Non-Billable Time (min) 46 min  -SD      PT Received On 07/08/25  -SD                User Key  (r) = Recorded By, (t) = Taken By, (c) = Cosigned By      Initials Name Provider Type    Lisa Appiah PT Physical Therapist                  Therapy Charges for Today       Code Description Service Date Service Provider Modifiers Qty    39663491584 HC PT EVAL LOW COMPLEXITY 4 7/8/2025 Lisa Hamilton PT GP 1             PT G-Codes  Outcome Measure Options: AM-PAC 6 Clicks Basic Mobility (PT)  AM-PAC 6 Clicks Score (PT): 24  AM-PAC 6 Clicks Score (OT): 22    PT Discharge Summary  Anticipated Discharge Disposition (PT): home with assist    Lisa Hamilton, PT  7/8/2025

## 2025-07-08 NOTE — PLAN OF CARE
Problem: Adult Inpatient Plan of Care  Goal: Absence of Hospital-Acquired Illness or Injury  Intervention: Identify and Manage Fall Risk  Recent Flowsheet Documentation  Taken 7/8/2025 0400 by La Ash, RN  Safety Promotion/Fall Prevention:   activity supervised   assistive device/personal items within reach   clutter free environment maintained   fall prevention program maintained   lighting adjusted   nonskid shoes/slippers when out of bed   room organization consistent   safety round/check completed  Taken 7/8/2025 0219 by La Ash, RN  Safety Promotion/Fall Prevention:   activity supervised   assistive device/personal items within reach   clutter free environment maintained   fall prevention program maintained   lighting adjusted   nonskid shoes/slippers when out of bed   room organization consistent   safety round/check completed  Taken 7/8/2025 0019 by La Ash, RN  Safety Promotion/Fall Prevention:   activity supervised   assistive device/personal items within reach   clutter free environment maintained   fall prevention program maintained   lighting adjusted   nonskid shoes/slippers when out of bed   room organization consistent   safety round/check completed  Taken 7/7/2025 2219 by La Ash, RN  Safety Promotion/Fall Prevention:   activity supervised   assistive device/personal items within reach   clutter free environment maintained   fall prevention program maintained   lighting adjusted   nonskid shoes/slippers when out of bed   room organization consistent   safety round/check completed  Taken 7/7/2025 2119 by La Ash, RN  Safety Promotion/Fall Prevention:   activity supervised   assistive device/personal items within reach   clutter free environment maintained   fall prevention program maintained   lighting adjusted   nonskid shoes/slippers when out of bed   safety round/check completed   room organization consistent  Intervention: Prevent Skin Injury  Recent  Flowsheet Documentation  Taken 7/8/2025 0400 by La Ash RN  Body Position: position changed independently  Taken 7/8/2025 0219 by La Ash RN  Body Position: position changed independently  Taken 7/8/2025 0019 by La Ash RN  Body Position: position changed independently  Taken 7/7/2025 2219 by La Ash RN  Body Position: position changed independently  Taken 7/7/2025 2119 by La Ash RN  Body Position: position changed independently  Intervention: Prevent Infection  Recent Flowsheet Documentation  Taken 7/8/2025 0400 by La Ash RN  Infection Prevention:   environmental surveillance performed   hand hygiene promoted   rest/sleep promoted   single patient room provided  Taken 7/8/2025 0219 by La Ash RN  Infection Prevention:   environmental surveillance performed   hand hygiene promoted   rest/sleep promoted   single patient room provided  Taken 7/8/2025 0019 by La Ash RN  Infection Prevention:   environmental surveillance performed   hand hygiene promoted   rest/sleep promoted   single patient room provided  Taken 7/7/2025 2219 by La Ash RN  Infection Prevention:   environmental surveillance performed   hand hygiene promoted   rest/sleep promoted   single patient room provided  Taken 7/7/2025 2119 by La Ash RN  Infection Prevention:   environmental surveillance performed   hand hygiene promoted   rest/sleep promoted   single patient room provided  Goal: Optimal Comfort and Wellbeing  Intervention: Provide Person-Centered Care  Recent Flowsheet Documentation  Taken 7/7/2025 2119 by La Ash RN  Trust Relationship/Rapport:   care explained   choices provided   questions answered   questions encouraged   thoughts/feelings acknowledged     Problem: Fall Injury Risk  Goal: Absence of Fall and Fall-Related Injury  Intervention: Promote Injury-Free Environment  Recent Flowsheet Documentation  Taken 7/8/2025 0400 by  La Ash, RN  Safety Promotion/Fall Prevention:   activity supervised   assistive device/personal items within reach   clutter free environment maintained   fall prevention program maintained   lighting adjusted   nonskid shoes/slippers when out of bed   room organization consistent   safety round/check completed  Taken 7/8/2025 0219 by La Ash, RN  Safety Promotion/Fall Prevention:   activity supervised   assistive device/personal items within reach   clutter free environment maintained   fall prevention program maintained   lighting adjusted   nonskid shoes/slippers when out of bed   room organization consistent   safety round/check completed  Taken 7/8/2025 0019 by La Ash, RN  Safety Promotion/Fall Prevention:   activity supervised   assistive device/personal items within reach   clutter free environment maintained   fall prevention program maintained   lighting adjusted   nonskid shoes/slippers when out of bed   room organization consistent   safety round/check completed  Taken 7/7/2025 2219 by La Ash, RN  Safety Promotion/Fall Prevention:   activity supervised   assistive device/personal items within reach   clutter free environment maintained   fall prevention program maintained   lighting adjusted   nonskid shoes/slippers when out of bed   room organization consistent   safety round/check completed  Taken 7/7/2025 2119 by La Ash, RN  Safety Promotion/Fall Prevention:   activity supervised   assistive device/personal items within reach   clutter free environment maintained   fall prevention program maintained   lighting adjusted   nonskid shoes/slippers when out of bed   safety round/check completed   room organization consistent   Goal Outcome Evaluation:

## 2025-07-08 NOTE — THERAPY EVALUATION
Patient Name: Jocelyne Lindquist  : 1960    MRN: [id number]                              Today's Date: 2025       Admit Date: 2025    Visit Dx:     ICD-10-CM ICD-9-CM   1. Weakness  R53.1 780.79   2. Hypomagnesemia  E83.42 275.2   3. Hypokalemia  E87.6 276.8   4. Hypocalcemia  E83.51 275.41   5. Dizziness  R42 780.4   6. Ataxia  R27.0 781.3   7. Proteinuria, unspecified type  R80.9 791.0   8. Leukocytosis, unspecified type  D72.829 288.60   9. Ketosis  E88.89 276.2     Patient Active Problem List   Diagnosis    Cyst of left ovary    History of smoking 30 or more pack years    Iron deficiency anemia    Essential hypertension    Mixed hyperlipidemia    Abnormal dreams    Thrombocytosis    Allergic reaction caused by a drug    Swollen tongue    Acute frontal sinusitis    Alcohol use    GERD (gastroesophageal reflux disease)    Hypocalcemia    Vitamin D deficiency    Seizure    Abnormal liver enzymes    Alcohol abuse    Acute urinary retention    Pelvic mass    S/P ESMER (total abdominal hysterectomy)    Magnesium deficiency    Type 2 diabetes mellitus with hyperglycemia, without long-term current use of insulin    Weakness     Past Medical History:   Diagnosis Date    Allergic Always    Anemia     Asthma     GERD (gastroesophageal reflux disease)     Hypertension     Hypotension 2017    PVD (peripheral vascular disease)     Seizures     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, right     outer corner of right eye    Tobacco abuse 2017    Quit 17     Past Surgical History:   Procedure Laterality Date    APPENDECTOMY  23    BREAST BIOPSY Left 2010    stereo bx    CYST REMOVAL Right     wrist    CYSTOSCOPY N/A 2023    Procedure: CYSTOSCOPY TEMPORARY PLACEMENT BILATERAL URETERAL CATHETER;  Surgeon: Kristin Dimas MD;  Location: [facility];  Service: Gynecology Oncology;  Laterality: N/A;    EXPLORATORY LAPAROTOMY, TOTAL ABDOMINAL HYSTERECTOMY SALPINGO OOPHORECTOMY N/A  04/20/2023    Procedure: EXPLORATORY LAPAROTOMY, TOTAL ABDOMINAL HYSTERECTOMY BILATERAL SALPINGO-OOPHORECTOMY;  Surgeon: Kristin Dimas MD;  Location: [facility];  Service: Gynecology Oncology;  Laterality: N/A;    HYSTERECTOMY  4/20/23    Removal of cyst etc    OOPHORECTOMY      SQUAMOUS CELL CARCINOMA EXCISION Right 12/13/2017    outer corner of right eye    WRIST SURGERY Left       General Information       Row Name 07/08/25 0832          OT Time and Intention    Document Type evaluation  -JY     Mode of Treatment occupational therapy;individual therapy  -JY       Row Name 07/08/25 0832          General Information    Patient Profile Reviewed yes  -JY     Prior Level of Function independent:;all household mobility;community mobility;gait;transfer;bed mobility;ADL's;feeding;grooming;dressing;bathing;home management;cleaning;driving;dependent:;cooking  dep on spouse for cooking for convenience vs actual need for A; no AD used at baseline; active - walks dog 1/2 mile each day; actively driving and in community; denies any falls  -JY     Existing Precautions/Restrictions fall  -JY     Barriers to Rehab none identified  -JY       Row Name 07/08/25 0832          Occupational Profile    Environmental Supports and Barriers (Occupational Profile) walk in shower w/o seat, elevated toilet height; DME: none used at baseline, has access to cane, rollator if needed  -JY     Patient Goals (Occupational Profile) to return to PLOF  -JY       Row Name 07/08/25 0832          Living Environment    Current Living Arrangements home  -JY     People in Home spouse  spouse retired and able to assist as needed  -JY       Row Name 07/08/25 0832          Home Main Entrance    Number of Stairs, Main Entrance two  -JY     Stair Railings, Main Entrance none  -JY       Row Name 07/08/25 0832          Stairs Within Home, Primary    Number of Stairs, Within Home, Primary other (see comments);none  has upper and lower flights of stairs that pt  does not use  -JY       Row Name 07/08/25 0832          Cognition    Orientation Status (Cognition) oriented x 4  -JY       Row Name 07/08/25 0832          Safety Issues/Impairments Affecting Functional Mobility    Impairments Affecting Function (Mobility) endurance/activity tolerance;strength  -JRADHA     Comment, Safety Issues/Impairments (Mobility) pt alert and able to follow commands  -RALPH               User Key  (r) = Recorded By, (t) = Taken By, (c) = Cosigned By      Initials Name Provider Type    Kylee Vo OT Occupational Therapist                     Mobility/ADL's       Row Name 07/08/25 0840          Bed Mobility    Comment, (Bed Mobility) pt received OOB upon OT arrival and preferred to remain UIC at OT exit therefore bed mobility not assessed this session  -RALPH       Row Name 07/08/25 0840          Transfers    Transfers sit-stand transfer;stand-sit transfer;toilet transfer  -RALPH     Comment, (Transfers) pt demonstrated good hand placement and seq for controlled transfers at various surfaces; no dizziness or feeling LH during t/fs  -RALPH       Row Name 07/08/25 0840          Sit-Stand Transfer    Sit-Stand [place] (Transfers) independent  -JY     Assistive Device (Sit-Stand Transfers) other (see comments)  no AD  -JY       Row Name 07/08/25 0840          Stand-Sit Transfer    Stand-Sit [place] (Transfers) independent  -JY     Assistive Device (Stand-Sit Transfers) other (see comments)  no AD  -JY       Row Name 07/08/25 0840          Toilet Transfer    Type (Toilet Transfer) sit-stand;stand-sit  -JY     [place] Level (Toilet Transfer) modified independence  -JY     Assistive Device (Toilet Transfer) commode;grab bars/safety frame;other (see comments)  no AD  -JY     Comment, (Toilet Transfer) tactile input, support at grab bar at R side during STS at toilet  -JRADHA       Row Name 07/08/25 0840          Functional Mobility    Functional Mobility- Ind. Level standby assist  -JY     Functional  Mobility- Device other (see comments)  no AD used  -JY     Functional Mobility-Distance (Feet) --  in room, on unit ADL related mobility  -JY     Functional Mobility- Comment defer to PT for specifics however during in room/on unit ADL related mobility pt req'd SBA for safety and close monitoring of pt; pt did not demonstrate any overt unsteadiness or LOB w/o use of AD; pt was mildly fatigued following fxl mobility w/ modified RPE 4/10, appeared SOA yet SPO2 > 90% throughout on RA; no dizziness or feeling LH during mobility  -JY     Patient was able to Ambulate yes  -       Row Name 07/08/25 0840          Activities of Daily Living    BADL Assessment/Intervention upper body dressing;lower body dressing;grooming;toileting  -       Row Name 07/08/25 0840          Upper Body Dressing Assessment/Training    [place] Level (Upper Body Dressing) doff;don;pajama/robe;contact guard assist  -JY     Position (Upper Body Dressing) unsupported sitting;unsupported standing  -JY     Comment, (Upper Body Dressing) CGA for mgmt of ties posteriorly and situational A for mgmt around IV, anticipate I in routine garments  -       Row Name 07/08/25 0840          Lower Body Dressing Assessment/Training    [place] Level (Lower Body Dressing) doff;don;socks;independent  -JY     Position (Lower Body Dressing) unsupported sitting  -       Row Name 07/08/25 0840          Grooming Assessment/Training    [place] Level (Grooming) wash face, hands;set up  -JY     Position (Grooming) unsupported standing;sink side  -JY     Comment, (Grooming) set up of supplies at sink side to conserve energy prior to completion  -       Row Name 07/08/25 0840          Toileting Assessment/Training    [place] Level (Toileting) adjust/manage clothing;perform perineal hygiene;independent  -JY     Assistive Devices (Toileting) commode;grab bar/safety frame  -JY     Position (Toileting) unsupported sitting;unsupported standing  -JY      Comment, (Toileting) no physical A for clothing mgmt or hygiene yet pt did report mild fatigue after completion  -               User Key  (r) = Recorded By, (t) = Taken By, (c) = Cosigned By      Initials Name Provider Type    Kylee Vo OT Occupational Therapist                   Obj/Interventions       Row Name 07/08/25 0849          Sensory Assessment (Somatosensory)    Sensory Assessment (Somatosensory) bilateral UE;sensation intact  -     Bilateral UE Sensory Assessment general sensation;light touch awareness;intact  -     Sensory Assessment denies any numbness or tingling and able to recognize LT stimuli as intact and symmetrical at BUEs  -       Row Name 07/08/25 0849          Vision Assessment/Intervention    Visual Impairment/Limitations corrective lenses for reading  -     Vision Assessment Comment no acute changes to vision  -[facility] Name 07/08/25 0849          Range of Motion Comprehensive    General Range of Motion bilateral upper extremity ROM WFL  -       Row Name 07/08/25 0849          Strength Comprehensive (MMT)    General Manual Muscle Testing (MMT) Assessment upper extremity strength deficits identified  -     Comment, General Manual Muscle Testing (MMT) Assessment per MMT, [facility] MMS grossly 4+/5; hand grasp assessment impacted by IV placement at L hand  -       Row Name 07/08/25 0849          Motor Skills    Motor Skills functional endurance;coordination  -     Coordination bilateral;upper extremity;finger to nose;other (see comments);WFL  finger thumb opposition  -     Functional Endurance decreased activity tolerance toward more dynamic demands, pt reported mild fatigue following routine tasks that pt denied as fatiguing at baseline, reported modified RPE 4/10 after fxl mobility related to ADLs and toileting, hygiene s/p toileting; SPO2 > 90% throughout on RA; initiated education on ECWS techs as applicable to ADLs, IADLs  -       Row Name 07/08/25 0884           Balance    Balance Assessment sitting static balance;sitting dynamic balance;standing static balance;standing dynamic balance  -JY     Static Sitting Balance independent  -JY     Dynamic Sitting Balance independent  -JY     Position, Sitting Balance unsupported;sitting in chair;other (see comments)  sitting on commode  -JY     Static Standing Balance independent  -JY     Dynamic Standing Balance standby assist  -JY     Position/Device Used, Standing Balance unsupported  -JY     Balance Interventions sitting;standing;static;dynamic;sit to stand;supported;occupation based/functional task  -JY     Comment, Balance no overt LOB during seated or standing tasks  -JY               User Key  (r) = Recorded By, (t) = Taken By, (c) = Cosigned By      Initials Name Provider Type    Kylee Vo, FARIBA Occupational Therapist                   Goals/Plan       Row Name 07/08/25 0903          Strength Goal 1 (OT)    Strength Goal 1 (OT) Pt to complete seated HEP encompassing BUEs targeting strength and endurance with progressive sets/reps/resistance in order to improve integration in ADLs, related t/fs and mobility as appropriate  -JY     Time Frame (Strength Goal 1, OT) short term goal (STG);2 days  -JY     Progress/Outcome (Strength Goal 1, OT) new goal  -JY       Row Name 07/08/25 0903          Problem Specific Goal 1 (OT)    Problem Specific Goal 1 (OT) Pt to verbalize/demonstrate understanding/competency in ECWS techs related to ADLs in order to improve safety, activity tolerance toward routine tasks  -JY     Time Frame (Problem Specific Goal 1, OT) short term goal (STG);2 days  -JY     Progress/Outcome (Problem Specific Goal 1, OT) new goal  -JY       Row Name 07/08/25 0903          Therapy Assessment/Plan (OT)    Planned Therapy Interventions (OT) activity tolerance training;BADL retraining;functional balance retraining;occupation/activity based interventions;patient/caregiver education/training;ROM/therapeutic  exercise;strengthening exercise;transfer/mobility retraining  -JY               User Key  (r) = Recorded By, (t) = Taken By, (c) = Cosigned By      Initials Name Provider Type    Kylee Vo, FARIBA Occupational Therapist                   Clinical Impression       Row Name 07/08/25 0854          Pain Assessment    Pretreatment Pain Rating 0/10 - no pain  -JY     Posttreatment Pain Rating 0/10 - no pain  -JY     Response to Pain Interventions no change per patient report  -JY     Pre/Posttreatment Pain Comment denies any pain throughout and tolerated all OT interventions  -JY       Row Name 07/08/25 0854          Plan of Care Review    Plan of Care Reviewed With patient  -FREDDYY     Progress no change  OT IE  -JY     Outcome Evaluation OT evaluation complete. Pt presents near occupational performance baseline however mildly impacted by decreased activity tolerance, muscle weakness at BUEs and perceived exertion/effort with more dynamic demands and some perceived less steadiness yet did not demonstrate any overt LOB during fxl mobility. Pt demonstrated I in fxl t/fs and SBA for ADL related mobility w/o AD used. RPE with said mobility 4/10. Initiated education on ECWS related to ADLs with further education planned. Pt demonstrated I in d/d socks, clothing mgmt and hygiene related to toileting, min A for d/d gown and SUA for sinkside g/h. Pt is just below baseline and would benefit from IPOT POC to address these underlying impairments and return home w/ A at d/c when medically ready.  -JY       Row Name 07/08/25 0854          Therapy Assessment/Plan (OT)    Patient/Family Therapy Goal Statement (OT) to return to OF  -JY     Rehab Potential (OT) good  -JY     Criteria for Skilled Therapeutic Interventions Met (OT) yes;skilled treatment is necessary  -JY     Therapy Frequency (OT) daily  -JY     Predicted Duration of Therapy Intervention (OT) 2 days  -JY       Row Name 07/08/25 0829          Therapy Plan Review/Discharge  Plan (OT)    Anticipated Discharge Disposition (OT) home with assist  -RALPH       Row Name 07/08/25 0854          Vital Signs    Pre Systolic BP Rehab 118  -JY     Pre Treatment Diastolic BP 53  -JY     Pretreatment Heart Rate (beats/min) 76  -JY     Posttreatment Heart Rate (beats/min) 72  -JY     Pre SpO2 (%) 95  -JY     O2 Delivery Pre Treatment room air  -JY     O2 Delivery Intra Treatment room air  -JY     Post SpO2 (%) 93  -JY     O2 Delivery Post Treatment room air  -JY     Pre Patient Position Sitting  -JY     Post Patient Position Sitting  -JY       Row Name 07/08/25 0854          Positioning and Restraints    Pre-Treatment Position bathroom  -JY     Post Treatment Position chair  -JY     In Chair notified nsg;reclined;call light within reach;encouraged to call for assist;waffle cushion;legs elevated  no exit alarm engaged upon OT arrival and left unchanged at exit, RN approved  -RALPH               User Key  (r) = Recorded By, (t) = Taken By, (c) = Cosigned By      Initials Name Provider Type    Kylee Vo OT Occupational Therapist                   Outcome Measures       Row Name 07/08/25 0907          How much help from another is currently needed...    Putting on and taking off regular lower body clothing? 4  -JY     Bathing (including washing, rinsing, and drying) 3  -JY     Toileting (which includes using toilet bed pan or urinal) 4  -JY     Putting on and taking off regular upper body clothing 3  -JY     Taking care of personal grooming (such as brushing teeth) 4  -JY     Eating meals 4  -JY     AM-PAC 6 Clicks Score (OT) 22  -JY       Row Name 07/08/25 0907          Functional Assessment    Outcome Measure Options AM-PAC 6 Clicks Daily Activity (OT)  -JY               User Key  (r) = Recorded By, (t) = Taken By, (c) = Cosigned By      Initials Name Provider Type    Kylee Vo OT Occupational Therapist                    Occupational Therapy Education       Title: PT OT SLP Therapies (In  Progress)       Topic: Occupational Therapy (In Progress)       Point: ADL training (In Progress)       Learning Progress Summary            Patient Acceptance, E,D, NR by RALPH at 7/8/2025 0750                      Point: Precautions (In Progress)       Learning Progress Summary            Patient Acceptance, E,D, NR by RALPH at 7/8/2025 0750                      Point: Body mechanics (In Progress)       Learning Progress Summary            Patient Acceptance, E,D, NR by RALPH at 7/8/2025 0750                                      User Key       Initials Effective Dates Name Provider Type Discipline    RALPH 06/16/21 -  Kylee Crowe, FARIBA Occupational Therapist OT                  OT Recommendation and Plan  Planned Therapy Interventions (OT): activity tolerance training, BADL retraining, functional balance retraining, occupation/activity based interventions, patient/caregiver education/training, ROM/therapeutic exercise, strengthening exercise, transfer/mobility retraining  Therapy Frequency (OT): daily  Plan of Care Review  Plan of Care Reviewed With: patient  Progress: no change (OT IE)  Outcome Evaluation: OT evaluation complete. Pt presents near occupational performance baseline however mildly impacted by decreased activity tolerance, muscle weakness at BUEs and perceived exertion/effort with more dynamic demands and some perceived less steadiness yet did not demonstrate any overt LOB during fxl mobility. Pt demonstrated I in fxl t/fs and SBA for ADL related mobility w/o AD used. RPE with said mobility 4/10. Initiated education on ECWS related to ADLs with further education planned. Pt demonstrated I in d/d socks, clothing mgmt and hygiene related to toileting, min A for d/d gown and SUA for sinkside g/h. Pt is just below baseline and would benefit from IPOT POC to address these underlying impairments and return home w/ A at d/c when medically ready.     Time Calculation:   Evaluation Complexity (OT)  Review Occupational  Profile/Medical/Therapy History Complexity: brief/low complexity  Assessment, Occupational Performance/Identification of Deficit Complexity: 1-3 performance deficits  Clinical Decision Making Complexity (OT): problem focused assessment/low complexity  Overall Complexity of Evaluation (OT): low complexity     Time Calculation- OT       Row Name 07/08/25 0908             Time Calculation- OT    OT Start Time 0750  -JY      OT Received On 07/08/25  -JY      OT Goal Re-Cert Due Date 07/18/25  -JY         Timed Charges    88412 - OT Therapeutic Activity Minutes 10  -JY      35186 - OT Self Care/Mgmt Minutes --  -JY         Untimed Charges    OT Eval/Re-eval Minutes 68  -JY         Total Minutes    Timed Charges Total Minutes 10  -JY      Untimed Charges Total Minutes 68  -JY       Total Minutes 78  -JY                User Key  (r) = Recorded By, (t) = Taken By, (c) = Cosigned By      Initials Name Provider Type    Kylee Vo OT Occupational Therapist                  Therapy Charges for Today       Code Description Service Date Service Provider Modifiers Qty    27675310160  OT THERAPEUTIC ACT EA 15 MIN 7/8/2025 Kylee Crowe OT GO 1    [id number] -OT EVAL LOW COMPLEXITY 5 7/8/2025 Kylee Crowe OT  1                 Kylee Crowe OT  7/8/2025

## 2025-07-08 NOTE — PLAN OF CARE
Problem: Adult Inpatient Plan of Care  Goal: Plan of Care Review  Outcome: Met  Goal: Patient-Specific Goal (Individualized)  Outcome: Met  Goal: Absence of Hospital-Acquired Illness or Injury  Outcome: Met  Intervention: Identify and Manage Fall Risk  Recent Flowsheet Documentation  Taken 7/8/2025 1602 by Shani Vela RN  Safety Promotion/Fall Prevention:   activity supervised   safety round/check completed  Taken 7/8/2025 1400 by Shani Vela RN  Safety Promotion/Fall Prevention:   activity supervised   safety round/check completed  Taken 7/8/2025 1200 by Shani Vela RN  Safety Promotion/Fall Prevention:   activity supervised   safety round/check completed  Taken 7/8/2025 1000 by Shani Vela RN  Safety Promotion/Fall Prevention:   activity supervised   safety round/check completed  Taken 7/8/2025 0800 by Shani Vela RN  Safety Promotion/Fall Prevention:   activity supervised   safety round/check completed  Intervention: Prevent Skin Injury  Recent Flowsheet Documentation  Taken 7/8/2025 1602 by Shani Vela RN  Body Position: position changed independently  Skin Protection: skin sealant/moisture barrier applied  Taken 7/8/2025 1400 by Shani Vela RN  Body Position: position changed independently  Skin Protection: skin sealant/moisture barrier applied  Taken 7/8/2025 1200 by Shani Vela RN  Body Position: position changed independently  Skin Protection: transparent dressing maintained  Taken 7/8/2025 1000 by Shani Vela RN  Body Position: position changed independently  Skin Protection: skin sealant/moisture barrier applied  Taken 7/8/2025 0800 by Shani Vela RN  Body Position: position changed independently  Skin Protection: skin sealant/moisture barrier applied  Goal: Optimal Comfort and Wellbeing  Outcome: Met  Goal: Readiness for Transition of Care  Outcome: Met     Problem: Fall Injury Risk  Goal: Absence of Fall and Fall-Related Injury  Outcome: Met  Intervention: Promote  Injury-Free Environment  Recent Flowsheet Documentation  Taken 7/8/2025 1602 by Shani Vela RN  Safety Promotion/Fall Prevention:   activity supervised   safety round/check completed  Taken 7/8/2025 1400 by Shani Vela RN  Safety Promotion/Fall Prevention:   activity supervised   safety round/check completed  Taken 7/8/2025 1200 by Shani Vela RN  Safety Promotion/Fall Prevention:   activity supervised   safety round/check completed  Taken 7/8/2025 1000 by Shani Vela RN  Safety Promotion/Fall Prevention:   activity supervised   safety round/check completed  Taken 7/8/2025 0800 by Shani Vela RN  Safety Promotion/Fall Prevention:   activity supervised   safety round/check completed   Goal Outcome Evaluation:         Patient educated on follow-up appointments, medications and side effects. VSS, Aox4, UAL. Pending ride arrival. D/c completed. Care plan concluded.

## 2025-07-08 NOTE — PROGRESS NOTES
[facility] Medicine Services  PROGRESS NOTE    Patient Name: Jocelyne Lindquist  : 1960  MRN: [id number]    Date of Admission: 2025  Primary Care Physician: Steve Trujillo MD    Subjective   Subjective     CC:  F/u electrolyte abnormalities    HPI:  Is doing well this morning, eating well and does not feel as weak as yesterday      Objective   Objective     Vital Signs:   Temp:  [97.4 °F (36.3 °C)-98 °F (36.7 °C)] 97.4 °F (36.3 °C)  Heart Rate:  [64-92] 67  Resp:  [16-18] 18  BP: (118-150)/(53-81) 118/53     Physical Exam:  Constitutional: No acute distress, awake, alert  HENT: NCAT, mucous membranes moist  Respiratory: Clear to auscultation bilaterally, respiratory effort normal   Cardiovascular: RRR, no murmurs, rubs, or gallops  Gastrointestinal: Positive bowel sounds, soft, nontender, nondistended  Musculoskeletal: No bilateral ankle edema  Psychiatric: Appropriate affect, cooperative  Neurologic: Oriented x 3, no focal deficits, speech clear  Skin: No rashes      Results Reviewed:  LAB RESULTS:      Lab 25  0517 25  0945 25  0818   WBC 8.19  --  14.24*   HEMOGLOBIN 12.6  --  15.1   HEMATOCRIT 39.8  --  46.2   PLATELETS 348  --  436   NEUTROS ABS 6.49  --  11.95*   IMMATURE GRANS (ABS) 0.02  --  0.09*   LYMPHS ABS 1.06  --  1.83   MONOS ABS 0.51  --  0.29   EOS ABS 0.08  --  0.03   MCV 83.3  --  81.6   SED RATE  --   --  35*   PROCALCITONIN  --   --  0.04   HSTROP T  --  13 12         Lab 25  0517 25  0133 25  0945 25  0818   SODIUM  --   --   --  144   POTASSIUM  --  4.2  --  3.4*   CHLORIDE  --   --   --  98   CO2  --   --   --  23.0   ANION GAP  --   --   --  23.0*   BUN  --   --   --  10.8   CREATININE  --   --   --  0.88   EGFR  --   --   --  72.6   GLUCOSE  --   --   --  111*   CALCIUM  --   --   --  6.5*   IONIZED CALCIUM 0.98*  --  0.76*  --    MAGNESIUM  --   --   --  0.3*   HEMOGLOBIN A1C  --   --   --  6.58*   TSH   --   --   --  0.759         Lab 07/07/25  0818   TOTAL PROTEIN 7.1   ALBUMIN 4.1   GLOBULIN 3.0   ALT (SGPT) 13   AST (SGOT) 29   BILIRUBIN 0.5   ALK PHOS 97         Lab 07/07/25  0945 07/07/25  0818   HSTROP T 13 12                 Brief Urine Lab Results  (Last result in the past 365 days)        Color   Clarity   Blood   Leuk Est   Nitrite   Protein   CREAT   Urine HCG        07/07/25 2355 Yellow   Turbid   Moderate (2+)   Negative   Negative   30 mg/dL (1+)                   Microbiology Results Abnormal       None            MRI Brain Without Contrast  Result Date: 7/7/2025  MRI BRAIN WO CONTRAST Date of Exam: 7/7/2025 11:09 AM EDT Indication: dizziness and problems walking x 3 weeks, eval for cva/mass.  Comparison: January 16, 2021 Technique:  Routine multiplanar/multisequence sequence images of the brain were obtained without contrast administration. Findings: No acute infarction, intracranial hemorrhage, or extra-axial collection is identified. The ventricles appear normal in caliber, with no evidence of mass effect or midline shift. The basal cisterns appear patent. The midline structures appear intact. The globes and orbits appear intact. The intracranial vascular flow-voids appear patent. Scattered foci of periventricular and subcortical white matter FLAIR hyperintensities are nonspecific, but likely the sequela of mild chronic small vessel ischemic disease.     Impression: Impression: 1.No acute intracranial process identified. 2.Findings suggestive of mild chronic small vessel ischemic disease. Electronically Signed: Parish Lake MD  7/7/2025 11:37 AM EDT  Workstation ID: [id number]    XR Chest 1 View  Result Date: 7/7/2025  XR CHEST 1 VW Date of Exam: 7/7/2025 8:11 AM EDT Indication: Weak/Dizzy/AMS triage protocol Comparison: 1/16/2021 Findings: Heart size is within normal limits. The pulmonary vascular pattern of the chest is normal and the lungs are clear. There are calcified lymph nodes in the  right hilum and right lung base reflecting old healed granulomatous disease.     Impression: Impression: No active disease. Electronically Signed: Steve Solano MD  7/7/2025 8:24 AM EDT  Workstation ID: [id number]          Current medications:  Scheduled Meds:aspirin, 81 mg, Oral, Daily  budesonide-formoterol, 2 puff, Inhalation, BID - RT  famotidine, 20 mg, Oral, BID AC  metoprolol succinate XL, 100 mg, Oral, Daily  sodium chloride, 10 mL, Intravenous, Q12H      Continuous Infusions:lactated ringers, 50 mL/hr, Last Rate: 50 mL/hr (07/07/25 1235)      PRN Meds:.  albuterol    senna-docusate sodium **AND** polyethylene glycol **AND** bisacodyl **AND** bisacodyl    Calcium Replacement - Follow Nurse / BPA Driven Protocol    Magnesium Standard Dose Replacement - Follow Nurse / BPA Driven Protocol    nitroglycerin    ondansetron    Phosphorus Replacement - Follow Nurse / BPA Driven Protocol    Potassium Replacement - Follow Nurse / BPA Driven Protocol    prochlorperazine **OR** prochlorperazine **OR** prochlorperazine    sodium chloride    sodium chloride    sodium chloride    Assessment & Plan   Assessment & Plan     Active Hospital Problems    Diagnosis  POA    **Weakness [R53.1]  Yes    GERD (gastroesophageal reflux disease) [K21.9]  Yes      Resolved Hospital Problems   No resolved problems to display.        Brief Hospital Course to date:  Jocelyne Lindquist is a 64 y.o. female with a history of hypertension, hyperlipidemia, chronic weakness presenting with acute on chronic weakness, nonspecific GI complaints and found to have severe electrolyte abnormalities.  Nephrology follows    Acute on chronic weakness  Severe electrolyte abnormalities including hypomagnesia and hypokalemia and hypocalcemia  - Started on electrolyte replacement protocol, patient reports improvement of symptoms with correction of electrolytes  - BMP from this morning is pending-she recently stopped her magnesium supplementation, had been on this  chronically  - PPI was stopped, in case contributing to hypo magnesium. Cont famotidine. Reflux sx currently controlled  - Serum magnesium and urine creatinine ordered per nephrology for further workup    Anorexia/nausea  - Now resolved, likely related to above    Weakness/falls  - MRI brain without acute findings, again likely related to above.  She was able to walk around in the jaeger without difficulty today    Hypertension   hyperlipidemia  --cont home meds      Expected Discharge Location and Transportation: home  Expected Discharge   Expected Discharge Date: 7/9/2025; Expected Discharge Time:      VTE Prophylaxis:  Mechanical VTE prophylaxis orders are present.         AM-PAC 6 Clicks Score (PT): 24 (07/07/25 1308)    CODE STATUS:   Code Status and Medical Interventions: CPR (Attempt to Resuscitate); Full Support   Ordered at: 07/07/25 1003     Code Status (Patient has no pulse and is not breathing):    CPR (Attempt to Resuscitate)     Medical Interventions (Patient has pulse or is breathing):    Full Support       Alyssa Olea MD  07/08/25

## 2025-07-09 LAB — MAGNESIUM UR-MCNC: 13 MG/DL

## 2025-07-23 ENCOUNTER — TELEPHONE (OUTPATIENT)
Dept: PEDIATRICS | Facility: OTHER | Age: 65
End: 2025-07-23
Payer: MEDICARE

## 2025-07-23 NOTE — TELEPHONE ENCOUNTER
Caller: Jocelyne Lindquist     Relationship: SELF    Best call back number: [phone]    What is your medical concern? PATIENT WANTED TO LET YOU KNOW SHE HAS CHANGED PROVIDERS BACK TO HER FORMER PRIMARY CARE PROVIDER DR. MCPHERSON.  SHE WILL NOT BE SEEN AT [facility] [place] [address] ANYMORE.

## 2025-07-29 ENCOUNTER — TRANSCRIBE ORDERS (OUTPATIENT)
Dept: ADMINISTRATIVE | Facility: HOSPITAL | Age: 65
End: 2025-07-29
Payer: MEDICARE

## 2025-07-29 DIAGNOSIS — Z78.0 POST-MENOPAUSAL: ICD-10-CM

## 2025-07-29 DIAGNOSIS — N95.1 MENOPAUSAL AND FEMALE CLIMACTERIC STATES: Primary | ICD-10-CM

## 2025-08-10 DIAGNOSIS — J45.20 MILD INTERMITTENT ASTHMA WITHOUT COMPLICATION: ICD-10-CM

## 2025-08-12 RX ORDER — ALBUTEROL SULFATE 90 UG/1
2 INHALANT RESPIRATORY (INHALATION) EVERY 6 HOURS PRN
Qty: 18 G | Refills: 0 | Status: SHIPPED | OUTPATIENT
Start: 2025-08-12

## 2025-08-19 ENCOUNTER — HOSPITAL ENCOUNTER (OUTPATIENT)
Dept: BONE DENSITY | Facility: HOSPITAL | Age: 65
Discharge: HOME | End: 2025-08-19
Admitting: INTERNAL MEDICINE
Payer: MEDICARE

## 2025-08-19 DIAGNOSIS — Z78.0 POST-MENOPAUSAL: ICD-10-CM

## 2025-08-19 DIAGNOSIS — N95.1 MENOPAUSAL AND FEMALE CLIMACTERIC STATES: ICD-10-CM

## 2025-08-19 PROCEDURE — 77080 DXA BONE DENSITY AXIAL: CPT

## (undated) DEVICE — 3M™ WARMING BLANKET, UPPER BODY, 10 PER CASE, 42268: Brand: BAIR HUGGER™

## (undated) DEVICE — ANTIBACTERIAL UNDYED BRAIDED (POLYGLACTIN 910), SYNTHETIC ABSORBABLE SUTURE: Brand: COATED VICRYL

## (undated) DEVICE — SUT VIC 12X27 D8116 BX/12

## (undated) DEVICE — LEX BASIC NO DRAPE: Brand: MEDLINE INDUSTRIES, INC.

## (undated) DEVICE — CATH URETRL FILFRM SPIRAL TP 5F REUS

## (undated) DEVICE — PREMIUM DRY TRAY LF: Brand: MEDLINE INDUSTRIES, INC.

## (undated) DEVICE — ADHS SKIN PREMIERPRO EXOFIN TOPICAL HI/VISC .5ML

## (undated) DEVICE — BNDR ABD PREMIUM/UNIV 10IN 27TO48IN

## (undated) DEVICE — PCH INST SURG INVISISHIELD 2PCKT

## (undated) DEVICE — CYSTO/BLADDER IRRIGATION SET, REGULATING CLAMP

## (undated) DEVICE — TBG PENCL TELESCP MEGADYNE SMOKE EVAC 10FT

## (undated) DEVICE — TOTAL TRAY, 16FR 10ML SIL FOLEY, URN: Brand: MEDLINE

## (undated) DEVICE — ENSEAL 20 CM SHAFT, LARGE JAW: Brand: ENSEAL X1

## (undated) DEVICE — TRAP FLD MINIVAC MEGADYNE 100ML

## (undated) DEVICE — GLV SURG SENSICARE PI MIC PF SZ6 LF STRL

## (undated) DEVICE — SUT MONOCRYL PLS ANTIB UND 3/0  PS1 27IN

## (undated) DEVICE — UNDERGLV SURG BIOGEL INDICAT PF 61/2 GRN

## (undated) DEVICE — DRAPE,UNDERBUTTOCKS,STERILE: Brand: MEDLINE

## (undated) DEVICE — SYS CLS SKIN PREMIERPRO EXOFINFUSION 22CM

## (undated) DEVICE — DRAPE, LAVH, STERILE: Brand: MEDLINE

## (undated) DEVICE — SCRB SURG BACTOSHIELD CHG 4PCT 4OZ

## (undated) DEVICE — SUT PDS LP 1 TP1 96IN VIO PDP880GA

## (undated) DEVICE — SUT VICYL PLS CTD ANTIB BR 1 27IN VIL

## (undated) DEVICE — PATIENT RETURN ELECTRODE, SINGLE-USE, CONTACT QUALITY MONITORING, ADULT, WITH 9FT CORD, FOR PATIENTS WEIGING OVER 33LBS. (15KG): Brand: MEGADYNE